# Patient Record
Sex: FEMALE | Employment: OTHER | URBAN - METROPOLITAN AREA
[De-identification: names, ages, dates, MRNs, and addresses within clinical notes are randomized per-mention and may not be internally consistent; named-entity substitution may affect disease eponyms.]

---

## 2020-01-01 ENCOUNTER — APPOINTMENT (OUTPATIENT)
Dept: GENERAL RADIOLOGY | Age: 75
DRG: 870 | End: 2020-01-01
Attending: INTERNAL MEDICINE
Payer: MEDICARE

## 2020-01-01 ENCOUNTER — HOSPITAL ENCOUNTER (INPATIENT)
Age: 75
LOS: 20 days | DRG: 870 | End: 2020-12-06
Attending: INTERNAL MEDICINE | Admitting: STUDENT IN AN ORGANIZED HEALTH CARE EDUCATION/TRAINING PROGRAM
Payer: MEDICARE

## 2020-01-01 ENCOUNTER — ANESTHESIA EVENT (OUTPATIENT)
Dept: ICU | Age: 75
DRG: 870 | End: 2020-01-01
Payer: MEDICARE

## 2020-01-01 ENCOUNTER — APPOINTMENT (OUTPATIENT)
Dept: NUCLEAR MEDICINE | Age: 75
DRG: 870 | End: 2020-01-01
Attending: INTERNAL MEDICINE
Payer: MEDICARE

## 2020-01-01 ENCOUNTER — ANESTHESIA (OUTPATIENT)
Dept: ICU | Age: 75
DRG: 870 | End: 2020-01-01
Payer: MEDICARE

## 2020-01-01 VITALS
SYSTOLIC BLOOD PRESSURE: 100 MMHG | HEIGHT: 67 IN | DIASTOLIC BLOOD PRESSURE: 47 MMHG | TEMPERATURE: 100.6 F | WEIGHT: 169.31 LBS | HEART RATE: 118 BPM | OXYGEN SATURATION: 75 % | RESPIRATION RATE: 21 BRPM | BODY MASS INDEX: 26.57 KG/M2

## 2020-01-01 LAB
-: NORMAL
-: NORMAL
ABO/RH: NORMAL
ABSOLUTE EOS #: 0 K/UL (ref 0–0.4)
ABSOLUTE EOS #: 0 K/UL (ref 0–0.44)
ABSOLUTE EOS #: 0.01 K/UL (ref 0–0.4)
ABSOLUTE EOS #: 0.11 K/UL (ref 0–0.4)
ABSOLUTE EOS #: <0.03 K/UL (ref 0–0.44)
ABSOLUTE IMMATURE GRANULOCYTE: 0 K/UL (ref 0–0.3)
ABSOLUTE IMMATURE GRANULOCYTE: 0 K/UL (ref 0–0.3)
ABSOLUTE IMMATURE GRANULOCYTE: 0.08 K/UL (ref 0–0.3)
ABSOLUTE IMMATURE GRANULOCYTE: 0.09 K/UL (ref 0–0.3)
ABSOLUTE IMMATURE GRANULOCYTE: 0.11 K/UL (ref 0–0.3)
ABSOLUTE IMMATURE GRANULOCYTE: 0.12 K/UL (ref 0–0.3)
ABSOLUTE IMMATURE GRANULOCYTE: 0.13 K/UL (ref 0–0.3)
ABSOLUTE IMMATURE GRANULOCYTE: 0.14 K/UL (ref 0–0.3)
ABSOLUTE IMMATURE GRANULOCYTE: 0.17 K/UL (ref 0–0.3)
ABSOLUTE IMMATURE GRANULOCYTE: 0.2 K/UL (ref 0–0.3)
ABSOLUTE IMMATURE GRANULOCYTE: 0.27 K/UL (ref 0–0.3)
ABSOLUTE IMMATURE GRANULOCYTE: 0.27 K/UL (ref 0–0.3)
ABSOLUTE IMMATURE GRANULOCYTE: 0.31 K/UL (ref 0–0.3)
ABSOLUTE IMMATURE GRANULOCYTE: 0.41 K/UL (ref 0–0.3)
ABSOLUTE IMMATURE GRANULOCYTE: 0.47 K/UL (ref 0–0.3)
ABSOLUTE IMMATURE GRANULOCYTE: 0.62 K/UL (ref 0–0.3)
ABSOLUTE LYMPH #: 0.48 K/UL (ref 1–4.8)
ABSOLUTE LYMPH #: 0.58 K/UL (ref 1–4.8)
ABSOLUTE LYMPH #: 0.63 K/UL (ref 1–4.8)
ABSOLUTE LYMPH #: 0.67 K/UL (ref 1–4.8)
ABSOLUTE LYMPH #: 0.71 K/UL (ref 1.1–3.7)
ABSOLUTE LYMPH #: 0.71 K/UL (ref 1.1–3.7)
ABSOLUTE LYMPH #: 0.74 K/UL (ref 1.1–3.7)
ABSOLUTE LYMPH #: 0.76 K/UL (ref 1.1–3.7)
ABSOLUTE LYMPH #: 0.79 K/UL (ref 1.1–3.7)
ABSOLUTE LYMPH #: 0.93 K/UL (ref 1.1–3.7)
ABSOLUTE LYMPH #: 0.94 K/UL (ref 1.1–3.7)
ABSOLUTE LYMPH #: 1.04 K/UL (ref 1.1–3.7)
ABSOLUTE LYMPH #: 1.19 K/UL (ref 1–4.8)
ABSOLUTE LYMPH #: 1.24 K/UL (ref 1–4.8)
ABSOLUTE LYMPH #: 1.35 K/UL (ref 1–4.8)
ABSOLUTE LYMPH #: 2.05 K/UL (ref 1–4.8)
ABSOLUTE LYMPH #: 2.08 K/UL (ref 1–4.8)
ABSOLUTE LYMPH #: 2.14 K/UL (ref 1–4.8)
ABSOLUTE MONO #: 0.16 K/UL (ref 0.1–1.2)
ABSOLUTE MONO #: 0.19 K/UL (ref 0.1–1.2)
ABSOLUTE MONO #: 0.19 K/UL (ref 0.1–1.2)
ABSOLUTE MONO #: 0.21 K/UL (ref 0.1–0.8)
ABSOLUTE MONO #: 0.22 K/UL (ref 0.1–0.8)
ABSOLUTE MONO #: 0.23 K/UL (ref 0.1–1.2)
ABSOLUTE MONO #: 0.23 K/UL (ref 0.1–1.2)
ABSOLUTE MONO #: 0.24 K/UL (ref 0.1–1.2)
ABSOLUTE MONO #: 0.26 K/UL (ref 0.1–0.8)
ABSOLUTE MONO #: 0.26 K/UL (ref 0.1–1.2)
ABSOLUTE MONO #: 0.27 K/UL (ref 0.1–0.8)
ABSOLUTE MONO #: 0.28 K/UL (ref 0.1–0.8)
ABSOLUTE MONO #: 0.34 K/UL (ref 0.1–0.8)
ABSOLUTE MONO #: 0.39 K/UL (ref 0.1–1.2)
ABSOLUTE MONO #: 0.48 K/UL (ref 0.1–0.8)
ABSOLUTE MONO #: 0.9 K/UL (ref 0.1–0.8)
ABSOLUTE MONO #: 0.9 K/UL (ref 0.1–0.8)
ABSOLUTE MONO #: 0.94 K/UL (ref 0.1–0.8)
ACTION: NORMAL
ADENOVIRUS PCR: NOT DETECTED
ALBUMIN SERPL-MCNC: 2.2 G/DL (ref 3.5–5.2)
ALBUMIN SERPL-MCNC: 2.2 G/DL (ref 3.5–5.2)
ALBUMIN SERPL-MCNC: 2.3 G/DL (ref 3.5–5.2)
ALBUMIN SERPL-MCNC: 2.5 G/DL (ref 3.5–5.2)
ALBUMIN SERPL-MCNC: 2.6 G/DL (ref 3.5–5.2)
ALBUMIN SERPL-MCNC: 2.8 G/DL (ref 3.5–5.2)
ALBUMIN SERPL-MCNC: 2.9 G/DL (ref 3.5–5.2)
ALBUMIN SERPL-MCNC: 2.9 G/DL (ref 3.5–5.2)
ALBUMIN SERPL-MCNC: 3 G/DL (ref 3.5–5.2)
ALBUMIN/GLOBULIN RATIO: 0.6 (ref 1–2.5)
ALBUMIN/GLOBULIN RATIO: 0.7 (ref 1–2.5)
ALBUMIN/GLOBULIN RATIO: 0.9 (ref 1–2.5)
ALBUMIN/GLOBULIN RATIO: 1 (ref 1–2.5)
ALBUMIN/GLOBULIN RATIO: 1 (ref 1–2.5)
ALLEN TEST: ABNORMAL
ALP BLD-CCNC: 58 U/L (ref 35–104)
ALP BLD-CCNC: 61 U/L (ref 35–104)
ALP BLD-CCNC: 62 U/L (ref 35–104)
ALP BLD-CCNC: 74 U/L (ref 35–104)
ALP BLD-CCNC: 76 U/L (ref 35–104)
ALP BLD-CCNC: 77 U/L (ref 35–104)
ALP BLD-CCNC: 88 U/L (ref 35–104)
ALT SERPL-CCNC: 14 U/L (ref 5–33)
ALT SERPL-CCNC: 18 U/L (ref 5–33)
ALT SERPL-CCNC: 18 U/L (ref 5–33)
ALT SERPL-CCNC: 23 U/L (ref 5–33)
ALT SERPL-CCNC: 35 U/L (ref 5–33)
ALT SERPL-CCNC: 45 U/L (ref 5–33)
ALT SERPL-CCNC: 49 U/L (ref 5–33)
AMORPHOUS: NORMAL
AMORPHOUS: NORMAL
ANION GAP SERPL CALCULATED.3IONS-SCNC: 11 MMOL/L (ref 9–17)
ANION GAP SERPL CALCULATED.3IONS-SCNC: 12 MMOL/L (ref 9–17)
ANION GAP SERPL CALCULATED.3IONS-SCNC: 15 MMOL/L (ref 9–17)
ANION GAP SERPL CALCULATED.3IONS-SCNC: 16 MMOL/L (ref 9–17)
ANION GAP SERPL CALCULATED.3IONS-SCNC: 5 MMOL/L (ref 9–17)
ANION GAP SERPL CALCULATED.3IONS-SCNC: 6 MMOL/L (ref 9–17)
ANION GAP SERPL CALCULATED.3IONS-SCNC: 7 MMOL/L (ref 9–17)
ANION GAP SERPL CALCULATED.3IONS-SCNC: 7 MMOL/L (ref 9–17)
ANION GAP SERPL CALCULATED.3IONS-SCNC: 8 MMOL/L (ref 9–17)
ANION GAP SERPL CALCULATED.3IONS-SCNC: 9 MMOL/L (ref 9–17)
ANTIBODY IDENTIFICATION: NORMAL
ANTIBODY SCREEN: NORMAL
ANTIBODY SCREEN: POSITIVE
ARM BAND NUMBER: NORMAL
AST SERPL-CCNC: 13 U/L
AST SERPL-CCNC: 16 U/L
AST SERPL-CCNC: 18 U/L
AST SERPL-CCNC: 22 U/L
AST SERPL-CCNC: 33 U/L
AST SERPL-CCNC: 38 U/L
AST SERPL-CCNC: 41 U/L
ATYPICAL LYMPHOCYTE ABSOLUTE COUNT: 0.01 K/UL
ATYPICAL LYMPHOCYTES: 1 %
BACTERIA: NORMAL
BACTERIA: NORMAL
BASOPHILS # BLD: 0 % (ref 0–2)
BASOPHILS # BLD: 1 % (ref 0–2)
BASOPHILS # BLD: 1 % (ref 0–2)
BASOPHILS ABSOLUTE: 0 K/UL (ref 0–0.2)
BASOPHILS ABSOLUTE: 0.08 K/UL (ref 0–0.2)
BASOPHILS ABSOLUTE: <0.03 K/UL (ref 0–0.2)
BILIRUB SERPL-MCNC: 0.29 MG/DL (ref 0.3–1.2)
BILIRUB SERPL-MCNC: 0.3 MG/DL (ref 0.3–1.2)
BILIRUB SERPL-MCNC: 0.41 MG/DL (ref 0.3–1.2)
BILIRUB SERPL-MCNC: 0.47 MG/DL (ref 0.3–1.2)
BILIRUB SERPL-MCNC: 0.51 MG/DL (ref 0.3–1.2)
BILIRUB SERPL-MCNC: 0.53 MG/DL (ref 0.3–1.2)
BILIRUB SERPL-MCNC: 0.64 MG/DL (ref 0.3–1.2)
BILIRUBIN DIRECT: 0.22 MG/DL
BILIRUBIN URINE: ABNORMAL
BILIRUBIN URINE: NEGATIVE
BILIRUBIN, INDIRECT: 0.31 MG/DL (ref 0–1)
BLD PROD TYP BPU: NORMAL
BLOOD BANK SPECIMEN: NORMAL
BNP INTERPRETATION: ABNORMAL
BNP INTERPRETATION: ABNORMAL
BORDETELLA PARAPERTUSSIS: NOT DETECTED
BORDETELLA PERTUSSIS PCR: NOT DETECTED
BUN BLDV-MCNC: 16 MG/DL (ref 8–23)
BUN BLDV-MCNC: 19 MG/DL (ref 8–23)
BUN BLDV-MCNC: 22 MG/DL (ref 8–23)
BUN BLDV-MCNC: 22 MG/DL (ref 8–23)
BUN BLDV-MCNC: 23 MG/DL (ref 8–23)
BUN BLDV-MCNC: 24 MG/DL (ref 8–23)
BUN BLDV-MCNC: 24 MG/DL (ref 8–23)
BUN BLDV-MCNC: 26 MG/DL (ref 8–23)
BUN BLDV-MCNC: 28 MG/DL (ref 8–23)
BUN BLDV-MCNC: 32 MG/DL (ref 8–23)
BUN BLDV-MCNC: 32 MG/DL (ref 8–23)
BUN BLDV-MCNC: 34 MG/DL (ref 8–23)
BUN BLDV-MCNC: 38 MG/DL (ref 8–23)
BUN BLDV-MCNC: 44 MG/DL (ref 8–23)
BUN/CREAT BLD: ABNORMAL (ref 9–20)
C-REACTIVE PROTEIN: 164 MG/L (ref 0–5)
C-REACTIVE PROTEIN: 227.4 MG/L (ref 0–5)
C-REACTIVE PROTEIN: 252.8 MG/L (ref 0–5)
C-REACTIVE PROTEIN: 67.6 MG/L (ref 0–5)
CALCIUM SERPL-MCNC: 7.8 MG/DL (ref 8.6–10.4)
CALCIUM SERPL-MCNC: 8 MG/DL (ref 8.6–10.4)
CALCIUM SERPL-MCNC: 8.1 MG/DL (ref 8.6–10.4)
CALCIUM SERPL-MCNC: 8.2 MG/DL (ref 8.6–10.4)
CALCIUM SERPL-MCNC: 8.3 MG/DL (ref 8.6–10.4)
CALCIUM SERPL-MCNC: 8.4 MG/DL (ref 8.6–10.4)
CALCIUM SERPL-MCNC: 8.4 MG/DL (ref 8.6–10.4)
CALCIUM SERPL-MCNC: 8.6 MG/DL (ref 8.6–10.4)
CALCIUM SERPL-MCNC: 9 MG/DL (ref 8.6–10.4)
CASTS UA: NORMAL /LPF (ref 0–8)
CASTS UA: NORMAL /LPF (ref 0–8)
CHLAMYDIA PNEUMONIAE BY PCR: NOT DETECTED
CHLORIDE BLD-SCNC: 100 MMOL/L (ref 98–107)
CHLORIDE BLD-SCNC: 101 MMOL/L (ref 98–107)
CHLORIDE BLD-SCNC: 102 MMOL/L (ref 98–107)
CHLORIDE BLD-SCNC: 102 MMOL/L (ref 98–107)
CHLORIDE BLD-SCNC: 103 MMOL/L (ref 98–107)
CHLORIDE BLD-SCNC: 103 MMOL/L (ref 98–107)
CHLORIDE BLD-SCNC: 104 MMOL/L (ref 98–107)
CHLORIDE BLD-SCNC: 105 MMOL/L (ref 98–107)
CHLORIDE BLD-SCNC: 105 MMOL/L (ref 98–107)
CHLORIDE BLD-SCNC: 106 MMOL/L (ref 98–107)
CHLORIDE BLD-SCNC: 106 MMOL/L (ref 98–107)
CHLORIDE BLD-SCNC: 107 MMOL/L (ref 98–107)
CHLORIDE BLD-SCNC: 108 MMOL/L (ref 98–107)
CHLORIDE BLD-SCNC: 111 MMOL/L (ref 98–107)
CO2: 21 MMOL/L (ref 20–31)
CO2: 22 MMOL/L (ref 20–31)
CO2: 24 MMOL/L (ref 20–31)
CO2: 25 MMOL/L (ref 20–31)
CO2: 26 MMOL/L (ref 20–31)
CO2: 27 MMOL/L (ref 20–31)
CO2: 27 MMOL/L (ref 20–31)
CO2: 28 MMOL/L (ref 20–31)
CO2: 30 MMOL/L (ref 20–31)
CO2: 31 MMOL/L (ref 20–31)
CO2: 31 MMOL/L (ref 20–31)
CO2: 33 MMOL/L (ref 20–31)
COLOR: ABNORMAL
COLOR: ABNORMAL
COMMENT UA: ABNORMAL
COMMENT UA: ABNORMAL
CORONAVIRUS 229E PCR: NOT DETECTED
CORONAVIRUS HKU1 PCR: NOT DETECTED
CORONAVIRUS NL63 PCR: NOT DETECTED
CORONAVIRUS OC43 PCR: NOT DETECTED
CORTISOL COLLECTION INFO: NORMAL
CORTISOL: 12.2 UG/DL (ref 2.7–18.4)
CREAT SERPL-MCNC: 0.48 MG/DL (ref 0.5–0.9)
CREAT SERPL-MCNC: 0.51 MG/DL (ref 0.5–0.9)
CREAT SERPL-MCNC: 0.51 MG/DL (ref 0.5–0.9)
CREAT SERPL-MCNC: 0.52 MG/DL (ref 0.5–0.9)
CREAT SERPL-MCNC: 0.53 MG/DL (ref 0.5–0.9)
CREAT SERPL-MCNC: 0.56 MG/DL (ref 0.5–0.9)
CREAT SERPL-MCNC: 0.57 MG/DL (ref 0.5–0.9)
CREAT SERPL-MCNC: 0.58 MG/DL (ref 0.5–0.9)
CREAT SERPL-MCNC: 0.61 MG/DL (ref 0.5–0.9)
CREAT SERPL-MCNC: 0.61 MG/DL (ref 0.5–0.9)
CREAT SERPL-MCNC: 0.64 MG/DL (ref 0.5–0.9)
CREAT SERPL-MCNC: 0.65 MG/DL (ref 0.5–0.9)
CREAT SERPL-MCNC: 0.66 MG/DL (ref 0.5–0.9)
CREAT SERPL-MCNC: 0.81 MG/DL (ref 0.5–0.9)
CREAT SERPL-MCNC: 0.81 MG/DL (ref 0.5–0.9)
CREAT SERPL-MCNC: 0.89 MG/DL (ref 0.5–0.9)
CROSSMATCH RESULT: NORMAL
CRYSTALS, UA: NORMAL /HPF
CRYSTALS, UA: NORMAL /HPF
CULTURE: ABNORMAL
CULTURE: NORMAL
D-DIMER QUANTITATIVE: 1.93 MG/L FEU
D-DIMER QUANTITATIVE: 2.44 MG/L FEU
D-DIMER QUANTITATIVE: 24.53 MG/L FEU
DAT IGG: NEGATIVE
DATE AND TIME: NORMAL
DATE, STOOL #1: ABNORMAL
DATE, STOOL #1: NORMAL
DATE, STOOL #2: ABNORMAL
DATE, STOOL #2: NORMAL
DATE, STOOL #3: ABNORMAL
DATE, STOOL #3: NORMAL
DIFFERENTIAL TYPE: ABNORMAL
DIRECT EXAM: ABNORMAL
DIRECT EXAM: NORMAL
DISPENSE STATUS BLOOD BANK: NORMAL
EKG ATRIAL RATE: 87 BPM
EKG ATRIAL RATE: 96 BPM
EKG P AXIS: 13 DEGREES
EKG P AXIS: 42 DEGREES
EKG P-R INTERVAL: 112 MS
EKG P-R INTERVAL: 134 MS
EKG Q-T INTERVAL: 336 MS
EKG Q-T INTERVAL: 340 MS
EKG QRS DURATION: 84 MS
EKG QRS DURATION: 88 MS
EKG QTC CALCULATION (BAZETT): 409 MS
EKG QTC CALCULATION (BAZETT): 424 MS
EKG R AXIS: 47 DEGREES
EKG R AXIS: 71 DEGREES
EKG T AXIS: 28 DEGREES
EKG T AXIS: 75 DEGREES
EKG VENTRICULAR RATE: 87 BPM
EKG VENTRICULAR RATE: 96 BPM
EOSINOPHILS RELATIVE PERCENT: 0 % (ref 1–4)
EOSINOPHILS RELATIVE PERCENT: 1 % (ref 1–4)
EOSINOPHILS RELATIVE PERCENT: 2 % (ref 1–4)
EPITHELIAL CELLS UA: NORMAL /HPF (ref 0–5)
EPITHELIAL CELLS UA: NORMAL /HPF (ref 0–5)
EXPIRATION DATE: NORMAL
FERRITIN: 2838 UG/L (ref 13–150)
FERRITIN: 2970 UG/L (ref 13–150)
FERRITIN: 3024 UG/L (ref 13–150)
FERRITIN: 3046 UG/L (ref 13–150)
FERRITIN: 3453 UG/L (ref 13–150)
FERRITIN: 3590 UG/L (ref 13–150)
FERRITIN: 4245 UG/L (ref 13–150)
FIBRINOGEN: 1002 MG/DL (ref 140–420)
FIBRINOGEN: 506 MG/DL (ref 140–420)
FIBRINOGEN: 673 MG/DL (ref 140–420)
FIBRINOGEN: 720 MG/DL (ref 140–420)
FIBRINOGEN: 721 MG/DL (ref 140–420)
FIO2: 100
FIO2: 100
FIO2: 40
FIO2: 45
FIO2: 45
FIO2: 55
FIO2: 65
FIO2: 70
FIO2: 80
FIO2: ABNORMAL
FOLATE: 11.5 NG/ML
GFR AFRICAN AMERICAN: >60 ML/MIN
GFR NON-AFRICAN AMERICAN: >60 ML/MIN
GFR SERPL CREATININE-BSD FRML MDRD: ABNORMAL ML/MIN/{1.73_M2}
GLOBULIN: ABNORMAL G/DL (ref 1.5–3.8)
GLUCOSE BLD-MCNC: 103 MG/DL (ref 74–100)
GLUCOSE BLD-MCNC: 107 MG/DL (ref 70–99)
GLUCOSE BLD-MCNC: 108 MG/DL (ref 65–105)
GLUCOSE BLD-MCNC: 108 MG/DL (ref 70–99)
GLUCOSE BLD-MCNC: 109 MG/DL (ref 70–99)
GLUCOSE BLD-MCNC: 110 MG/DL (ref 74–100)
GLUCOSE BLD-MCNC: 110 MG/DL (ref 74–100)
GLUCOSE BLD-MCNC: 111 MG/DL (ref 70–99)
GLUCOSE BLD-MCNC: 112 MG/DL (ref 70–99)
GLUCOSE BLD-MCNC: 116 MG/DL (ref 70–99)
GLUCOSE BLD-MCNC: 121 MG/DL (ref 70–99)
GLUCOSE BLD-MCNC: 126 MG/DL (ref 74–100)
GLUCOSE BLD-MCNC: 127 MG/DL (ref 74–100)
GLUCOSE BLD-MCNC: 128 MG/DL (ref 74–100)
GLUCOSE BLD-MCNC: 131 MG/DL (ref 74–100)
GLUCOSE BLD-MCNC: 133 MG/DL (ref 70–99)
GLUCOSE BLD-MCNC: 133 MG/DL (ref 74–100)
GLUCOSE BLD-MCNC: 139 MG/DL (ref 70–99)
GLUCOSE BLD-MCNC: 142 MG/DL (ref 74–100)
GLUCOSE BLD-MCNC: 84 MG/DL (ref 70–99)
GLUCOSE BLD-MCNC: 84 MG/DL (ref 70–99)
GLUCOSE BLD-MCNC: 91 MG/DL (ref 70–99)
GLUCOSE BLD-MCNC: 93 MG/DL (ref 70–99)
GLUCOSE BLD-MCNC: 93 MG/DL (ref 70–99)
GLUCOSE BLD-MCNC: 94 MG/DL (ref 70–99)
GLUCOSE BLD-MCNC: 96 MG/DL (ref 70–99)
GLUCOSE BLD-MCNC: 98 MG/DL (ref 74–100)
GLUCOSE URINE: NEGATIVE
GLUCOSE URINE: NEGATIVE
HCT VFR BLD CALC: 19.8 % (ref 36.3–47.1)
HCT VFR BLD CALC: 21.5 % (ref 36.3–47.1)
HCT VFR BLD CALC: 21.8 % (ref 36.3–47.1)
HCT VFR BLD CALC: 22.4 % (ref 36.3–47.1)
HCT VFR BLD CALC: 22.9 % (ref 36.3–47.1)
HCT VFR BLD CALC: 23.2 % (ref 36.3–47.1)
HCT VFR BLD CALC: 23.4 % (ref 36.3–47.1)
HCT VFR BLD CALC: 23.6 % (ref 36.3–47.1)
HCT VFR BLD CALC: 23.8 % (ref 36.3–47.1)
HCT VFR BLD CALC: 23.9 % (ref 36.3–47.1)
HCT VFR BLD CALC: 24 % (ref 36.3–47.1)
HCT VFR BLD CALC: 24.1 % (ref 36.3–47.1)
HCT VFR BLD CALC: 24.2 % (ref 36.3–47.1)
HCT VFR BLD CALC: 25.2 % (ref 36.3–47.1)
HCT VFR BLD CALC: 25.2 % (ref 36.3–47.1)
HCT VFR BLD CALC: 25.3 % (ref 36.3–47.1)
HCT VFR BLD CALC: 25.4 % (ref 36.3–47.1)
HCT VFR BLD CALC: 25.6 % (ref 36.3–47.1)
HCT VFR BLD CALC: 25.7 % (ref 36.3–47.1)
HCT VFR BLD CALC: 25.7 % (ref 36.3–47.1)
HCT VFR BLD CALC: 25.9 % (ref 36.3–47.1)
HCT VFR BLD CALC: 25.9 % (ref 36.3–47.1)
HCT VFR BLD CALC: 26 % (ref 36.3–47.1)
HCT VFR BLD CALC: 26 % (ref 36.3–47.1)
HCT VFR BLD CALC: 26.1 % (ref 36.3–47.1)
HCT VFR BLD CALC: 26.4 % (ref 36.3–47.1)
HCT VFR BLD CALC: 26.4 % (ref 36.3–47.1)
HCT VFR BLD CALC: 26.7 % (ref 36.3–47.1)
HCT VFR BLD CALC: 27.6 % (ref 36.3–47.1)
HCT VFR BLD CALC: 27.8 % (ref 36.3–47.1)
HCT VFR BLD CALC: 28.8 % (ref 36.3–47.1)
HCT VFR BLD CALC: 29.3 % (ref 36.3–47.1)
HEMOCCULT SP1 STL QL: NEGATIVE
HEMOCCULT SP1 STL QL: POSITIVE
HEMOCCULT SP2 STL QL: ABNORMAL
HEMOCCULT SP2 STL QL: NORMAL
HEMOCCULT SP3 STL QL: ABNORMAL
HEMOCCULT SP3 STL QL: NORMAL
HEMOGLOBIN: 6 G/DL (ref 11.9–15.1)
HEMOGLOBIN: 6.7 G/DL (ref 11.9–15.1)
HEMOGLOBIN: 6.8 G/DL (ref 11.9–15.1)
HEMOGLOBIN: 6.8 G/DL (ref 11.9–15.1)
HEMOGLOBIN: 7 G/DL (ref 11.9–15.1)
HEMOGLOBIN: 7.1 G/DL (ref 11.9–15.1)
HEMOGLOBIN: 7.2 G/DL (ref 11.9–15.1)
HEMOGLOBIN: 7.3 G/DL (ref 11.9–15.1)
HEMOGLOBIN: 7.4 G/DL (ref 11.9–15.1)
HEMOGLOBIN: 7.4 G/DL (ref 11.9–15.1)
HEMOGLOBIN: 7.5 G/DL (ref 11.9–15.1)
HEMOGLOBIN: 7.7 G/DL (ref 11.9–15.1)
HEMOGLOBIN: 7.8 G/DL (ref 11.9–15.1)
HEMOGLOBIN: 8 G/DL (ref 11.9–15.1)
HEMOGLOBIN: 8.1 G/DL (ref 11.9–15.1)
HEMOGLOBIN: 8.1 G/DL (ref 11.9–15.1)
HEMOGLOBIN: 8.2 G/DL (ref 11.9–15.1)
HEMOGLOBIN: 8.4 G/DL (ref 11.9–15.1)
HEMOGLOBIN: 9 G/DL (ref 11.9–15.1)
HEMOGLOBIN: 9.1 G/DL (ref 11.9–15.1)
HUMAN METAPNEUMOVIRUS PCR: NOT DETECTED
IMMATURE GRANULOCYTES: 0 %
IMMATURE GRANULOCYTES: 0 %
IMMATURE GRANULOCYTES: 1 %
IMMATURE GRANULOCYTES: 1 %
IMMATURE GRANULOCYTES: 2 %
IMMATURE GRANULOCYTES: 2 %
IMMATURE GRANULOCYTES: 3 %
IMMATURE GRANULOCYTES: 4 %
IMMATURE GRANULOCYTES: 5 %
IMMATURE GRANULOCYTES: 9 %
INFLUENZA A BY PCR: NOT DETECTED
INFLUENZA A H1 (2009) PCR: ABNORMAL
INFLUENZA A H1 PCR: ABNORMAL
INFLUENZA A H3 PCR: ABNORMAL
INFLUENZA B BY PCR: NOT DETECTED
INR BLD: 0.9
INR BLD: 0.9
IRON SATURATION: 68 % (ref 20–55)
IRON: 92 UG/DL (ref 37–145)
KETONES, URINE: ABNORMAL
KETONES, URINE: NEGATIVE
LACTATE DEHYDROGENASE: 224 U/L (ref 135–214)
LACTATE DEHYDROGENASE: 291 U/L (ref 135–214)
LACTATE DEHYDROGENASE: 296 U/L (ref 135–214)
LACTATE DEHYDROGENASE: 407 U/L (ref 135–214)
LEGIONELLA PNEUMOPHILIA AG, URINE: NEGATIVE
LEUKOCYTE ESTERASE, URINE: NEGATIVE
LEUKOCYTE ESTERASE, URINE: NEGATIVE
LYMPHOCYTES # BLD: 11 % (ref 24–44)
LYMPHOCYTES # BLD: 13 % (ref 24–43)
LYMPHOCYTES # BLD: 15 % (ref 24–44)
LYMPHOCYTES # BLD: 16 % (ref 24–44)
LYMPHOCYTES # BLD: 16 % (ref 24–44)
LYMPHOCYTES # BLD: 18 % (ref 24–44)
LYMPHOCYTES # BLD: 19 % (ref 24–43)
LYMPHOCYTES # BLD: 20 % (ref 24–44)
LYMPHOCYTES # BLD: 23 % (ref 24–44)
LYMPHOCYTES # BLD: 24 % (ref 24–43)
LYMPHOCYTES # BLD: 27 % (ref 24–43)
LYMPHOCYTES # BLD: 27 % (ref 24–43)
LYMPHOCYTES # BLD: 30 % (ref 24–43)
LYMPHOCYTES # BLD: 36 % (ref 24–44)
LYMPHOCYTES # BLD: 48 % (ref 24–44)
LYMPHOCYTES # BLD: 7 % (ref 24–44)
LYMPHOCYTES # BLD: 9 % (ref 24–43)
LYMPHOCYTES # BLD: 9 % (ref 24–43)
Lab: ABNORMAL
Lab: NORMAL
MAGNESIUM: 2 MG/DL (ref 1.6–2.6)
MAGNESIUM: 2.3 MG/DL (ref 1.6–2.6)
MAGNESIUM: 2.4 MG/DL (ref 1.6–2.6)
MAGNESIUM: 2.5 MG/DL (ref 1.6–2.6)
MCH RBC QN AUTO: 29 PG (ref 25.2–33.5)
MCH RBC QN AUTO: 29.2 PG (ref 25.2–33.5)
MCH RBC QN AUTO: 29.3 PG (ref 25.2–33.5)
MCH RBC QN AUTO: 29.4 PG (ref 25.2–33.5)
MCH RBC QN AUTO: 29.4 PG (ref 25.2–33.5)
MCH RBC QN AUTO: 29.5 PG (ref 25.2–33.5)
MCH RBC QN AUTO: 29.6 PG (ref 25.2–33.5)
MCH RBC QN AUTO: 29.8 PG (ref 25.2–33.5)
MCH RBC QN AUTO: 29.8 PG (ref 25.2–33.5)
MCH RBC QN AUTO: 29.9 PG (ref 25.2–33.5)
MCH RBC QN AUTO: 29.9 PG (ref 25.2–33.5)
MCH RBC QN AUTO: 30 PG (ref 25.2–33.5)
MCH RBC QN AUTO: 30.1 PG (ref 25.2–33.5)
MCH RBC QN AUTO: 30.3 PG (ref 25.2–33.5)
MCH RBC QN AUTO: 30.3 PG (ref 25.2–33.5)
MCH RBC QN AUTO: 30.8 PG (ref 25.2–33.5)
MCH RBC QN AUTO: 30.9 PG (ref 25.2–33.5)
MCH RBC QN AUTO: 31.5 PG (ref 25.2–33.5)
MCHC RBC AUTO-ENTMCNC: 29 G/DL (ref 28.4–34.8)
MCHC RBC AUTO-ENTMCNC: 29.3 G/DL (ref 28.4–34.8)
MCHC RBC AUTO-ENTMCNC: 29.6 G/DL (ref 28.4–34.8)
MCHC RBC AUTO-ENTMCNC: 29.8 G/DL (ref 28.4–34.8)
MCHC RBC AUTO-ENTMCNC: 30.1 G/DL (ref 28.4–34.8)
MCHC RBC AUTO-ENTMCNC: 30.2 G/DL (ref 28.4–34.8)
MCHC RBC AUTO-ENTMCNC: 30.3 G/DL (ref 28.4–34.8)
MCHC RBC AUTO-ENTMCNC: 30.3 G/DL (ref 28.4–34.8)
MCHC RBC AUTO-ENTMCNC: 30.7 G/DL (ref 28.4–34.8)
MCHC RBC AUTO-ENTMCNC: 30.8 G/DL (ref 28.4–34.8)
MCHC RBC AUTO-ENTMCNC: 30.9 G/DL (ref 28.4–34.8)
MCHC RBC AUTO-ENTMCNC: 31.1 G/DL (ref 28.4–34.8)
MCHC RBC AUTO-ENTMCNC: 31.2 G/DL (ref 28.4–34.8)
MCHC RBC AUTO-ENTMCNC: 31.3 G/DL (ref 28.4–34.8)
MCHC RBC AUTO-ENTMCNC: 31.4 G/DL (ref 28.4–34.8)
MCHC RBC AUTO-ENTMCNC: 31.6 G/DL (ref 28.4–34.8)
MCHC RBC AUTO-ENTMCNC: 31.7 G/DL (ref 28.4–34.8)
MCHC RBC AUTO-ENTMCNC: 32.8 G/DL (ref 28.4–34.8)
MCV RBC AUTO: 100 FL (ref 82.6–102.9)
MCV RBC AUTO: 102 FL (ref 82.6–102.9)
MCV RBC AUTO: 102.2 FL (ref 82.6–102.9)
MCV RBC AUTO: 91.2 FL (ref 82.6–102.9)
MCV RBC AUTO: 93.3 FL (ref 82.6–102.9)
MCV RBC AUTO: 94.3 FL (ref 82.6–102.9)
MCV RBC AUTO: 94.5 FL (ref 82.6–102.9)
MCV RBC AUTO: 95.3 FL (ref 82.6–102.9)
MCV RBC AUTO: 95.5 FL (ref 82.6–102.9)
MCV RBC AUTO: 96.6 FL (ref 82.6–102.9)
MCV RBC AUTO: 97.1 FL (ref 82.6–102.9)
MCV RBC AUTO: 97.3 FL (ref 82.6–102.9)
MCV RBC AUTO: 97.7 FL (ref 82.6–102.9)
MCV RBC AUTO: 97.9 FL (ref 82.6–102.9)
MCV RBC AUTO: 98.2 FL (ref 82.6–102.9)
MCV RBC AUTO: 98.8 FL (ref 82.6–102.9)
MCV RBC AUTO: 99.2 FL (ref 82.6–102.9)
MODE: ABNORMAL
MONOCYTES # BLD: 10 % (ref 1–7)
MONOCYTES # BLD: 11 % (ref 3–12)
MONOCYTES # BLD: 17 % (ref 1–7)
MONOCYTES # BLD: 2 % (ref 3–12)
MONOCYTES # BLD: 2 % (ref 3–12)
MONOCYTES # BLD: 3 % (ref 1–7)
MONOCYTES # BLD: 3 % (ref 3–12)
MONOCYTES # BLD: 4 % (ref 1–7)
MONOCYTES # BLD: 4 % (ref 1–7)
MONOCYTES # BLD: 6 % (ref 3–12)
MONOCYTES # BLD: 6 % (ref 3–12)
MONOCYTES # BLD: 7 % (ref 1–7)
MONOCYTES # BLD: 7 % (ref 1–7)
MONOCYTES # BLD: 7 % (ref 3–12)
MONOCYTES # BLD: 8 % (ref 1–7)
MONOCYTES # BLD: 8 % (ref 1–7)
MONOCYTES # BLD: 9 % (ref 1–7)
MONOCYTES # BLD: 9 % (ref 3–12)
MORPHOLOGY: ABNORMAL
MUCUS: NORMAL
MUCUS: NORMAL
MYCOPLASMA PNEUMONIAE IGM: 0.17
MYCOPLASMA PNEUMONIAE IGM: 0.2
MYCOPLASMA PNEUMONIAE PCR: NOT DETECTED
NEGATIVE BASE EXCESS, ART: ABNORMAL (ref 0–2)
NITRITE, URINE: NEGATIVE
NITRITE, URINE: NEGATIVE
NOTIFY: NORMAL
NRBC AUTOMATED: 0 PER 100 WBC
NRBC AUTOMATED: 0.4 PER 100 WBC
NRBC AUTOMATED: 0.6 PER 100 WBC
NRBC AUTOMATED: 0.6 PER 100 WBC
NRBC AUTOMATED: 0.7 PER 100 WBC
NRBC AUTOMATED: 0.7 PER 100 WBC
NRBC AUTOMATED: 1.2 PER 100 WBC
O2 DEVICE/FLOW/%: ABNORMAL
OTHER OBSERVATIONS UA: NORMAL
OTHER OBSERVATIONS UA: NORMAL
PARAINFLUENZA 1 PCR: NOT DETECTED
PARAINFLUENZA 2 PCR: NOT DETECTED
PARAINFLUENZA 3 PCR: NOT DETECTED
PARAINFLUENZA 4 PCR: NOT DETECTED
PATIENT TEMP: 37.5
PATIENT TEMP: 38.3
PATIENT TEMP: 39.8
PATIENT TEMP: ABNORMAL
PDW BLD-RTO: 16.1 % (ref 11.8–14.4)
PDW BLD-RTO: 16.2 % (ref 11.8–14.4)
PDW BLD-RTO: 16.6 % (ref 11.8–14.4)
PDW BLD-RTO: 16.6 % (ref 11.8–14.4)
PDW BLD-RTO: 16.7 % (ref 11.8–14.4)
PDW BLD-RTO: 16.8 % (ref 11.8–14.4)
PDW BLD-RTO: 17 % (ref 11.8–14.4)
PDW BLD-RTO: 17.1 % (ref 11.8–14.4)
PDW BLD-RTO: 17.4 % (ref 11.8–14.4)
PDW BLD-RTO: 17.5 % (ref 11.8–14.4)
PDW BLD-RTO: 18.2 % (ref 11.8–14.4)
PDW BLD-RTO: 18.4 % (ref 11.8–14.4)
PDW BLD-RTO: 18.5 % (ref 11.8–14.4)
PDW BLD-RTO: 19 % (ref 11.8–14.4)
PDW BLD-RTO: 19.2 % (ref 11.8–14.4)
PDW BLD-RTO: 19.2 % (ref 11.8–14.4)
PDW BLD-RTO: 19.6 % (ref 11.8–14.4)
PDW BLD-RTO: 19.6 % (ref 11.8–14.4)
PH UA: 5.5 (ref 5–8)
PH UA: 7 (ref 5–8)
PHOSPHORUS: 3.3 MG/DL (ref 2.6–4.5)
PHOSPHORUS: 3.5 MG/DL (ref 2.6–4.5)
PLATELET # BLD: 118 K/UL (ref 138–453)
PLATELET # BLD: 129 K/UL (ref 138–453)
PLATELET # BLD: 148 K/UL (ref 138–453)
PLATELET # BLD: 148 K/UL (ref 138–453)
PLATELET # BLD: 149 K/UL (ref 138–453)
PLATELET # BLD: 170 K/UL (ref 138–453)
PLATELET # BLD: 219 K/UL (ref 138–453)
PLATELET # BLD: 222 K/UL (ref 138–453)
PLATELET # BLD: 292 K/UL (ref 138–453)
PLATELET # BLD: 292 K/UL (ref 138–453)
PLATELET # BLD: 294 K/UL (ref 138–453)
PLATELET # BLD: 358 K/UL (ref 138–453)
PLATELET # BLD: 385 K/UL (ref 138–453)
PLATELET # BLD: 423 K/UL (ref 138–453)
PLATELET # BLD: 433 K/UL (ref 138–453)
PLATELET # BLD: 75 K/UL (ref 138–453)
PLATELET # BLD: 79 K/UL (ref 138–453)
PLATELET # BLD: 81 K/UL (ref 138–453)
PLATELET # BLD: 90 K/UL (ref 138–453)
PLATELET # BLD: ABNORMAL K/UL (ref 138–453)
PLATELET ESTIMATE: ABNORMAL
PLATELET, FLUORESCENCE: 134 K/UL (ref 138–453)
PLATELET, IMMATURE FRACTION: 9.7 % (ref 1.1–10.3)
PMV BLD AUTO: 10.6 FL (ref 8.1–13.5)
PMV BLD AUTO: 10.7 FL (ref 8.1–13.5)
PMV BLD AUTO: 11 FL (ref 8.1–13.5)
PMV BLD AUTO: 11.1 FL (ref 8.1–13.5)
PMV BLD AUTO: 11.2 FL (ref 8.1–13.5)
PMV BLD AUTO: 11.2 FL (ref 8.1–13.5)
PMV BLD AUTO: 11.3 FL (ref 8.1–13.5)
PMV BLD AUTO: 11.4 FL (ref 8.1–13.5)
PMV BLD AUTO: 11.5 FL (ref 8.1–13.5)
PMV BLD AUTO: 11.8 FL (ref 8.1–13.5)
PMV BLD AUTO: 12.2 FL (ref 8.1–13.5)
PMV BLD AUTO: 12.3 FL (ref 8.1–13.5)
PMV BLD AUTO: 12.4 FL (ref 8.1–13.5)
PMV BLD AUTO: 12.8 FL (ref 8.1–13.5)
PMV BLD AUTO: 12.8 FL (ref 8.1–13.5)
PMV BLD AUTO: 12.9 FL (ref 8.1–13.5)
PMV BLD AUTO: 13 FL (ref 8.1–13.5)
PMV BLD AUTO: ABNORMAL FL (ref 8.1–13.5)
POC HCO3: 26.6 MMOL/L (ref 21–28)
POC HCO3: 29.8 MMOL/L (ref 21–28)
POC HCO3: 30.5 MMOL/L (ref 21–28)
POC HCO3: 30.8 MMOL/L (ref 21–28)
POC HCO3: 31.4 MMOL/L (ref 21–28)
POC HCO3: 31.5 MMOL/L (ref 21–28)
POC HCO3: 31.5 MMOL/L (ref 21–28)
POC HCO3: 33.3 MMOL/L (ref 21–28)
POC HCO3: 33.9 MMOL/L (ref 21–28)
POC HCO3: 36 MMOL/L (ref 21–28)
POC HCO3: 36.2 MMOL/L (ref 21–28)
POC HCO3: 36.8 MMOL/L (ref 21–28)
POC HCO3: 38.9 MMOL/L (ref 21–28)
POC HCO3: 39.5 MMOL/L (ref 21–28)
POC HCO3: 41.4 MMOL/L (ref 21–28)
POC LACTIC ACID: 0.6 MMOL/L (ref 0.56–1.39)
POC LACTIC ACID: 0.68 MMOL/L (ref 0.56–1.39)
POC LACTIC ACID: 0.91 MMOL/L (ref 0.56–1.39)
POC LACTIC ACID: 0.91 MMOL/L (ref 0.56–1.39)
POC LACTIC ACID: 0.96 MMOL/L (ref 0.56–1.39)
POC LACTIC ACID: 1.03 MMOL/L (ref 0.56–1.39)
POC O2 SATURATION: 84 % (ref 94–98)
POC O2 SATURATION: 85 % (ref 94–98)
POC O2 SATURATION: 87 % (ref 94–98)
POC O2 SATURATION: 89 % (ref 94–98)
POC O2 SATURATION: 89 % (ref 94–98)
POC O2 SATURATION: 90 % (ref 94–98)
POC O2 SATURATION: 91 % (ref 94–98)
POC O2 SATURATION: 94 % (ref 94–98)
POC O2 SATURATION: 94 % (ref 94–98)
POC O2 SATURATION: 95 % (ref 94–98)
POC O2 SATURATION: 97 % (ref 94–98)
POC O2 SATURATION: 98 % (ref 94–98)
POC PCO2 TEMP: 39 MM HG
POC PCO2 TEMP: 42 MM HG
POC PCO2 TEMP: 51 MM HG
POC PCO2 TEMP: ABNORMAL MM HG
POC PCO2: 101.6 MM HG (ref 35–48)
POC PCO2: 36.7 MM HG (ref 35–48)
POC PCO2: 37.1 MM HG (ref 35–48)
POC PCO2: 43.7 MM HG (ref 35–48)
POC PCO2: 47 MM HG (ref 35–48)
POC PCO2: 47.6 MM HG (ref 35–48)
POC PCO2: 47.8 MM HG (ref 35–48)
POC PCO2: 49.7 MM HG (ref 35–48)
POC PCO2: 52.3 MM HG (ref 35–48)
POC PCO2: 52.4 MM HG (ref 35–48)
POC PCO2: 56.3 MM HG (ref 35–48)
POC PCO2: 57.1 MM HG (ref 35–48)
POC PCO2: 58.2 MM HG (ref 35–48)
POC PCO2: 58.4 MM HG (ref 35–48)
POC PCO2: 62.6 MM HG (ref 35–48)
POC PH TEMP: 7.4
POC PH TEMP: 7.42
POC PH TEMP: 7.51
POC PH TEMP: ABNORMAL
POC PH: 7.22 (ref 7.35–7.45)
POC PH: 7.37 (ref 7.35–7.45)
POC PH: 7.39 (ref 7.35–7.45)
POC PH: 7.41 (ref 7.35–7.45)
POC PH: 7.43 (ref 7.35–7.45)
POC PH: 7.44 (ref 7.35–7.45)
POC PH: 7.45 (ref 7.35–7.45)
POC PH: 7.45 (ref 7.35–7.45)
POC PH: 7.46 (ref 7.35–7.45)
POC PH: 7.53 (ref 7.35–7.45)
POC PO2 TEMP: 105 MM HG
POC PO2 TEMP: 51 MM HG
POC PO2 TEMP: 56 MM HG
POC PO2 TEMP: ABNORMAL MM HG
POC PO2: 103.6 MM HG (ref 83–108)
POC PO2: 46.7 MM HG (ref 83–108)
POC PO2: 48.6 MM HG (ref 83–108)
POC PO2: 51.5 MM HG (ref 83–108)
POC PO2: 53.7 MM HG (ref 83–108)
POC PO2: 54.1 MM HG (ref 83–108)
POC PO2: 56.3 MM HG (ref 83–108)
POC PO2: 56.9 MM HG (ref 83–108)
POC PO2: 60.3 MM HG (ref 83–108)
POC PO2: 61.1 MM HG (ref 83–108)
POC PO2: 64.3 MM HG (ref 83–108)
POC PO2: 67.9 MM HG (ref 83–108)
POC PO2: 70.6 MM HG (ref 83–108)
POC PO2: 81.1 MM HG (ref 83–108)
POC PO2: 87.4 MM HG (ref 83–108)
POSITIVE BASE EXCESS, ART: 11 (ref 0–3)
POSITIVE BASE EXCESS, ART: 13 (ref 0–3)
POSITIVE BASE EXCESS, ART: 14 (ref 0–3)
POSITIVE BASE EXCESS, ART: 3 (ref 0–3)
POSITIVE BASE EXCESS, ART: 5 (ref 0–3)
POSITIVE BASE EXCESS, ART: 5 (ref 0–3)
POSITIVE BASE EXCESS, ART: 6 (ref 0–3)
POSITIVE BASE EXCESS, ART: 8 (ref 0–3)
POSITIVE BASE EXCESS, ART: 9 (ref 0–3)
POTASSIUM SERPL-SCNC: 3.1 MMOL/L (ref 3.7–5.3)
POTASSIUM SERPL-SCNC: 3.5 MMOL/L (ref 3.7–5.3)
POTASSIUM SERPL-SCNC: 3.6 MMOL/L (ref 3.7–5.3)
POTASSIUM SERPL-SCNC: 3.8 MMOL/L (ref 3.7–5.3)
POTASSIUM SERPL-SCNC: 3.9 MMOL/L (ref 3.7–5.3)
POTASSIUM SERPL-SCNC: 4 MMOL/L (ref 3.7–5.3)
POTASSIUM SERPL-SCNC: 4.1 MMOL/L (ref 3.7–5.3)
POTASSIUM SERPL-SCNC: 4.2 MMOL/L (ref 3.7–5.3)
POTASSIUM SERPL-SCNC: 4.4 MMOL/L (ref 3.7–5.3)
POTASSIUM SERPL-SCNC: 4.9 MMOL/L (ref 3.7–5.3)
PRO-BNP: 712 PG/ML
PRO-BNP: 729 PG/ML
PROCALCITONIN: 1 NG/ML
PROTEIN UA: ABNORMAL
PROTEIN UA: ABNORMAL
PROTHROMBIN TIME: 9.3 SEC (ref 9–12)
PROTHROMBIN TIME: 9.5 SEC (ref 9–12)
RBC # BLD: 2.05 M/UL (ref 3.95–5.11)
RBC # BLD: 2.24 M/UL (ref 3.95–5.11)
RBC # BLD: 2.28 M/UL (ref 3.95–5.11)
RBC # BLD: 2.36 M/UL (ref 3.95–5.11)
RBC # BLD: 2.37 M/UL (ref 3.95–5.11)
RBC # BLD: 2.41 M/UL (ref 3.95–5.11)
RBC # BLD: 2.42 M/UL (ref 3.95–5.11)
RBC # BLD: 2.45 M/UL (ref 3.95–5.11)
RBC # BLD: 2.52 M/UL (ref 3.95–5.11)
RBC # BLD: 2.53 M/UL (ref 3.95–5.11)
RBC # BLD: 2.53 M/UL (ref 3.95–5.11)
RBC # BLD: 2.54 M/UL (ref 3.95–5.11)
RBC # BLD: 2.6 M/UL (ref 3.95–5.11)
RBC # BLD: 2.61 M/UL (ref 3.95–5.11)
RBC # BLD: 2.62 M/UL (ref 3.95–5.11)
RBC # BLD: 2.64 M/UL (ref 3.95–5.11)
RBC # BLD: 2.7 M/UL (ref 3.95–5.11)
RBC # BLD: 2.72 M/UL (ref 3.95–5.11)
RBC # BLD: 2.77 M/UL (ref 3.95–5.11)
RBC # BLD: 2.84 M/UL (ref 3.95–5.11)
RBC # BLD: ABNORMAL 10*6/UL
RBC UA: NORMAL /HPF (ref 0–4)
RBC UA: NORMAL /HPF (ref 0–4)
READ BACK: YES
RENAL EPITHELIAL, UA: NORMAL /HPF
RENAL EPITHELIAL, UA: NORMAL /HPF
RESP SYNCYTIAL VIRUS PCR: NOT DETECTED
RHINO/ENTEROVIRUS PCR: NOT DETECTED
SAMPLE SITE: ABNORMAL
SARS-COV-2, PCR: DETECTED
SARS-COV-2: POSITIVE
SEG NEUTROPHILS: 33 % (ref 36–66)
SEG NEUTROPHILS: 56 % (ref 36–66)
SEG NEUTROPHILS: 57 % (ref 36–65)
SEG NEUTROPHILS: 60 % (ref 36–65)
SEG NEUTROPHILS: 60 % (ref 36–65)
SEG NEUTROPHILS: 66 % (ref 36–65)
SEG NEUTROPHILS: 66 % (ref 36–66)
SEG NEUTROPHILS: 68 % (ref 36–66)
SEG NEUTROPHILS: 69 % (ref 36–66)
SEG NEUTROPHILS: 73 % (ref 36–65)
SEG NEUTROPHILS: 74 % (ref 36–66)
SEG NEUTROPHILS: 74 % (ref 36–66)
SEG NEUTROPHILS: 76 % (ref 36–66)
SEG NEUTROPHILS: 78 % (ref 36–66)
SEG NEUTROPHILS: 81 % (ref 36–65)
SEG NEUTROPHILS: 83 % (ref 36–65)
SEG NEUTROPHILS: 85 % (ref 36–65)
SEG NEUTROPHILS: 86 % (ref 36–66)
SEGMENTED NEUTROPHILS ABSOLUTE COUNT: 0.43 K/UL (ref 1.8–7.7)
SEGMENTED NEUTROPHILS ABSOLUTE COUNT: 1.74 K/UL (ref 1.5–8.1)
SEGMENTED NEUTROPHILS ABSOLUTE COUNT: 1.85 K/UL (ref 1.8–7.7)
SEGMENTED NEUTROPHILS ABSOLUTE COUNT: 1.95 K/UL (ref 1.5–8.1)
SEGMENTED NEUTROPHILS ABSOLUTE COUNT: 1.99 K/UL (ref 1.5–8.1)
SEGMENTED NEUTROPHILS ABSOLUTE COUNT: 2.1 K/UL (ref 1.5–8.1)
SEGMENTED NEUTROPHILS ABSOLUTE COUNT: 2.94 K/UL (ref 1.5–8.1)
SEGMENTED NEUTROPHILS ABSOLUTE COUNT: 3.11 K/UL (ref 1.8–7.7)
SEGMENTED NEUTROPHILS ABSOLUTE COUNT: 4.13 K/UL (ref 1.8–7.7)
SEGMENTED NEUTROPHILS ABSOLUTE COUNT: 4.56 K/UL (ref 1.8–7.7)
SEGMENTED NEUTROPHILS ABSOLUTE COUNT: 5.7 K/UL (ref 1.5–8.1)
SEGMENTED NEUTROPHILS ABSOLUTE COUNT: 5.85 K/UL (ref 1.8–7.7)
SEGMENTED NEUTROPHILS ABSOLUTE COUNT: 6.41 K/UL (ref 1.8–7.7)
SEGMENTED NEUTROPHILS ABSOLUTE COUNT: 6.66 K/UL (ref 1.8–7.7)
SEGMENTED NEUTROPHILS ABSOLUTE COUNT: 6.8 K/UL (ref 1.5–8.1)
SEGMENTED NEUTROPHILS ABSOLUTE COUNT: 6.81 K/UL (ref 1.5–8.1)
SEGMENTED NEUTROPHILS ABSOLUTE COUNT: 7.07 K/UL (ref 1.8–7.7)
SEGMENTED NEUTROPHILS ABSOLUTE COUNT: 9.72 K/UL (ref 1.8–7.7)
SODIUM BLD-SCNC: 133 MMOL/L (ref 135–144)
SODIUM BLD-SCNC: 133 MMOL/L (ref 135–144)
SODIUM BLD-SCNC: 136 MMOL/L (ref 135–144)
SODIUM BLD-SCNC: 138 MMOL/L (ref 135–144)
SODIUM BLD-SCNC: 138 MMOL/L (ref 135–144)
SODIUM BLD-SCNC: 139 MMOL/L (ref 135–144)
SODIUM BLD-SCNC: 139 MMOL/L (ref 135–144)
SODIUM BLD-SCNC: 141 MMOL/L (ref 135–144)
SODIUM BLD-SCNC: 141 MMOL/L (ref 135–144)
SODIUM BLD-SCNC: 143 MMOL/L (ref 135–144)
SODIUM BLD-SCNC: 144 MMOL/L (ref 135–144)
SODIUM BLD-SCNC: 144 MMOL/L (ref 135–144)
SODIUM BLD-SCNC: 145 MMOL/L (ref 135–144)
SODIUM BLD-SCNC: 146 MMOL/L (ref 135–144)
SPECIFIC GRAVITY UA: 1.02 (ref 1–1.03)
SPECIFIC GRAVITY UA: 1.02 (ref 1–1.03)
SPECIMEN DESCRIPTION: ABNORMAL
SPECIMEN DESCRIPTION: ABNORMAL
SPECIMEN DESCRIPTION: NORMAL
TCO2 (CALC), ART: 28 MMOL/L (ref 22–29)
TCO2 (CALC), ART: 31 MMOL/L (ref 22–29)
TCO2 (CALC), ART: 32 MMOL/L (ref 22–29)
TCO2 (CALC), ART: 32 MMOL/L (ref 22–29)
TCO2 (CALC), ART: 33 MMOL/L (ref 22–29)
TCO2 (CALC), ART: 35 MMOL/L (ref 22–29)
TCO2 (CALC), ART: 36 MMOL/L (ref 22–29)
TCO2 (CALC), ART: 38 MMOL/L (ref 22–29)
TCO2 (CALC), ART: 38 MMOL/L (ref 22–29)
TCO2 (CALC), ART: 39 MMOL/L (ref 22–29)
TCO2 (CALC), ART: 41 MMOL/L (ref 22–29)
TCO2 (CALC), ART: 41 MMOL/L (ref 22–29)
TCO2 (CALC), ART: 45 MMOL/L (ref 22–29)
TIME, STOOL #1: ABNORMAL
TIME, STOOL #1: NORMAL
TIME, STOOL #2: ABNORMAL
TIME, STOOL #2: NORMAL
TIME, STOOL #3: ABNORMAL
TIME, STOOL #3: NORMAL
TOTAL IRON BINDING CAPACITY: 136 UG/DL (ref 250–450)
TOTAL PROTEIN: 5.6 G/DL (ref 6.4–8.3)
TOTAL PROTEIN: 5.7 G/DL (ref 6.4–8.3)
TOTAL PROTEIN: 5.8 G/DL (ref 6.4–8.3)
TOTAL PROTEIN: 5.9 G/DL (ref 6.4–8.3)
TOTAL PROTEIN: 5.9 G/DL (ref 6.4–8.3)
TOTAL PROTEIN: 6.1 G/DL (ref 6.4–8.3)
TOTAL PROTEIN: 6.3 G/DL (ref 6.4–8.3)
TRANSFUSION STATUS: NORMAL
TRICHOMONAS: NORMAL
TRICHOMONAS: NORMAL
TURBIDITY: CLEAR
TURBIDITY: CLEAR
UNIT DIVISION: 0
UNIT NUMBER: NORMAL
UNSATURATED IRON BINDING CAPACITY: 44 UG/DL (ref 112–347)
URINE HGB: NEGATIVE
URINE HGB: NEGATIVE
UROBILINOGEN, URINE: NORMAL
UROBILINOGEN, URINE: NORMAL
VITAMIN B-12: >2000 PG/ML (ref 232–1245)
VITAMIN D 25-HYDROXY: 47.6 NG/ML (ref 30–100)
WBC # BLD: 1.2 K/UL (ref 3.5–11.3)
WBC # BLD: 1.3 K/UL (ref 3.5–11.3)
WBC # BLD: 10.4 K/UL (ref 3.5–11.3)
WBC # BLD: 12.8 K/UL (ref 3.5–11.3)
WBC # BLD: 2 K/UL (ref 3.5–11.3)
WBC # BLD: 2.9 K/UL (ref 3.5–11.3)
WBC # BLD: 3 K/UL (ref 3.5–11.3)
WBC # BLD: 3.3 K/UL (ref 3.5–11.3)
WBC # BLD: 3.4 K/UL (ref 3.5–11.3)
WBC # BLD: 3.5 K/UL (ref 3.5–11.3)
WBC # BLD: 4.1 K/UL (ref 3.5–11.3)
WBC # BLD: 4.2 K/UL (ref 3.5–11.3)
WBC # BLD: 5.3 K/UL (ref 3.5–11.3)
WBC # BLD: 6.8 K/UL (ref 3.5–11.3)
WBC # BLD: 6.9 K/UL (ref 3.5–11.3)
WBC # BLD: 7.1 K/UL (ref 3.5–11.3)
WBC # BLD: 8 K/UL (ref 3.5–11.3)
WBC # BLD: 8.2 K/UL (ref 3.5–11.3)
WBC # BLD: 9 K/UL (ref 3.5–11.3)
WBC # BLD: 9.3 K/UL (ref 3.5–11.3)
WBC # BLD: ABNORMAL 10*3/UL
WBC UA: NORMAL /HPF (ref 0–5)
WBC UA: NORMAL /HPF (ref 0–5)
YEAST: NORMAL
YEAST: NORMAL

## 2020-01-01 PROCEDURE — 99232 SBSQ HOSP IP/OBS MODERATE 35: CPT | Performed by: INTERNAL MEDICINE

## 2020-01-01 PROCEDURE — 6360000002 HC RX W HCPCS: Performed by: INTERNAL MEDICINE

## 2020-01-01 PROCEDURE — C9113 INJ PANTOPRAZOLE SODIUM, VIA: HCPCS | Performed by: NURSE PRACTITIONER

## 2020-01-01 PROCEDURE — 2700000000 HC OXYGEN THERAPY PER DAY

## 2020-01-01 PROCEDURE — 6370000000 HC RX 637 (ALT 250 FOR IP): Performed by: INTERNAL MEDICINE

## 2020-01-01 PROCEDURE — 36415 COLL VENOUS BLD VENIPUNCTURE: CPT

## 2020-01-01 PROCEDURE — 94761 N-INVAS EAR/PLS OXIMETRY MLT: CPT

## 2020-01-01 PROCEDURE — 93005 ELECTROCARDIOGRAM TRACING: CPT | Performed by: INTERNAL MEDICINE

## 2020-01-01 PROCEDURE — 85018 HEMOGLOBIN: CPT

## 2020-01-01 PROCEDURE — 6370000000 HC RX 637 (ALT 250 FOR IP): Performed by: CLINICAL NURSE SPECIALIST

## 2020-01-01 PROCEDURE — 2000000000 HC ICU R&B

## 2020-01-01 PROCEDURE — 2580000003 HC RX 258: Performed by: INTERNAL MEDICINE

## 2020-01-01 PROCEDURE — 99222 1ST HOSP IP/OBS MODERATE 55: CPT | Performed by: NURSE PRACTITIONER

## 2020-01-01 PROCEDURE — 2580000003 HC RX 258: Performed by: NURSE PRACTITIONER

## 2020-01-01 PROCEDURE — 6360000002 HC RX W HCPCS: Performed by: NURSE PRACTITIONER

## 2020-01-01 PROCEDURE — 94770 HC ETCO2 MONITOR DAILY: CPT

## 2020-01-01 PROCEDURE — 99232 SBSQ HOSP IP/OBS MODERATE 35: CPT | Performed by: STUDENT IN AN ORGANIZED HEALTH CARE EDUCATION/TRAINING PROGRAM

## 2020-01-01 PROCEDURE — 99291 CRITICAL CARE FIRST HOUR: CPT | Performed by: INTERNAL MEDICINE

## 2020-01-01 PROCEDURE — 3E033XZ INTRODUCTION OF VASOPRESSOR INTO PERIPHERAL VEIN, PERCUTANEOUS APPROACH: ICD-10-PCS | Performed by: INTERNAL MEDICINE

## 2020-01-01 PROCEDURE — 85014 HEMATOCRIT: CPT

## 2020-01-01 PROCEDURE — 87040 BLOOD CULTURE FOR BACTERIA: CPT

## 2020-01-01 PROCEDURE — 99232 SBSQ HOSP IP/OBS MODERATE 35: CPT | Performed by: NURSE PRACTITIONER

## 2020-01-01 PROCEDURE — 86920 COMPATIBILITY TEST SPIN: CPT

## 2020-01-01 PROCEDURE — 86870 RBC ANTIBODY IDENTIFICATION: CPT

## 2020-01-01 PROCEDURE — 37799 UNLISTED PX VASCULAR SURGERY: CPT

## 2020-01-01 PROCEDURE — 94003 VENT MGMT INPAT SUBQ DAY: CPT

## 2020-01-01 PROCEDURE — 80048 BASIC METABOLIC PNL TOTAL CA: CPT

## 2020-01-01 PROCEDURE — 99223 1ST HOSP IP/OBS HIGH 75: CPT | Performed by: INTERNAL MEDICINE

## 2020-01-01 PROCEDURE — 86902 BLOOD TYPE ANTIGEN DONOR EA: CPT

## 2020-01-01 PROCEDURE — 87205 SMEAR GRAM STAIN: CPT

## 2020-01-01 PROCEDURE — 2500000003 HC RX 250 WO HCPCS: Performed by: INTERNAL MEDICINE

## 2020-01-01 PROCEDURE — 82947 ASSAY GLUCOSE BLOOD QUANT: CPT

## 2020-01-01 PROCEDURE — 89220 SPUTUM SPECIMEN COLLECTION: CPT

## 2020-01-01 PROCEDURE — 31500 INSERT EMERGENCY AIRWAY: CPT

## 2020-01-01 PROCEDURE — 94002 VENT MGMT INPAT INIT DAY: CPT

## 2020-01-01 PROCEDURE — 83615 LACTATE (LD) (LDH) ENZYME: CPT

## 2020-01-01 PROCEDURE — 85379 FIBRIN DEGRADATION QUANT: CPT

## 2020-01-01 PROCEDURE — 83735 ASSAY OF MAGNESIUM: CPT

## 2020-01-01 PROCEDURE — 71045 X-RAY EXAM CHEST 1 VIEW: CPT

## 2020-01-01 PROCEDURE — 2060000000 HC ICU INTERMEDIATE R&B

## 2020-01-01 PROCEDURE — 99233 SBSQ HOSP IP/OBS HIGH 50: CPT | Performed by: INTERNAL MEDICINE

## 2020-01-01 PROCEDURE — 85384 FIBRINOGEN ACTIVITY: CPT

## 2020-01-01 PROCEDURE — 82803 BLOOD GASES ANY COMBINATION: CPT

## 2020-01-01 PROCEDURE — 82746 ASSAY OF FOLIC ACID SERUM: CPT

## 2020-01-01 PROCEDURE — 99231 SBSQ HOSP IP/OBS SF/LOW 25: CPT | Performed by: STUDENT IN AN ORGANIZED HEALTH CARE EDUCATION/TRAINING PROGRAM

## 2020-01-01 PROCEDURE — 83605 ASSAY OF LACTIC ACID: CPT

## 2020-01-01 PROCEDURE — 81001 URINALYSIS AUTO W/SCOPE: CPT

## 2020-01-01 PROCEDURE — 86900 BLOOD TYPING SEROLOGIC ABO: CPT

## 2020-01-01 PROCEDURE — 36430 TRANSFUSION BLD/BLD COMPNT: CPT

## 2020-01-01 PROCEDURE — 80053 COMPREHEN METABOLIC PANEL: CPT

## 2020-01-01 PROCEDURE — 83550 IRON BINDING TEST: CPT

## 2020-01-01 PROCEDURE — 80069 RENAL FUNCTION PANEL: CPT

## 2020-01-01 PROCEDURE — 51702 INSERT TEMP BLADDER CATH: CPT

## 2020-01-01 PROCEDURE — 36620 INSERTION CATHETER ARTERY: CPT | Performed by: NURSE ANESTHETIST, CERTIFIED REGISTERED

## 2020-01-01 PROCEDURE — 85025 COMPLETE CBC W/AUTO DIFF WBC: CPT

## 2020-01-01 PROCEDURE — 82728 ASSAY OF FERRITIN: CPT

## 2020-01-01 PROCEDURE — 86140 C-REACTIVE PROTEIN: CPT

## 2020-01-01 PROCEDURE — 83880 ASSAY OF NATRIURETIC PEPTIDE: CPT

## 2020-01-01 PROCEDURE — 36620 INSERTION CATHETER ARTERY: CPT

## 2020-01-01 PROCEDURE — 85027 COMPLETE CBC AUTOMATED: CPT

## 2020-01-01 PROCEDURE — 93010 ELECTROCARDIOGRAM REPORT: CPT | Performed by: INTERNAL MEDICINE

## 2020-01-01 PROCEDURE — 6360000002 HC RX W HCPCS: Performed by: FAMILY MEDICINE

## 2020-01-01 PROCEDURE — 86850 RBC ANTIBODY SCREEN: CPT

## 2020-01-01 PROCEDURE — XW13325 TRANSFUSION OF CONVALESCENT PLASMA (NONAUTOLOGOUS) INTO PERIPHERAL VEIN, PERCUTANEOUS APPROACH, NEW TECHNOLOGY GROUP 5: ICD-10-PCS | Performed by: STUDENT IN AN ORGANIZED HEALTH CARE EDUCATION/TRAINING PROGRAM

## 2020-01-01 PROCEDURE — 87070 CULTURE OTHR SPECIMN AEROBIC: CPT

## 2020-01-01 PROCEDURE — 80076 HEPATIC FUNCTION PANEL: CPT

## 2020-01-01 PROCEDURE — 6370000000 HC RX 637 (ALT 250 FOR IP): Performed by: STUDENT IN AN ORGANIZED HEALTH CARE EDUCATION/TRAINING PROGRAM

## 2020-01-01 PROCEDURE — 82306 VITAMIN D 25 HYDROXY: CPT

## 2020-01-01 PROCEDURE — P9016 RBC LEUKOCYTES REDUCED: HCPCS

## 2020-01-01 PROCEDURE — 83540 ASSAY OF IRON: CPT

## 2020-01-01 PROCEDURE — 86901 BLOOD TYPING SEROLOGIC RH(D): CPT

## 2020-01-01 PROCEDURE — 94660 CPAP INITIATION&MGMT: CPT

## 2020-01-01 PROCEDURE — 86880 COOMBS TEST DIRECT: CPT

## 2020-01-01 PROCEDURE — 2500000003 HC RX 250 WO HCPCS: Performed by: FAMILY MEDICINE

## 2020-01-01 PROCEDURE — 82533 TOTAL CORTISOL: CPT

## 2020-01-01 PROCEDURE — 5A1955Z RESPIRATORY VENTILATION, GREATER THAN 96 CONSECUTIVE HOURS: ICD-10-PCS | Performed by: INTERNAL MEDICINE

## 2020-01-01 PROCEDURE — A9540 TC99M MAA: HCPCS | Performed by: INTERNAL MEDICINE

## 2020-01-01 PROCEDURE — 31500 INSERT EMERGENCY AIRWAY: CPT | Performed by: NURSE ANESTHETIST, CERTIFIED REGISTERED

## 2020-01-01 PROCEDURE — 78580 LUNG PERFUSION IMAGING: CPT

## 2020-01-01 PROCEDURE — 84132 ASSAY OF SERUM POTASSIUM: CPT

## 2020-01-01 PROCEDURE — 99231 SBSQ HOSP IP/OBS SF/LOW 25: CPT | Performed by: INTERNAL MEDICINE

## 2020-01-01 PROCEDURE — 87449 NOS EACH ORGANISM AG IA: CPT

## 2020-01-01 PROCEDURE — 2580000003 HC RX 258: Performed by: CLINICAL NURSE SPECIALIST

## 2020-01-01 PROCEDURE — 85055 RETICULATED PLATELET ASSAY: CPT

## 2020-01-01 PROCEDURE — 85610 PROTHROMBIN TIME: CPT

## 2020-01-01 PROCEDURE — 0202U NFCT DS 22 TRGT SARS-COV-2: CPT

## 2020-01-01 PROCEDURE — G0328 FECAL BLOOD SCRN IMMUNOASSAY: HCPCS

## 2020-01-01 PROCEDURE — 36600 WITHDRAWAL OF ARTERIAL BLOOD: CPT

## 2020-01-01 PROCEDURE — 99213 OFFICE O/P EST LOW 20 MIN: CPT

## 2020-01-01 PROCEDURE — 86738 MYCOPLASMA ANTIBODY: CPT

## 2020-01-01 PROCEDURE — 99221 1ST HOSP IP/OBS SF/LOW 40: CPT | Performed by: NURSE PRACTITIONER

## 2020-01-01 PROCEDURE — 86922 COMPATIBILITY TEST ANTIGLOB: CPT

## 2020-01-01 PROCEDURE — 0BH17EZ INSERTION OF ENDOTRACHEAL AIRWAY INTO TRACHEA, VIA NATURAL OR ARTIFICIAL OPENING: ICD-10-PCS | Performed by: INTERNAL MEDICINE

## 2020-01-01 PROCEDURE — 86921 COMPATIBILITY TEST INCUBATE: CPT

## 2020-01-01 PROCEDURE — 82607 VITAMIN B-12: CPT

## 2020-01-01 PROCEDURE — XW033E5 INTRODUCTION OF REMDESIVIR ANTI-INFECTIVE INTO PERIPHERAL VEIN, PERCUTANEOUS APPROACH, NEW TECHNOLOGY GROUP 5: ICD-10-PCS | Performed by: STUDENT IN AN ORGANIZED HEALTH CARE EDUCATION/TRAINING PROGRAM

## 2020-01-01 PROCEDURE — 99233 SBSQ HOSP IP/OBS HIGH 50: CPT | Performed by: NURSE PRACTITIONER

## 2020-01-01 PROCEDURE — 2500000003 HC RX 250 WO HCPCS

## 2020-01-01 PROCEDURE — 6360000002 HC RX W HCPCS: Performed by: STUDENT IN AN ORGANIZED HEALTH CARE EDUCATION/TRAINING PROGRAM

## 2020-01-01 PROCEDURE — 3430000000 HC RX DIAGNOSTIC RADIOPHARMACEUTICAL: Performed by: INTERNAL MEDICINE

## 2020-01-01 PROCEDURE — 84145 PROCALCITONIN (PCT): CPT

## 2020-01-01 RX ORDER — FUROSEMIDE 10 MG/ML
20 INJECTION INTRAMUSCULAR; INTRAVENOUS ONCE
Status: COMPLETED | OUTPATIENT
Start: 2020-01-01 | End: 2020-01-01

## 2020-01-01 RX ORDER — GLYCOPYRROLATE 0.2 MG/ML
0.2 INJECTION INTRAMUSCULAR; INTRAVENOUS EVERY 4 HOURS PRN
Status: DISCONTINUED | OUTPATIENT
Start: 2020-01-01 | End: 2020-01-01 | Stop reason: HOSPADM

## 2020-01-01 RX ORDER — DAPSONE 100 MG/1
1 TABLET ORAL
COMMUNITY
Start: 2020-01-01

## 2020-01-01 RX ORDER — MINERAL OIL AND WHITE PETROLATUM 150; 830 MG/G; MG/G
OINTMENT OPHTHALMIC PRN
Status: DISCONTINUED | OUTPATIENT
Start: 2020-01-01 | End: 2020-01-01 | Stop reason: HOSPADM

## 2020-01-01 RX ORDER — SODIUM CHLORIDE 0.9 % (FLUSH) 0.9 %
10 SYRINGE (ML) INJECTION EVERY 12 HOURS SCHEDULED
Status: DISCONTINUED | OUTPATIENT
Start: 2020-01-01 | End: 2020-01-01

## 2020-01-01 RX ORDER — POTASSIUM CHLORIDE 7.45 MG/ML
10 INJECTION INTRAVENOUS DAILY
Status: DISCONTINUED | OUTPATIENT
Start: 2020-01-01 | End: 2020-01-01

## 2020-01-01 RX ORDER — 0.9 % SODIUM CHLORIDE 0.9 %
500 INTRAVENOUS SOLUTION INTRAVENOUS ONCE
Status: COMPLETED | OUTPATIENT
Start: 2020-01-01 | End: 2020-01-01

## 2020-01-01 RX ORDER — PROCHLORPERAZINE 25 MG
25 SUPPOSITORY, RECTAL RECTAL EVERY 12 HOURS PRN
COMMUNITY

## 2020-01-01 RX ORDER — FENTANYL CITRATE 50 UG/ML
50 INJECTION, SOLUTION INTRAMUSCULAR; INTRAVENOUS
Status: DISCONTINUED | OUTPATIENT
Start: 2020-01-01 | End: 2020-01-01

## 2020-01-01 RX ORDER — 0.9 % SODIUM CHLORIDE 0.9 %
20 INTRAVENOUS SOLUTION INTRAVENOUS ONCE
Status: DISCONTINUED | OUTPATIENT
Start: 2020-01-01 | End: 2020-01-01

## 2020-01-01 RX ORDER — NOREPINEPHRINE BIT/0.9 % NACL 16MG/250ML
2 INFUSION BOTTLE (ML) INTRAVENOUS CONTINUOUS
Status: DISCONTINUED | OUTPATIENT
Start: 2020-01-01 | End: 2020-01-01

## 2020-01-01 RX ORDER — HYDROXYCHLOROQUINE SULFATE 200 MG/1
200 TABLET, FILM COATED ORAL DAILY
Status: DISCONTINUED | OUTPATIENT
Start: 2020-01-01 | End: 2020-01-01

## 2020-01-01 RX ORDER — LATANOPROST 50 UG/ML
1 SOLUTION/ DROPS OPHTHALMIC NIGHTLY
Status: DISCONTINUED | OUTPATIENT
Start: 2020-01-01 | End: 2020-01-01 | Stop reason: HOSPADM

## 2020-01-01 RX ORDER — POLYETHYLENE GLYCOL 3350 17 G/17G
17 POWDER, FOR SOLUTION ORAL DAILY PRN
Status: DISCONTINUED | OUTPATIENT
Start: 2020-01-01 | End: 2020-01-01

## 2020-01-01 RX ORDER — MORPHINE SULFATE 4 MG/ML
4 INJECTION, SOLUTION INTRAMUSCULAR; INTRAVENOUS
Status: DISCONTINUED | OUTPATIENT
Start: 2020-01-01 | End: 2020-01-01 | Stop reason: HOSPADM

## 2020-01-01 RX ORDER — POTASSIUM CHLORIDE 7.45 MG/ML
10 INJECTION INTRAVENOUS PRN
Status: DISCONTINUED | OUTPATIENT
Start: 2020-01-01 | End: 2020-01-01

## 2020-01-01 RX ORDER — PANTOPRAZOLE SODIUM 40 MG/10ML
40 INJECTION, POWDER, LYOPHILIZED, FOR SOLUTION INTRAVENOUS 2 TIMES DAILY
Status: DISCONTINUED | OUTPATIENT
Start: 2020-01-01 | End: 2020-01-01

## 2020-01-01 RX ORDER — ROCURONIUM BROMIDE 10 MG/ML
INJECTION, SOLUTION INTRAVENOUS
Status: COMPLETED
Start: 2020-01-01 | End: 2020-01-01

## 2020-01-01 RX ORDER — SODIUM CHLORIDE 9 MG/ML
10 INJECTION INTRAVENOUS DAILY
Status: DISCONTINUED | OUTPATIENT
Start: 2020-01-01 | End: 2020-01-01

## 2020-01-01 RX ORDER — CARVEDILOL 6.25 MG/1
6.25 TABLET ORAL 2 TIMES DAILY WITH MEALS
Status: DISCONTINUED | OUTPATIENT
Start: 2020-01-01 | End: 2020-01-01

## 2020-01-01 RX ORDER — PHENAZOPYRIDINE HYDROCHLORIDE 100 MG/1
100 TABLET, FILM COATED ORAL 3 TIMES DAILY PRN
COMMUNITY

## 2020-01-01 RX ORDER — ONDANSETRON 2 MG/ML
4 INJECTION INTRAMUSCULAR; INTRAVENOUS EVERY 6 HOURS PRN
Status: DISCONTINUED | OUTPATIENT
Start: 2020-01-01 | End: 2020-01-01

## 2020-01-01 RX ORDER — SUCCINYLCHOLINE CHLORIDE 20 MG/ML
INJECTION INTRAMUSCULAR; INTRAVENOUS
Status: DISPENSED
Start: 2020-01-01 | End: 2020-01-01

## 2020-01-01 RX ORDER — DEXAMETHASONE SODIUM PHOSPHATE 10 MG/ML
6 INJECTION INTRAMUSCULAR; INTRAVENOUS DAILY
Status: COMPLETED | OUTPATIENT
Start: 2020-01-01 | End: 2020-01-01

## 2020-01-01 RX ORDER — 0.9 % SODIUM CHLORIDE 0.9 %
250 INTRAVENOUS SOLUTION INTRAVENOUS ONCE
Status: COMPLETED | OUTPATIENT
Start: 2020-01-01 | End: 2020-01-01

## 2020-01-01 RX ORDER — SODIUM CHLORIDE 0.9 % (FLUSH) 0.9 %
10 SYRINGE (ML) INJECTION PRN
Status: DISCONTINUED | OUTPATIENT
Start: 2020-01-01 | End: 2020-01-01

## 2020-01-01 RX ORDER — PROPOFOL 10 MG/ML
INJECTION, EMULSION INTRAVENOUS
Status: DISPENSED
Start: 2020-01-01 | End: 2020-01-01

## 2020-01-01 RX ORDER — PHENAZOPYRIDINE HYDROCHLORIDE 100 MG/1
100 TABLET, FILM COATED ORAL 3 TIMES DAILY PRN
Status: DISCONTINUED | OUTPATIENT
Start: 2020-01-01 | End: 2020-01-01

## 2020-01-01 RX ORDER — LORAZEPAM 2 MG/ML
1 INJECTION INTRAMUSCULAR ONCE
Status: COMPLETED | OUTPATIENT
Start: 2020-01-01 | End: 2020-01-01

## 2020-01-01 RX ORDER — NITROGLYCERIN 0.4 MG/1
0.4 TABLET SUBLINGUAL EVERY 5 MIN PRN
COMMUNITY

## 2020-01-01 RX ORDER — PROCHLORPERAZINE 25 MG
25 SUPPOSITORY, RECTAL RECTAL EVERY 12 HOURS PRN
Status: DISCONTINUED | OUTPATIENT
Start: 2020-01-01 | End: 2020-01-01

## 2020-01-01 RX ORDER — ONDANSETRON 2 MG/ML
4 INJECTION INTRAMUSCULAR; INTRAVENOUS EVERY 6 HOURS PRN
Status: DISCONTINUED | OUTPATIENT
Start: 2020-01-01 | End: 2020-01-01 | Stop reason: HOSPADM

## 2020-01-01 RX ORDER — LORAZEPAM 2 MG/ML
0.5 INJECTION INTRAMUSCULAR ONCE
Status: COMPLETED | OUTPATIENT
Start: 2020-01-01 | End: 2020-01-01

## 2020-01-01 RX ORDER — DIPHENHYDRAMINE HCL 25 MG
25 CAPSULE ORAL EVERY 6 HOURS PRN
COMMUNITY

## 2020-01-01 RX ORDER — FENTANYL CITRATE 50 UG/ML
100 INJECTION, SOLUTION INTRAMUSCULAR; INTRAVENOUS
Status: DISCONTINUED | OUTPATIENT
Start: 2020-01-01 | End: 2020-01-01

## 2020-01-01 RX ORDER — HYDROXYCHLOROQUINE SULFATE 200 MG/1
200 TABLET, FILM COATED ORAL DAILY
COMMUNITY

## 2020-01-01 RX ORDER — ACETAMINOPHEN 325 MG/1
650 TABLET ORAL EVERY 6 HOURS PRN
Status: DISCONTINUED | OUTPATIENT
Start: 2020-01-01 | End: 2020-01-01 | Stop reason: HOSPADM

## 2020-01-01 RX ORDER — PANTOPRAZOLE SODIUM 40 MG/1
40 TABLET, DELAYED RELEASE ORAL
Status: DISCONTINUED | OUTPATIENT
Start: 2020-01-01 | End: 2020-01-01

## 2020-01-01 RX ORDER — PROMETHAZINE HYDROCHLORIDE 12.5 MG/1
12.5 TABLET ORAL EVERY 6 HOURS PRN
Status: DISCONTINUED | OUTPATIENT
Start: 2020-01-01 | End: 2020-01-01 | Stop reason: HOSPADM

## 2020-01-01 RX ORDER — LORAZEPAM 2 MG/ML
0.5 INJECTION INTRAMUSCULAR EVERY 8 HOURS PRN
Status: DISCONTINUED | OUTPATIENT
Start: 2020-01-01 | End: 2020-01-01

## 2020-01-01 RX ORDER — LORAZEPAM 2 MG/ML
1 INJECTION INTRAMUSCULAR EVERY 30 MIN PRN
Status: DISCONTINUED | OUTPATIENT
Start: 2020-01-01 | End: 2020-01-01 | Stop reason: HOSPADM

## 2020-01-01 RX ORDER — FENTANYL CITRATE 50 UG/ML
INJECTION, SOLUTION INTRAMUSCULAR; INTRAVENOUS
Status: DISCONTINUED
Start: 2020-01-01 | End: 2020-01-01 | Stop reason: WASHOUT

## 2020-01-01 RX ORDER — EPINEPHRINE 0.1 MG/ML
SYRINGE (ML) INJECTION
Status: DISPENSED
Start: 2020-01-01 | End: 2020-01-01

## 2020-01-01 RX ORDER — DEXAMETHASONE SODIUM PHOSPHATE 4 MG/ML
4 INJECTION, SOLUTION INTRA-ARTICULAR; INTRALESIONAL; INTRAMUSCULAR; INTRAVENOUS; SOFT TISSUE DAILY
Status: DISCONTINUED | OUTPATIENT
Start: 2020-01-01 | End: 2020-01-01

## 2020-01-01 RX ORDER — ETOMIDATE 2 MG/ML
INJECTION INTRAVENOUS
Status: COMPLETED
Start: 2020-01-01 | End: 2020-01-01

## 2020-01-01 RX ORDER — MAGNESIUM SULFATE 1 G/100ML
1 INJECTION INTRAVENOUS PRN
Status: DISCONTINUED | OUTPATIENT
Start: 2020-01-01 | End: 2020-01-01

## 2020-01-01 RX ORDER — HALOPERIDOL 5 MG/ML
INJECTION INTRAMUSCULAR
Status: DISPENSED
Start: 2020-01-01 | End: 2020-01-01

## 2020-01-01 RX ORDER — PANTOPRAZOLE SODIUM 40 MG/10ML
40 INJECTION, POWDER, LYOPHILIZED, FOR SOLUTION INTRAVENOUS DAILY
Status: DISCONTINUED | OUTPATIENT
Start: 2020-01-01 | End: 2020-01-01

## 2020-01-01 RX ORDER — KETOROLAC TROMETHAMINE 15 MG/ML
30 INJECTION, SOLUTION INTRAMUSCULAR; INTRAVENOUS ONCE
Status: DISCONTINUED | OUTPATIENT
Start: 2020-01-01 | End: 2020-01-01

## 2020-01-01 RX ORDER — FUROSEMIDE 10 MG/ML
20 INJECTION INTRAMUSCULAR; INTRAVENOUS DAILY
Status: DISPENSED | OUTPATIENT
Start: 2020-01-01 | End: 2020-01-01

## 2020-01-01 RX ORDER — MORPHINE SULFATE 2 MG/ML
2 INJECTION, SOLUTION INTRAMUSCULAR; INTRAVENOUS
Status: DISCONTINUED | OUTPATIENT
Start: 2020-01-01 | End: 2020-01-01 | Stop reason: HOSPADM

## 2020-01-01 RX ORDER — FUROSEMIDE 10 MG/ML
20 INJECTION INTRAMUSCULAR; INTRAVENOUS DAILY
Status: COMPLETED | OUTPATIENT
Start: 2020-01-01 | End: 2020-01-01

## 2020-01-01 RX ORDER — ASPIRIN 81 MG/1
81 TABLET, CHEWABLE ORAL DAILY
Status: DISCONTINUED | OUTPATIENT
Start: 2020-01-01 | End: 2020-01-01

## 2020-01-01 RX ORDER — KETOROLAC TROMETHAMINE 15 MG/ML
15 INJECTION, SOLUTION INTRAMUSCULAR; INTRAVENOUS ONCE
Status: COMPLETED | OUTPATIENT
Start: 2020-01-01 | End: 2020-01-01

## 2020-01-01 RX ORDER — DEXMEDETOMIDINE HYDROCHLORIDE 4 UG/ML
0.2 INJECTION, SOLUTION INTRAVENOUS CONTINUOUS
Status: DISCONTINUED | OUTPATIENT
Start: 2020-01-01 | End: 2020-01-01

## 2020-01-01 RX ORDER — POLYETHYLENE GLYCOL 3350 17 G/17G
17 POWDER, FOR SOLUTION ORAL DAILY
Status: DISCONTINUED | OUTPATIENT
Start: 2020-01-01 | End: 2020-01-01

## 2020-01-01 RX ORDER — 0.9 % SODIUM CHLORIDE 0.9 %
30 INTRAVENOUS SOLUTION INTRAVENOUS PRN
Status: DISCONTINUED | OUTPATIENT
Start: 2020-01-01 | End: 2020-01-01

## 2020-01-01 RX ORDER — QUETIAPINE FUMARATE 25 MG/1
50 TABLET, FILM COATED ORAL NIGHTLY
Status: DISCONTINUED | OUTPATIENT
Start: 2020-01-01 | End: 2020-01-01

## 2020-01-01 RX ORDER — CARVEDILOL 6.25 MG/1
6.25 TABLET ORAL 2 TIMES DAILY WITH MEALS
COMMUNITY

## 2020-01-01 RX ORDER — MIDODRINE HYDROCHLORIDE 5 MG/1
10 TABLET ORAL
Status: DISCONTINUED | OUTPATIENT
Start: 2020-01-01 | End: 2020-01-01

## 2020-01-01 RX ORDER — NICOTINE 21 MG/24HR
1 PATCH, TRANSDERMAL 24 HOURS TRANSDERMAL DAILY PRN
Status: DISCONTINUED | OUTPATIENT
Start: 2020-01-01 | End: 2020-01-01

## 2020-01-01 RX ORDER — HALOPERIDOL 5 MG/ML
5 INJECTION INTRAMUSCULAR ONCE
Status: COMPLETED | OUTPATIENT
Start: 2020-01-01 | End: 2020-01-01

## 2020-01-01 RX ORDER — PREDNISONE 1 MG/1
1 TABLET ORAL DAILY
COMMUNITY

## 2020-01-01 RX ORDER — ACETAMINOPHEN 650 MG/1
650 SUPPOSITORY RECTAL EVERY 6 HOURS PRN
Status: DISCONTINUED | OUTPATIENT
Start: 2020-01-01 | End: 2020-01-01 | Stop reason: HOSPADM

## 2020-01-01 RX ORDER — OMEPRAZOLE 20 MG/1
40 CAPSULE, DELAYED RELEASE ORAL DAILY
COMMUNITY

## 2020-01-01 RX ORDER — NOREPINEPHRINE BIT/0.9 % NACL 16MG/250ML
INFUSION BOTTLE (ML) INTRAVENOUS
Status: COMPLETED
Start: 2020-01-01 | End: 2020-01-01

## 2020-01-01 RX ORDER — ISAVUCONAZONIUM SULFATE 186 MG/1
CAPSULE ORAL
COMMUNITY

## 2020-01-01 RX ORDER — ASPIRIN 81 MG/1
81 TABLET ORAL DAILY
COMMUNITY

## 2020-01-01 RX ORDER — LORAZEPAM 2 MG/ML
0.25 INJECTION INTRAMUSCULAR ONCE
Status: DISCONTINUED | OUTPATIENT
Start: 2020-01-01 | End: 2020-01-01

## 2020-01-01 RX ORDER — POTASSIUM CHLORIDE 20 MEQ/1
40 TABLET, EXTENDED RELEASE ORAL PRN
Status: DISCONTINUED | OUTPATIENT
Start: 2020-01-01 | End: 2020-01-01

## 2020-01-01 RX ADMIN — Medication 50 MCG/KG/HR: at 11:00

## 2020-01-01 RX ADMIN — FENTANYL CITRATE 50 MCG: 50 INJECTION, SOLUTION INTRAMUSCULAR; INTRAVENOUS at 18:15

## 2020-01-01 RX ADMIN — ENOXAPARIN SODIUM 40 MG: 40 INJECTION SUBCUTANEOUS at 19:40

## 2020-01-01 RX ADMIN — Medication 10 ML: at 12:38

## 2020-01-01 RX ADMIN — Medication 5 MG/HR: at 13:53

## 2020-01-01 RX ADMIN — Medication 50 MCG/HR: at 05:54

## 2020-01-01 RX ADMIN — PANTOPRAZOLE SODIUM 40 MG: 40 INJECTION, POWDER, FOR SOLUTION INTRAVENOUS at 20:44

## 2020-01-01 RX ADMIN — Medication 4 MCG/MIN: at 09:05

## 2020-01-01 RX ADMIN — MORPHINE SULFATE 4 MG: 4 INJECTION, SOLUTION INTRAMUSCULAR; INTRAVENOUS at 04:11

## 2020-01-01 RX ADMIN — Medication 6 MG/HR: at 17:32

## 2020-01-01 RX ADMIN — LATANOPROST 1 DROP: 50 SOLUTION OPHTHALMIC at 22:42

## 2020-01-01 RX ADMIN — ACETAMINOPHEN 650 MG: 325 TABLET ORAL at 19:40

## 2020-01-01 RX ADMIN — MORPHINE SULFATE 4 MG: 4 INJECTION, SOLUTION INTRAMUSCULAR; INTRAVENOUS at 16:45

## 2020-01-01 RX ADMIN — MIDODRINE HYDROCHLORIDE 10 MG: 5 TABLET ORAL at 08:06

## 2020-01-01 RX ADMIN — LORAZEPAM 1 MG: 2 INJECTION INTRAMUSCULAR at 07:45

## 2020-01-01 RX ADMIN — Medication 10 ML: at 08:24

## 2020-01-01 RX ADMIN — LORAZEPAM 1 MG: 2 INJECTION INTRAMUSCULAR at 13:06

## 2020-01-01 RX ADMIN — FUROSEMIDE 20 MG: 10 INJECTION, SOLUTION INTRAMUSCULAR; INTRAVENOUS at 05:02

## 2020-01-01 RX ADMIN — LATANOPROST 1 DROP: 50 SOLUTION OPHTHALMIC at 20:40

## 2020-01-01 RX ADMIN — MORPHINE SULFATE 4 MG: 4 INJECTION, SOLUTION INTRAMUSCULAR; INTRAVENOUS at 10:55

## 2020-01-01 RX ADMIN — Medication 10 ML: at 09:30

## 2020-01-01 RX ADMIN — Medication 6 MG/HR: at 23:00

## 2020-01-01 RX ADMIN — Medication 100 MCG/HR: at 08:36

## 2020-01-01 RX ADMIN — LATANOPROST 1 DROP: 50 SOLUTION OPHTHALMIC at 20:21

## 2020-01-01 RX ADMIN — Medication 8 MG/HR: at 04:01

## 2020-01-01 RX ADMIN — SODIUM CHLORIDE, PRESERVATIVE FREE 10 ML: 5 INJECTION INTRAVENOUS at 09:43

## 2020-01-01 RX ADMIN — LATANOPROST 1 DROP: 50 SOLUTION OPHTHALMIC at 19:11

## 2020-01-01 RX ADMIN — Medication 100 MCG/HR: at 08:24

## 2020-01-01 RX ADMIN — ACETAMINOPHEN 650 MG: 325 TABLET ORAL at 08:06

## 2020-01-01 RX ADMIN — Medication 175 MCG/HR: at 13:17

## 2020-01-01 RX ADMIN — ENOXAPARIN SODIUM 30 MG: 30 INJECTION SUBCUTANEOUS at 09:21

## 2020-01-01 RX ADMIN — Medication 2 MCG/MIN: at 11:57

## 2020-01-01 RX ADMIN — DEXAMETHASONE SODIUM PHOSPHATE 6 MG: 10 INJECTION INTRAMUSCULAR; INTRAVENOUS at 08:43

## 2020-01-01 RX ADMIN — PANTOPRAZOLE SODIUM 8 MG/HR: 40 INJECTION, POWDER, FOR SOLUTION INTRAVENOUS at 11:09

## 2020-01-01 RX ADMIN — CEFEPIME HYDROCHLORIDE 2 G: 2 INJECTION, POWDER, FOR SOLUTION INTRAVENOUS at 21:49

## 2020-01-01 RX ADMIN — CEFEPIME HYDROCHLORIDE 2 G: 2 INJECTION, POWDER, FOR SOLUTION INTRAVENOUS at 08:52

## 2020-01-01 RX ADMIN — Medication 7 MG/HR: at 21:10

## 2020-01-01 RX ADMIN — CEFTAROLINE FOSAMIL 600 MG: 600 POWDER, FOR SOLUTION INTRAVENOUS at 18:43

## 2020-01-01 RX ADMIN — FUROSEMIDE 20 MG: 10 INJECTION, SOLUTION INTRAMUSCULAR; INTRAVENOUS at 09:41

## 2020-01-01 RX ADMIN — MORPHINE SULFATE 4 MG: 4 INJECTION, SOLUTION INTRAMUSCULAR; INTRAVENOUS at 13:06

## 2020-01-01 RX ADMIN — Medication 10 ML: at 16:58

## 2020-01-01 RX ADMIN — VANCOMYCIN HYDROCHLORIDE 1250 MG: 10 INJECTION, POWDER, LYOPHILIZED, FOR SOLUTION INTRAVENOUS at 15:21

## 2020-01-01 RX ADMIN — ENOXAPARIN SODIUM 40 MG: 40 INJECTION SUBCUTANEOUS at 09:44

## 2020-01-01 RX ADMIN — REMDESIVIR 100 MG: 5 INJECTION INTRAVENOUS at 03:06

## 2020-01-01 RX ADMIN — Medication 10 ML: at 20:59

## 2020-01-01 RX ADMIN — PANTOPRAZOLE SODIUM 40 MG: 40 INJECTION, POWDER, FOR SOLUTION INTRAVENOUS at 16:57

## 2020-01-01 RX ADMIN — QUETIAPINE FUMARATE 50 MG: 25 TABLET ORAL at 21:45

## 2020-01-01 RX ADMIN — POTASSIUM CHLORIDE 10 MEQ: 7.46 INJECTION, SOLUTION INTRAVENOUS at 11:20

## 2020-01-01 RX ADMIN — PANTOPRAZOLE SODIUM 40 MG: 40 INJECTION, POWDER, FOR SOLUTION INTRAVENOUS at 09:44

## 2020-01-01 RX ADMIN — DEXAMETHASONE SODIUM PHOSPHATE 4 MG: 4 INJECTION, SOLUTION INTRAMUSCULAR; INTRAVENOUS at 08:26

## 2020-01-01 RX ADMIN — DEXMEDETOMIDINE HYDROCHLORIDE 0.2 MCG/KG/HR: 400 INJECTION INTRAVENOUS at 07:05

## 2020-01-01 RX ADMIN — DEXAMETHASONE SODIUM PHOSPHATE 6 MG: 10 INJECTION INTRAMUSCULAR; INTRAVENOUS at 08:31

## 2020-01-01 RX ADMIN — DEXAMETHASONE SODIUM PHOSPHATE 6 MG: 10 INJECTION INTRAMUSCULAR; INTRAVENOUS at 08:20

## 2020-01-01 RX ADMIN — PANTOPRAZOLE SODIUM 8 MG/HR: 40 INJECTION, POWDER, FOR SOLUTION INTRAVENOUS at 21:14

## 2020-01-01 RX ADMIN — ENOXAPARIN SODIUM 40 MG: 40 INJECTION SUBCUTANEOUS at 08:52

## 2020-01-01 RX ADMIN — ENOXAPARIN SODIUM 40 MG: 40 INJECTION SUBCUTANEOUS at 21:14

## 2020-01-01 RX ADMIN — ENOXAPARIN SODIUM 40 MG: 40 INJECTION SUBCUTANEOUS at 08:51

## 2020-01-01 RX ADMIN — ENOXAPARIN SODIUM 40 MG: 40 INJECTION SUBCUTANEOUS at 20:10

## 2020-01-01 RX ADMIN — POTASSIUM BICARBONATE 40 MEQ: 782 TABLET, EFFERVESCENT ORAL at 08:51

## 2020-01-01 RX ADMIN — LORAZEPAM 1 MG: 2 INJECTION INTRAMUSCULAR at 01:10

## 2020-01-01 RX ADMIN — CEFTAROLINE FOSAMIL 600 MG: 600 POWDER, FOR SOLUTION INTRAVENOUS at 06:39

## 2020-01-01 RX ADMIN — Medication 7 MG/HR: at 15:01

## 2020-01-01 RX ADMIN — MEROPENEM 1 G: 1 INJECTION, POWDER, FOR SOLUTION INTRAVENOUS at 14:59

## 2020-01-01 RX ADMIN — MORPHINE SULFATE 4 MG: 4 INJECTION, SOLUTION INTRAMUSCULAR; INTRAVENOUS at 02:11

## 2020-01-01 RX ADMIN — DOCUSATE SODIUM 100 MG: 50 LIQUID ORAL at 19:38

## 2020-01-01 RX ADMIN — ACETAMINOPHEN 650 MG: 325 TABLET ORAL at 21:04

## 2020-01-01 RX ADMIN — LORAZEPAM 0.5 MG: 2 INJECTION INTRAMUSCULAR at 18:11

## 2020-01-01 RX ADMIN — SODIUM CHLORIDE 250 ML: 9 INJECTION, SOLUTION INTRAVENOUS at 07:45

## 2020-01-01 RX ADMIN — Medication 10 ML: at 09:02

## 2020-01-01 RX ADMIN — LORAZEPAM 0.5 MG: 2 INJECTION INTRAMUSCULAR at 00:51

## 2020-01-01 RX ADMIN — ENOXAPARIN SODIUM 40 MG: 40 INJECTION SUBCUTANEOUS at 21:30

## 2020-01-01 RX ADMIN — Medication 10 ML: at 19:40

## 2020-01-01 RX ADMIN — PANTOPRAZOLE SODIUM 40 MG: 40 INJECTION, POWDER, FOR SOLUTION INTRAVENOUS at 21:45

## 2020-01-01 RX ADMIN — Medication 175 MCG/HR: at 12:52

## 2020-01-01 RX ADMIN — Medication 100 MCG/HR: at 20:01

## 2020-01-01 RX ADMIN — LORAZEPAM 1 MG: 2 INJECTION INTRAMUSCULAR at 00:10

## 2020-01-01 RX ADMIN — PANTOPRAZOLE SODIUM 40 MG: 40 INJECTION, POWDER, FOR SOLUTION INTRAVENOUS at 11:36

## 2020-01-01 RX ADMIN — PANTOPRAZOLE SODIUM 40 MG: 40 INJECTION, POWDER, FOR SOLUTION INTRAVENOUS at 08:24

## 2020-01-01 RX ADMIN — LORAZEPAM 1 MG: 2 INJECTION INTRAMUSCULAR at 02:40

## 2020-01-01 RX ADMIN — MORPHINE SULFATE 2 MG: 2 INJECTION, SOLUTION INTRAMUSCULAR; INTRAVENOUS at 14:04

## 2020-01-01 RX ADMIN — POTASSIUM CHLORIDE 10 MEQ: 7.46 INJECTION, SOLUTION INTRAVENOUS at 09:13

## 2020-01-01 RX ADMIN — FENTANYL CITRATE 100 MCG: 50 INJECTION, SOLUTION INTRAMUSCULAR; INTRAVENOUS at 00:05

## 2020-01-01 RX ADMIN — Medication 6 MG/HR: at 13:14

## 2020-01-01 RX ADMIN — Medication 6 MG/HR: at 12:30

## 2020-01-01 RX ADMIN — ACETAMINOPHEN 650 MG: 325 TABLET ORAL at 14:59

## 2020-01-01 RX ADMIN — VANCOMYCIN HYDROCHLORIDE 1250 MG: 10 INJECTION, POWDER, LYOPHILIZED, FOR SOLUTION INTRAVENOUS at 21:19

## 2020-01-01 RX ADMIN — FENTANYL CITRATE 100 MCG: 50 INJECTION, SOLUTION INTRAMUSCULAR; INTRAVENOUS at 19:15

## 2020-01-01 RX ADMIN — Medication 100 MCG/HR: at 00:06

## 2020-01-01 RX ADMIN — MIDODRINE HYDROCHLORIDE 10 MG: 5 TABLET ORAL at 12:39

## 2020-01-01 RX ADMIN — MORPHINE SULFATE 4 MG: 4 INJECTION, SOLUTION INTRAMUSCULAR; INTRAVENOUS at 06:11

## 2020-01-01 RX ADMIN — ROCURONIUM BROMIDE 50 MG: 10 INJECTION INTRAVENOUS at 09:16

## 2020-01-01 RX ADMIN — Medication 2 MG/HR: at 10:16

## 2020-01-01 RX ADMIN — PANTOPRAZOLE SODIUM 40 MG: 40 INJECTION, POWDER, FOR SOLUTION INTRAVENOUS at 20:24

## 2020-01-01 RX ADMIN — DOCUSATE SODIUM 100 MG: 50 LIQUID ORAL at 21:45

## 2020-01-01 RX ADMIN — Medication 150 MCG/HR: at 07:29

## 2020-01-01 RX ADMIN — Medication 10 ML: at 09:21

## 2020-01-01 RX ADMIN — LORAZEPAM 1 MG: 2 INJECTION INTRAMUSCULAR at 21:40

## 2020-01-01 RX ADMIN — MORPHINE SULFATE 4 MG: 4 INJECTION, SOLUTION INTRAMUSCULAR; INTRAVENOUS at 03:11

## 2020-01-01 RX ADMIN — Medication 10 ML: at 08:08

## 2020-01-01 RX ADMIN — PANTOPRAZOLE SODIUM 40 MG: 40 INJECTION, POWDER, FOR SOLUTION INTRAVENOUS at 08:51

## 2020-01-01 RX ADMIN — Medication 6 MG/HR: at 05:59

## 2020-01-01 RX ADMIN — LORAZEPAM 1 MG: 2 INJECTION INTRAMUSCULAR at 10:55

## 2020-01-01 RX ADMIN — Medication 10 ML: at 09:13

## 2020-01-01 RX ADMIN — Medication 175 MCG/HR: at 02:30

## 2020-01-01 RX ADMIN — DEXMEDETOMIDINE HYDROCHLORIDE 1 MCG/KG/HR: 400 INJECTION INTRAVENOUS at 19:13

## 2020-01-01 RX ADMIN — ENOXAPARIN SODIUM 40 MG: 40 INJECTION SUBCUTANEOUS at 20:14

## 2020-01-01 RX ADMIN — MORPHINE SULFATE 4 MG: 4 INJECTION, SOLUTION INTRAMUSCULAR; INTRAVENOUS at 21:11

## 2020-01-01 RX ADMIN — VANCOMYCIN HYDROCHLORIDE 1250 MG: 10 INJECTION, POWDER, LYOPHILIZED, FOR SOLUTION INTRAVENOUS at 05:06

## 2020-01-01 RX ADMIN — CEFTAROLINE FOSAMIL 600 MG: 600 POWDER, FOR SOLUTION INTRAVENOUS at 21:45

## 2020-01-01 RX ADMIN — ETOMIDATE 20 MG: 2 INJECTION INTRAVENOUS at 09:16

## 2020-01-01 RX ADMIN — DEXMEDETOMIDINE HYDROCHLORIDE 0.9 MCG/KG/HR: 400 INJECTION INTRAVENOUS at 16:08

## 2020-01-01 RX ADMIN — DEXAMETHASONE SODIUM PHOSPHATE 6 MG: 10 INJECTION INTRAMUSCULAR; INTRAVENOUS at 09:30

## 2020-01-01 RX ADMIN — LORAZEPAM 1 MG: 2 INJECTION INTRAMUSCULAR at 01:40

## 2020-01-01 RX ADMIN — PANTOPRAZOLE SODIUM 40 MG: 40 INJECTION, POWDER, FOR SOLUTION INTRAVENOUS at 19:38

## 2020-01-01 RX ADMIN — Medication 10 ML: at 08:51

## 2020-01-01 RX ADMIN — ACETAMINOPHEN 650 MG: 325 TABLET ORAL at 10:21

## 2020-01-01 RX ADMIN — CEFTAROLINE FOSAMIL 600 MG: 600 POWDER, FOR SOLUTION INTRAVENOUS at 18:16

## 2020-01-01 RX ADMIN — GLYCOPYRROLATE 0.2 MG: 0.2 INJECTION INTRAMUSCULAR; INTRAVENOUS at 10:59

## 2020-01-01 RX ADMIN — LATANOPROST 1 DROP: 50 SOLUTION OPHTHALMIC at 21:40

## 2020-01-01 RX ADMIN — SODIUM CHLORIDE 250 ML: 9 INJECTION, SOLUTION INTRAVENOUS at 04:19

## 2020-01-01 RX ADMIN — MIDODRINE HYDROCHLORIDE 10 MG: 5 TABLET ORAL at 16:29

## 2020-01-01 RX ADMIN — FUROSEMIDE 20 MG: 10 INJECTION, SOLUTION INTRAMUSCULAR; INTRAVENOUS at 09:29

## 2020-01-01 RX ADMIN — LORAZEPAM 1 MG: 2 INJECTION INTRAMUSCULAR at 06:10

## 2020-01-01 RX ADMIN — Medication 100 MCG/HR: at 19:41

## 2020-01-01 RX ADMIN — QUETIAPINE FUMARATE 50 MG: 25 TABLET ORAL at 20:40

## 2020-01-01 RX ADMIN — Medication 10 ML: at 09:44

## 2020-01-01 RX ADMIN — ENOXAPARIN SODIUM 40 MG: 40 INJECTION SUBCUTANEOUS at 21:02

## 2020-01-01 RX ADMIN — MINERAL OIL AND WHITE PETROLATUM: 150; 830 OINTMENT OPHTHALMIC at 11:58

## 2020-01-01 RX ADMIN — LATANOPROST 1 DROP: 50 SOLUTION OPHTHALMIC at 20:08

## 2020-01-01 RX ADMIN — LATANOPROST 1 DROP: 50 SOLUTION OPHTHALMIC at 22:37

## 2020-01-01 RX ADMIN — Medication 125 MCG/HR: at 00:48

## 2020-01-01 RX ADMIN — CISATRACURIUM BESYLATE 0.5 MCG/KG/MIN: 10 INJECTION, SOLUTION INTRAVENOUS at 17:29

## 2020-01-01 RX ADMIN — ENOXAPARIN SODIUM 40 MG: 40 INJECTION SUBCUTANEOUS at 08:42

## 2020-01-01 RX ADMIN — ENOXAPARIN SODIUM 30 MG: 30 INJECTION SUBCUTANEOUS at 17:45

## 2020-01-01 RX ADMIN — LORAZEPAM 0.5 MG: 2 INJECTION INTRAMUSCULAR at 18:44

## 2020-01-01 RX ADMIN — LATANOPROST 1 DROP: 50 SOLUTION OPHTHALMIC at 21:06

## 2020-01-01 RX ADMIN — CEFTAROLINE FOSAMIL 600 MG: 600 POWDER, FOR SOLUTION INTRAVENOUS at 06:36

## 2020-01-01 RX ADMIN — POLYETHYLENE GLYCOL 3350 17 G: 17 POWDER, FOR SOLUTION ORAL at 09:01

## 2020-01-01 RX ADMIN — HALOPERIDOL LACTATE 5 MG: 5 INJECTION, SOLUTION INTRAMUSCULAR at 06:51

## 2020-01-01 RX ADMIN — DEXAMETHASONE SODIUM PHOSPHATE 4 MG: 4 INJECTION, SOLUTION INTRAMUSCULAR; INTRAVENOUS at 09:21

## 2020-01-01 RX ADMIN — DEXMEDETOMIDINE HYDROCHLORIDE 0.2 MCG/KG/HR: 400 INJECTION INTRAVENOUS at 11:09

## 2020-01-01 RX ADMIN — MORPHINE SULFATE 4 MG: 4 INJECTION, SOLUTION INTRAMUSCULAR; INTRAVENOUS at 09:09

## 2020-01-01 RX ADMIN — FUROSEMIDE 20 MG: 10 INJECTION, SOLUTION INTRAMUSCULAR; INTRAVENOUS at 08:20

## 2020-01-01 RX ADMIN — Medication 75 MCG/HR: at 06:11

## 2020-01-01 RX ADMIN — POTASSIUM CHLORIDE 40 MEQ: 1500 TABLET, EXTENDED RELEASE ORAL at 15:44

## 2020-01-01 RX ADMIN — Medication 12 MCG/MIN: at 12:08

## 2020-01-01 RX ADMIN — REMDESIVIR 100 MG: 5 INJECTION INTRAVENOUS at 04:31

## 2020-01-01 RX ADMIN — LORAZEPAM 1 MG: 2 INJECTION INTRAMUSCULAR at 04:10

## 2020-01-01 RX ADMIN — Medication 150 MCG/HR: at 14:13

## 2020-01-01 RX ADMIN — MORPHINE SULFATE 4 MG: 4 INJECTION, SOLUTION INTRAMUSCULAR; INTRAVENOUS at 15:07

## 2020-01-01 RX ADMIN — PANTOPRAZOLE SODIUM 40 MG: 40 INJECTION, POWDER, FOR SOLUTION INTRAVENOUS at 22:40

## 2020-01-01 RX ADMIN — KETOROLAC TROMETHAMINE 15 MG: 15 INJECTION, SOLUTION INTRAMUSCULAR; INTRAVENOUS at 05:01

## 2020-01-01 RX ADMIN — Medication 10 ML: at 20:11

## 2020-01-01 RX ADMIN — Medication 5 MG/HR: at 08:06

## 2020-01-01 RX ADMIN — QUETIAPINE FUMARATE 50 MG: 25 TABLET ORAL at 19:39

## 2020-01-01 RX ADMIN — ENOXAPARIN SODIUM 30 MG: 30 INJECTION SUBCUTANEOUS at 20:58

## 2020-01-01 RX ADMIN — DEXAMETHASONE SODIUM PHOSPHATE 6 MG: 10 INJECTION INTRAMUSCULAR; INTRAVENOUS at 09:41

## 2020-01-01 RX ADMIN — Medication 125 MCG/HR: at 21:10

## 2020-01-01 RX ADMIN — CISATRACURIUM BESYLATE 2.5 MCG/KG/MIN: 10 INJECTION, SOLUTION INTRAVENOUS at 10:30

## 2020-01-01 RX ADMIN — POTASSIUM CHLORIDE 10 MEQ: 7.46 INJECTION, SOLUTION INTRAVENOUS at 09:42

## 2020-01-01 RX ADMIN — LORAZEPAM 0.5 MG: 2 INJECTION INTRAMUSCULAR at 06:30

## 2020-01-01 RX ADMIN — Medication 6 MG/HR: at 23:23

## 2020-01-01 RX ADMIN — Medication 125 MCG/HR: at 19:18

## 2020-01-01 RX ADMIN — SODIUM CHLORIDE 80 MG: 9 INJECTION, SOLUTION INTRAVENOUS at 11:09

## 2020-01-01 RX ADMIN — CEFTAROLINE FOSAMIL 600 MG: 600 POWDER, FOR SOLUTION INTRAVENOUS at 06:23

## 2020-01-01 RX ADMIN — DOCUSATE SODIUM 100 MG: 50 LIQUID ORAL at 19:11

## 2020-01-01 RX ADMIN — LORAZEPAM 1 MG: 2 INJECTION INTRAMUSCULAR at 22:40

## 2020-01-01 RX ADMIN — TBO-FILGRASTIM 480 MCG: 480 INJECTION, SOLUTION SUBCUTANEOUS at 12:01

## 2020-01-01 RX ADMIN — POLYETHYLENE GLYCOL 3350 17 G: 17 POWDER, FOR SOLUTION ORAL at 08:24

## 2020-01-01 RX ADMIN — Medication 2 MG/HR: at 19:50

## 2020-01-01 RX ADMIN — ENOXAPARIN SODIUM 40 MG: 40 INJECTION SUBCUTANEOUS at 09:39

## 2020-01-01 RX ADMIN — Medication 100 MCG/HR: at 06:08

## 2020-01-01 RX ADMIN — ENOXAPARIN SODIUM 40 MG: 40 INJECTION SUBCUTANEOUS at 10:13

## 2020-01-01 RX ADMIN — MEROPENEM 1 G: 1 INJECTION, POWDER, FOR SOLUTION INTRAVENOUS at 06:29

## 2020-01-01 RX ADMIN — ACETAMINOPHEN 650 MG: 325 TABLET ORAL at 21:14

## 2020-01-01 RX ADMIN — LORAZEPAM 0.5 MG: 2 INJECTION INTRAMUSCULAR at 10:01

## 2020-01-01 RX ADMIN — PANTOPRAZOLE SODIUM 40 MG: 40 INJECTION, POWDER, FOR SOLUTION INTRAVENOUS at 09:12

## 2020-01-01 RX ADMIN — Medication 175 MCG/HR: at 19:38

## 2020-01-01 RX ADMIN — ACETAMINOPHEN 650 MG: 325 TABLET ORAL at 11:17

## 2020-01-01 RX ADMIN — POLYETHYLENE GLYCOL 3350 17 G: 17 POWDER, FOR SOLUTION ORAL at 18:45

## 2020-01-01 RX ADMIN — ACETAMINOPHEN 650 MG: 325 TABLET ORAL at 00:30

## 2020-01-01 RX ADMIN — MIDODRINE HYDROCHLORIDE 10 MG: 5 TABLET ORAL at 09:43

## 2020-01-01 RX ADMIN — DOCUSATE SODIUM 100 MG: 50 LIQUID ORAL at 08:24

## 2020-01-01 RX ADMIN — ACETAMINOPHEN 650 MG: 325 TABLET ORAL at 12:00

## 2020-01-01 RX ADMIN — LORAZEPAM 0.5 MG: 2 INJECTION INTRAMUSCULAR at 04:55

## 2020-01-01 RX ADMIN — LORAZEPAM 1 MG: 2 INJECTION INTRAMUSCULAR at 00:40

## 2020-01-01 RX ADMIN — MORPHINE SULFATE 4 MG: 4 INJECTION, SOLUTION INTRAMUSCULAR; INTRAVENOUS at 00:11

## 2020-01-01 RX ADMIN — LORAZEPAM 1 MG: 2 INJECTION INTRAMUSCULAR; INTRAVENOUS at 07:09

## 2020-01-01 RX ADMIN — ENOXAPARIN SODIUM 40 MG: 40 INJECTION SUBCUTANEOUS at 08:06

## 2020-01-01 RX ADMIN — FUROSEMIDE 20 MG: 10 INJECTION, SOLUTION INTRAMUSCULAR; INTRAVENOUS at 11:09

## 2020-01-01 RX ADMIN — SODIUM CHLORIDE 250 ML: 9 INJECTION, SOLUTION INTRAVENOUS at 15:02

## 2020-01-01 RX ADMIN — ENOXAPARIN SODIUM 40 MG: 40 INJECTION SUBCUTANEOUS at 08:31

## 2020-01-01 RX ADMIN — LATANOPROST 1 DROP: 50 SOLUTION OPHTHALMIC at 21:05

## 2020-01-01 RX ADMIN — PANTOPRAZOLE SODIUM 40 MG: 40 INJECTION, POWDER, FOR SOLUTION INTRAVENOUS at 09:29

## 2020-01-01 RX ADMIN — REMDESIVIR 100 MG: 5 INJECTION INTRAVENOUS at 05:08

## 2020-01-01 RX ADMIN — MIDODRINE HYDROCHLORIDE 10 MG: 5 TABLET ORAL at 12:50

## 2020-01-01 RX ADMIN — MIDODRINE HYDROCHLORIDE 10 MG: 5 TABLET ORAL at 16:04

## 2020-01-01 RX ADMIN — ACETAMINOPHEN 650 MG: 325 TABLET ORAL at 08:51

## 2020-01-01 RX ADMIN — Medication 4 MG/HR: at 02:14

## 2020-01-01 RX ADMIN — REMDESIVIR 100 MG: 5 INJECTION INTRAVENOUS at 05:00

## 2020-01-01 RX ADMIN — LORAZEPAM 1 MG: 2 INJECTION INTRAMUSCULAR at 23:40

## 2020-01-01 RX ADMIN — MORPHINE SULFATE 4 MG: 4 INJECTION, SOLUTION INTRAMUSCULAR; INTRAVENOUS at 23:11

## 2020-01-01 RX ADMIN — ACETAMINOPHEN 650 MG: 325 TABLET ORAL at 17:07

## 2020-01-01 RX ADMIN — DOCUSATE SODIUM 100 MG: 50 LIQUID ORAL at 09:01

## 2020-01-01 RX ADMIN — LORAZEPAM 1 MG: 2 INJECTION INTRAMUSCULAR at 09:09

## 2020-01-01 RX ADMIN — DOCUSATE SODIUM 100 MG: 50 LIQUID ORAL at 09:39

## 2020-01-01 RX ADMIN — Medication 5 MILLICURIE: at 11:30

## 2020-01-01 RX ADMIN — MORPHINE SULFATE 4 MG: 4 INJECTION, SOLUTION INTRAMUSCULAR; INTRAVENOUS at 22:11

## 2020-01-01 RX ADMIN — ACETAMINOPHEN 650 MG: 325 TABLET ORAL at 18:16

## 2020-01-01 RX ADMIN — TBO-FILGRASTIM 480 MCG: 480 INJECTION, SOLUTION SUBCUTANEOUS at 12:58

## 2020-01-01 RX ADMIN — Medication 100 MCG/HR: at 23:40

## 2020-01-01 RX ADMIN — MORPHINE SULFATE 4 MG: 4 INJECTION, SOLUTION INTRAMUSCULAR; INTRAVENOUS at 01:11

## 2020-01-01 RX ADMIN — Medication 100 MCG/HR: at 09:14

## 2020-01-01 RX ADMIN — PANTOPRAZOLE SODIUM 40 MG: 40 INJECTION, POWDER, FOR SOLUTION INTRAVENOUS at 08:08

## 2020-01-01 RX ADMIN — FUROSEMIDE 20 MG: 10 INJECTION, SOLUTION INTRAMUSCULAR; INTRAVENOUS at 18:15

## 2020-01-01 RX ADMIN — SODIUM CHLORIDE, PRESERVATIVE FREE 10 ML: 5 INJECTION INTRAVENOUS at 11:30

## 2020-01-01 RX ADMIN — ENOXAPARIN SODIUM 30 MG: 30 INJECTION SUBCUTANEOUS at 08:25

## 2020-01-01 RX ADMIN — FENTANYL CITRATE 100 MCG: 50 INJECTION, SOLUTION INTRAMUSCULAR; INTRAVENOUS at 23:51

## 2020-01-01 RX ADMIN — DEXAMETHASONE SODIUM PHOSPHATE 6 MG: 10 INJECTION INTRAMUSCULAR; INTRAVENOUS at 08:51

## 2020-01-01 RX ADMIN — PANTOPRAZOLE SODIUM 40 MG: 40 INJECTION, POWDER, FOR SOLUTION INTRAVENOUS at 21:49

## 2020-01-01 RX ADMIN — MEROPENEM 1 G: 1 INJECTION, POWDER, FOR SOLUTION INTRAVENOUS at 23:40

## 2020-01-01 RX ADMIN — PANTOPRAZOLE SODIUM 8 MG/HR: 40 INJECTION, POWDER, FOR SOLUTION INTRAVENOUS at 06:33

## 2020-01-01 RX ADMIN — LORAZEPAM 1 MG: 2 INJECTION INTRAMUSCULAR at 14:03

## 2020-01-01 RX ADMIN — DOCUSATE SODIUM 100 MG: 50 LIQUID ORAL at 20:43

## 2020-01-01 RX ADMIN — LATANOPROST 1 DROP: 50 SOLUTION OPHTHALMIC at 21:14

## 2020-01-01 RX ADMIN — PANTOPRAZOLE SODIUM 40 MG: 40 INJECTION, POWDER, FOR SOLUTION INTRAVENOUS at 19:11

## 2020-01-01 RX ADMIN — LORAZEPAM 1 MG: 2 INJECTION INTRAMUSCULAR at 05:10

## 2020-01-01 RX ADMIN — TBO-FILGRASTIM 480 MCG: 480 INJECTION, SOLUTION SUBCUTANEOUS at 08:38

## 2020-01-01 RX ADMIN — LORAZEPAM 1 MG: 2 INJECTION INTRAMUSCULAR at 15:07

## 2020-01-01 RX ADMIN — MIDODRINE HYDROCHLORIDE 10 MG: 5 TABLET ORAL at 09:38

## 2020-01-01 RX ADMIN — FENTANYL CITRATE 100 MCG: 50 INJECTION, SOLUTION INTRAMUSCULAR; INTRAVENOUS at 03:28

## 2020-01-01 RX ADMIN — ENOXAPARIN SODIUM 40 MG: 40 INJECTION SUBCUTANEOUS at 09:41

## 2020-01-01 RX ADMIN — MIDODRINE HYDROCHLORIDE 10 MG: 5 TABLET ORAL at 16:09

## 2020-01-01 RX ADMIN — PANTOPRAZOLE SODIUM 40 MG: 40 INJECTION, POWDER, FOR SOLUTION INTRAVENOUS at 20:42

## 2020-01-01 RX ADMIN — ACETAMINOPHEN 650 MG: 325 TABLET ORAL at 10:44

## 2020-01-01 RX ADMIN — Medication 2 MCG/MIN: at 04:02

## 2020-01-01 RX ADMIN — PANTOPRAZOLE SODIUM 40 MG: 40 INJECTION, POWDER, FOR SOLUTION INTRAVENOUS at 09:01

## 2020-01-01 RX ADMIN — Medication 10 ML: at 21:14

## 2020-01-01 RX ADMIN — LORAZEPAM 1 MG: 2 INJECTION INTRAMUSCULAR at 23:10

## 2020-01-01 RX ADMIN — LORAZEPAM 1 MG: 2 INJECTION INTRAMUSCULAR at 22:10

## 2020-01-01 RX ADMIN — SODIUM CHLORIDE 500 ML: 0.9 INJECTION, SOLUTION INTRAVENOUS at 03:15

## 2020-01-01 RX ADMIN — Medication 100 MCG/HR: at 05:29

## 2020-01-01 RX ADMIN — ACETAMINOPHEN 650 MG: 325 TABLET ORAL at 09:44

## 2020-01-01 RX ADMIN — ENOXAPARIN SODIUM 40 MG: 40 INJECTION SUBCUTANEOUS at 09:01

## 2020-01-01 RX ADMIN — DEXMEDETOMIDINE HYDROCHLORIDE 0.8 MCG/KG/HR: 400 INJECTION INTRAVENOUS at 10:20

## 2020-01-01 RX ADMIN — Medication 175 MCG/HR: at 07:32

## 2020-01-01 RX ADMIN — LORAZEPAM 1 MG: 2 INJECTION INTRAMUSCULAR at 02:10

## 2020-01-01 RX ADMIN — POTASSIUM CHLORIDE 10 MEQ: 7.46 INJECTION, SOLUTION INTRAVENOUS at 08:52

## 2020-01-01 RX ADMIN — DOCUSATE SODIUM 100 MG: 50 LIQUID ORAL at 08:51

## 2020-01-01 RX ADMIN — MEROPENEM 1 G: 1 INJECTION, POWDER, FOR SOLUTION INTRAVENOUS at 17:20

## 2020-01-01 RX ADMIN — ACETAMINOPHEN 650 MG: 325 TABLET ORAL at 16:13

## 2020-01-01 RX ADMIN — LORAZEPAM 1 MG: 2 INJECTION INTRAMUSCULAR at 03:10

## 2020-01-01 RX ADMIN — MIDODRINE HYDROCHLORIDE 10 MG: 5 TABLET ORAL at 11:11

## 2020-01-01 RX ADMIN — CEFTAROLINE FOSAMIL 600 MG: 600 POWDER, FOR SOLUTION INTRAVENOUS at 18:21

## 2020-01-01 RX ADMIN — FUROSEMIDE 20 MG: 10 INJECTION, SOLUTION INTRAMUSCULAR; INTRAVENOUS at 08:51

## 2020-01-01 RX ADMIN — CISATRACURIUM BESYLATE 2 MCG/KG/MIN: 10 INJECTION, SOLUTION INTRAVENOUS at 12:52

## 2020-01-01 RX ADMIN — REMDESIVIR 200 MG: 100 INJECTION, POWDER, LYOPHILIZED, FOR SOLUTION INTRAVENOUS at 04:08

## 2020-01-01 RX ADMIN — Medication 175 MCG/HR: at 20:59

## 2020-01-01 RX ADMIN — MORPHINE SULFATE 4 MG: 4 INJECTION, SOLUTION INTRAMUSCULAR; INTRAVENOUS at 05:11

## 2020-01-01 RX ADMIN — MIDODRINE HYDROCHLORIDE 10 MG: 5 TABLET ORAL at 16:55

## 2020-01-01 RX ADMIN — DEXMEDETOMIDINE HYDROCHLORIDE 1.1 MCG/KG/HR: 400 INJECTION INTRAVENOUS at 00:13

## 2020-01-01 RX ADMIN — GLYCOPYRROLATE 0.2 MG: 0.2 INJECTION INTRAMUSCULAR; INTRAVENOUS at 15:08

## 2020-01-01 RX ADMIN — Medication 50 MCG/HR: at 20:49

## 2020-01-01 RX ADMIN — ACETAMINOPHEN 650 MG: 325 TABLET ORAL at 02:07

## 2020-01-01 RX ADMIN — LATANOPROST 1 DROP: 50 SOLUTION OPHTHALMIC at 21:42

## 2020-01-01 RX ADMIN — MIDODRINE HYDROCHLORIDE 10 MG: 5 TABLET ORAL at 11:36

## 2020-01-01 RX ADMIN — CEFTAROLINE FOSAMIL 600 MG: 600 POWDER, FOR SOLUTION INTRAVENOUS at 19:02

## 2020-01-01 RX ADMIN — DEXAMETHASONE SODIUM PHOSPHATE 6 MG: 10 INJECTION INTRAMUSCULAR; INTRAVENOUS at 10:45

## 2020-01-01 RX ADMIN — SODIUM CHLORIDE 500 ML: 0.9 INJECTION, SOLUTION INTRAVENOUS at 23:54

## 2020-01-01 RX ADMIN — PANTOPRAZOLE SODIUM 40 MG: 40 INJECTION, POWDER, FOR SOLUTION INTRAVENOUS at 20:40

## 2020-01-01 RX ADMIN — Medication 175 MCG/HR: at 07:56

## 2020-01-01 RX ADMIN — ACETAMINOPHEN 650 MG: 325 TABLET ORAL at 12:39

## 2020-01-01 RX ADMIN — Medication 8 MG/HR: at 07:45

## 2020-01-01 RX ADMIN — Medication 50 MCG/HR: at 11:38

## 2020-01-01 RX ADMIN — LATANOPROST 1 DROP: 50 SOLUTION OPHTHALMIC at 20:00

## 2020-01-01 RX ADMIN — Medication 5 MG/HR: at 23:49

## 2020-01-01 RX ADMIN — DOCUSATE SODIUM 100 MG: 50 LIQUID ORAL at 12:02

## 2020-01-01 RX ADMIN — CEFTAROLINE FOSAMIL 600 MG: 600 POWDER, FOR SOLUTION INTRAVENOUS at 06:08

## 2020-01-01 RX ADMIN — Medication 10 ML: at 11:37

## 2020-01-01 RX ADMIN — MIDODRINE HYDROCHLORIDE 10 MG: 5 TABLET ORAL at 16:32

## 2020-01-01 RX ADMIN — DEXAMETHASONE SODIUM PHOSPHATE 6 MG: 10 INJECTION INTRAMUSCULAR; INTRAVENOUS at 11:21

## 2020-01-01 RX ADMIN — Medication 100 MCG/HR: at 13:15

## 2020-01-01 RX ADMIN — FUROSEMIDE 20 MG: 10 INJECTION, SOLUTION INTRAMUSCULAR; INTRAVENOUS at 12:00

## 2020-01-01 RX ADMIN — ENOXAPARIN SODIUM 40 MG: 40 INJECTION SUBCUTANEOUS at 10:45

## 2020-01-01 RX ADMIN — LATANOPROST 1 DROP: 50 SOLUTION OPHTHALMIC at 20:42

## 2020-01-01 RX ADMIN — QUETIAPINE FUMARATE 50 MG: 25 TABLET ORAL at 21:04

## 2020-01-01 RX ADMIN — Medication 8 MG/HR: at 18:24

## 2020-01-01 RX ADMIN — MINERAL OIL AND WHITE PETROLATUM: 150; 830 OINTMENT OPHTHALMIC at 16:32

## 2020-01-01 RX ADMIN — MORPHINE SULFATE 4 MG: 4 INJECTION, SOLUTION INTRAMUSCULAR; INTRAVENOUS at 07:45

## 2020-01-01 RX ADMIN — MIDODRINE HYDROCHLORIDE 10 MG: 5 TABLET ORAL at 08:52

## 2020-01-01 RX ADMIN — LORAZEPAM 1 MG: 2 INJECTION INTRAMUSCULAR at 21:10

## 2020-01-01 RX ADMIN — FUROSEMIDE 20 MG: 10 INJECTION, SOLUTION INTRAMUSCULAR; INTRAVENOUS at 11:20

## 2020-01-01 RX ADMIN — MEROPENEM 1 G: 1 INJECTION, POWDER, FOR SOLUTION INTRAVENOUS at 03:34

## 2020-01-01 RX ADMIN — PANTOPRAZOLE SODIUM 40 MG: 40 INJECTION, POWDER, FOR SOLUTION INTRAVENOUS at 12:38

## 2020-01-01 RX ADMIN — LATANOPROST 1 DROP: 50 SOLUTION OPHTHALMIC at 20:58

## 2020-01-01 RX ADMIN — LATANOPROST 1 DROP: 50 SOLUTION OPHTHALMIC at 22:16

## 2020-01-01 RX ADMIN — QUETIAPINE FUMARATE 50 MG: 25 TABLET ORAL at 20:43

## 2020-01-01 RX ADMIN — LORAZEPAM 1 MG: 2 INJECTION INTRAMUSCULAR at 06:40

## 2020-01-01 RX ADMIN — LATANOPROST 1 DROP: 50 SOLUTION OPHTHALMIC at 20:44

## 2020-01-01 SDOH — HEALTH STABILITY: MENTAL HEALTH: HOW OFTEN DO YOU HAVE A DRINK CONTAINING ALCOHOL?: NEVER

## 2020-01-01 ASSESSMENT — PULMONARY FUNCTION TESTS
PIF_VALUE: 31
PIF_VALUE: 29
PIF_VALUE: 18
PIF_VALUE: 18
PIF_VALUE: 19
PIF_VALUE: 16
PIF_VALUE: 17
PIF_VALUE: 30
PIF_VALUE: 24
PIF_VALUE: 21
PIF_VALUE: 28
PIF_VALUE: 30
PIF_VALUE: 23
PIF_VALUE: 15
PIF_VALUE: 32
PIF_VALUE: 22
PIF_VALUE: 15
PIF_VALUE: 15
PIF_VALUE: 21
PIF_VALUE: 28
PIF_VALUE: 21
PIF_VALUE: 23
PIF_VALUE: 22
PIF_VALUE: 31
PIF_VALUE: 22
PIF_VALUE: 13
PIF_VALUE: 29
PIF_VALUE: 23
PIF_VALUE: 27
PIF_VALUE: 24
PIF_VALUE: 20
PIF_VALUE: 30
PIF_VALUE: 21
PIF_VALUE: 31
PIF_VALUE: 27
PIF_VALUE: 20
PIF_VALUE: 18
PIF_VALUE: 28
PIF_VALUE: 30
PIF_VALUE: 16
PIF_VALUE: 17
PIF_VALUE: 23
PIF_VALUE: 23
PIF_VALUE: 22
PIF_VALUE: 20
PIF_VALUE: 22
PIF_VALUE: 26
PIF_VALUE: 31
PIF_VALUE: 33
PIF_VALUE: 15
PIF_VALUE: 20
PIF_VALUE: 22

## 2020-01-01 ASSESSMENT — PAIN SCALES - GENERAL
PAINLEVEL_OUTOF10: 4
PAINLEVEL_OUTOF10: 3
PAINLEVEL_OUTOF10: 0
PAINLEVEL_OUTOF10: 3
PAINLEVEL_OUTOF10: 0
PAINLEVEL_OUTOF10: 1
PAINLEVEL_OUTOF10: 0
PAINLEVEL_OUTOF10: 5
PAINLEVEL_OUTOF10: 0
PAINLEVEL_OUTOF10: 7
PAINLEVEL_OUTOF10: 4
PAINLEVEL_OUTOF10: 3
PAINLEVEL_OUTOF10: 8
PAINLEVEL_OUTOF10: 0
PAINLEVEL_OUTOF10: 0
PAINLEVEL_OUTOF10: 5
PAINLEVEL_OUTOF10: 0
PAINLEVEL_OUTOF10: 3
PAINLEVEL_OUTOF10: 3
PAINLEVEL_OUTOF10: 2
PAINLEVEL_OUTOF10: 0
PAINLEVEL_OUTOF10: 3

## 2020-01-01 ASSESSMENT — ENCOUNTER SYMPTOMS
FACIAL SWELLING: 0
VOMITING: 0
DIARRHEA: 0
COUGH: 1
CHOKING: 0
EYE DISCHARGE: 0
VOMITING: 0
ABDOMINAL DISTENTION: 0
RHINORRHEA: 0
EYE DISCHARGE: 0
TACHYPNEA: 1
EYE DISCHARGE: 0
ABDOMINAL PAIN: 0
TROUBLE SWALLOWING: 0
SINUS PAIN: 0
CHEST TIGHTNESS: 0
RHINORRHEA: 0
BACK PAIN: 0
BACK PAIN: 0
VOICE CHANGE: 0
EYE DISCHARGE: 0
FACIAL SWELLING: 0
ABDOMINAL DISTENTION: 0
EYE REDNESS: 0
NAUSEA: 1
BACK PAIN: 0
NAUSEA: 0
CONSTIPATION: 0
SINUS PAIN: 0
CONSTIPATION: 0
BACK PAIN: 0
VOICE CHANGE: 0
ABDOMINAL DISTENTION: 0
SINUS PAIN: 0
COUGH: 1
COUGH: 1
WHEEZING: 0
ABDOMINAL PAIN: 0
SHORTNESS OF BREATH: 0
CHEST TIGHTNESS: 0
SHORTNESS OF BREATH: 1
EYE DISCHARGE: 0
ANAL BLEEDING: 0
CHEST TIGHTNESS: 0
CHEST TIGHTNESS: 0
WHEEZING: 0
COUGH: 1
RHINORRHEA: 0
CHOKING: 0
VOMITING: 0
DIARRHEA: 0
VOICE CHANGE: 0
TROUBLE SWALLOWING: 0
COLOR CHANGE: 0
VOMITING: 0
CHOKING: 0
CONSTIPATION: 0
ABDOMINAL PAIN: 0
VOICE CHANGE: 0
VOICE CHANGE: 0
SINUS PAIN: 0
COLOR CHANGE: 0
CONSTIPATION: 0
SHORTNESS OF BREATH: 1
ABDOMINAL DISTENTION: 0
TROUBLE SWALLOWING: 0
SHORTNESS OF BREATH: 1
CHOKING: 0
WHEEZING: 0
SHORTNESS OF BREATH: 1
SHORTNESS OF BREATH: 0
DIARRHEA: 0
SINUS PRESSURE: 0
RHINORRHEA: 0
NAUSEA: 1
FACIAL SWELLING: 0
DIARRHEA: 0
ANAL BLEEDING: 0
WHEEZING: 0
EYE DISCHARGE: 0
CHEST TIGHTNESS: 0
APNEA: 0
RHINORRHEA: 0
EYE REDNESS: 0
SINUS PAIN: 0
ABDOMINAL PAIN: 0
COUGH: 1
EYE REDNESS: 0
CHOKING: 0
SHORTNESS OF BREATH: 0
DIARRHEA: 0
CHOKING: 0
VOMITING: 0
WHEEZING: 0
CHEST TIGHTNESS: 0
WHEEZING: 1
NAUSEA: 1
BACK PAIN: 0
COLOR CHANGE: 0
FACIAL SWELLING: 0
BACK PAIN: 0
FACIAL SWELLING: 0
EYE REDNESS: 0
EYE REDNESS: 0
CHOKING: 0
TROUBLE SWALLOWING: 0
CONSTIPATION: 0
VOMITING: 0
DIARRHEA: 0
SINUS PAIN: 0
WHEEZING: 0
COLOR CHANGE: 0
ANAL BLEEDING: 0
SORE THROAT: 0
ANAL BLEEDING: 0
BACK PAIN: 0
WHEEZING: 0
NAUSEA: 1
ANAL BLEEDING: 0
COUGH: 1
PHOTOPHOBIA: 0
COLOR CHANGE: 0
COUGH: 1
COLOR CHANGE: 0
SHORTNESS OF BREATH: 1
EYE REDNESS: 0
TROUBLE SWALLOWING: 0
NAUSEA: 1
CONSTIPATION: 0
ABDOMINAL DISTENTION: 0
ANAL BLEEDING: 0
COLOR CHANGE: 0
ANAL BLEEDING: 0
SHORTNESS OF BREATH: 0
SHORTNESS OF BREATH: 1
ABDOMINAL DISTENTION: 0
VOICE CHANGE: 0

## 2020-01-01 ASSESSMENT — PAIN DESCRIPTION - PAIN TYPE: TYPE: CHRONIC PAIN

## 2020-01-01 ASSESSMENT — PAIN DESCRIPTION - LOCATION: LOCATION: COCCYX;HEAD

## 2020-11-16 PROBLEM — J96.00 ACUTE RESPIRATORY FAILURE DUE TO COVID-19 (HCC): Status: ACTIVE | Noted: 2020-01-01

## 2020-11-16 PROBLEM — U07.1 ACUTE RESPIRATORY FAILURE DUE TO COVID-19 (HCC): Status: ACTIVE | Noted: 2020-01-01

## 2020-11-16 NOTE — PLAN OF CARE
Bety Mixon is a 70 yr old female directly admitted to the [facility] ICU from [facility] ED Nov 16. She was recently diagnosed with myelodysplastic syndrome. She recently had a port placed. Pt presented with hypoxia and required Bipap with 50% O2. D-dimer is elevated but pt is allergic to IV contrast and could not have CTA. Covid is +. She was accepted to the Covid ICU per Dr Efren Golden and is admitted under Dr Radha Shoemaker. Roselia Heath.

## 2020-11-17 PROBLEM — J96.01 ACUTE RESPIRATORY FAILURE WITH HYPOXIA (HCC): Status: ACTIVE | Noted: 2020-01-01

## 2020-11-17 PROBLEM — Z86.79 HISTORY OF RAYNAUD'S SYNDROME: Status: ACTIVE | Noted: 2020-01-01

## 2020-11-17 PROBLEM — J12.82 PNEUMONIA DUE TO COVID-19 VIRUS: Status: ACTIVE | Noted: 2020-01-01

## 2020-11-17 PROBLEM — I10 ESSENTIAL HYPERTENSION: Status: ACTIVE | Noted: 2020-01-01

## 2020-11-17 PROBLEM — D75.89 BICYTOPENIA: Status: ACTIVE | Noted: 2020-01-01

## 2020-11-17 PROBLEM — U07.1 PNEUMONIA DUE TO COVID-19 VIRUS: Status: ACTIVE | Noted: 2020-01-01

## 2020-11-17 PROBLEM — A41.9 SEVERE SEPSIS (HCC): Status: ACTIVE | Noted: 2020-01-01

## 2020-11-17 PROBLEM — N17.9 AKI (ACUTE KIDNEY INJURY) (HCC): Status: ACTIVE | Noted: 2020-01-01

## 2020-11-17 PROBLEM — R74.01 TRANSAMINITIS: Status: ACTIVE | Noted: 2020-01-01

## 2020-11-17 PROBLEM — A41.9 NEUTROPENIC SEPSIS (HCC): Status: ACTIVE | Noted: 2020-01-01

## 2020-11-17 PROBLEM — D46.9 MDS (MYELODYSPLASTIC SYNDROME) (HCC): Status: ACTIVE | Noted: 2020-01-01

## 2020-11-17 PROBLEM — I25.810 CORONARY ARTERY DISEASE INVOLVING CORONARY BYPASS GRAFT OF NATIVE HEART WITHOUT ANGINA PECTORIS: Status: ACTIVE | Noted: 2020-01-01

## 2020-11-17 PROBLEM — D70.9 NEUTROPENIC SEPSIS (HCC): Status: ACTIVE | Noted: 2020-01-01

## 2020-11-17 PROBLEM — R65.20 SEVERE SEPSIS (HCC): Status: ACTIVE | Noted: 2020-01-01

## 2020-11-17 NOTE — PLAN OF CARE
Problem: Skin Integrity:  Goal: Risk for impaired skin integrity will decrease  Description: Risk for impaired skin integrity will decrease  Outcome: Ongoing  Goal: Will show no infection signs and symptoms  Description: Will show no infection signs and symptoms  Outcome: Ongoing  Goal: Absence of new skin breakdown  Description: Absence of new skin breakdown  Outcome: Ongoing     Problem: Airway Clearance - Ineffective  Goal: Achieve or maintain patent airway  Outcome: Ongoing     Problem: Gas Exchange - Impaired  Goal: Absence of hypoxia  Outcome: Ongoing  Goal: Promote optimal lung function  Outcome: Ongoing     Problem: Breathing Pattern - Ineffective  Goal: Ability to achieve and maintain a regular respiratory rate  Outcome: Ongoing   BRONCHOSPASM/BRONCHOCONSTRICTION     [x]         IMPROVE AERATION/BREATH SOUNDS  [x]   ADMINISTER THERAPY AS APPROPRIATE  [x]   ASSESS BREATH SOUNDS  [x]   PATIENT EDUCATION AS NEEDED

## 2020-11-17 NOTE — CONSULTS
PULMONARY & CRITICAL CARE MEDICINE CONSULT NOTE     Patient:  Paul Shay  MRN: [id number]  Admit date: 11/16/2020  Primary Care Physician: No primary care provider on file. Consulting Physician: Brandy Eagle MD  CODE Status: Full Code  LOS: 1     SUBJECTIVE     CHIEF COMPLAINT/REASON FOR CONSULT:    HISTORY OF PRESENT ILLNESS:  The patient is a 70 y.o. female with history of myelodysplastic syndrome on chemotherapy apparently last 3 to 4 weeks ago, coronary artery disease status post CABG, hypertension, hyperlipidemia. Presented to [facility] with increasing fatigue and tiredness which according to patient she usually feels when her blood count goes low and she gets transfusion, 4 days history of cough associated with shortness of breath, cough is dry without sputum production. She did not have fever or chills. She denies headache body ache muscle ache myalgia sore throat. Denies nausea vomiting diarrhea or abdominal pain denies urinary complaint. In the emergency room she was found to be hypoxic, initially on nasal cannula and then she was switched to BiPAP after she was placed on BiPAP 50% she was saturating well remained on BiPAP and was transferred here to [facility] ICU unit. She had a CT chest done in [facility] which shows bilateral groundglass opacities nonspecific mediastinal neuropathy. She also had a hemoglobin of 6 and she received 1 unit packed RBC there  Since she was admitted overnight she remained on BiPAP 50% FiO2 had saturated well and she was transitioned to high flow nasal cannula this morning. She had mild distress with mild tachypnea. She does get short of breath talking on the phone or activities on bed. Her initial labs show sodium 133 BUN 23 creatinine 0.81 bicarbonate 21. WBC count was 1.2 hemoglobin 7 hematocrit 22.4 and platelet 441. Procalcitonin was 1.0. . . proBNP 729. Ferritin 3024. Fibrinogen 720. D-dimer 2.44 and INR 0.9.   She was Historical Provider, MD     IMMUNIZATIONS:    There is no immunization history on file for this patient. REVIEW OF SYSTEMS:  Review of Systems   Constitutional: Positive for appetite change and fatigue. Negative for chills, diaphoresis and fever. HENT: Negative for congestion, ear pain, mouth sores, nosebleeds, sinus pressure, sinus pain, sore throat and voice change. Eyes: Negative for photophobia, discharge, redness and visual disturbance. Respiratory: Positive for cough and shortness of breath. Negative for apnea, chest tightness and wheezing. Cardiovascular: Negative for chest pain, palpitations and leg swelling. Gastrointestinal: Negative for abdominal distention, abdominal pain, constipation, diarrhea, nausea and vomiting. Endocrine: Negative for cold intolerance, heat intolerance, polydipsia and polyphagia. Genitourinary: Negative for difficulty urinating, dysuria, frequency, hematuria and urgency. Musculoskeletal: Negative for arthralgias, back pain, joint swelling and myalgias. Skin: Negative for color change, pallor and rash. Neurological: Negative for dizziness, tremors, seizures, syncope, facial asymmetry, speech difficulty, weakness and headaches. Hematological: Negative for adenopathy. Does not bruise/bleed easily. Psychiatric/Behavioral: Positive for sleep disturbance. Negative for agitation and confusion. The patient is nervous/anxious. OBJECTIVE     PaO2/FiO2 RATIO:  No results for input(s): POCPO2 in the last 72 hours.    FiO2 : 50 %     VITAL SIGNS:   LAST:  BP (!) 107/55   Pulse 76   Temp 98.1 °F (36.7 °C) (Oral)   Resp 23   Ht 5' 7\" (1.702 m)   Wt 182 lb 8.7 oz (82.8 kg)   SpO2 100%   BMI 28.59 kg/m²   8-24 HR RANGE:  TEMP Temp  Av.5 °F (36.9 °C)  Min: 98.1 °F (36.7 °C)  Max: 98.9 °F (13.2 °C)   BP Systolic (96YKS), XBC:222 , Min:80 , RDA:572      Diastolic (22HGX), GYE:84, Min:50, Max:63     PULSE Pulse  Av.2  Min: 65  Max: 76   RR Resp  Avg: CRP:   Recent Labs     11/17/20 0036   .8*     LDH:   Recent Labs     11/17/20 0036 11/17/20 0255   * 291*     BMP:   Recent Labs     11/17/20 0036 11/17/20 0255   * 133*   K 4.1 4.1    100   CO2 21 21   BUN 24* 23   CREATININE 0.89 0.81   GLUCOSE 112* 109*   PHOS  --  3.5     Liver Function Test:   Recent Labs     11/17/20 0036 11/17/20 0255   PROT 5.9* 5.8*   LABALBU 3.0* 2.9*  2.9*   ALT 49* 45*   AST 41* 38*   ALKPHOS 76 74   BILITOT 0.64 0.53     Coagulation Profile:   Recent Labs     11/17/20 0036 11/17/20 0255   INR 0.9 0.9   PROTIME 9.3 9.5     D-Dimer:  Recent Labs     11/17/20 0255   DDIMER 2.44     Ferritin:    Recent Labs     11/17/20 0036 11/17/20 0255   FERRITIN 3,046* 3,024*     Lactic Acid:  No results for input(s): LACTA in the last 72 hours. Cardiac Enzymes:  No results for input(s): CKTOTAL, CKMB, CKMBINDEX, TROPONINI in the last 72 hours. Invalid input(s): TROPONIN, HSTROP  BNP/ProBNP:   Recent Labs     11/17/20  0700   PROBNP 729*     Triglycerides:  No results for input(s): TRIG in the last 72 hours. Microbiology:  Recent Labs     11/17/20 0248   SPECDESC . BLOOD   SPECIAL RT HAND 2ML   CULTURE NO GROWTH 4 HOURS        Radiology Reports:  XR CHEST (SINGLE VIEW FRONTAL)    (Results Pending)   NM LUNG SCAN PERFUSION ONLY    (Results Pending)        Echocardiogram:   No results found for this or any previous visit. ASSESSMENT AND PLAN     Assessment:    Acute hypoxic respiratory failure. Bilateral multifocal groundglass opacities/pneumonia due to COVID-19 infection. Sepsis secondary to COVID-19 infection/secondary to immune suppression/leukopenia  Elevated D-dimer, ferritin, fibrinogen, CRP secondary to COVID-19 infection. Myelodysplastic syndrome on chemotherapy. Leukopenia possibly secondary to chemotherapy/MDS. Anemia secondary to myelodysplastic syndrome. History of coronary artery disease status post CABG.   History of hypertension. Hyperlipidemia. Plan:    I personally interviewed/examined the patient; reviewed interval history, interpreted all available radiographic and laboratory data at the time of service. She is on BiPAP 50% 12/6 this morning. Switch to high flow nasal cannula and will wean to nasal cannula to keep saturation above 92%. Will use BiPAP as needed. She had transfusion for hemoglobin of 7. Recommend to monitor hemoglobin hematocrit daily and transfusion if hemoglobin 7 or less or if symptoms or hypotension. Hematology oncology consulted and have discussed with Dr. Meagan Grullon. Remdesivir started on 11/17/2020. Decadron is started on 11/17/2020. Consider/evaluation for convalescent plasma  Infectious disease evaluation  On meropenem and vancomycin. Antibiotic per infectious disease  Follow-up blood cultures. Continue supplemental oxygen to keep oxygen saturation >90%  Perfusion scan ordered by primary service. Recommend to use Lovenox 0.5 mg/kg twice daily  Encourage prone position when sleeping, incentive spirometry  Continue pulmonary toilet, aspiration precautions and bronchodilators  Monitor I/O, electrolytes with a goal of even/negative fluid balance  Stress ulcer prophylaxis  Continue to monitor CBC, coagulation profile  Chemical DVT prophylaxis as per protocol on Lovenox  Antimicrobials reviewed; on meropenem and vancomycin  Monitor CRP, LDH, AST/ALT, D-Dimer, Ferritin  Glycemic control per primary service  Physical/occupational therapy    Discussed with nursing staff, treatment plan discussed with  Discussed with respiratory therapist will    Patient is critically ill with illness/injury that acutely impairs one or more vital organ systems, such that there is a high probability of imminent or life threatening deterioration in the patient's condition.  Critical care time of 50 minutes was spent (excluding procedures), in coordination of care during bedside rounds and discussion of patient care in detail, and recommendations of the team were adopted in the plan. Necessity of all invasive devices was also confirmed. Mariel Goldsmith M.D. Pulmonary and Critical Care Medicine           11/17/2020, 9:45 AM    This patient was evaluated in the context of the global SARS-CoV-2 (COVID-19) pandemic, which necessitated considerations that the patient either has COVID-19 infection or is at risk of infection with COVID-19. Institutional protocols and algorithms that pertain to the evaluation & management of patients with COVID-19 or those at risk for COVID-19 are in a state of rapid changes based on information released by regulatory bodies including the CDC and federal and state organizations. These policies and algorithms were followed during the patient's care. Please note that this chart was generated using voice recognition Dragon dictation software. Although every effort was made to ensure the accuracy of this automated transcription, some errors in transcription may have occurred.

## 2020-11-17 NOTE — H&P
[facility]  Office: [address], DO, Jayro Pena, DO, [place], DO, Randall End Blood, DO, Digna Angeles MD, Stephanie Marshall MD, Jackson Vargas MD, Carine Rush MD, Zeynep Gipson MD, Mary Paz MD, Belia Head MD, Quinton Nava MD, Ren Zavala MD, Baljinder Hardy DO, Milton Morton MD, Nyoka Hodgkins, MD, Gavino Thomas DO, Jadene Boast, MD,  Abhay Gutierrez DO, Shawn Woods MD, Godwin Leach MD, Roro Altamirano CNP, [address], CNP, Josiane Seal, CNP, Florentin Lopez, CNS, Joselyn Rear, CNP, Lexi Pedarryl, CNP, Dena Foot, CNP, Arti Gamma, CNP, Tiki Arteaga, CNP, Caty Huntley PA-C, Jah Moreira DNP, Crow Gallardo, CNP, Johanne Parra, CNP, Ana Woodward, CNP, Ligia Medrano, CNP, Jaime Rondon, [facility]    HISTORY AND PHYSICAL EXAMINATION            Date:   11/17/2020  Patient name:  Shamir Henry  Date of admission:  11/16/2020 10:51 PM  MRN:   [id number]  Account:  [de-identified]  YOB: 1945  PCP:    No primary care provider on file. Room:   [address] [place] [address]  Code Status:    Full Code    Chief Complaint:     Shortness of Breath, LANDIN    History Obtained From:     patient    History of Present Illness:     Shamir Henry is a 70 y.o. female with a past medical history for MDS, essential hypertension, melanoma, mitral valve prolapse, MRSA, Raynaud's disease presents emergency department for shortness of breath and dyspnea upon exertion for 3 days. Patient was initially seen at [facility] diagnosed with Covid pneumonia. Patient requiring 50% FiO2 on BiPAP, patient also had an elevated D-dimer but CTA was not possible secondary to contrast allergy. [facility] unable to accommodate due to lack of Covid beds, patient was transferred for further care. Spoke with pulmonary medicine at [facility] and was accepted for ICU transfer.   Patient was given 1 unit of PRBCs at [facility] for a hemoglobin less than 7, patient was also placed on BiPAP for transfer. Patient arrived, no complaints except for some shortness of breath. She was saturating 100% on FiO2 of 50%. Discussed treatment course, reiterated CODE STATUS. Past Medical History:     Past Medical History:   Diagnosis Date    Cancer ([facility] [address]     myelodysplastic syndrome    Deaf, left     Essential hypertension     GERD (gastroesophageal reflux disease)     Glaucoma     Hyperlipidemia     [facility]     MRSA (methicillin resistant staph aureus) culture positive     MVP (mitral valve prolapse)     Pharyngoesophageal dysphagia     Raynaud disease     Status post four vessel coronary artery bypass         Past Surgical History:     Past Surgical History:   Procedure Laterality Date    APPENDECTOMY      CARDIAC SURGERY      CABG x3    CHOLECYSTECTOMY      HYSTERECTOMY, TOTAL ABDOMINAL      IR PORT PLACEMENT < 5 YEARS      SMALL INTESTINE SURGERY      TUBAL LIGATION          Medications Prior to Admission:     Prior to Admission medications    Medication Sig Start Date End Date Taking? Authorizing Provider   dapsone 100 MG tablet Take 1 tablet by mouth daily (with breakfast) 10/16/20  Yes Historical Provider, MD   aspirin 81 MG EC tablet Take 81 mg by mouth daily   Yes Historical Provider, MD   carvedilol (COREG) 6.25 MG tablet Take 6.25 mg by mouth 2 times daily (with meals)   Yes Historical Provider, MD   Isavuconazonium Sulfate (CRESEMBA) 186 MG CAPS Take by mouth   Yes Historical Provider, MD   diphenhydrAMINE (BENADRYL) 25 MG capsule Take 25 mg by mouth every 6 hours as needed for Itching   Yes Historical Provider, MD   hydroxychloroquine (PLAQUENIL) 200 MG tablet Take 200 mg by mouth daily   Yes Historical Provider, MD   nitroGLYCERIN (NITROSTAT) 0.4 MG SL tablet Place 0.4 mg under the tongue every 5 minutes as needed for Chest pain up to max of 3 total doses.  If no relief after 1 dose, call 911. Yes Historical Provider, MD   omeprazole (PRILOSEC) 20 MG delayed release capsule Take 40 mg by mouth daily   Yes Historical Provider, MD   phenazopyridine (PYRIDIUM) 100 MG tablet Take 100 mg by mouth 3 times daily as needed for Pain   Yes Historical Provider, MD   predniSONE (DELTASONE) 1 MG tablet Take 1 mg by mouth daily   Yes Historical Provider, MD   prochlorperazine (COMPAZINE) 25 MG suppository Place 25 mg rectally every 12 hours as needed for Nausea   Yes Historical Provider, MD   Travoprost (TRAVATAN OP) Apply to eye   Yes Historical Provider, MD        Allergies:     Fish allergy; Hydrocodone-acetaminophen; Tizanidine; Atorvastatin; Metoclopramide; Moxifloxacin; Oxycodone; Pregabalin; Tramadol; Zolpidem; Cephalexin; Iodides; and Sulfa antibiotics    Social History:     Tobacco:    reports that she quit smoking about 15 years ago. Her smoking use included cigarettes. She started smoking about 63 years ago. She does not have any smokeless tobacco history on file. Alcohol:      reports no history of alcohol use. Drug Use:  reports no history of drug use. Family History:     Family History   Problem Relation Age of Onset    Heart Failure Mother     Early Death Mother     Heart Disease Mother     Stroke Father        Review of Systems:     Positive and Negative as described in HPI.     CONSTITUTIONAL:  negative for fevers, chills, sweats, fatigue, weight loss  HEENT:  negative for vision, hearing changes, runny nose, throat pain  RESPIRATORY: +SOB  CARDIOVASCULAR:  negative for chest pain, palpitations  GASTROINTESTINAL:  negative for nausea, vomiting, diarrhea, constipation, change in bowel habits, abdominal pain   GENITOURINARY:  negative for difficulty of urination, burning with urination, frequency   INTEGUMENT:  negative for rash, skin lesions, easy bruising   HEMATOLOGIC/LYMPHATIC:  negative for swelling/edema   ALLERGIC/IMMUNOLOGIC:  negative for urticaria , itching  ENDOCRINE:  negative increase in drinking, increase in urination, hot or cold intolerance  MUSCULOSKELETAL:  negative joint pains, muscle aches, swelling of joints  NEUROLOGICAL:  negative for headaches, dizziness, lightheadedness, numbness, pain, tingling extremities  BEHAVIOR/PSYCH:  negative for depression, anxiety    Physical Exam:   BP (!) 104/54   Pulse 68   Temp 98.5 °F (36.9 °C) (Oral)   Resp 29   Ht 5' 7\" (1.702 m)   Wt 182 lb 8.7 oz (82.8 kg)   SpO2 98%   BMI 28.59 kg/m²   Temp (24hrs), Av.5 °F (36.9 °C), Min:98.5 °F (36.9 °C), Max:98.5 °F (36.9 °C)    Recent Labs     20  2348   POCGLU 108*     No intake or output data in the 24 hours ending 20 0253    General Appearance: alert, well appearing, and in no acute distress  Mental status: oriented to person, place, and time  Head: normocephalic, atraumatic  Lungs: bilateral crackles at bases , right chest port in place, accessed  Cardiovascular: normal rate, regular rhythm, no murmur, gallop, rub  Abdomen: Soft, nontender, nondistended, normal bowel sounds, no hepatomegaly or splenomegaly  Neurologic: There are no new focal motor or sensory deficits, normal muscle tone and bulk, no abnormal sensation, normal speech, cranial nerves II through XII grossly intact  Skin: No gross lesions, rashes, bruising or bleeding on exposed skin area  Extremities: peripheral pulses palpable, no pedal edema or calf pain with palpation  Psych: normal affect    Investigations:      Laboratory Testing:  Recent Results (from the past 24 hour(s))   POC Glucose Fingerstick    Collection Time: 20 11:48 PM   Result Value Ref Range    POC Glucose 108 (H) 65 - 105 mg/dL   CBC    Collection Time: 20 12:36 AM   Result Value Ref Range    WBC 1.3 (LL) 3.5 - 11.3 k/uL    RBC 2.53 (L) 3.95 - 5.11 m/uL    Hemoglobin 7.4 (L) 11.9 - 15.1 g/dL    Hematocrit 23.6 (L) 36.3 - 47.1 %    MCV 93.3 82.6 - 102.9 fL    MCH 29.2 25.2 - 33.5 pg    MCHC 31.4 28.4 - 34.8 g/dL    RDW 18.5 (H) 11.8 - 14.4 %    Platelets 547 692 - 514 k/uL    MPV 10.6 8.1 - 13.5 fL    NRBC Automated 0.0 0.0 per 100 WBC   Protime-INR    Collection Time: 11/17/20 12:36 AM   Result Value Ref Range    Protime 9.3 9.0 - 12.0 sec    INR 0.9    Comprehensive Metabolic Panel w/ Reflex to MG    Collection Time: 11/17/20 12:36 AM   Result Value Ref Range    Glucose 112 (H) 70 - 99 mg/dL    BUN 24 (H) 8 - 23 mg/dL    CREATININE 0.89 0.50 - 0.90 mg/dL    Bun/Cre Ratio NOT REPORTED 9 - 20    Calcium 8.0 (L) 8.6 - 10.4 mg/dL    Sodium 133 (L) 135 - 144 mmol/L    Potassium 4.1 3.7 - 5.3 mmol/L    Chloride 100 98 - 107 mmol/L    CO2 21 20 - 31 mmol/L    Anion Gap 12 9 - 17 mmol/L    Alkaline Phosphatase 76 35 - 104 U/L    ALT 49 (H) 5 - 33 U/L    AST 41 (H) <32 U/L    Total Bilirubin 0.64 0.3 - 1.2 mg/dL    Total Protein 5.9 (L) 6.4 - 8.3 g/dL    Alb 3.0 (L) 3.5 - 5.2 g/dL    Albumin/Globulin Ratio 1.0 1.0 - 2.5    GFR Non-African American >60 >60 mL/min    GFR African American >60 >60 mL/min    GFR Comment          GFR Staging NOT REPORTED    C-Reactive Protein    Collection Time: 11/17/20 12:36 AM   Result Value Ref Range    .8 (H) 0.0 - 5.0 mg/L   Ferritin    Collection Time: 11/17/20 12:36 AM   Result Value Ref Range    Ferritin 3,046 (H) 13 - 150 ug/L   Fibrinogen    Collection Time: 11/17/20 12:36 AM   Result Value Ref Range    Fibrinogen 721 (H) 140 - 420 mg/dL   Lactate Dehydrogenase    Collection Time: 11/17/20 12:36 AM   Result Value Ref Range     (H) 135 - 214 U/L   Differential    Collection Time: 11/17/20 12:36 AM   Result Value Ref Range    Differential Type NOT REPORTED     WBC Morphology NOT REPORTED     RBC Morphology NOT REPORTED     Platelet Estimate NOT REPORTED     Immature Granulocytes 0 0 %    Seg Neutrophils 33 (L) 36 - 66 %    Lymphocytes 48 (H) 24 - 44 %    Atypical Lymphocytes 1 %    Monocytes 17 (H) 1 - 7 %    Eosinophils % 1 1 - 4 %    Basophils 0 0 - 2 %    Absolute Immature Granulocyte 0.00 0.00 - 0.30 k/uL    Segs Absolute 0.43 (LL) 1.8 - 7.7 k/uL    Absolute Lymph # 0.63 (L) 1.0 - 4.8 k/uL    Atypical Lymphocytes Absolute 0.01 k/uL    Absolute Mono # 0.22 0.1 - 0.8 k/uL    Absolute Eos # 0.01 0.0 - 0.4 k/uL    Basophils Absolute 0.00 0.0 - 0.2 k/uL    Morphology ANISOCYTOSIS PRESENT        Imaging/Diagnostics:  No results found. Assessment :      Hospital Problems           Last Modified POA    * (Principal) Pneumonia due to COVID-19 virus 11/17/2020 Yes    Acute respiratory failure due to COVID-19 ([facility] [address] 11/16/2020 Yes    MDS (myelodysplastic syndrome) ([facility] [facility] 11/17/2020 Yes    Neutropenic sepsis ([facility] [address] 11/17/2020 Yes    Bicytopenia 11/17/2020 Yes    ROSALINO (acute kidney injury) ([facility] [address] 11/17/2020 Yes    Acute respiratory failure with hypoxia ([address] 11/17/2020 Yes    Essential hypertension 11/17/2020 Yes    Coronary artery disease involving coronary bypass graft of native heart without angina pectoris 11/17/2020 Yes    History of Raynaud's syndrome 11/17/2020 Yes    Transaminitis 11/17/2020 Yes          Plan:     Patient status inpatient in the Progressive Unit/Step down    1. COVID-19 pneumonia-start remdesivir, wean oxygen as tolerated. Pulmonary consult, Decadron 4 mg, infectious disease consult  2. Neutropenic sepsis-patient newly neutropenic, start broad-spectrum antibiotic, patient with a history of MRSA, chest port in place, check blood cultures, infectious disease consult  3. Acute hypoxic respiratory failure-unclear etiology likely #1, check procalcitonin, wean oxygen as tolerated. Patient tolerating 50% FiO2 saturating 99 to 100%. 4. Elevated D-dimer-patient allergic to contrast, order VQ scan, start lovenox 1mg/kg BID until negative   5.  MDS-patient status post first round of chemotherapy, patient afterwards required 5 units of PRBC transfusions, will continue to monitor, hematology oncology consult, patient receives most of her medical care at

## 2020-11-17 NOTE — PROGRESS NOTES
Pharmacy Note  Vancomycin Consult    Haley Mejia is a 70 y.o. female started on Vancomycin; consult received from Dr. Angelina Fernandez to manage therapy. Also receiving the following antibiotics: Meropenem. Patient Active Problem List   Diagnosis    Acute respiratory failure due to COVID-19 [facility])    MDS (myelodysplastic syndrome) ([facility] [address]    Pneumonia due to COVID-19 virus       Allergies:  Fish allergy; Hydrocodone-acetaminophen; Tizanidine; Atorvastatin; Metoclopramide; Moxifloxacin; Oxycodone; Pregabalin; Tramadol; Zolpidem; Cephalexin; Iodides; and Sulfa antibiotics     Temp max:     Recent Labs     11/17/20  0036   BUN 24*       Recent Labs     11/17/20  0036   CREATININE 0.89       Recent Labs     11/17/20  0036   WBC 1.3*       No intake or output data in the 24 hours ending 11/17/20 0251    Culture Date      Source                       Results  pending    Ht Readings from Last 1 Encounters:   11/17/20 5' 7\" (1.702 m)        Wt Readings from Last 1 Encounters:   11/17/20 182 lb 8.7 oz (82.8 kg)         Body mass index is 28.59 kg/m². Estimated Creatinine Clearance: 62 mL/min (based on SCr of 0.89 mg/dL). Goal Trough Level: 10-20 mcg/mL    Assessment/Plan:  Will initiate Vancomycin  1250 mg IV every 18 hours. Timing of trough level will be determined based on culture results, renal function, and clinical response. Thank you for the consult. Will continue to follow.     Dao Upton [facility]  11/17/2020 2:52 AM

## 2020-11-17 NOTE — CONSULTS
Today's Date: 11/17/2020  Patient Name: Paul Shay  Date of admission: 11/16/2020 10:51 PM  Patient's age: 70 y.o., 1945  Admission Dx: Acute respiratory failure due to COVID-19 ([facility] [U07.1, J96.00]    Reason for Consult: management recommendations  Requesting Physician: Brandy Eagle MD    CHIEF COMPLAINT: Anemia. MDS. COVID-19 positive. History Obtained From:  patient, electronic medical record    HISTORY OF PRESENT ILLNESS:      The patient is a 70 y.o.  female who is admitted to the hospital with chief complaint of shortness of breath dyspnea for the last 3 days. Further testing revealed patient was COVID-19 positive. Patient was initially seen in outlying hospital and then transferred. Patient is requiring noninvasive positive pressure ventilation support. CTA has not been able to get done due to contrast allergy. Reportedly patient also has history of MDS. CBC from 9/2020 showed a WBC count of 1.6 hemoglobin 9.4 with MCV 95. Patient has been started on full dose anticoagulation due to concerns regarding pulmonary embolism. Patient was also recently started on Vidaza and has received 1 cycle so far. Patient is also transfusion dependent. Patient is supposed to get next cycle of Vidaza in the next week or so. Patient most recent bone marrow biopsy from 9/2020 showed normocellular marrow with erythroid hyperplasia and mild this erythropoiesis and 8% blasts. Recommendations from [address] [place] [address],[address]: Copied from care everywhere. ASSESSMENT AND PALN:  Ms. Paul Shay is a 70year old woman with multiple comorbid conditions, now with a recent diagnosis of MDS-MLD. 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  In this situation, we previously discussed that we often favor management as if the patient is higher-tier risk disease, as outcomes for patients with IPSS-R 4.5 at diagnosis are similar to high risk. But unfortunately, she is not a candidate for her only curative option, i.e., allogeneic hematopoietic cell transplantation. Therefore, when we initially consulted on her, we discussed that all therapy is palliative. Given all therapies would be palliative, we discussed this in the context that she had been transfusion-independent. Thus, while initiation of hypomethylating agent (HMA) would have certainly been justifiable at that juncture, she had cytopenias for several months and still had not required transfusions or developed significant infection. Therefore, we discussed close observation since she was transfusion independent. We discussed the risks/beneftis of such an approach. At this visit, she is now beginning to require transfusions, so we agree with the plan to begin azacitidine locally under the care of Dr. Ashlyn Albarado. We again discussed that we would recommend a repeat bone marrow exam prior to initiating therapy for palliation. If she declines treatment, one could consider EPO supplementation to minimize need for transfusion. Given her [address], antiviral prophylaxis with acyclovir (400 mg BID) could be considered. If her [address] were to persistently be lower than 500/uL, antifungal prophylaxis could be considered. She will continue to follow up with Dr. Ashlyn Albarado for ongoing management. We will see her back prn. Past Medical History:   has a past medical history of Cancer ([address] Deaf, left, Essential hypertension, GERD (gastroesophageal reflux disease), Glaucoma, Hyperlipidemia, Melanoma ([address] MRSA (methicillin resistant staph aureus) culture positive, MVP (mitral valve prolapse), Pharyngoesophageal dysphagia, Raynaud disease, and Status post four vessel coronary artery bypass. Past Surgical History:   has a past surgical history that includes Cardiac surgery; Tubal ligation; Appendectomy; Cholecystectomy;  Hysterectomy, total abdominal; Small intestine surgery; and IR PORT PLACEMENT < 5 YEARS. Medications:    Prior to Admission medications    Medication Sig Start Date End Date Taking? Authorizing Provider   dapsone 100 MG tablet Take 1 tablet by mouth daily (with breakfast) 10/16/20  Yes Historical Provider, MD   aspirin 81 MG EC tablet Take 81 mg by mouth daily   Yes Historical Provider, MD   carvedilol (COREG) 6.25 MG tablet Take 6.25 mg by mouth 2 times daily (with meals)   Yes Historical Provider, MD   Isavuconazonium Sulfate (CRESEMBA) 186 MG CAPS Take by mouth   Yes Historical Provider, MD   diphenhydrAMINE (BENADRYL) 25 MG capsule Take 25 mg by mouth every 6 hours as needed for Itching   Yes Historical Provider, MD   hydroxychloroquine (PLAQUENIL) 200 MG tablet Take 200 mg by mouth daily   Yes Historical Provider, MD   nitroGLYCERIN (NITROSTAT) 0.4 MG SL tablet Place 0.4 mg under the tongue every 5 minutes as needed for Chest pain up to max of 3 total doses. If no relief after 1 dose, call 911.    Yes Historical Provider, MD   omeprazole (PRILOSEC) 20 MG delayed release capsule Take 40 mg by mouth daily   Yes Historical Provider, MD   phenazopyridine (PYRIDIUM) 100 MG tablet Take 100 mg by mouth 3 times daily as needed for Pain   Yes Historical Provider, MD   predniSONE (DELTASONE) 1 MG tablet Take 1 mg by mouth daily   Yes Historical Provider, MD   prochlorperazine (COMPAZINE) 25 MG suppository Place 25 mg rectally every 12 hours as needed for Nausea   Yes Historical Provider, MD   Travoprost (TRAVATAN OP) Apply to eye   Yes Historical Provider, MD     Current Facility-Administered Medications   Medication Dose Route Frequency Provider Last Rate Last Dose    [START ON 11/18/2020] remdesivir 100 mg in sodium chloride 0.9 % 250 mL IVPB  100 mg Intravenous Q24H Ardath Barges, DO        0.9 % sodium chloride bolus  30 mL Intravenous PRN Krystina De Souza, DO        [START ON 11/18/2020] dexamethasone (DECADRON) injection 4 mg  4 mg Intravenous Daily Ardath Barges, DO        meropenem (MERREM) 1 g in sodium chloride 0.9 % 100 mL IVPB (mini-bag)  1 g Intravenous Q8H Lawerence Harness, DO        enoxaparin (LOVENOX) injection 80 mg  1 mg/kg Subcutaneous BID Lawerence Harness, DO        vancomycin (VANCOCIN) 1250 mg in dextrose 5 % 250 mL IVPB  1,250 mg Intravenous Q18H Lawerence Harness, DO   Stopped at 11/17/20 0084    vancomycin (VANCOCIN) intermittent dosing (placeholder)   Other RX Placeholder Lawerence Harness, DO        0.9 % sodium chloride bolus  20 mL Intravenous Once Lawerence Harness, DO        sodium chloride flush 0.9 % injection 10 mL  10 mL Intravenous PRN Lawerence Harness, DO   10 mL at 11/17/20 1130    0.9 % sodium chloride bolus  20 mL Intravenous Once Moreno Tobin MD        sodium chloride flush 0.9 % injection 10 mL  10 mL Intravenous 2 times per day Carissa Bumps, APRN - CNS   10 mL at 11/17/20 0943    sodium chloride flush 0.9 % injection 10 mL  10 mL Intravenous PRN Carissa Bumps, APRN - CNS        potassium chloride (KLOR-CON M) extended release tablet 40 mEq  40 mEq Oral PRN Carissa Bumps, APRN - CNS        Or    potassium bicarb-citric acid (EFFER-K) effervescent tablet 40 mEq  40 mEq Oral PRN Carissa Bumps, APRN - CNS        Or    potassium chloride 10 mEq/100 mL IVPB (Peripheral Line)  10 mEq Intravenous PRN Carissa Bumps, APRN - CNS        magnesium sulfate 1 g in dextrose 5% 100 mL IVPB  1 g Intravenous PRN Carissa Bumps, APRN - CNS        acetaminophen (TYLENOL) tablet 650 mg  650 mg Oral Q6H PRN Carissa Bumps, APRN - CNS        Or    acetaminophen (TYLENOL) suppository 650 mg  650 mg Rectal Q6H PRN Carissa Bumps, APRN - CNS        polyethylene glycol (GLYCOLAX) packet 17 g  17 g Oral Daily PRN Carissa Bumps, APRN - CNS        promethazine (PHENERGAN) tablet 12.5 mg  12.5 mg Oral Q6H PRN Carissa Bumps, APRN - CNS        Or    ondansetron (ZOFRAN) injection 4 mg  4 mg Intravenous Q6H PRN MICHAEL Brantley - CNS        nicotine (NICODERM CQ) 21 MG/24HR 1 patch  1 patch Transdermal Daily PRN Jackie Jose, APRN - CNS           Allergies:  Fish allergy; Hydrocodone-acetaminophen; Tizanidine; Atorvastatin; Metoclopramide; Moxifloxacin; Oxycodone; Pregabalin; Tramadol; Zolpidem; Cephalexin; Iodides; and Sulfa antibiotics    Social History:   reports that she quit smoking about 15 years ago. Her smoking use included cigarettes. She started smoking about 63 years ago. She does not have any smokeless tobacco history on file. She reports that she does not drink alcohol or use drugs. Family History: family history includes Early Death in her mother; Heart Disease in her mother; Heart Failure in her mother; Stroke in her father. REVIEW OF SYSTEMS:      Constitutional: No fever or chills. No night sweats, no weight loss. Positive fatigue. Eyes: No eye discharge, double vision, or eye pain   HEENT: negative for sore mouth, sore throat, hoarseness and voice change   Respiratory: negative for cough , sputum, dyspnea, wheezing, hemoptysis, chest pain positive shortness of breath  Cardiovascular: negative for chest pain, dyspnea, palpitations, orthopnea, PND   Gastrointestinal: negative for nausea, vomiting, diarrhea, constipation, abdominal pain, Dysphagia, hematemesis and hematochezia   Genitourinary: negative for frequency, dysuria, nocturia, urinary incontinence, and hematuria   Integument: negative for rash, skin lesions, bruises.    Hematologic/Lymphatic: negative for easy bruising, bleeding, lymphadenopathy, or petechiae   Endocrine: negative for heat or cold intolerance,weight changes, change in bowel habits and hair loss   Musculoskeletal: negative for myalgias, arthralgias, pain, joint swelling,and bone pain   Neurological: negative for headaches, dizziness, seizures, weakness, numbness    PHYSICAL EXAM:        BP (!) 143/69   Pulse 93   Temp 100.6 °F (38.1 °C) (Oral)   Resp 25   Ht 5' 7\" (1.702 m)   Wt 182 lb 8.7 oz (82.8 kg)   SpO2 98%   BMI 28.59 kg/m² Temp (24hrs), Av °F (37.2 °C), Min:98.1 °F (36.7 °C), Max:100.6 °F (38.1 °C)    In light of ongoing coronavirus pandemic and preservation of PPE I did not physically examine the patient instead to history for the phone. Patient was however inspected through the window in her room. DATA:      Labs:     Results for orders placed or performed during the hospital encounter of 20   Culture, Blood 1    Specimen: Blood   Result Value Ref Range    Specimen Description . BLOOD     Special Requests LT HAND 20ML     Culture NO GROWTH 9 HOURS    Culture, Blood 2    Specimen: Blood   Result Value Ref Range    Specimen Description . BLOOD     Special Requests RT HAND 2ML     Culture NO GROWTH 9 HOURS    CBC   Result Value Ref Range    WBC 1.3 (LL) 3.5 - 11.3 k/uL    RBC 2.53 (L) 3.95 - 5.11 m/uL    Hemoglobin 7.4 (L) 11.9 - 15.1 g/dL    Hematocrit 23.6 (L) 36.3 - 47.1 %    MCV 93.3 82.6 - 102.9 fL    MCH 29.2 25.2 - 33.5 pg    MCHC 31.4 28.4 - 34.8 g/dL    RDW 18.5 (H) 11.8 - 14.4 %    Platelets 850 705 - 640 k/uL    MPV 10.6 8.1 - 13.5 fL    NRBC Automated 0.0 0.0 per 100 WBC   Protime-INR   Result Value Ref Range    Protime 9.3 9.0 - 12.0 sec    INR 0.9    Comprehensive Metabolic Panel w/ Reflex to MG   Result Value Ref Range    Glucose 112 (H) 70 - 99 mg/dL    BUN 24 (H) 8 - 23 mg/dL    CREATININE 0.89 0.50 - 0.90 mg/dL    Bun/Cre Ratio NOT REPORTED 9 - 20    Calcium 8.0 (L) 8.6 - 10.4 mg/dL    Sodium 133 (L) 135 - 144 mmol/L    Potassium 4.1 3.7 - 5.3 mmol/L    Chloride 100 98 - 107 mmol/L    CO2 21 20 - 31 mmol/L    Anion Gap 12 9 - 17 mmol/L    Alkaline Phosphatase 76 35 - 104 U/L    ALT 49 (H) 5 - 33 U/L    AST 41 (H) <32 U/L    Total Bilirubin 0.64 0.3 - 1.2 mg/dL    Total Protein 5.9 (L) 6.4 - 8.3 g/dL    Alb 3.0 (L) 3.5 - 5.2 g/dL    Albumin/Globulin Ratio 1.0 1.0 - 2.5    GFR Non-African American >60 >60 mL/min    GFR African American >60 >60 mL/min    GFR Comment          GFR Staging NOT REPORTED C-Reactive Protein   Result Value Ref Range    .8 (H) 0.0 - 5.0 mg/L   Ferritin   Result Value Ref Range    Ferritin 3,046 (H) 13 - 150 ug/L   Fibrinogen   Result Value Ref Range    Fibrinogen 721 (H) 140 - 420 mg/dL   Lactate Dehydrogenase   Result Value Ref Range     (H) 135 - 214 U/L   CBC   Result Value Ref Range    WBC 1.2 (LL) 3.5 - 11.3 k/uL    RBC 2.37 (L) 3.95 - 5.11 m/uL    Hemoglobin 7.0 (LL) 11.9 - 15.1 g/dL    Hematocrit 22.4 (L) 36.3 - 47.1 %    MCV 94.5 82.6 - 102.9 fL    MCH 29.5 25.2 - 33.5 pg    MCHC 31.3 28.4 - 34.8 g/dL    RDW 19.0 (H) 11.8 - 14.4 %    Platelets 493 323 - 212 k/uL    MPV 10.7 8.1 - 13.5 fL    NRBC Automated 0.0 0.0 per 100 WBC   Differential   Result Value Ref Range    Differential Type NOT REPORTED     WBC Morphology NOT REPORTED     RBC Morphology NOT REPORTED     Platelet Estimate NOT REPORTED     Immature Granulocytes 0 0 %    Seg Neutrophils 33 (L) 36 - 66 %    Lymphocytes 48 (H) 24 - 44 %    Atypical Lymphocytes 1 %    Monocytes 17 (H) 1 - 7 %    Eosinophils % 1 1 - 4 %    Basophils 0 0 - 2 %    Absolute Immature Granulocyte 0.00 0.00 - 0.30 k/uL    Segs Absolute 0.43 (LL) 1.8 - 7.7 k/uL    Absolute Lymph # 0.63 (L) 1.0 - 4.8 k/uL    Atypical Lymphocytes Absolute 0.01 k/uL    Absolute Mono # 0.22 0.1 - 0.8 k/uL    Absolute Eos # 0.01 0.0 - 0.4 k/uL    Basophils Absolute 0.00 0.0 - 0.2 k/uL    Morphology ANISOCYTOSIS PRESENT    D-Dimer, Quantitative   Result Value Ref Range    D-Dimer, Quant 2.44 mg/L FEU   Ferritin   Result Value Ref Range    Ferritin 3,024 (H) 13 - 150 ug/L   Fibrinogen   Result Value Ref Range    Fibrinogen 720 (H) 140 - 420 mg/dL   Protime-INR   Result Value Ref Range    Protime 9.5 9.0 - 12.0 sec    INR 0.9    Lactate Dehydrogenase   Result Value Ref Range     (H) 135 - 214 U/L   Hepatic Function Panel   Result Value Ref Range    Alb 2.9 (L) 3.5 - 5.2 g/dL    Alkaline Phosphatase 74 35 - 104 U/L    ALT 45 (H) 5 - 33 U/L    AST 38 (H) <32 U/L    Total Bilirubin 0.53 0.3 - 1.2 mg/dL    Bilirubin, Direct 0.22 <0.31 mg/dL    Bilirubin, Indirect 0.31 0.00 - 1.00 mg/dL    Total Protein 5.8 (L) 6.4 - 8.3 g/dL    Globulin NOT REPORTED 1.5 - 3.8 g/dL    Albumin/Globulin Ratio 1.0 1.0 - 2.5   Magnesium   Result Value Ref Range    Magnesium 2.3 1.6 - 2.6 mg/dL   Renal Function Panel   Result Value Ref Range    Glucose 109 (H) 70 - 99 mg/dL    BUN 23 8 - 23 mg/dL    CREATININE 0.81 0.50 - 0.90 mg/dL    Bun/Cre Ratio NOT REPORTED 9 - 20    Calcium 8.0 (L) 8.6 - 10.4 mg/dL    Alb 2.9 (L) 3.5 - 5.2 g/dL    Phosphorus 3.5 2.6 - 4.5 mg/dL    Sodium 133 (L) 135 - 144 mmol/L    Potassium 4.1 3.7 - 5.3 mmol/L    Chloride 100 98 - 107 mmol/L    CO2 21 20 - 31 mmol/L    Anion Gap 12 9 - 17 mmol/L    GFR Non-African American >60 >60 mL/min    GFR African American >60 >60 mL/min    GFR Comment          GFR Staging NOT REPORTED    Procalcitonin   Result Value Ref Range    Procalcitonin 1.00 (H) <0.09 ng/mL   Brain Natriuretic Peptide   Result Value Ref Range    Pro- (H) <300 pg/mL    BNP Interpretation Pro-BNP Reference Range:    COVID-19    Specimen: Nasopharyngeal Swab   Result Value Ref Range    SARS-CoV-2 Positive (A)    POC Glucose Fingerstick   Result Value Ref Range    POC Glucose 108 (H) 65 - 105 mg/dL   TYPE AND SCREEN   Result Value Ref Range    Expiration Date 11/20/2020,2359     Arm Band Number [id number]     ABO/Rh A POSITIVE     Antibody Screen NOT TESTED     Antibody ID Anti-Heaven Present     SILVIA IgG NEGATIVE     Unit Number [id number]     Product Code Leukocyte Reduced Irradiated Red Cell     Unit Divison 00     Dispense Status ALLOCATED     Transfusion Status OK TO TRANSFUSE     Crossmatch Result COMPATIBLE     Unit Number [id number]     Product Code Leukocyte Reduced Red Cell     Unit Divison 00     Dispense Status ALLOCATED     Transfusion Status OK TO TRANSFUSE     Crossmatch Result COMPATIBLE    BLOOD BANK SPECIMEN   Result Value Ref Range    Blood Bank Specimen NOT REPORTED          IMAGING DATA:    Xr Chest (single View Frontal)    Result Date: 11/17/2020  EXAMINATION: NUCLEAR MEDICINE PERFUSION SCAN. PORTABLE CHEST RADIOGRAPH 11/17/2020 TECHNIQUE: 5 millicuries of Tc 08D MAA was administered intravenously prior to planar imaging of the lungs in multiple projections. HISTORY: ORDERING SYSTEM PROVIDED HISTORY: elevated D-Dimer outside hospital Reason for Exam: Covid-19 positive pt. and elevated D-Dimer 2.44 FINDINGS: PERFUSION: Distribution of radiotracer is slightly heterogeneous. No segmental defects identified. CHEST RADIOGRAPH:  Bilateral mid to lower lung field left greater than right opacities with peripheral involvement most pronounced at the bases. No effusion or pneumothorax. Normal heart size. Postsurgical changes of median sternotomy. Right-sided Port-A-Cath is in place     *Low Probability for Pulmonary Embolus. *Bilateral mid to lower lung field left greater than right opacities with peripheral involvement most pronounced at the bases compatible with  COVID-19 pneumonia. The findings were sent to the Radiology Results [id number] at 2:46 pm on 11/17/2020to be communicated to a licensed caregiver. Nm Lung Scan Perfusion Only    Result Date: 11/17/2020  EXAMINATION: NUCLEAR MEDICINE PERFUSION SCAN. PORTABLE CHEST RADIOGRAPH 11/17/2020 TECHNIQUE: 5 millicuries of Tc 11Y MAA was administered intravenously prior to planar imaging of the lungs in multiple projections. HISTORY: ORDERING SYSTEM PROVIDED HISTORY: elevated D-Dimer outside hospital Reason for Exam: Covid-19 positive pt. and elevated D-Dimer 2.44 FINDINGS: PERFUSION: Distribution of radiotracer is slightly heterogeneous. No segmental defects identified. CHEST RADIOGRAPH:  Bilateral mid to lower lung field left greater than right opacities with peripheral involvement most pronounced at the bases. No effusion or pneumothorax. Normal heart size.   Postsurgical changes of median sternotomy. Right-sided Port-A-Cath is in place     *Low Probability for Pulmonary Embolus. *Bilateral mid to lower lung field left greater than right opacities with peripheral involvement most pronounced at the bases compatible with  COVID-19 pneumonia. The findings were sent to the Radiology Results [id number] at 2:46 pm on 11/17/2020to be communicated to a licensed caregiver. IMPRESSION:   Primary Problem  Pneumonia due to COVID-19 virus    Active Hospital Problems    Diagnosis Date Noted    MDS (myelodysplastic syndrome) ([address] [D46.9] 11/17/2020    Pneumonia due to COVID-19 virus [U07.1, J12.89] 11/17/2020    Neutropenic sepsis ([address] [A41.9, D70.9] 11/17/2020    Bicytopenia [D75.89] 11/17/2020    ROSALINO (acute kidney injury) ([address] [N17.9] 11/17/2020    Acute respiratory failure with hypoxia ([address] [J96.01] 11/17/2020    Essential hypertension [I10] 11/17/2020    Coronary artery disease involving coronary bypass graft of native heart without angina pectoris [I25.810] 11/17/2020    History of Raynaud's syndrome [Z86.79] 11/17/2020    Transaminitis [R74.01] 11/17/2020    Immunosuppressed status ([address] [D84.9]     Acute respiratory failure due to COVID-19 ([address] [U07.1, J96.00] 11/16/2020       MDS  Awaiting results of VQ scan  Anemia, transfusion dependent  COVID-19 infection    RECOMMENDATIONS:  1. I personally reviewed results of lab work-up imaging studies and other relevant clinical data. 2. Reviewed records from outside facility  3. 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 She has been started on Vidaza. She has received 1 cycle so far. According the patient she was scheduled for next cycle soon. 4. Patient also has transfusion dependent anemia  5. She gets transfuse for hemoglobin below 7  6. Continue monitor cell counts. Transfuse to keep hemoglobin above 7  7.  Given patient is febrile and has severe neutropenia will also start patient on Granix for GS-CSF support  8. We will check for stool occult blood test  9. Anemia is likely secondary to myelodysplastic syndrome. Okay to give anticoagulation if there is no active bleeding and platelet count above 50,000  10. Discussed with Dr. Rose Marie Hammond. Given negative VQ scan will change Lovenox to 30 mg twice daily  11. Continue supportive care symptom management  12. Patient is seriously ill. 13. We will continue to follow. Discussed with patient and Nurse. Thank you for asking us to see this patient. Damaris Guallpa MD          This note is created with the assistance of a speech recognition program.  While intending to generate a document that actually reflects the content of the visit, the document can still have some errors including those of syntax and sound a like substitutions which may escape proof reading. It such instances, actual meaning can be extrapolated by contextual diversion.

## 2020-11-17 NOTE — PLAN OF CARE
Problem: Respiratory  Intervention: Respiratory assessment  Note:   PROVIDE ADEQUATE OXYGENATION WITH ACCEPTABLE SP02/ABG'S    [x]  IDENTIFY APPROPRIATE OXYGEN THERAPY  [x]   MONITOR SP02/ABG'S AS NEEDED   [x]   PATIENT EDUCATION AS NEEDED     NON INVASIVE VENTILATION  PROVIDE OPTIMAL VENTILATION/ACCEPTABLE SP02  IMPLEMENT NON INVASIVE VENTILATION PROTOCOL  ASSESSMENT SKIN INTEGRITY  PATIENT EDUCATION AS NEEDED  BIPAP AS NEEDED

## 2020-11-17 NOTE — CONSULTS
[facility] -   Infectious diseases evaluation  admission date 11/16/2020    reason for consultation:   covid    Impression :   Current:  · covid pneumonia - severe w resp distress  · MDS  · neutropenia  ·   Discussion / summary of stay / plan of care   ·   Recommendations   · Keep remdesevir till 11/20/20  · LFT daily  · Follow ferritin and infl markers  · consented Immune plasma - ordered 11/17 1 U  · Meropenem and vanco for now - if [facility] are neg, stop both  · ghet EKG for the QTC  · Get resp PCR and mycoplasma and legionella AG    Infection Control Recommendations   · [place] Precautions  · Contact Isolation   · Airborne isolation  · Droplet Isolation      Antimicrobial Stewardship Recommendations   · Simplification of therapy  · Targeted therapy  · Per Kg dosing    Coordination ofOutpatient Care:   · Estimated Length of IV antimicrobials:  · Patient will need Midline / picc Catheter Insertion:   · Patient will need SNF:  · Patient will need outpatient wound care:     History of Present Illness:   Initial history:  Juno Donohue is a 70y.o.-year-old female w resp distress transferred from [facility] [address] pneumonia and underlying MDS.   Neutropenia  On bipap and 50% FIO2, elevated D-Dimers and went for a VQ scan  Past cig smoking - HTN - Raynaud-    Started on decadron remdesevir  started on meropenem and vanco pend BC due to concern for ongoing sepsis      Interval changes  11/17/2020   Patient Vitals for the past 8 hrs:   BP Temp Temp src Pulse Resp SpO2   11/17/20 1000 (!) 120/56 -- -- 81 27 95 %   11/17/20 0900 (!) 107/55 -- -- 76 23 96 %   11/17/20 0857 -- -- -- -- 27 100 %   11/17/20 0850 -- -- -- -- 24 98 %   11/17/20 0800 80/63 98.1 °F (36.7 °C) Oral 69 27 100 %   11/17/20 0700 (!) 125/56 -- -- 67 27 100 %   11/17/20 0600 (!) 108/50 -- -- 66 29 98 %   11/17/20 0500 (!) 106/53 -- -- 65 23 100 %   11/17/20 0414 -- -- -- -- 26 --   11/17/20 0400 (!) 113/53 98.9 °F (37.2 °C) Axillary 66 30 100 %       Summary of relevant labs:  Labs:  WBC  1.2  Creat 0.81  procalcitonin 1    Ferritin 3046 - 3024  DDimer 2.44  Fibrinogen 720  proBNP 729      Micro:  BC 11/17/20 x 2    Imaging:  VQ scan      CT chest 11/16 [facility] periph ground glass bilat, mediastinal large LN  CT brain 11/216 neg [facility]      I have personally reviewed the past medical history, past surgical history, medications, social history, and family history, and I haveupdated the database accordingly. Allergies:   Fish allergy; Hydrocodone-acetaminophen; Tizanidine; Atorvastatin; Metoclopramide; Moxifloxacin; Oxycodone; Pregabalin; Tramadol; Zolpidem; Cephalexin; Iodides; and Sulfa antibiotics     Review of Systems:     Review of Systems   Constitutional: Positive for activity change and fatigue. Eyes: Negative for visual disturbance. Respiratory: Positive for cough and shortness of breath. Negative for choking. Cardiovascular: Negative for chest pain. Gastrointestinal: Negative for anal bleeding. Endocrine: Negative for polydipsia. Genitourinary: Negative for dysuria. Musculoskeletal: Negative for back pain. Skin: Negative for color change. Allergic/Immunologic: Positive for immunocompromised state. Neurological: Negative for numbness. HANDS SHAKE   Hematological: Negative for adenopathy. Psychiatric/Behavioral: Negative for agitation. Physical Examination :       Physical Exam  Constitutional:       General: She is not in acute distress. Appearance: Normal appearance. She is ill-appearing. HENT:      Head: Normocephalic and atraumatic. Nose: Nose normal. No congestion. Eyes:      General: No scleral icterus. Conjunctiva/sclera: Conjunctivae normal.      Pupils: Pupils are equal, round, and reactive to light. Neck:      Musculoskeletal: Neck supple. No neck rigidity.    Cardiovascular:      Rate and Rhythm: Normal rate and regular rhythm. Heart sounds: Normal heart sounds. No murmur. Pulmonary:      Effort: No respiratory distress. Breath sounds: No stridor. Abdominal:      General: There is no distension. Palpations: Abdomen is soft. Tenderness: There is no abdominal tenderness. Genitourinary:     Comments: Urine bhavin  Musculoskeletal:         General: No swelling or deformity. Skin:     General: Skin is dry. Coloration: Skin is not jaundiced. Neurological:      General: No focal deficit present. Mental Status: She is alert and oriented to person, place, and time. Mental status is at baseline. Psychiatric:         Mood and Affect: Mood normal.         Thought Content:  Thought content normal.         Past Medical History:     Past Medical History:   Diagnosis Date    Cancer ([facility] [address]     myelodysplastic syndrome    Deaf, left     Essential hypertension     GERD (gastroesophageal reflux disease)     Glaucoma     Hyperlipidemia     [facility]     MRSA (methicillin resistant staph aureus) culture positive     MVP (mitral valve prolapse)     Pharyngoesophageal dysphagia     Raynaud disease     Status post four vessel coronary artery bypass        Past Surgical  History:     Past Surgical History:   Procedure Laterality Date    APPENDECTOMY      CARDIAC SURGERY      CABG x3    CHOLECYSTECTOMY      HYSTERECTOMY, TOTAL ABDOMINAL      IR PORT PLACEMENT < 5 YEARS      SMALL INTESTINE SURGERY      TUBAL LIGATION         Medications:      [START ON 11/18/2020] remdesivir IVPB  100 mg Intravenous Q24H    [START ON 11/18/2020] dexamethasone  4 mg Intravenous Daily    meropenem  1 g Intravenous Q8H    enoxaparin  1 mg/kg Subcutaneous BID    vancomycin  1,250 mg Intravenous Q18H    vancomycin (VANCOCIN) intermittent dosing (placeholder)   Other RX Placeholder    sodium chloride  20 mL Intravenous Once    sodium chloride flush  10 mL Intravenous 2 times per day       Social History:

## 2020-11-17 NOTE — CARE COORDINATION
Case Management Initial Discharge Plan  elizabeth Cheatham  Called patient to verify information        Information verified: address, contacts, phone number, , insurance Yes    Emergency Contact/Next of Kin name & number: children #'s in emergency contact    admitted Neville Morrison    PCP: Giovani Loomis MD  Date of last visit: yesterday    Insurance Provider: medicare/    Discharge Planning    Living Arrangements:    lives with   Support Systems:   family     Home has 1 1/2 stories  ramp stairs to climb to get into front door,     Patient able to perform ADL's:Independent    Current Services (outpatient & in home) none  DME equipment: wc, walker  Pharmacy: [facility]    Receiving oral anticoagulation therapy? No          Potential Assistance Needed:   home care    Patient agreeable to home care: Yes  Freedom of choice provided:  yes will consider     Prior SNF/Rehab Placement and Facility: none  Agreeable to SNF/Rehab: No  [place] of choice provided: n/a     Evaluation: no    Expected Discharge date:       Patient expects to be discharged to:   home  Follow Up Appointment: Best Day/ Time:  afternoon     Transportation provider: family   Transportation arrangements needed for discharge: No    Readmission Risk              Risk of Unplanned Readmission:        14             Does patient have a readmission risk score greater than 14?: No  If yes, follow-up appointment must be made within 7 days of discharge.      Goals of Care: return to adl without shortness of breath      Discharge Plan: return home with  considering home care           Electronically signed by Tahmina Escobar RN on 20 at 12:22 PM EST

## 2020-11-17 NOTE — PROGRESS NOTES
Remdesivir Initiation & Daily Monitoring    Physician requesting remdesivir (use limited to ID): Jackelin Hernández, DO     Demonstrated benefit is to shorten the duration of illness, mortality benefit has not been shown. Seems to be most beneficial early in the disease course. Criteria for use (confirm all are met): yes  Suspected/Confirmed COVID-19 positive and requiring hospitalization  Patient requires supplemental oxygen (this is the population for whom remdesivir demonstrated a shortened duration of illness; benefit less clear for those on high flow oxygen or mechanical ventilation, but may be reasonable to consider if change in respiratory status was recent)  Not recommended to be used in patients with baseline ALT 5x ULN or more    There are no PK data available for severe renal impairment (eGFR < 30 mL/min) or on RRT. Consider risk/benefit for individual patient. Baseline CrCl: Estimated Creatinine Clearance: 62 mL/min (based on SCr of 0.89 mg/dL). Ensure LFTs are ordered at baseline & consider if monitoring during therapy is appropriate (this information is included in CMP or can be ordered separately). Package insert states: Perform hepatic laboratory testing in all patients before starting VEKLURY and while receiving VEKLURY as clinically appropriate. Baseline ALT: 49    Duration of therapy should be limited to 5 days. Anticipated stop date: 11/21/20    Also assess corticosteroid therapy: If patient has had symptoms for 7 days or more and is on supplemental oxygen, then a course of dexamethasone can also be considered. Dexamethasone indicated? If yes, is dexamethasone ordered?     Consider baseline CRP (steroids may be beneficial if > 20 mg/L and may be harmful if < 10 mg/L):

## 2020-11-18 NOTE — PROGRESS NOTES
Today's Date: 11/18/2020  Patient Name: Andra Azevedo  Date of admission: 11/16/2020 10:51 PM  Patient's age: 70 y.o., 1945  Admission Dx: Acute respiratory failure due to COVID-19 ([facility] [U07.1, J96.00]    Reason for Consult: management recommendations  Requesting Physician: Lindsay Morales MD    CHIEF COMPLAINT: Anemia. MDS. COVID-19 positive. History Obtained From:  patient, electronic medical record    Interval history:    Chart reviewed intervals noted  Discussed with patient's nurse  Patient had an episode of shortness of breath for which she was placed on BiPAP. Patient is now off BiPAP and back on 6 L of oxygen  Patient started on Granix. WBC count up to 2.0 now. Patient T-max 100.6 noted yesterday  H&H is stable  Awaiting stool occult results      HISTORY OF PRESENT ILLNESS:      The patient is a 70 y.o.  female who is admitted to the hospital with chief complaint of shortness of breath dyspnea for the last 3 days. Further testing revealed patient was COVID-19 positive. Patient was initially seen in outlying hospital and then transferred. Patient is requiring noninvasive positive pressure ventilation support. CTA has not been able to get done due to contrast allergy. Reportedly patient also has history of MDS. CBC from 9/2020 showed a WBC count of 1.6 hemoglobin 9.4 with MCV 95. Patient has been started on full dose anticoagulation due to concerns regarding pulmonary embolism. Patient was also recently started on Vidaza and has received 1 cycle so far. Patient is also transfusion dependent. Patient is supposed to get next cycle of Vidaza in the next week or so. Patient most recent bone marrow biopsy from 9/2020 showed normocellular marrow with erythroid hyperplasia and mild this erythropoiesis and 8% blasts. Recommendations from [address] [place] [address],[address]: Copied from care everywhere.       ASSESSMENT AND PALN:  Ms. 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 In this situation, we previously discussed that we often favor management as if the patient is higher-tier risk disease, as outcomes for patients with IPSS-R 4.5 at diagnosis are similar to high risk. But unfortunately, she is not a candidate for her only curative option, i.e., allogeneic hematopoietic cell transplantation. Therefore, when we initially consulted on her, we discussed that all therapy is palliative. Given all therapies would be palliative, we discussed this in the context that she had been transfusion-independent. Thus, while initiation of hypomethylating agent (HMA) would have certainly been justifiable at that juncture, she had cytopenias for several months and still had not required transfusions or developed significant infection. Therefore, we discussed close observation since she was transfusion independent. We discussed the risks/beneftis of such an approach. At this visit, she is now beginning to require transfusions, so we agree with the plan to begin azacitidine locally under the care of Dr. Lesly Gavin. We again discussed that we would recommend a repeat bone marrow exam prior to initiating therapy for palliation. If she declines treatment, one could consider EPO supplementation to minimize need for transfusion. Given her [address], antiviral prophylaxis with acyclovir (400 mg BID) could be considered. If her [address] were to persistently be lower than 500/uL, antifungal prophylaxis could be considered. She will continue to follow up with Dr. Lesly Gavin for ongoing management. We will see her back prn.        Past Medical History:   has a past medical history of Cancer ([address] Deaf, left, Essential hypertension, GERD (gastroesophageal reflux disease), Glaucoma, Hyperlipidemia, Melanoma ([address] MRSA (methicillin resistant staph aureus) culture positive, MVP (mitral valve prolapse), Pharyngoesophageal dysphagia, Raynaud disease, and Status post four vessel coronary artery bypass. Past Surgical History:   has a past surgical history that includes Cardiac surgery; Tubal ligation; Appendectomy; Cholecystectomy; Hysterectomy, total abdominal; Small intestine surgery; and IR PORT PLACEMENT < 5 YEARS. Medications:    Prior to Admission medications    Medication Sig Start Date End Date Taking? Authorizing Provider   dapsone 100 MG tablet Take 1 tablet by mouth daily (with breakfast) 10/16/20  Yes Historical Provider, MD   aspirin 81 MG EC tablet Take 81 mg by mouth daily   Yes Historical Provider, MD   carvedilol (COREG) 6.25 MG tablet Take 6.25 mg by mouth 2 times daily (with meals)   Yes Historical Provider, MD   Isavuconazonium Sulfate (CRESEMBA) 186 MG CAPS Take by mouth   Yes Historical Provider, MD   diphenhydrAMINE (BENADRYL) 25 MG capsule Take 25 mg by mouth every 6 hours as needed for Itching   Yes Historical Provider, MD   hydroxychloroquine (PLAQUENIL) 200 MG tablet Take 200 mg by mouth daily   Yes Historical Provider, MD   nitroGLYCERIN (NITROSTAT) 0.4 MG SL tablet Place 0.4 mg under the tongue every 5 minutes as needed for Chest pain up to max of 3 total doses. If no relief after 1 dose, call 911.    Yes Historical Provider, MD   omeprazole (PRILOSEC) 20 MG delayed release capsule Take 40 mg by mouth daily   Yes Historical Provider, MD   phenazopyridine (PYRIDIUM) 100 MG tablet Take 100 mg by mouth 3 times daily as needed for Pain   Yes Historical Provider, MD   predniSONE (DELTASONE) 1 MG tablet Take 1 mg by mouth daily   Yes Historical Provider, MD   prochlorperazine (COMPAZINE) 25 MG suppository Place 25 mg rectally every 12 hours as needed for Nausea   Yes Historical Provider, MD   Travoprost (TRAVATAN OP) Apply to eye   Yes Historical Provider, MD     Current Facility-Administered Medications Medication Dose Route Frequency Provider Last Rate Last Dose    Tbo-Filgrastim (GRANIX) injection 480 mcg  480 mcg Subcutaneous Daily Mohsen Guallpa MD   480 mcg at 11/18/20 1201    latanoprost (XALATAN) 0.005 % ophthalmic solution 1 drop  1 drop Both Eyes Nightly Fabrizio Johnson MD        remdesivir 100 mg in sodium chloride 0.9 % 250 mL IVPB  100 mg Intravenous Q24H Chacorta William, DO   Stopped at 11/18/20 0610    0.9 % sodium chloride bolus  30 mL Intravenous PRN Chacorta William, DO        dexamethasone (DECADRON) injection 4 mg  4 mg Intravenous Daily Chacorta William, DO   4 mg at 11/18/20 0826    meropenem (MERREM) 1 g in sodium chloride 0.9 % 100 mL IVPB (mini-bag)  1 g Intravenous Q8H Chacorta William, DO 0 mL/hr at 11/18/20 1050 1 g at 11/18/20 1720    vancomycin (VANCOCIN) 1250 mg in dextrose 5 % 250 mL IVPB  1,250 mg Intravenous Q18H Chacorta William, .7 mL/hr at 11/18/20 1521 1,250 mg at 11/18/20 1521    vancomycin (VANCOCIN) intermittent dosing (placeholder)   Other RX Placeholder Chacorta William, DO        0.9 % sodium chloride bolus  20 mL Intravenous Once Chacorta William, DO        0.9 % sodium chloride bolus  20 mL Intravenous Once Vilma Leonard MD        enoxaparin (LOVENOX) injection 30 mg  30 mg Subcutaneous BID Fabrizio Johnson MD   30 mg at 11/18/20 0825    sodium chloride flush 0.9 % injection 10 mL  10 mL Intravenous PRN Chacorta William, DO        potassium chloride (KLOR-CON M) extended release tablet 40 mEq  40 mEq Oral PRN MICHAEL Chase - CNS        Or    potassium bicarb-citric acid (EFFER-K) effervescent tablet 40 mEq  40 mEq Oral PRN MICHAEL Chase - CNS        Or    potassium chloride 10 mEq/100 mL IVPB (Peripheral Line)  10 mEq Intravenous PRN MICHAEL Chase - CNS        magnesium sulfate 1 g in dextrose 5% 100 mL IVPB  1 g Intravenous PRN Jullie Sheng, APRN - CNS        acetaminophen (TYLENOL) tablet 650 mg  650 mg Oral Q6H PRN Herson Patricio, APRN - CNS   650 mg at 11/17/20 1459    Or    acetaminophen (TYLENOL) suppository 650 mg  650 mg Rectal Q6H PRN Colton , APRN - CNS        polyethylene glycol (GLYCOLAX) packet 17 g  17 g Oral Daily PRN Colton , APRN - CNS        promethazine (PHENERGAN) tablet 12.5 mg  12.5 mg Oral Q6H PRN Colton , APRN - CNS        Or    ondansetron (ZOFRAN) injection 4 mg  4 mg Intravenous Q6H PRN Colton , APRN - CNS        nicotine (NICODERM CQ) 21 MG/24HR 1 patch  1 patch Transdermal Daily PRN Colton , APRN - CNS           Allergies:  Fish allergy; Hydrocodone-acetaminophen; Tizanidine; Atorvastatin; Metoclopramide; Moxifloxacin; Oxycodone; Pregabalin; Tramadol; Zolpidem; Cephalexin; Iodides; and Sulfa antibiotics    Social History:   reports that she quit smoking about 15 years ago. Her smoking use included cigarettes. She started smoking about 63 years ago. She does not have any smokeless tobacco history on file. She reports that she does not drink alcohol or use drugs. Family History: family history includes Early Death in her mother; Heart Disease in her mother; Heart Failure in her mother; Stroke in her father. REVIEW OF SYSTEMS:      Constitutional: No fever or chills. No night sweats, no weight loss. Positive fatigue. Eyes: No eye discharge, double vision, or eye pain   HEENT: negative for sore mouth, sore throat, hoarseness and voice change   Respiratory: negative for cough , sputum, dyspnea, wheezing, hemoptysis, chest pain positive shortness of breath  Cardiovascular: negative for chest pain, dyspnea, palpitations, orthopnea, PND   Gastrointestinal: negative for nausea, vomiting, diarrhea, constipation, abdominal pain, Dysphagia, hematemesis and hematochezia   Genitourinary: negative for frequency, dysuria, nocturia, urinary incontinence, and hematuria   Integument: negative for rash, skin lesions, bruises.    Hematologic/Lymphatic: negative for easy bruising, bleeding, lymphadenopathy, Not Detected Not Detected    Influenza A H1 PCR NOT REPORTED Not Detected    Influenza A H1 (2009) PCR NOT REPORTED Not Detected    Influenza A H3 PCR NOT REPORTED Not Detected    Influenza B by PCR Not Detected Not Detected    Parainfluenza 1 PCR Not Detected Not Detected    Parainfluenza 2 PCR Not Detected Not Detected    Parainfluenza 3 PCR Not Detected Not Detected    Parainfluenza 4 PCR Not Detected Not Detected    Resp Syncytial Virus PCR Not Detected Not Detected    Bordetella Parapertussis Not Detected Not Detected    B Pertussis by PCR Not Detected Not Detected    Chlamydia pneumoniae By PCR Not Detected Not Detected    Mycoplasma pneumo by PCR Not Detected Not Detected   CBC   Result Value Ref Range    WBC 1.3 (LL) 3.5 - 11.3 k/uL    RBC 2.53 (L) 3.95 - 5.11 m/uL    Hemoglobin 7.4 (L) 11.9 - 15.1 g/dL    Hematocrit 23.6 (L) 36.3 - 47.1 %    MCV 93.3 82.6 - 102.9 fL    MCH 29.2 25.2 - 33.5 pg    MCHC 31.4 28.4 - 34.8 g/dL    RDW 18.5 (H) 11.8 - 14.4 %    Platelets 119 637 - 993 k/uL    MPV 10.6 8.1 - 13.5 fL    NRBC Automated 0.0 0.0 per 100 WBC   Protime-INR   Result Value Ref Range    Protime 9.3 9.0 - 12.0 sec    INR 0.9    Comprehensive Metabolic Panel w/ Reflex to MG   Result Value Ref Range    Glucose 112 (H) 70 - 99 mg/dL    BUN 24 (H) 8 - 23 mg/dL    CREATININE 0.89 0.50 - 0.90 mg/dL    Bun/Cre Ratio NOT REPORTED 9 - 20    Calcium 8.0 (L) 8.6 - 10.4 mg/dL    Sodium 133 (L) 135 - 144 mmol/L    Potassium 4.1 3.7 - 5.3 mmol/L    Chloride 100 98 - 107 mmol/L    CO2 21 20 - 31 mmol/L    Anion Gap 12 9 - 17 mmol/L    Alkaline Phosphatase 76 35 - 104 U/L    ALT 49 (H) 5 - 33 U/L    AST 41 (H) <32 U/L    Total Bilirubin 0.64 0.3 - 1.2 mg/dL    Total Protein 5.9 (L) 6.4 - 8.3 g/dL    Alb 3.0 (L) 3.5 - 5.2 g/dL    Albumin/Globulin Ratio 1.0 1.0 - 2.5    GFR Non-African American >60 >60 mL/min    GFR African American >60 >60 mL/min    GFR Comment          GFR Staging NOT REPORTED    C-Reactive Protein   Result Value Ref Range    .8 (H) 0.0 - 5.0 mg/L   Ferritin   Result Value Ref Range    Ferritin 3,046 (H) 13 - 150 ug/L   Fibrinogen   Result Value Ref Range    Fibrinogen 721 (H) 140 - 420 mg/dL   Lactate Dehydrogenase   Result Value Ref Range     (H) 135 - 214 U/L   CBC   Result Value Ref Range    WBC 1.2 (LL) 3.5 - 11.3 k/uL    RBC 2.37 (L) 3.95 - 5.11 m/uL    Hemoglobin 7.0 (LL) 11.9 - 15.1 g/dL    Hematocrit 22.4 (L) 36.3 - 47.1 %    MCV 94.5 82.6 - 102.9 fL    MCH 29.5 25.2 - 33.5 pg    MCHC 31.3 28.4 - 34.8 g/dL    RDW 19.0 (H) 11.8 - 14.4 %    Platelets 313 038 - 094 k/uL    MPV 10.7 8.1 - 13.5 fL    NRBC Automated 0.0 0.0 per 100 WBC   Differential   Result Value Ref Range    Differential Type NOT REPORTED     WBC Morphology NOT REPORTED     RBC Morphology NOT REPORTED     Platelet Estimate NOT REPORTED     Immature Granulocytes 0 0 %    Seg Neutrophils 33 (L) 36 - 66 %    Lymphocytes 48 (H) 24 - 44 %    Atypical Lymphocytes 1 %    Monocytes 17 (H) 1 - 7 %    Eosinophils % 1 1 - 4 %    Basophils 0 0 - 2 %    Absolute Immature Granulocyte 0.00 0.00 - 0.30 k/uL    Segs Absolute 0.43 (LL) 1.8 - 7.7 k/uL    Absolute Lymph # 0.63 (L) 1.0 - 4.8 k/uL    Atypical Lymphocytes Absolute 0.01 k/uL    Absolute Mono # 0.22 0.1 - 0.8 k/uL    Absolute Eos # 0.01 0.0 - 0.4 k/uL    Basophils Absolute 0.00 0.0 - 0.2 k/uL    Morphology ANISOCYTOSIS PRESENT    D-Dimer, Quantitative   Result Value Ref Range    D-Dimer, Quant 2.44 mg/L FEU   Ferritin   Result Value Ref Range    Ferritin 3,024 (H) 13 - 150 ug/L   Fibrinogen   Result Value Ref Range    Fibrinogen 720 (H) 140 - 420 mg/dL   Protime-INR   Result Value Ref Range    Protime 9.5 9.0 - 12.0 sec    INR 0.9    Lactate Dehydrogenase   Result Value Ref Range     (H) 135 - 214 U/L   Hepatic Function Panel   Result Value Ref Range    Alb 2.9 (L) 3.5 - 5.2 g/dL    Alkaline Phosphatase 74 35 - 104 U/L    ALT 45 (H) 5 - 33 U/L    AST 38 (H) <32 U/L    Total Bilirubin 0. 53 0.3 - 1.2 mg/dL    Bilirubin, Direct 0.22 <0.31 mg/dL    Bilirubin, Indirect 0.31 0.00 - 1.00 mg/dL    Total Protein 5.8 (L) 6.4 - 8.3 g/dL    Globulin NOT REPORTED 1.5 - 3.8 g/dL    Albumin/Globulin Ratio 1.0 1.0 - 2.5   Magnesium   Result Value Ref Range    Magnesium 2.3 1.6 - 2.6 mg/dL   Renal Function Panel   Result Value Ref Range    Glucose 109 (H) 70 - 99 mg/dL    BUN 23 8 - 23 mg/dL    CREATININE 0.81 0.50 - 0.90 mg/dL    Bun/Cre Ratio NOT REPORTED 9 - 20    Calcium 8.0 (L) 8.6 - 10.4 mg/dL    Alb 2.9 (L) 3.5 - 5.2 g/dL    Phosphorus 3.5 2.6 - 4.5 mg/dL    Sodium 133 (L) 135 - 144 mmol/L    Potassium 4.1 3.7 - 5.3 mmol/L    Chloride 100 98 - 107 mmol/L    CO2 21 20 - 31 mmol/L    Anion Gap 12 9 - 17 mmol/L    GFR Non-African American >60 >60 mL/min    GFR African American >60 >60 mL/min    GFR Comment          GFR Staging NOT REPORTED    Procalcitonin   Result Value Ref Range    Procalcitonin 1.00 (H) <0.09 ng/mL   Brain Natriuretic Peptide   Result Value Ref Range    Pro- (H) <300 pg/mL    BNP Interpretation Pro-BNP Reference Range:    COVID-19    Specimen: Nasopharyngeal Swab   Result Value Ref Range    SARS-CoV-2 Positive (A)    MYCOPLASMA PNEUMONIAE ANTIBODY, IGM   Result Value Ref Range    Mycoplasma pneumo IgM 0.17 <0.91   URINALYSIS   Result Value Ref Range    Color, UA DARK YELLOW (A) YELLOW    Turbidity UA CLEAR CLEAR    Glucose, Ur NEGATIVE NEGATIVE    Bilirubin Urine NEGATIVE  Verified by ictotest. (A) NEGATIVE    Ketones, Urine SMALL (A) NEGATIVE    Specific Gravity, UA 1.023 1.005 - 1.030    Urine Hgb NEGATIVE NEGATIVE    pH, UA 5.5 5.0 - 8.0    Protein, UA 1+ (A) NEGATIVE    Urobilinogen, Urine Normal Normal    Nitrite, Urine NEGATIVE NEGATIVE    Leukocyte Esterase, Urine NEGATIVE NEGATIVE    Urinalysis Comments NOT REPORTED    Microscopic Urinalysis   Result Value Ref Range    -          WBC, UA 0 TO 2 0 - 5 /HPF    RBC, UA 0 TO 2 0 - 4 /HPF    Casts UA  0 - 8 /LPF     5 TO 10 HYALINE Reference range defined for non-centrifuged specimen. Crystals, UA NOT REPORTED None /HPF    Epithelial Cells UA 0 TO 2 0 - 5 /HPF    Renal Epithelial, UA NOT REPORTED 0 /HPF    Bacteria, UA NOT REPORTED None    Mucus, UA NOT REPORTED None    Trichomonas, UA NOT REPORTED None    Amorphous, UA NOT REPORTED None    Other Observations UA NOT REPORTED NOT REQ.     Yeast, UA NOT REPORTED None   Vitamin B12 & Folate   Result Value Ref Range    Vitamin B-12 >2000 (H) 232 - 1245 pg/mL    Folate 11.5 >4.8 ng/mL   Iron and TIBC   Result Value Ref Range    Iron 92 37 - 145 ug/dL    TIBC 136 (L) 250 - 450 ug/dL    Iron Saturation 68 (H) 20 - 55 %    UIBC 44 (L) 112 - 347 ug/dL   CBC   Result Value Ref Range    WBC 2.0 (L) 3.5 - 11.3 k/uL    RBC 2.62 (L) 3.95 - 5.11 m/uL    Hemoglobin 7.7 (L) 11.9 - 15.1 g/dL    Hematocrit 25.6 (L) 36.3 - 47.1 %    MCV 97.7 82.6 - 102.9 fL    MCH 29.4 25.2 - 33.5 pg    MCHC 30.1 28.4 - 34.8 g/dL    RDW 19.6 (H) 11.8 - 14.4 %    Platelets 560 263 - 378 k/uL    MPV 11.1 8.1 - 13.5 fL    NRBC Automated 0.0 0.0 per 100 WBC   Renal Function Panel   Result Value Ref Range    Glucose 93 70 - 99 mg/dL    BUN 23 8 - 23 mg/dL    CREATININE 0.81 0.50 - 0.90 mg/dL    Bun/Cre Ratio NOT REPORTED 9 - 20    Calcium 8.4 (L) 8.6 - 10.4 mg/dL    Alb 2.8 (L) 3.5 - 5.2 g/dL    Phosphorus 3.3 2.6 - 4.5 mg/dL    Sodium 138 135 - 144 mmol/L    Potassium 3.6 (L) 3.7 - 5.3 mmol/L    Chloride 100 98 - 107 mmol/L    CO2 22 20 - 31 mmol/L    Anion Gap 16 9 - 17 mmol/L    GFR Non-African American >60 >60 mL/min    GFR African American >60 >60 mL/min    GFR Comment          GFR Staging NOT REPORTED    Magnesium   Result Value Ref Range    Magnesium 2.5 1.6 - 2.6 mg/dL   FIBRINOGEN   Result Value Ref Range    Fibrinogen 1002 (H) 140 - 420 mg/dL   C-Reactive Protein   Result Value Ref Range    .4 (H) 0.0 - 5.0 mg/L   Ferritin   Result Value Ref Range    Ferritin 3,453 (H) 13 - 150 ug/L   Lactate Dehydrogenase Result Value Ref Range     (H) 135 - 214 U/L   POC Glucose Fingerstick   Result Value Ref Range    POC Glucose 108 (H) 65 - 105 mg/dL   EKG 12 Lead   Result Value Ref Range    Ventricular Rate 96 BPM    Atrial Rate 96 BPM    P-R Interval 134 ms    QRS Duration 88 ms    Q-T Interval 336 ms    QTc Calculation (Bazett) 424 ms    P Axis 13 degrees    R Axis 47 degrees    T Axis 28 degrees   EKG 12 Lead   Result Value Ref Range    Ventricular Rate 87 BPM    Atrial Rate 87 BPM    P-R Interval 112 ms    QRS Duration 84 ms    Q-T Interval 340 ms    QTc Calculation (Bazett) 409 ms    P Axis 42 degrees    R Axis 71 degrees    T Axis 75 degrees   TYPE AND SCREEN   Result Value Ref Range    Expiration Date 11/20/2020,2359     Arm Band Number [id number]     ABO/Rh A POSITIVE     Antibody Screen NOT TESTED     Antibody ID Anti-[place] Present     SILVIA IgG NEGATIVE     Unit Number [id number]     Product Code Leukocyte Reduced Irradiated Red Cell     Unit Divison 00     Dispense Status REL FROM [facility]     Transfusion Status OK TO TRANSFUSE     Crossmatch Result COMPATIBLE     Unit Number [id number]     Product Code Leukocyte Reduced Red Cell     Unit Divison 00     Dispense Status REL FROM [facility]     Transfusion Status OK TO TRANSFUSE     Crossmatch Result COMPATIBLE     Unit Number [id number]     Product Code Leukocyte Reduced Red Cell     Unit Divison 00     Dispense Status ALLOCATED     Transfusion Status OK TO TRANSFUSE     Crossmatch Result COMPATIBLE     Unit Number [id number]     Product Code Leukocyte Reduced Red Cell     Unit Divison 00     Dispense Status ALLOCATED     Transfusion Status OK TO TRANSFUSE     Crossmatch Result COMPATIBLE    BLOOD BANK SPECIMEN   Result Value Ref Range    Blood Bank Specimen NOT REPORTED    Prepare COVID-19 Convalescent Plasma, 1 Units   Result Value Ref Range    Unit Number [id number]     Product Code Fresh Plasma     Unit Divison 00     Dispense Status TRANSFUSED Transfusion Status OK TO TRANSFUSE          IMAGING DATA:    Xr Chest (single View Frontal)    Result Date: 11/17/2020  EXAMINATION: NUCLEAR MEDICINE PERFUSION SCAN. PORTABLE CHEST RADIOGRAPH 11/17/2020 TECHNIQUE: 5 millicuries of Tc 19O MAA was administered intravenously prior to planar imaging of the lungs in multiple projections. HISTORY: ORDERING SYSTEM PROVIDED HISTORY: elevated D-Dimer outside hospital Reason for Exam: Covid-19 positive pt. and elevated D-Dimer 2.44 FINDINGS: PERFUSION: Distribution of radiotracer is slightly heterogeneous. No segmental defects identified. CHEST RADIOGRAPH:  Bilateral mid to lower lung field left greater than right opacities with peripheral involvement most pronounced at the bases. No effusion or pneumothorax. Normal heart size. Postsurgical changes of median sternotomy. Right-sided Port-A-Cath is in place     *Low Probability for Pulmonary Embolus. *Bilateral mid to lower lung field left greater than right opacities with peripheral involvement most pronounced at the bases compatible with  COVID-19 pneumonia. The findings were sent to the Radiology Results [id number] at 2:46 pm on 11/17/2020to be communicated to a licensed caregiver. Nm Lung Scan Perfusion Only    Result Date: 11/17/2020  EXAMINATION: NUCLEAR MEDICINE PERFUSION SCAN. PORTABLE CHEST RADIOGRAPH 11/17/2020 TECHNIQUE: 5 millicuries of Tc 83E MAA was administered intravenously prior to planar imaging of the lungs in multiple projections. HISTORY: ORDERING SYSTEM PROVIDED HISTORY: elevated D-Dimer outside hospital Reason for Exam: Covid-19 positive pt. and elevated D-Dimer 2.44 FINDINGS: PERFUSION: Distribution of radiotracer is slightly heterogeneous. No segmental defects identified. CHEST RADIOGRAPH:  Bilateral mid to lower lung field left greater than right opacities with peripheral involvement most pronounced at the bases. No effusion or pneumothorax. Normal heart size.   Postsurgical changes of median sternotomy. Right-sided Port-A-Cath is in place     *Low Probability for Pulmonary Embolus. *Bilateral mid to lower lung field left greater than right opacities with peripheral involvement most pronounced at the bases compatible with  COVID-19 pneumonia. The findings were sent to the Radiology Results [id number] at 2:46 pm on 11/17/2020to be communicated to a licensed caregiver. IMPRESSION:   Primary Problem  Pneumonia due to COVID-19 virus    Active Hospital Problems    Diagnosis Date Noted    MDS (myelodysplastic syndrome) ([address] [D46.9] 11/17/2020    Pneumonia due to COVID-19 virus [U07.1, J12.89] 11/17/2020    Neutropenic sepsis ([address] [A41.9, D70.9] 11/17/2020    Bicytopenia [D75.89] 11/17/2020    ROSALINO (acute kidney injury) ([address] [N17.9] 11/17/2020    Acute respiratory failure with hypoxia ([address] [J96.01] 11/17/2020    Essential hypertension [I10] 11/17/2020    Coronary artery disease involving coronary bypass graft of native heart without angina pectoris [I25.810] 11/17/2020    History of Raynaud's syndrome [Z86.79] 11/17/2020    Transaminitis [R74.01] 11/17/2020    Severe sepsis ([address] [A41.9, R65.20] 11/17/2020    Immunosuppressed status ([address] [D84.9]     Acute respiratory failure due to COVID-19 ([address] [U07.1, J96.00] 11/16/2020       MDS  Anemia, transfusion dependent  COVID-19 infection    RECOMMENDATIONS:  1. I personally reviewed results of lab work-up imaging studies and other relevant clinical data. 2. Reviewed records from outside facility  3. 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 She has been started on Vidaza. She has received 1 cycle so far. According the patient she was scheduled for next cycle soon. 4. Transfuse to keep hemoglobin above 7  5. Follow-up on results of stool occult blood testing  6. Okay to give anticoagulation as long as patient is on bleeding and platelet count above 50,000  7. Continue Granix.   We will add differential to CBC tomorrow morning  8. Continue symptomatic and supportive care. We will continue to follow. 9. Patient also has transfusion dependent anemia  10. We will continue to follow. Discussed with patient and Nurse. Thank you for asking us to see this patient. Suyapa Guallpa MD          This note is created with the assistance of a speech recognition program.  While intending to generate a document that actually reflects the content of the visit, the document can still have some errors including those of syntax and sound a like substitutions which may escape proof reading. It such instances, actual meaning can be extrapolated by contextual diversion.

## 2020-11-18 NOTE — PROGRESS NOTES
[facility] -   Infectious diseases evaluation  admission date 11/16/2020    reason for consultation:   covid    Impression :   Current:  · covid pneumonia - severe w resp distress  · MDS  · neutropenia  ·   Discussion / summary of stay / plan of care   ·   Recommendations   · Keep remdesevir till 11/20/20  · LFT daily  · Follow ferritin and infl markers  · consented Immune plasma - ordered 11/17 1 U - tolerated 11/17  · Meropenem and vanco for [facility] are neg, will stop both  · mycoplasma and legionella AG    Infection Control Recommendations   · Ely Precautions  · Contact Isolation   · Airborne isolation  · Droplet Isolation      Antimicrobial Stewardship Recommendations   · Simplification of therapy  · Targeted therapy  · Per Kg dosing    Coordination ofOutpatient Care:   · Estimated Length of IV antimicrobials:  · Patient will need Midline / picc Catheter Insertion:   · Patient will need SNF:  · Patient will need outpatient wound care:     History of Present Illness:   Initial history:  Lizzeth Mittal is a 70y.o.-year-old female w resp distress transferred from [address] pneumonia and underlying MDS.   Neutropenia  On bipap and 50% FIO2, elevated D-Dimers and went for a VQ scan  Past cig smoking - HTN - Raynaud-    Started on decadron remdesevir  started on meropenem and vanco pend BC due to concern for ongoing sepsis      Interval changes  11/18/2020   Patient Vitals for the past 8 hrs:   BP Temp Temp src Pulse Resp SpO2   11/18/20 1720 -- -- -- -- -- 97 %   11/18/20 1550 -- -- -- -- (!) 41 --   11/18/20 1549 -- -- -- -- (!) 41 --   11/18/20 1548 -- -- -- -- (!) 38 --   11/18/20 1547 -- -- -- -- (!) 39 --   11/18/20 1546 -- -- -- -- (!) 41 --   11/18/20 1545 -- -- -- -- (!) 41 --   11/18/20 1544 -- -- -- -- (!) 39 --   11/18/20 1543 -- -- -- -- (!) 35 --   11/18/20 1542 -- -- -- -- (!) 38 --   11/18/20 1541 -- -- -- -- (!) 32 -- 11/18/20 1540 -- -- -- -- (!) 41 --   11/18/20 1539 -- -- -- -- (!) 34 --   11/18/20 1535 (!) 106/51 98.8 °F (37.1 °C) Axillary 82 (!) 41 93 %   11/18/20 1200 -- -- -- -- -- 97 %   11/18/20 1142 -- -- -- -- 28 98 %       inflamm markers worse as below  No fever  Alert and tolerated the plasma well 11/17  Cough still dry  Throat dry from mouth breathing  No pos cx  - will stop AB      Summary of relevant labs:  Labs:  WBC  1.2  Creat 0.81  procalcitonin 1   - 227  Ferritin 3046 - 3024  DDimer 2.44  Fibrinogen 720 - 1002  proBNP 729      Micro:  BC 11/17/20 x 2  resp PCR neg but for COVID+ 11/17  Imaging:  VQ scan      CT chest 11/16 [facility] periph ground glass bilat, mediastinal large LN  CT brain 11/216 neg [facility]      I have personally reviewed the past medical history, past surgical history, medications, social history, and family history, and I haveupdated the database accordingly. Allergies:   Fish allergy; Hydrocodone-acetaminophen; Tizanidine; Atorvastatin; Metoclopramide; Moxifloxacin; Oxycodone; Pregabalin; Tramadol; Zolpidem; Cephalexin; Iodides; and Sulfa antibiotics     Review of Systems:     Review of Systems   Constitutional: Positive for activity change and fatigue. Eyes: Negative for visual disturbance. Respiratory: Positive for cough and shortness of breath. Negative for choking. Cardiovascular: Negative for chest pain. Gastrointestinal: Negative for anal bleeding. Endocrine: Negative for polydipsia. Genitourinary: Negative for dysuria. Musculoskeletal: Negative for back pain. Skin: Negative for color change. Allergic/Immunologic: Positive for immunocompromised state. Neurological: Negative for numbness. HANDS SHAKE   Hematological: Negative for adenopathy. Psychiatric/Behavioral: Negative for agitation, behavioral problems and confusion. Physical Examination :       Physical Exam  Constitutional:       General: She is not in acute distress. 480 mcg Subcutaneous Daily    latanoprost  1 drop Both Eyes Nightly    remdesivir IVPB  100 mg Intravenous Q24H    dexamethasone  4 mg Intravenous Daily    meropenem  1 g Intravenous Q8H    vancomycin  1,250 mg Intravenous Q18H    vancomycin (VANCOCIN) intermittent dosing (placeholder)   Other RX Placeholder    sodium chloride  20 mL Intravenous Once    sodium chloride  20 mL Intravenous Once    enoxaparin  30 mg Subcutaneous BID       Social History:     Social History     Socioeconomic History    Marital status:      Spouse name: Not on file    Number of children: Not on file    Years of education: Not on file    Highest education level: Not on file   Occupational History    Not on file   Social Needs    Financial resource strain: Not on file    Food insecurity     Worry: Not on file     Inability: Not on file    Transportation needs     Medical: Not on file     Non-medical: Not on file   Tobacco Use    Smoking status: Former Smoker     Types: Cigarettes     Start date: 1/1/1957     Last attempt to quit: 8/22/2005     Years since quitting: 15.2   Substance and Sexual Activity    Alcohol use: Never     Frequency: Never    Drug use: Never    Sexual activity: Not on file   Lifestyle    Physical activity     Days per week: Not on file     Minutes per session: Not on file    Stress: Not on file   Relationships    Social connections     Talks on phone: Not on file     Gets together: Not on file     Attends Sabianism service: Not on file     Active member of club or organization: Not on file     Attends meetings of clubs or organizations: Not on file     Relationship status: Not on file    Intimate partner violence     Fear of current or ex partner: Not on file     Emotionally abused: Not on file     Physically abused: Not on file     Forced sexual activity: Not on file   Other Topics Concern    Not on file   Social History Narrative    Not on file       Family History:     Family History   Problem Relation Age of Onset    Heart Failure Mother     Early Death Mother     Heart Disease Mother     Stroke Father       Medical Decision Making:   I have independently reviewed/ordered the following labs:    CBC with Differential:   Recent Labs     11/17/20 0036 11/17/20 0256 11/18/20  0553   WBC 1.3* 1.2* 2.0*   HGB 7.4* 7.0* 7.7*   HCT 23.6* 22.4* 25.6*    294 292   LYMPHOPCT 48*  --   --    MONOPCT 17*  --   --      BMP:  Recent Labs     11/17/20 0255 11/18/20  0553   * 138   K 4.1 3.6*    100   CO2 21 22   BUN 23 23   CREATININE 0.81 0.81   MG 2.3 2.5     Hepatic Function Panel:   Recent Labs     11/17/20 0036 11/17/20 0255 11/18/20  0553   PROT 5.9* 5.8*  --    LABALBU 3.0* 2.9*  2.9* 2.8*   BILIDIR  --  0.22  --    IBILI  --  0.31  --    BILITOT 0.64 0.53  --    ALKPHOS 76 74  --    ALT 49* 45*  --    AST 41* 38*  --      No results for input(s): RPR in the last 72 hours. No results for input(s): HIV in the last 72 hours. No results for input(s): BC in the last 72 hours. Lab Results   Component Value Date    CREATININE 0.81 11/18/2020    GLUCOSE 93 11/18/2020       Detailed results: Thank you for allowing us to participate in the care of this patient. Please call with questions. This note is created with the assistance of a speech recognition program.  While intending to generate adocument that actually reflects the content of the visit, the document can still have some errors including those of syntax and sound a like substitutions which may escape proof reading. It such instances, actual meaningcan be extrapolated by contextual diversion.     Sixto Bradley MD  Office: [phone]  Perfect serve / office [phone]

## 2020-11-18 NOTE — PROGRESS NOTES
Ellie Barboza MD the following:    Patient c/o of sob after exertion to the bathroom. Patient was on 6L NC at this time. She kept holding her chest saying something felt off so I got an EKG. EKG is normal. Called respiratory and put patient on BiPaP. /51 MAP 64 HR 83 NSR Respirations 43 and holding steady above 40. O2 93% on BiPap. Stat CXRAY being ordered.      Electronically signed by Jordan Meeks RN on 11/18/2020 at 3:45 PM

## 2020-11-18 NOTE — PLAN OF CARE
Complications related to the disease process, condition or treatment will be avoided or minimized  Description: Complications related to the disease process, condition or treatment will be avoided or minimized  11/18/2020 1013 by Lor Obregon RN  Outcome: Ongoing  11/18/2020 0443 by Sadiq Jacob RN  Outcome: Ongoing     Problem: Skin Integrity:  Goal: Risk for impaired skin integrity will decrease  Description: Risk for impaired skin integrity will decrease  11/18/2020 1013 by Lor Obregon RN  Outcome: Ongoing  11/18/2020 0443 by Sadiq Jacob RN  Outcome: Ongoing  Goal: Will show no infection signs and symptoms  Description: Will show no infection signs and symptoms  11/18/2020 1013 by Lor Obregon RN  Outcome: Ongoing  11/18/2020 0443 by Sadiq Jacob RN  Outcome: Ongoing  Goal: Absence of new skin breakdown  Description: Absence of new skin breakdown  11/18/2020 1013 by Lor Obregon RN  Outcome: Ongoing  11/18/2020 0443 by Sadiq Jacob RN  Outcome: Ongoing     Problem: Falls - Risk of:  Goal: Will remain free from falls  Description: Will remain free from falls  11/18/2020 1013 by Lor Obregon RN  Outcome: Ongoing  11/18/2020 0443 by Sadiq Jacob RN  Outcome: Ongoing  Goal: Absence of physical injury  Description: Absence of physical injury  11/18/2020 1013 by Lor Obregon RN  Outcome: Ongoing  11/18/2020 0443 by Sadiq Jacob RN  Outcome: Ongoing     Problem: Pain:  Goal: Pain level will decrease  Description: Pain level will decrease  11/18/2020 1013 by Lor Obregon RN  Outcome: Ongoing  11/18/2020 0443 by Sadiq Jacob RN  Outcome: Ongoing  Goal: Control of acute pain  Description: Control of acute pain  11/18/2020 1013 by Lor Obregon RN  Outcome: Ongoing  11/18/2020 0443 by Sadiq Jacob RN  Outcome: Ongoing  Goal: Control of chronic pain  Description: Control of chronic pain  11/18/2020 1013 by Lor Obregon RN  Outcome: Ongoing  11/18/2020 0443 by Wyane Clifford RN  Outcome: Ongoing     Problem: Airway Clearance - Ineffective  Goal: Achieve or maintain patent airway  11/18/2020 1013 by Shantel Lucas RN  Outcome: Ongoing  11/18/2020 0443 by Wayne Clifford RN  Outcome: Ongoing     Problem: Gas Exchange - Impaired  Goal: Absence of hypoxia  11/18/2020 1013 by Shantel Lucas RN  Outcome: Ongoing  11/18/2020 0443 by Wayne Clifford RN  Outcome: Ongoing  Goal: Promote optimal lung function  11/18/2020 1013 by Shantel Lucas RN  Outcome: Ongoing  11/18/2020 0443 by Wayne Clifford RN  Outcome: Ongoing     Problem: Breathing Pattern - Ineffective  Goal: Ability to achieve and maintain a regular respiratory rate  11/18/2020 1013 by Shantel Lucas RN  Outcome: Ongoing  11/18/2020 0443 by Wayne Clifford RN  Outcome: Ongoing     Problem:  Body Temperature -  Risk of, Imbalanced  Goal: Ability to maintain a body temperature within defined limits  11/18/2020 1013 by Shantel Lucas RN  Outcome: Ongoing  11/18/2020 0443 by Wayne Clifford RN  Outcome: Ongoing  Goal: Will regain or maintain usual level of consciousness  11/18/2020 1013 by Shantel Lucas RN  Outcome: Ongoing  11/18/2020 0443 by Wayne Clifford RN  Outcome: Ongoing  Goal: Complications related to the disease process, condition or treatment will be avoided or minimized  11/18/2020 1013 by Shantel Lucas RN  Outcome: Ongoing  11/18/2020 0443 by Wayne Clifford RN  Outcome: Ongoing     Problem: Isolation Precautions - Risk of Spread of Infection  Goal: Prevent transmission of infection  11/18/2020 1013 by Shantel Lucas RN  Outcome: Ongoing  11/18/2020 0443 by Wayne Clifford RN  Outcome: Ongoing     Problem: Nutrition Deficits  Goal: Optimize nutrtional status  11/18/2020 1013 by Shantel Lucas RN  Outcome: Ongoing  11/18/2020 0443 by Wayne Clifford RN  Outcome: Ongoing     Problem: Risk for Fluid Volume Deficit  Goal: Maintain normal heart rhythm  11/18/2020 1013 by Bakari Hall RN  Outcome: Ongoing  11/18/2020 0443 by Rebeka Loyola RN  Outcome: Ongoing  Goal: Maintain absence of muscle cramping  11/18/2020 1013 by Bakari Hall RN  Outcome: Ongoing  11/18/2020 0443 by Rebeka Loyola RN  Outcome: Ongoing  Goal: Maintain normal serum potassium, sodium, calcium, phosphorus, and pH  11/18/2020 1013 by Bakari Hall RN  Outcome: Ongoing  11/18/2020 0443 by Rebeka Loyola RN  Outcome: Ongoing     Problem: Loneliness or Risk for Loneliness  Goal: Demonstrate positive use of time alone when socialization is not possible  11/18/2020 1013 by Bakari Hall RN  Outcome: Ongoing  11/18/2020 0443 by Rebeka Loyola RN  Outcome: Ongoing     Problem: Fatigue  Goal: Verbalize increase energy and improved vitality  11/18/2020 1013 by Bakari Hall RN  Outcome: Ongoing  11/18/2020 0443 by Rebeka Loyola RN  Outcome: Ongoing     Problem: Patient Education: Go to Patient Education Activity  Goal: Patient/Family Education  11/18/2020 1013 by Bakari Hall RN  Outcome: Ongoing  11/18/2020 0443 by Rebeka Loyola RN  Outcome: Ongoing

## 2020-11-18 NOTE — PROGRESS NOTES
Patient's daughter Ana Laura Bernardke updated. All questions and concerns addressed.     Electronically signed by Nicole Martinez RN on 11/18/2020 at 1:36 PM

## 2020-11-18 NOTE — PROGRESS NOTES
[facility]  Office: [address], DO, Mar Salgado, DO, Desirae Pastor, DO, Ricardo Elaine, DO, Ifrah Salcedo MD, Phuong Jaeger MD, Danielle Mcneil MD, Krystyna Bedoya MD, Jessica Nagy MD, Fatimah Clinton MD, Mariaa Bhatt MD, Jemima Moss MD, Ren Jean MD, Sabrina Davis DO, Azael Choudhary MD, Desmond De Souza MD, Hector Evangelista DO, John Paul Flores MD,  Lorraine Vargas, DO, Natalie Majano MD, Giovany Sanford MD, Adal Granado, [facility], [facility], [facility], Ebony Lowe, CNP, Zachary Pepper, CNS, Robert Varela, CNP, Steve Ko, CNP, Olivia Wiley, CNP, Dionicia Frankel, [facility], Alan Mcmillan, CNP, Kamilah Crews PA-C, Vance Becerril, [facility], Kashmir Jeffrey, CNP, Esau Bradley, CNP, González Raymundo, CNP, Brijesh Estrada, CNP, Valentin Phillips, [facility]   [address]    Progress Note    11/18/2020    4:45 PM    Name:   Juno Donohue  MRN:     [id number]     Acct:      [de-identified]   Room:   [address] [place] [address] Day:  2  Admit Date:  11/16/2020 10:51 PM    PCP:   Jayne Beckham MD  Code Status:  Full Code    Subjective:     C/C: No chief complaint on file. SOB  Interval History Status: not changed.      Seen and examined face-to-face today,  Patient was still short of breath this morning, needing 5 to 6 L of oxygen,  Had an episode of tachypnea and hypoxia with some chest discomfort this afternoon while she was going to the bathroom, EKG did not show any changes, placed on BiPAP, seems comfortable after that,  discussed with the oncology about pancytopenia, infectious disease on board  Infectious disease recommendations,  · \"Keep remdesevir till 11/20/20  · LFT daily  · Follow ferritin and infl markers  · consented Immune plasma - ordered 11/17 1 U  · Meropenem and vanco for now - if [facility] are neg, stop both  · ghet EKG for the QTC  Get resp PCR and mycoplasma and legionella AG\"      Brief History:     Juno Donohue is a 70 y.o. female with a past medical history for MDS, essential hypertension, melanoma, mitral valve prolapse, MRSA, Raynaud's disease presents emergency department for shortness of breath and dyspnea upon exertion for 3 days. Patient was initially seen at [facility] diagnosed with Covid pneumonia. Patient requiring 50% FiO2 on BiPAP, patient also had an elevated D-dimer but CTA was not possible secondary to contrast allergy. [facility] unable to accommodate due to lack of Covid beds, patient was transferred for further care. Spoke with pulmonary medicine at [facility] and was accepted for ICU transfer. Patient was given 1 unit of PRBCs at [address] [place] [address] for a hemoglobin less than 7, patient was also placed on BiPAP for transfer. Patient arrived, no complaints except for some shortness of breath. She was saturating 100% on FiO2 of 50%. Discussed treatment course, reiterated CODE STATUS. Review of Systems:     Constitutional: Anxious  Respiratory: Shortness of breath, cough  Cardiovascular:  negative for chest pain, chest pressure/discomfort, lower extremity edema, palpitations  Gastrointestinal:  negative for abdominal pain, constipation, diarrhea, nausea, vomiting  Neurological: Tremors    Medications: Allergies: Allergies   Allergen Reactions    Fish Allergy Anaphylaxis, Hives and Swelling    Hydrocodone-Acetaminophen Anaphylaxis    Tizanidine Anaphylaxis and Hives    Atorvastatin Hives, Other (See Comments) and Swelling    Metoclopramide Hives     Other reaction(s): Unknown, Unknown    Moxifloxacin Hives, Other (See Comments) and Swelling     Other reaction(s):  Other (See Comments), Unknown    Oxycodone Other (See Comments)     Other reaction(s): Hallucinations, Mental Status Change    Pregabalin Other (See Comments)     Other reaction(s): Hallucinations, Other (See Comments)  Causes sores in the mouth  Causes sores in the mouth      Tramadol      Other reaction(s): Edema, Other (See Comments)    Zolpidem Other (See Comments)     Other reaction(s): Other (See Comments)  causes cramps in hands and legs  causes cramps in hands and legs  Other reaction(s): Other (See Comments)  causes cramps in hands and legs  causes cramps in hands and legs      Cephalexin Rash     Other reaction(s): Unknown, Unknown    Iodides Rash and Swelling     ivp and heart cath dye    Sulfa Antibiotics Rash       Current Meds:   Scheduled Meds:    tbo-filgrastim  480 mcg Subcutaneous Daily    latanoprost  1 drop Both Eyes Nightly    remdesivir IVPB  100 mg Intravenous Q24H    dexamethasone  4 mg Intravenous Daily    meropenem  1 g Intravenous Q8H    vancomycin  1,250 mg Intravenous Q18H    vancomycin (VANCOCIN) intermittent dosing (placeholder)   Other RX Placeholder    sodium chloride  20 mL Intravenous Once    sodium chloride  20 mL Intravenous Once    enoxaparin  30 mg Subcutaneous BID     Continuous Infusions:   PRN Meds: sodium chloride, sodium chloride flush, potassium chloride **OR** potassium alternative oral replacement **OR** potassium chloride, magnesium sulfate, acetaminophen **OR** acetaminophen, polyethylene glycol, promethazine **OR** ondansetron, nicotine    Data:     Past Medical History:   has a past medical history of Cancer ([facility] [address] Deaf, left, Essential hypertension, GERD (gastroesophageal reflux disease), Glaucoma, Hyperlipidemia, Melanoma ([facility] [address] MRSA (methicillin resistant staph aureus) culture positive, MVP (mitral valve prolapse), Pharyngoesophageal dysphagia, Raynaud disease, and Status post four vessel coronary artery bypass. Social History:   reports that she quit smoking about 15 years ago. Her smoking use included cigarettes. She started smoking about 63 years ago. She does not have any smokeless tobacco history on file. She reports that she does not drink alcohol or use drugs.      Family History:   Family History   Problem Relation Age of Onset    Heart Failure Mother    Mary Elliott Early Death Mother     Heart Disease Mother     Stroke Father        Vitals:  BP (!) 106/51   Pulse 82   Temp 98.8 °F (37.1 °C) (Axillary)   Resp (!) 41   Ht 5' 7\" (1.702 m)   Wt 182 lb 8.7 oz (82.8 kg)   SpO2 93%   BMI 28.59 kg/m²   Temp (24hrs), Av °F (37.2 °C), Min:98.7 °F (37.1 °C), Max:99.5 °F (37.5 °C)    Recent Labs     20  2348   POCGLU 108*       I/O (24Hr):     Intake/Output Summary (Last 24 hours) at 2020 1645  Last data filed at 2020 1553  Gross per 24 hour   Intake 1483.67 ml   Output 1075 ml   Net 408.67 ml       Labs:  Hematology:  Recent Labs     20  0036 20  0255 20  0256 20  0553   WBC 1.3*  --  1.2* 2.0*   RBC 2.53*  --  2.37* 2.62*   HGB 7.4*  --  7.0* 7.7*   HCT 23.6*  --  22.4* 25.6*   MCV 93.3  --  94.5 97.7   MCH 29.2  --  29.5 29.4   MCHC 31.4  --  31.3 30.1   RDW 18.5*  --  19.0* 19.6*     --  294 292   MPV 10.6  --  10.7 11.1   .8*  --   --  227.4*   INR 0.9 0.9  --   --    DDIMER  --  2.44  --   --      Chemistry:  Recent Labs     20  0036 20  0255 20  0700 20  0553   * 133*  --  138   K 4.1 4.1  --  3.6*    100  --  100   CO2 21 21  --  22   GLUCOSE 112* 109*  --  93   BUN 24* 23  --  23   CREATININE 0.89 0.81  --  0.81   MG  --  2.3  --  2.5   ANIONGAP 12 12  --  16   LABGLOM >60 >60  --  >60   GFRAA >60 >60  --  >60   CALCIUM 8.0* 8.0*  --  8.4*   PHOS  --  3.5  --  3.3   PROBNP  --   --  729*  --      Recent Labs     20  2348 20  0036 20  0255 20  0553   PROT  --  5.9* 5.8*  --    LABALBU  --  3.0* 2.9*  2.9* 2.8*   AST  --  41* 38*  --    ALT  --  49* 45*  --    LDH  --  296* 291* 407*   ALKPHOS  --  76 74  --    BILITOT  --  0.64 0.53  --    BILIDIR  --   --  0.22  --    POCGLU 108*  --   --   --      ABG:No results found for: POCPH, PHART, PH, POCPCO2, GKN0ZUX, PCO2, POCPO2, PO2ART, PO2, POCHCO3, XDB1NXQ, HCO3, NBEA, PBEA, BEART, BE, THGBART, THB, KNQ9NRW, YNXK4IGA, O0YBRTPS, O2SAT, FIO2  Lab Results   Component Value Date/Time    SPECIAL RT HAND 2ML 11/17/2020 02:48 AM     Lab Results   Component Value Date/Time    CULTURE NO GROWTH 1 DAY 11/17/2020 02:48 AM       Radiology:  [facility] Chest (single View Frontal)    Result Date: 11/17/2020  *Low Probability for Pulmonary Embolus. *Bilateral mid to lower lung field left greater than right opacities with peripheral involvement most pronounced at the bases compatible with  COVID-19 pneumonia. The findings were sent to the Radiology Results [address] at 2:46 pm on 11/17/2020to be communicated to a licensed caregiver. Nm Lung Scan Perfusion Only    Result Date: 11/17/2020  *Low Probability for Pulmonary Embolus. *Bilateral mid to lower lung field left greater than right opacities with peripheral involvement most pronounced at the bases compatible with  COVID-19 pneumonia. The findings were sent to the Radiology Results [address] at 2:46 pm on 11/17/2020to be communicated to a licensed caregiver.        Physical Examination:        General appearance:  alert, cooperative  Mental Status:  oriented to person, place and time and normal affect  Lungs: Coarse breath sounds bilateral  Heart:  regular rate and rhythm, no murmur  Abdomen:  soft, nontender, nondistended, normal bowel sounds, no masses, hepatomegaly, splenomegaly  Extremities:  no edema, redness, tenderness in the calves  Skin:  no gross lesions, rashes, induration    Assessment:        Hospital Problems           Last Modified POA    * (Principal) Pneumonia due to COVID-19 virus 11/17/2020 Yes    Acute respiratory failure due to COVID-19 ([address] 11/16/2020 Yes    MDS (myelodysplastic syndrome) ([address] 11/17/2020 Yes    Neutropenic sepsis ([address] 11/17/2020 Yes    Bicytopenia 11/17/2020 Yes    ROSALINO (acute kidney injury) ([address] 11/17/2020 Yes    Acute respiratory failure with hypoxia ([address] 11/17/2020 Yes    Essential hypertension 11/17/2020 Yes Coronary artery disease involving coronary bypass graft of native heart without angina pectoris 11/17/2020 Yes    History of Raynaud's syndrome 11/17/2020 Yes    Transaminitis 11/17/2020 Yes    Immunosuppressed status ([facility] [address] 11/17/2020 Yes    Severe sepsis ([facility] 11/17/2020 Yes          Plan:        1. Covid pneumonia, acute respiratory failure, started on remdesivir, convalescent plasma, Decadron, infectious disease and pulmonary critical care on board, episode of dyspnea and hypoxia this afternoon, on DVT prophylaxis, EKG did not show any new changes, continue on BiPAP at this time, get labs in the morning  2. Myelodysplastic syndrome and pancytopenia, oncology consulted and discussed in detail, advised to keep the patient on DVT prophylaxis with Lovenox twice daily,  3. VQ scan low probability,  4. Neutropenic sepsis, continue antibiotics as per infectious disease, blood cultures still pending,  5. Acute kidney injury, creatinine improving,  6. Transaminitis, monitor liver functions,  7. Hypertension, monitor blood pressure,  8. DVT prophylaxis with Lovenox,  9. GI prophylaxis,  10. Full CODE STATUS,  11.  Total time spent 45 minutes    Josiah Wilson MD  11/18/2020  4:45 PM

## 2020-11-18 NOTE — PLAN OF CARE
Problem: Falls - Risk of:  Goal: Will remain free from falls  Description: Will remain free from falls  11/18/2020 1808 by Lori Esteves RN  Outcome: Met This Shift  11/18/2020 1013 by Lori Esteves RN  Outcome: Ongoing  11/18/2020 0443 by Ming Eisenberg RN  Outcome: Ongoing  Goal: Absence of physical injury  Description: Absence of physical injury  11/18/2020 1808 by Lori Esteves RN  Outcome: Met This Shift  11/18/2020 1013 by Lori Esteves RN  Outcome: Ongoing  11/18/2020 0443 by Ming Eisenberg RN  Outcome: Ongoing       Pt remained free of falls this shift. Bed remains in lowest position, with 2/4 side rails up and all wheels locked. Non skid socks on. Bedside table and call light within reach. Pt calls out appropriately. Will continue to monitor.     Electronically signed by Lori Esteves RN on 11/18/2020 at 6:08 PM

## 2020-11-18 NOTE — PROGRESS NOTES
Physician Progress Note      Obinna Taylor  CSN #:                  [id number]  :                       1945  ADMIT DATE:       2020 10:51 PM  [address] [place] DATE:  Lakenoemy Pio  PROVIDER #:        Imelda FRIEND          QUERY TEXT:    Patient admitted with COVID pneumonia with neutropenic sepsis. Noted   documentation of Acute Kidney Injury in dated problem list of H&P. Patients Cr   0.89 on admit with repeat level of Cr 0.81. Please document in progress   notes and discharge summary:    The medical record reflects the following:  Risk Factors: Covid pneumonia  Clinical Indicators:  Cr 0.89   2nd draw - Cr 0.81  Treatment: lab monitoring    Defined by Kidney Disease Improving Global Outcomes (KDIGO) clinical practice   guideline for acute kidney injury:  -Increase in SCr by greater than or equal to 0.3 mg/dl within 48 hours; or  -Increase in SCr to greater than or equal to 1.5 times baseline, which is   known or presumed to have occurred within the prior 7 days; or  -Urine volume < 0.5ml/kg/h for 6 hours    Thank you, John Marx RN, CDS. Please call with any questions [phone]    M-F 6a-2:30p  Options provided:  -- Acute kidney injury ruled out after study  -- Acute kidney injury resolved prior to admission  -- Acute kidney injury evidenced by, Please document evidence as well as   baseline creatinine, if known. -- Other - I will add my own diagnosis  -- Disagree - Not applicable / Not valid  -- Disagree - Clinically unable to determine / Unknown  -- Refer to Clinical Documentation Reviewer    PROVIDER RESPONSE TEXT:    Acute kidney injury was resolved prior to admission.     Query created by: Cristofer Alexis on 2020 10:08 AM      Electronically signed by:  Imelda Ruiz FRIEND 2020 8:20 PM

## 2020-11-18 NOTE — PLAN OF CARE
Problem:  Activity:  Goal: Fatigue will decrease  Description: Fatigue will decrease  Outcome: Ongoing     Problem: Cardiac:  Goal: Hemodynamic stability will improve  Description: Hemodynamic stability will improve  Outcome: Ongoing     Problem: Coping:  Goal: Level of anxiety will decrease  Description: Level of anxiety will decrease  Outcome: Ongoing  Goal: Ability to cope will improve  Description: Ability to cope will improve  Outcome: Ongoing  Goal: Ability to establish a method of communication will improve  Description: Ability to establish a method of communication will improve  Outcome: Ongoing     Problem: Nutritional:  Goal: Consumption of the prescribed amount of daily calories will improve  Description: Consumption of the prescribed amount of daily calories will improve  Outcome: Ongoing     Problem: Respiratory:  Goal: Ability to maintain a clear airway will improve  Description: Ability to maintain a clear airway will improve  Outcome: Ongoing  Goal: Ability to maintain adequate ventilation will improve  Description: Ability to maintain adequate ventilation will improve  Outcome: Ongoing  Goal: Complications related to the disease process, condition or treatment will be avoided or minimized  Description: Complications related to the disease process, condition or treatment will be avoided or minimized  Outcome: Ongoing     Problem: Skin Integrity:  Goal: Risk for impaired skin integrity will decrease  Description: Risk for impaired skin integrity will decrease  11/18/2020 0443 by Radu Maria RN  Outcome: Ongoing  11/17/2020 1509 by Robe Sarkar RCP  Outcome: Ongoing  Goal: Will show no infection signs and symptoms  Description: Will show no infection signs and symptoms  11/18/2020 0443 by Radu Maria RN  Outcome: Ongoing  11/17/2020 1509 by Robe Sarkar RCP  Outcome: Ongoing  Goal: Absence of new skin breakdown  Description: Absence of new skin breakdown  11/18/2020 0443 by Mohamud Joya RN  Outcome: Ongoing  11/17/2020 1509 by Sally Rodríguez RCP  Outcome: Ongoing     Problem: Falls - Risk of:  Goal: Will remain free from falls  Description: Will remain free from falls  Outcome: Ongoing  Goal: Absence of physical injury  Description: Absence of physical injury  Outcome: Ongoing     Problem: Pain:  Goal: Pain level will decrease  Description: Pain level will decrease  Outcome: Ongoing  Goal: Control of acute pain  Description: Control of acute pain  Outcome: Ongoing  Goal: Control of chronic pain  Description: Control of chronic pain  Outcome: Ongoing     Problem: Airway Clearance - Ineffective  Goal: Achieve or maintain patent airway  11/18/2020 0443 by Mohamud Joya RN  Outcome: Ongoing  11/17/2020 1509 by Sally Rodríguez RCP  Outcome: Ongoing     Problem: Gas Exchange - Impaired  Goal: Absence of hypoxia  11/18/2020 0443 by Mohamud Joya RN  Outcome: Ongoing  11/17/2020 1509 by Sally Rodríguez RCP  Outcome: Ongoing  Goal: Promote optimal lung function  11/18/2020 0443 by Mohamud Joya RN  Outcome: Ongoing  11/17/2020 1509 by Sally Rodríguez RCP  Outcome: Ongoing     Problem: Breathing Pattern - Ineffective  Goal: Ability to achieve and maintain a regular respiratory rate  11/18/2020 0443 by Mohamud Joya RN  Outcome: Ongoing  11/17/2020 1509 by Sally Rodríguez RCP  Outcome: Ongoing     Problem:  Body Temperature -  Risk of, Imbalanced  Goal: Ability to maintain a body temperature within defined limits  Outcome: Ongoing  Goal: Will regain or maintain usual level of consciousness  Outcome: Ongoing  Goal: Complications related to the disease process, condition or treatment will be avoided or minimized  Outcome: Ongoing     Problem: Isolation Precautions - Risk of Spread of Infection  Goal: Prevent transmission of infection  Outcome: Ongoing     Problem: Nutrition Deficits  Goal: Optimize nutrtional status  Outcome: Ongoing     Problem: Risk for Fluid Volume Deficit  Goal: Maintain normal heart rhythm  Outcome: Ongoing  Goal: Maintain absence of muscle cramping  Outcome: Ongoing  Goal: Maintain normal serum potassium, sodium, calcium, phosphorus, and pH  Outcome: Ongoing     Problem: Loneliness or Risk for Loneliness  Goal: Demonstrate positive use of time alone when socialization is not possible  Outcome: Ongoing     Problem: Fatigue  Goal: Verbalize increase energy and improved vitality  Outcome: Ongoing     Problem: Patient Education: Go to Patient Education Activity  Goal: Patient/Family Education  Outcome: Ongoing

## 2020-11-19 NOTE — PROGRESS NOTES
[facility]  Office: [address], DO, Martha Garcia, DO, Fredy Rivera, DO, Lennox Mars Blood, DO, Radha Macario MD, Eliazar Gutierrez MD, Gary Gunter MD, Jeromy Medina MD, Tori Elizabeth MD, Sofy Gallego MD, Yaquelin Valiente MD, Saul George MD, Ren Enciso MD, Jacqueline Ignacio, DO, Indio Walker MD, Alexis Beavers MD, Duane Dc, DO, Irina Rubi MD,  Ewa Perdomo DO, Burna Cockayne, MD, Brittny Fields MD, Sharif Vogel, [facility], [facility], CNP, Thalia Fatima, CNP, Paddy Duane, CNS, Rosita Armando, CNP, Lizeth Sellers, CNP, Kathleen Richardson, CNP, Jhoana East, CNP, Mabel Munguia, CNP, Lior Perez PA-C, Daisey Meigs, [facility], Shanel Hernandez, CNP, Jenn Neal, CNP, Luana Ruvalcaba, CNP, Tai Tucker, CNP, Katiana Jacobo, [facility]   [address]    Progress Note    11/19/2020    5:29 PM    Name:   Latha Thakur  MRN:     [id number]     Acct:      [de-identified]   Room:   3023023-01   Day:  3  Admit Date:  11/16/2020 10:51 PM    PCP:   Lise Coronado MD  Code Status:  Full Code    Subjective:     C/C: No chief complaint on file. SOB  Interval History Status: not changed.      Seen and examined face-to-face today,  Patient was still short of breath this morning, needing 5 to 6 L of oxygen,  discussed with the oncology about pancytopenia, infectious disease on board  Infectious disease recommendations,  · \"Keep remdesevir till 11/20/20  · LFT daily  · Follow ferritin and infl markers  · consented Immune plasma - ordered 11/17 1 U  · Meropenem and vanco for now [facility] are neg, stop both  · ghet EKG for the QTC  Get resp PCR and mycoplasma and legionella AG\"      Brief History:     Latha Thakur is a 70 y.o. female with a past medical history for MDS, essential hypertension, melanoma, mitral valve prolapse, MRSA, Raynaud's disease presents emergency department for shortness of breath and dyspnea upon exertion for 3 days.  Patient was initially seen at [facility] diagnosed with Covid pneumonia. Patient requiring 50% FiO2 on BiPAP, patient also had an elevated D-dimer but CTA was not possible secondary to contrast allergy. [facility] unable to accommodate due to lack of Covid beds, patient was transferred for further care. Spoke with pulmonary medicine at [place] and was accepted for ICU transfer. Patient was given 1 unit of PRBCs at [address] [place] [address] for a hemoglobin less than 7, patient was also placed on BiPAP for transfer. Patient arrived, no complaints except for some shortness of breath. She was saturating 100% on FiO2 of 50%. Discussed treatment course, reiterated CODE STATUS. Review of Systems:     Constitutional: Anxious  Respiratory: Shortness of breath, cough  Cardiovascular:  negative for chest pain, chest pressure/discomfort, lower extremity edema, palpitations  Gastrointestinal:  negative for abdominal pain, constipation, diarrhea, nausea, vomiting  Neurological: Tremors    Medications: Allergies: Allergies   Allergen Reactions    Fish Allergy Anaphylaxis, Hives and Swelling    Hydrocodone-Acetaminophen Anaphylaxis    Tizanidine Anaphylaxis and Hives    Atorvastatin Hives, Other (See Comments) and Swelling    Metoclopramide Hives     Other reaction(s): Unknown, Unknown    Moxifloxacin Hives, Other (See Comments) and Swelling     Other reaction(s): Other (See Comments), Unknown    Oxycodone Other (See Comments)     Other reaction(s): Hallucinations, Mental Status Change    Pregabalin Other (See Comments)     Other reaction(s): Hallucinations, Other (See Comments)  Causes sores in the mouth  Causes sores in the mouth      Tramadol      Other reaction(s): Edema, Other (See Comments)    Zolpidem Other (See Comments)     Other reaction(s): Other (See Comments)  causes cramps in hands and legs  causes cramps in hands and legs  Other reaction(s):  Other (See Labs     11/16/20 2348   POCGLU 108*       I/O (24Hr): Intake/Output Summary (Last 24 hours) at 11/19/2020 1729  Last data filed at 11/19/2020 1652  Gross per 24 hour   Intake 3339 ml   Output 650 ml   Net 2689 ml       Labs:  Hematology:  Recent Labs     11/17/20 0036 11/17/20  0255 11/17/20  0256 11/18/20  0553 11/19/20  0544   WBC 1.3*  --  1.2* 2.0* 3.3*   RBC 2.53*  --  2.37* 2.62* 2.61*   HGB 7.4*  --  7.0* 7.7* 7.8*   HCT 23.6*  --  22.4* 25.6* 23.8*   MCV 93.3  --  94.5 97.7 91.2   MCH 29.2  --  29.5 29.4 29.9   MCHC 31.4  --  31.3 30.1 32.8   RDW 18.5*  --  19.0* 19.6* 19.2*     --  294 292 222   MPV 10.6  --  10.7 11.1 11.4   .8*  --   --  227.4*  --    INR 0.9 0.9  --   --   --    DDIMER  --  2.44  --   --   --      Chemistry:  Recent Labs     11/17/20 0255 11/17/20  0700 11/18/20  0553 11/19/20  0544   *  --  138 138   K 4.1  --  3.6* 4.0     --  100 102   CO2 21  --  22 21   GLUCOSE 109*  --  93 94   BUN 23  --  23 19   CREATININE 0.81  --  0.81 0.53   MG 2.3  --  2.5 2.4   ANIONGAP 12  --  16 15   LABGLOM >60  --  >60 >60   GFRAA >60  --  >60 >60   CALCIUM 8.0*  --  8.4* 8.4*   PHOS 3.5  --  3.3  --    PROBNP  --  729*  --   --      Recent Labs     11/16/20 2348 11/17/20 0036 11/17/20  0255 11/18/20  0553 11/19/20  0544   PROT  --   --  5.9* 5.8*  --  5.6*   LABALBU  --    < > 3.0* 2.9*  2.9* 2.8* 2.6*   AST  --   --  41* 38*  --  33*   ALT  --   --  49* 45*  --  35*   LDH  --   --  296* 291* 407*  --    ALKPHOS  --   --  76 74  --  58   BILITOT  --   --  0.64 0.53  --  0.47   BILIDIR  --   --   --  0.22  --   --    POCGLU 108*  --   --   --   --   --     < > = values in this interval not displayed.      ABG:No results found for: POCPH, PHART, PH, POCPCO2, KVV3CCG, PCO2, POCPO2, PO2ART, PO2, POCHCO3, ZZB5LER, HCO3, NBEA, PBEA, BEART, BE, THGBART, THB, SUY4YSX, TWEF8WET, U6DWWMEF, O2SAT, FIO2  Lab Results   Component Value Date/Time    SPECIAL RT HAND 2ML 11/17/2020 02:48 AM     Lab Results   Component Value Date/Time    CULTURE NO GROWTH 2 DAYS 11/17/2020 02:48 AM       Radiology:  [facility] (single View Frontal)    Result Date: 11/17/2020  *Low Probability for Pulmonary Embolus. *Bilateral mid to lower lung field left greater than right opacities with peripheral involvement most pronounced at the bases compatible with  COVID-19 pneumonia. The findings were sent to the Radiology Results [address] at 2:46 pm on 11/17/2020to be communicated to a licensed caregiver. [facility] Lung Scan Perfusion Only    Result Date: 11/17/2020  *Low Probability for Pulmonary Embolus. *Bilateral mid to lower lung field left greater than right opacities with peripheral involvement most pronounced at the bases compatible with  COVID-19 pneumonia. The findings were sent to the Radiology Results [address] at 2:46 pm on 11/17/2020to be communicated to a licensed caregiver.        Physical Examination:        General appearance:  alert, cooperative  Mental Status:  oriented to person, place and time and normal affect  Lungs: Coarse breath sounds bilateral  Heart:  regular rate and rhythm, no murmur  Abdomen:  soft, nontender, nondistended, normal bowel sounds, no masses, hepatomegaly, splenomegaly  Extremities:  no edema, redness, tenderness in the calves  Skin:  no gross lesions, rashes, induration    Assessment:        Hospital Problems           Last Modified POA    * (Principal) Pneumonia due to COVID-19 virus 11/17/2020 Yes    Acute respiratory failure due to COVID-19 ([address] 11/16/2020 Yes    MDS (myelodysplastic syndrome) ([address] 11/17/2020 Yes    Neutropenic sepsis ([address] 11/17/2020 Yes    Bicytopenia 11/17/2020 Yes    ROSALINO (acute kidney injury) ([address] 11/17/2020 Yes    Acute respiratory failure with hypoxia ([address] 11/17/2020 Yes    Essential hypertension 11/17/2020 Yes    Coronary artery disease involving coronary bypass graft of native heart without angina pectoris 11/17/2020 Yes History of Raynaud's syndrome 11/17/2020 Yes    Transaminitis 11/17/2020 Yes    Immunosuppressed status ([facility] 11/17/2020 Yes    Severe sepsis ([facility] 11/17/2020 Yes          Plan:        1. Covid pneumonia, acute respiratory failure, started on remdesivir, convalescent plasma, Decadron, infectious disease and pulmonary critical care on board, continue with 6 L of oxygen via nasal cannula,  2. Myelodysplastic syndrome and pancytopenia, oncology consulted and discussed in detail, advised to keep the patient on DVT prophylaxis with Lovenox twice daily,  3. VQ scan low probability,  4. Neutropenic sepsis, continue antibiotics as per infectious disease, blood cultures still pending,  5. Acute kidney injury, creatinine improving,  6. Transaminitis, monitor liver functions,  7. Hypertension, monitor blood pressure,  8. DVT prophylaxis with Lovenox,  9. GI prophylaxis,  10. Full CODE STATUS,  11.  Total time spent 32 minutes    Gabbie Scherer MD  11/19/2020  5:29 PM

## 2020-11-19 NOTE — PLAN OF CARE
Problem:  Activity:  Goal: Fatigue will decrease  Description: Fatigue will decrease  11/19/2020 1654 by Bettie Galeazzi, RN  Outcome: Ongoing  11/19/2020 1518 by Bettie Galeazzi, RN  Outcome: Ongoing  11/19/2020 0952 by Bettie Galeazzi, RN  Outcome: Ongoing  11/19/2020 0439 by Valentina Jones RN  Outcome: Ongoing     Problem: Cardiac:  Goal: Hemodynamic stability will improve  Description: Hemodynamic stability will improve  11/19/2020 1654 by Bettie Galeazzi, RN  Outcome: Ongoing  11/19/2020 1518 by Bettie Galeazzi, RN  Outcome: Ongoing  11/19/2020 0952 by Bettie Galeazzi, RN  Outcome: Ongoing  11/19/2020 0439 by Valentina Jones RN  Outcome: Ongoing     Problem: Coping:  Goal: Level of anxiety will decrease  Description: Level of anxiety will decrease  11/19/2020 1654 by Bettie Galeazzi, RN  Outcome: Ongoing  11/19/2020 1518 by Bettie Galeazzi, RN  Outcome: Ongoing  11/19/2020 0952 by Bettie Galeazzi, RN  Outcome: Ongoing  11/19/2020 0439 by Valentina Jones RN  Outcome: Ongoing     Problem: Nutritional:  Goal: Consumption of the prescribed amount of daily calories will improve  Description: Consumption of the prescribed amount of daily calories will improve  11/19/2020 1654 by Bettie Galeazzi, RN  Outcome: Ongoing  11/19/2020 1518 by Bettie Galeazzi, RN  Outcome: Ongoing  11/19/2020 0952 by Bettie Galeazzi, RN  Outcome: Ongoing  11/19/2020 0439 by Valentina Jones RN  Outcome: Ongoing     Problem: Respiratory:  Goal: Ability to maintain a clear airway will improve  Description: Ability to maintain a clear airway will improve  11/19/2020 1654 by Bettie Galeazzi, RN  Outcome: Ongoing  11/19/2020 1518 by Bettie Galeazzi, RN  Outcome: Ongoing  11/19/2020 0952 by Bettie Galeazzi, RN  Outcome: Ongoing  11/19/2020 0439 by Valentina Jones RN  Outcome: Ongoing     Problem: Skin Integrity:  Goal: Risk for impaired skin integrity will decrease  Description: Risk for impaired skin integrity will decrease  11/19/2020 1654 by Education  11/19/2020 1654 by Brianna Morris RN  Outcome: Ongoing  11/19/2020 1518 by Brianna Morris RN  Outcome: Ongoing  11/19/2020 0952 by Brianna Morris RN  Outcome: Ongoing  11/19/2020 0439 by Suki Carbone RN  Outcome: Ongoing

## 2020-11-19 NOTE — PROGRESS NOTES
Today's Date: 11/19/2020  Patient Name: Federico Manzo  Date of admission: 11/16/2020 10:51 PM  Patient's age: 70 y.o., 1945  Admission Dx: Acute respiratory failure due to COVID-19 ([facility] [U07.1, J96.00]    Reason for Consult: management recommendations  Requesting Physician: Fatou Curtis MD    CHIEF COMPLAINT: Anemia. MDS. COVID-19 positive. History Obtained From:  patient, electronic medical record    Interval history:    Chart reviewed intervals noted  Patient continues to be short of breath. Requiring supplemental oxygen. Patient on remdesivir. Patient on meropenem and Vanco.  H&H is stable. ANC 1.8 now. HISTORY OF PRESENT ILLNESS:      The patient is a 70 y.o.  female who is admitted to the hospital with chief complaint of shortness of breath dyspnea for the last 3 days. Further testing revealed patient was COVID-19 positive. Patient was initially seen in outlying hospital and then transferred. Patient is requiring noninvasive positive pressure ventilation support. CTA has not been able to get done due to contrast allergy. Reportedly patient also has history of MDS. CBC from 9/2020 showed a WBC count of 1.6 hemoglobin 9.4 with MCV 95. Patient has been started on full dose anticoagulation due to concerns regarding pulmonary embolism. Patient was also recently started on Vidaza and has received 1 cycle so far. Patient is also transfusion dependent. Patient is supposed to get next cycle of Vidaza in the next week or so. Patient most recent bone marrow biopsy from 9/2020 showed normocellular marrow with erythroid hyperplasia and mild this erythropoiesis and 8% blasts. Recommendations from [address] [place] [address],[address]: Copied from care everywhere. ASSESSMENT AND PALN:  Ms. Federico Manzo is a 70year old woman with multiple comorbid conditions, now with a recent diagnosis of MDS-MLD.      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 In this situation, we previously discussed that we often favor management as if the patient is higher-tier risk disease, as outcomes for patients with IPSS-R 4.5 at diagnosis are similar to high risk. But unfortunately, she is not a candidate for her only curative option, i.e., allogeneic hematopoietic cell transplantation. Therefore, when we initially consulted on her, we discussed that all therapy is palliative. Given all therapies would be palliative, we discussed this in the context that she had been transfusion-independent. Thus, while initiation of hypomethylating agent (HMA) would have certainly been justifiable at that juncture, she had cytopenias for several months and still had not required transfusions or developed significant infection. Therefore, we discussed close observation since she was transfusion independent. We discussed the risks/beneftis of such an approach. At this visit, she is now beginning to require transfusions, so we agree with the plan to begin azacitidine locally under the care of Dr. Jany Alexander. We again discussed that we would recommend a repeat bone marrow exam prior to initiating therapy for palliation. If she declines treatment, one could consider EPO supplementation to minimize need for transfusion. Given her [address], antiviral prophylaxis with acyclovir (400 mg BID) could be considered. If her [address] were to persistently be lower than 500/uL, antifungal prophylaxis could be considered. She will continue to follow up with Dr. Jany Alexander for ongoing management. We will see her back prn.        Past Medical History:   has a past medical history of Cancer ([address] Deaf, left, Essential hypertension, GERD (gastroesophageal reflux disease), Glaucoma, Hyperlipidemia, Melanoma ([address] MRSA (methicillin resistant staph aureus) culture positive, MVP (mitral valve prolapse), Pharyngoesophageal dysphagia, Raynaud disease, and Status post four 480 mcg at 11/18/20 1201    latanoprost (XALATAN) 0.005 % ophthalmic solution 1 drop  1 drop Both Eyes Nightly Nicolas Del Rosario MD   1 drop at 11/18/20 2058    remdesivir 100 mg in sodium chloride 0.9 % 250 mL IVPB  100 mg Intravenous Q24H Edson Simple, DO   Stopped at 11/19/20 0522    0.9 % sodium chloride bolus  30 mL Intravenous PRN Edson Simple, DO        dexamethasone (DECADRON) injection 4 mg  4 mg Intravenous Daily Edson Simple, DO   4 mg at 11/18/20 0826    0.9 % sodium chloride bolus  20 mL Intravenous Once Edson Simple, DO        0.9 % sodium chloride bolus  20 mL Intravenous Once Vilma Ny MD        enoxaparin (LOVENOX) injection 30 mg  30 mg Subcutaneous BID Nicolas Del Rosario MD   30 mg at 11/18/20 2058    sodium chloride flush 0.9 % injection 10 mL  10 mL Intravenous PRN Edson Simple, DO   10 mL at 11/18/20 2059    potassium chloride (KLOR-CON M) extended release tablet 40 mEq  40 mEq Oral PRN Rebeca Thomas APRN - CNS        Or    potassium bicarb-citric acid (EFFER-K) effervescent tablet 40 mEq  40 mEq Oral PRN MICHAEL Mensah - CNS        Or    potassium chloride 10 mEq/100 mL IVPB (Peripheral Line)  10 mEq Intravenous PRN Rebeca Thomas APRN - CNS        magnesium sulfate 1 g in dextrose 5% 100 mL IVPB  1 g Intravenous PRN Rebeca Thomas APRN - CNS        acetaminophen (TYLENOL) tablet 650 mg  650 mg Oral Q6H PRN Rebeca Thomas APRN - CNS   650 mg at 11/17/20 1459    Or    acetaminophen (TYLENOL) suppository 650 mg  650 mg Rectal Q6H PRN Rebeca Thomas APRN - CNS        polyethylene glycol (GLYCOLAX) packet 17 g  17 g Oral Daily PRN MICHAEL Mensah - CNS        promethazine (PHENERGAN) tablet 12.5 mg  12.5 mg Oral Q6H PRN MICHAEL Mensah - CNS        Or    ondansetron (ZOFRAN) injection 4 mg  4 mg Intravenous Q6H PRN Rebeca Thomas APRN - CNS        nicotine (NICODERM CQ) 21 MG/24HR 1 patch  1 patch Transdermal Daily PRN Rebeca Thomas, APRN - CNS Allergies:  Fish allergy; Hydrocodone-acetaminophen; Tizanidine; Atorvastatin; Metoclopramide; Moxifloxacin; Oxycodone; Pregabalin; Tramadol; Zolpidem; Cephalexin; Iodides; and Sulfa antibiotics    Social History:   reports that she quit smoking about 15 years ago. Her smoking use included cigarettes. She started smoking about 63 years ago. She does not have any smokeless tobacco history on file. She reports that she does not drink alcohol or use drugs. Family History: family history includes Early Death in her mother; Heart Disease in her mother; Heart Failure in her mother; Stroke in her father. REVIEW OF SYSTEMS:      Constitutional: No fever or chills. No night sweats, no weight loss. Positive fatigue. Eyes: No eye discharge, double vision, or eye pain   HEENT: negative for sore mouth, sore throat, hoarseness and voice change   Respiratory: negative for cough , sputum, dyspnea, wheezing, hemoptysis, chest pain positive shortness of breath  Cardiovascular: negative for chest pain, dyspnea, palpitations, orthopnea, PND   Gastrointestinal: negative for nausea, vomiting, diarrhea, constipation, abdominal pain, Dysphagia, hematemesis and hematochezia   Genitourinary: negative for frequency, dysuria, nocturia, urinary incontinence, and hematuria   Integument: negative for rash, skin lesions, bruises.    Hematologic/Lymphatic: negative for easy bruising, bleeding, lymphadenopathy, or petechiae   Endocrine: negative for heat or cold intolerance,weight changes, change in bowel habits and hair loss   Musculoskeletal: negative for myalgias, arthralgias, pain, joint swelling,and bone pain   Neurological: negative for headaches, dizziness, seizures, weakness, numbness    PHYSICAL EXAM:        BP (!) 98/46   Pulse 82   Temp 98 °F (36.7 °C) (Oral)   Resp 28   Ht 5' 7\" (1.702 m)   Wt 194 lb 3.6 oz (88.1 kg)   SpO2 91%   BMI 30.42 kg/m²    Temp (24hrs), Av.4 °F (36.9 °C), Min:98 °F (36.7 °C), Max:98.8 °F (37.1 °C)    Due to the current efforts to prevent transmission of COVID-19 and also the need to preserve PPE for other caregivers, a face-to-face encounter with the patient was not performed. That being said, all relevant records and diagnostic tests were reviewed, including laboratory results and imaging. Please reference any relevant documentation elsewhere. Care will be coordinated with the primary service. DATA:      Labs:     Results for orders placed or performed during the hospital encounter of 11/16/20   Culture, Blood 1    Specimen: Blood   Result Value Ref Range    Specimen Description . BLOOD     Special Requests LT HAND 20ML     Culture NO GROWTH 1 DAY    Culture, Blood 2    Specimen: Blood   Result Value Ref Range    Specimen Description . BLOOD     Special Requests RT HAND 2ML     Culture NO GROWTH 1 DAY    Respiratory Panel, Molecular, with COVID-19    Specimen: Nasopharyngeal Swab   Result Value Ref Range    Specimen Description . NASOPHARYNGEAL SWAB     Adenovirus PCR Not Detected Not Detected    Coronavirus 229E PCR Not Detected Not Detected    Coronavirus HKU1 PCR Not Detected Not Detected    Coronavirus NL63 PCR Not Detected Not Detected    Coronavirus OC43 PCR Not Detected Not Detected    SARS-CoV-2, PCR DETECTED (A) Not Detected    Human Metapneumovirus PCR Not Detected Not Detected    Rhino/Enterovirus PCR Not Detected Not Detected    Influenza A by PCR Not Detected Not Detected    Influenza A H1 PCR NOT REPORTED Not Detected    Influenza A H1 (2009) PCR NOT REPORTED Not Detected    Influenza A H3 PCR NOT REPORTED Not Detected    Influenza B by PCR Not Detected Not Detected    Parainfluenza 1 PCR Not Detected Not Detected    Parainfluenza 2 PCR Not Detected Not Detected    Parainfluenza 3 PCR Not Detected Not Detected    Parainfluenza 4 PCR Not Detected Not Detected    Resp Syncytial Virus PCR Not Detected Not Detected    Bordetella Parapertussis Not Detected Not Detected    B Pertussis by PCR Not Detected Not Detected    Chlamydia pneumoniae By PCR Not Detected Not Detected    Mycoplasma pneumo by PCR Not Detected Not Detected   LEGIONELLA ANTIGEN, URINE    Specimen: Urine, clean catch   Result Value Ref Range    Legionella Pneumophilia Ag, Urine NEGATIVE    CBC   Result Value Ref Range    WBC 1.3 (LL) 3.5 - 11.3 k/uL    RBC 2.53 (L) 3.95 - 5.11 m/uL    Hemoglobin 7.4 (L) 11.9 - 15.1 g/dL    Hematocrit 23.6 (L) 36.3 - 47.1 %    MCV 93.3 82.6 - 102.9 fL    MCH 29.2 25.2 - 33.5 pg    MCHC 31.4 28.4 - 34.8 g/dL    RDW 18.5 (H) 11.8 - 14.4 %    Platelets 887 056 - 360 k/uL    MPV 10.6 8.1 - 13.5 fL    NRBC Automated 0.0 0.0 per 100 WBC   Protime-INR   Result Value Ref Range    Protime 9.3 9.0 - 12.0 sec    INR 0.9    Comprehensive Metabolic Panel w/ Reflex to MG   Result Value Ref Range    Glucose 112 (H) 70 - 99 mg/dL    BUN 24 (H) 8 - 23 mg/dL    CREATININE 0.89 0.50 - 0.90 mg/dL    Bun/Cre Ratio NOT REPORTED 9 - 20    Calcium 8.0 (L) 8.6 - 10.4 mg/dL    Sodium 133 (L) 135 - 144 mmol/L    Potassium 4.1 3.7 - 5.3 mmol/L    Chloride 100 98 - 107 mmol/L    CO2 21 20 - 31 mmol/L    Anion Gap 12 9 - 17 mmol/L    Alkaline Phosphatase 76 35 - 104 U/L    ALT 49 (H) 5 - 33 U/L    AST 41 (H) <32 U/L    Total Bilirubin 0.64 0.3 - 1.2 mg/dL    Total Protein 5.9 (L) 6.4 - 8.3 g/dL    Alb 3.0 (L) 3.5 - 5.2 g/dL    Albumin/Globulin Ratio 1.0 1.0 - 2.5    GFR Non-African American >60 >60 mL/min    GFR African American >60 >60 mL/min    GFR Comment          GFR Staging NOT REPORTED    C-Reactive Protein   Result Value Ref Range    .8 (H) 0.0 - 5.0 mg/L   Ferritin   Result Value Ref Range    Ferritin 3,046 (H) 13 - 150 ug/L   Fibrinogen   Result Value Ref Range    Fibrinogen 721 (H) 140 - 420 mg/dL   Lactate Dehydrogenase   Result Value Ref Range     (H) 135 - 214 U/L   CBC   Result Value Ref Range    WBC 1.2 (LL) 3.5 - 11.3 k/uL    RBC 2.37 (L) 3.95 - 5.11 m/uL    Hemoglobin 7.0 (LL) 11.9 - 15.1 g/dL Hematocrit 22.4 (L) 36.3 - 47.1 %    MCV 94.5 82.6 - 102.9 fL    MCH 29.5 25.2 - 33.5 pg    MCHC 31.3 28.4 - 34.8 g/dL    RDW 19.0 (H) 11.8 - 14.4 %    Platelets 971 597 - 509 k/uL    MPV 10.7 8.1 - 13.5 fL    NRBC Automated 0.0 0.0 per 100 WBC   Differential   Result Value Ref Range    Differential Type NOT REPORTED     WBC Morphology NOT REPORTED     RBC Morphology NOT REPORTED     Platelet Estimate NOT REPORTED     Immature Granulocytes 0 0 %    Seg Neutrophils 33 (L) 36 - 66 %    Lymphocytes 48 (H) 24 - 44 %    Atypical Lymphocytes 1 %    Monocytes 17 (H) 1 - 7 %    Eosinophils % 1 1 - 4 %    Basophils 0 0 - 2 %    Absolute Immature Granulocyte 0.00 0.00 - 0.30 k/uL    Segs Absolute 0.43 (LL) 1.8 - 7.7 k/uL    Absolute Lymph # 0.63 (L) 1.0 - 4.8 k/uL    Atypical Lymphocytes Absolute 0.01 k/uL    Absolute Mono # 0.22 0.1 - 0.8 k/uL    Absolute Eos # 0.01 0.0 - 0.4 k/uL    Basophils Absolute 0.00 0.0 - 0.2 k/uL    Morphology ANISOCYTOSIS PRESENT    D-Dimer, Quantitative   Result Value Ref Range    D-Dimer, Quant 2.44 mg/L FEU   Ferritin   Result Value Ref Range    Ferritin 3,024 (H) 13 - 150 ug/L   Fibrinogen   Result Value Ref Range    Fibrinogen 720 (H) 140 - 420 mg/dL   Protime-INR   Result Value Ref Range    Protime 9.5 9.0 - 12.0 sec    INR 0.9    Lactate Dehydrogenase   Result Value Ref Range     (H) 135 - 214 U/L   Hepatic Function Panel   Result Value Ref Range    Alb 2.9 (L) 3.5 - 5.2 g/dL    Alkaline Phosphatase 74 35 - 104 U/L    ALT 45 (H) 5 - 33 U/L    AST 38 (H) <32 U/L    Total Bilirubin 0.53 0.3 - 1.2 mg/dL    Bilirubin, Direct 0.22 <0.31 mg/dL    Bilirubin, Indirect 0.31 0.00 - 1.00 mg/dL    Total Protein 5.8 (L) 6.4 - 8.3 g/dL    Globulin NOT REPORTED 1.5 - 3.8 g/dL    Albumin/Globulin Ratio 1.0 1.0 - 2.5   Magnesium   Result Value Ref Range    Magnesium 2.3 1.6 - 2.6 mg/dL   Renal Function Panel   Result Value Ref Range    Glucose 109 (H) 70 - 99 mg/dL    BUN 23 8 - 23 mg/dL    CREATININE 0. 81 0.50 - 0.90 mg/dL    Bun/Cre Ratio NOT REPORTED 9 - 20    Calcium 8.0 (L) 8.6 - 10.4 mg/dL    Alb 2.9 (L) 3.5 - 5.2 g/dL    Phosphorus 3.5 2.6 - 4.5 mg/dL    Sodium 133 (L) 135 - 144 mmol/L    Potassium 4.1 3.7 - 5.3 mmol/L    Chloride 100 98 - 107 mmol/L    CO2 21 20 - 31 mmol/L    Anion Gap 12 9 - 17 mmol/L    GFR Non-African American >60 >60 mL/min    GFR African American >60 >60 mL/min    GFR Comment          GFR Staging NOT REPORTED    Procalcitonin   Result Value Ref Range    Procalcitonin 1.00 (H) <0.09 ng/mL   Brain Natriuretic Peptide   Result Value Ref Range    Pro- (H) <300 pg/mL    BNP Interpretation Pro-BNP Reference Range:    COVID-19    Specimen: Nasopharyngeal Swab   Result Value Ref Range    SARS-CoV-2 Positive (A)    MYCOPLASMA PNEUMONIAE ANTIBODY, IGM   Result Value Ref Range    Mycoplasma pneumo IgM 0.17 <0.91   URINALYSIS   Result Value Ref Range    Color, UA DARK YELLOW (A) YELLOW    Turbidity UA CLEAR CLEAR    Glucose, Ur NEGATIVE NEGATIVE    Bilirubin Urine NEGATIVE  Verified by ictotest. (A) NEGATIVE    Ketones, Urine SMALL (A) NEGATIVE    Specific Gravity, UA 1.023 1.005 - 1.030    Urine Hgb NEGATIVE NEGATIVE    pH, UA 5.5 5.0 - 8.0    Protein, UA 1+ (A) NEGATIVE    Urobilinogen, Urine Normal Normal    Nitrite, Urine NEGATIVE NEGATIVE    Leukocyte Esterase, Urine NEGATIVE NEGATIVE    Urinalysis Comments NOT REPORTED    OCCULT BLOOD SCREEN   Result Value Ref Range    Occult Blood, Stool #1 NEGATIVE NEGATIVE    Date, Stool #1 . FECES     Time, Stool #1 . FECES     Occult Blood, Stool #2 NOT REPORTED NEGATIVE    Date, Stool #2 NOT REPORTED     Time, Stool #2 NOT REPORTED     Occult Blood, Stool #3 NOT REPORTED NEGATIVE    Date, Stool #3 NOT REPORTED     Time, Stool #3 NOT REPORTED    Microscopic Urinalysis   Result Value Ref Range    -          WBC, UA 0 TO 2 0 - 5 /HPF    RBC, UA 0 TO 2 0 - 4 /HPF    Casts UA  0 - 8 /LPF     5 TO 10 HYALINE Reference range defined for non-centrifuged specimen. Crystals, UA NOT REPORTED None /HPF    Epithelial Cells UA 0 TO 2 0 - 5 /HPF    Renal Epithelial, UA NOT REPORTED 0 /HPF    Bacteria, UA NOT REPORTED None    Mucus, UA NOT REPORTED None    Trichomonas, UA NOT REPORTED None    Amorphous, UA NOT REPORTED None    Other Observations UA NOT REPORTED NOT REQ.     Yeast, UA NOT REPORTED None   Vitamin B12 & Folate   Result Value Ref Range    Vitamin B-12 >2000 (H) 232 - 1245 pg/mL    Folate 11.5 >4.8 ng/mL   Iron and TIBC   Result Value Ref Range    Iron 92 37 - 145 ug/dL    TIBC 136 (L) 250 - 450 ug/dL    Iron Saturation 68 (H) 20 - 55 %    UIBC 44 (L) 112 - 347 ug/dL   CBC   Result Value Ref Range    WBC 2.0 (L) 3.5 - 11.3 k/uL    RBC 2.62 (L) 3.95 - 5.11 m/uL    Hemoglobin 7.7 (L) 11.9 - 15.1 g/dL    Hematocrit 25.6 (L) 36.3 - 47.1 %    MCV 97.7 82.6 - 102.9 fL    MCH 29.4 25.2 - 33.5 pg    MCHC 30.1 28.4 - 34.8 g/dL    RDW 19.6 (H) 11.8 - 14.4 %    Platelets 040 240 - 695 k/uL    MPV 11.1 8.1 - 13.5 fL    NRBC Automated 0.0 0.0 per 100 WBC   Renal Function Panel   Result Value Ref Range    Glucose 93 70 - 99 mg/dL    BUN 23 8 - 23 mg/dL    CREATININE 0.81 0.50 - 0.90 mg/dL    Bun/Cre Ratio NOT REPORTED 9 - 20    Calcium 8.4 (L) 8.6 - 10.4 mg/dL    Alb 2.8 (L) 3.5 - 5.2 g/dL    Phosphorus 3.3 2.6 - 4.5 mg/dL    Sodium 138 135 - 144 mmol/L    Potassium 3.6 (L) 3.7 - 5.3 mmol/L    Chloride 100 98 - 107 mmol/L    CO2 22 20 - 31 mmol/L    Anion Gap 16 9 - 17 mmol/L    GFR Non-African American >60 >60 mL/min    GFR African American >60 >60 mL/min    GFR Comment          GFR Staging NOT REPORTED    Magnesium   Result Value Ref Range    Magnesium 2.5 1.6 - 2.6 mg/dL   FIBRINOGEN   Result Value Ref Range    Fibrinogen 1002 (H) 140 - 420 mg/dL   C-Reactive Protein   Result Value Ref Range    .4 (H) 0.0 - 5.0 mg/L   Ferritin   Result Value Ref Range    Ferritin 3,453 (H) 13 - 150 ug/L   Lactate Dehydrogenase   Result Value Ref Range     (H) 135 - 214 U/L   Magnesium   Result Value Ref Range    Magnesium 2.4 1.6 - 2.6 mg/dL   Comprehensive Metabolic Panel   Result Value Ref Range    Glucose 94 70 - 99 mg/dL    BUN 19 8 - 23 mg/dL    CREATININE 0.53 0.50 - 0.90 mg/dL    Bun/Cre Ratio NOT REPORTED 9 - 20    Calcium 8.4 (L) 8.6 - 10.4 mg/dL    Sodium 138 135 - 144 mmol/L    Potassium 4.0 3.7 - 5.3 mmol/L    Chloride 102 98 - 107 mmol/L    CO2 21 20 - 31 mmol/L    Anion Gap 15 9 - 17 mmol/L    Alkaline Phosphatase 58 35 - 104 U/L    ALT 35 (H) 5 - 33 U/L    AST 33 (H) <32 U/L    Total Bilirubin 0.47 0.3 - 1.2 mg/dL    Total Protein 5.6 (L) 6.4 - 8.3 g/dL    Alb 2.6 (L) 3.5 - 5.2 g/dL    Albumin/Globulin Ratio 0.9 (L) 1.0 - 2.5    GFR Non-African American >60 >60 mL/min    GFR African American >60 >60 mL/min    GFR Comment          GFR Staging NOT REPORTED    CBC Auto Differential   Result Value Ref Range    WBC 3.3 (L) 3.5 - 11.3 k/uL    RBC 2.61 (L) 3.95 - 5.11 m/uL    Hemoglobin 7.8 (L) 11.9 - 15.1 g/dL    Hematocrit 23.8 (L) 36.3 - 47.1 %    MCV 91.2 82.6 - 102.9 fL    MCH 29.9 25.2 - 33.5 pg    MCHC 32.8 28.4 - 34.8 g/dL    RDW 19.2 (H) 11.8 - 14.4 %    Platelets 517 574 - 068 k/uL    MPV 11.4 8.1 - 13.5 fL    NRBC Automated 0.0 0.0 per 100 WBC    Differential Type NOT REPORTED     WBC Morphology NOT REPORTED     RBC Morphology NOT REPORTED     Platelet Estimate NOT REPORTED     Immature Granulocytes 0 0 %    Seg Neutrophils 56 36 - 66 %    Lymphocytes 36 24 - 44 %    Monocytes 8 (H) 1 - 7 %    Eosinophils % 0 (L) 1 - 4 %    Basophils 0 0 - 2 %    Absolute Immature Granulocyte 0.00 0.00 - 0.30 k/uL    Segs Absolute 1.85 1.8 - 7.7 k/uL    Absolute Lymph # 1.19 1.0 - 4.8 k/uL    Absolute Mono # 0.26 0.1 - 0.8 k/uL    Absolute Eos # 0.00 0.0 - 0.4 k/uL    Basophils Absolute 0.00 0.0 - 0.2 k/uL    Morphology ANISOCYTOSIS PRESENT    POC Glucose Fingerstick   Result Value Ref Range    POC Glucose 108 (H) 65 - 105 mg/dL   EKG 12 Lead   Result Value Ref Range    Ventricular Rate 96 BPM    Atrial Rate 96 BPM    P-R Interval 134 ms    QRS Duration 88 ms    Q-T Interval 336 ms    QTc Calculation (Bazett) 424 ms    P Axis 13 degrees    R Axis 47 degrees    T Axis 28 degrees   EKG 12 Lead   Result Value Ref Range    Ventricular Rate 87 BPM    Atrial Rate 87 BPM    P-R Interval 112 ms    QRS Duration 84 ms    Q-T Interval 340 ms    QTc Calculation (Bazett) 409 ms    P Axis 42 degrees    R Axis 71 degrees    T Axis 75 degrees   TYPE AND SCREEN   Result Value Ref Range    Expiration Date 11/20/2020,2359     Arm Band Number [id number]     ABO/Rh A POSITIVE     Antibody Screen NOT TESTED     Antibody ID Anti-Fosters Present     SILVIA IgG NEGATIVE     Unit Number [id number]     Product Code Leukocyte Reduced Irradiated Red Cell     Unit Divison 00     Dispense Status REL FROM [facility]     Transfusion Status OK TO TRANSFUSE     Crossmatch Result COMPATIBLE     Unit Number [id number]     Product Code Leukocyte Reduced Red Cell     Unit Divison 00     Dispense Status REL FROM [facility]     Transfusion Status OK TO TRANSFUSE     Crossmatch Result COMPATIBLE     Unit Number [id number]     Product Code Leukocyte Reduced Red Cell     Unit Divison 00     Dispense Status ALLOCATED     Transfusion Status OK TO TRANSFUSE     Crossmatch Result COMPATIBLE     Unit Number [id number]     Product Code Leukocyte Reduced Red Cell     Unit Divison 00     Dispense Status ALLOCATED     Transfusion Status OK TO TRANSFUSE     Crossmatch Result COMPATIBLE    BLOOD BANK SPECIMEN   Result Value Ref Range    Blood Bank Specimen NOT REPORTED    Prepare COVID-19 Convalescent Plasma, 1 Units   Result Value Ref Range    Unit Number [id number]     Product Code Fresh Plasma     Unit Divison 00     Dispense Status TRANSFUSED     Transfusion Status OK TO TRANSFUSE          IMAGING DATA:    Xr Chest (single View Frontal)    Result Date: 11/17/2020  EXAMINATION: NUCLEAR MEDICINE PERFUSION SCAN.  PORTABLE CHEST RADIOGRAPH 11/17/2020 TECHNIQUE: 5 millicuries of Tc 71C MAA was administered intravenously prior to planar imaging of the lungs in multiple projections. HISTORY: ORDERING SYSTEM PROVIDED HISTORY: elevated D-Dimer outside hospital Reason for Exam: Covid-19 positive pt. and elevated D-Dimer 2.44 FINDINGS: PERFUSION: Distribution of radiotracer is slightly heterogeneous. No segmental defects identified. CHEST RADIOGRAPH:  Bilateral mid to lower lung field left greater than right opacities with peripheral involvement most pronounced at the bases. No effusion or pneumothorax. Normal heart size. Postsurgical changes of median sternotomy. Right-sided Port-A-Cath is in place     *Low Probability for Pulmonary Embolus. *Bilateral mid to lower lung field left greater than right opacities with peripheral involvement most pronounced at the bases compatible with  COVID-19 pneumonia. The findings were sent to the Radiology Results [id number] at 2:46 pm on 11/17/2020to be communicated to a licensed caregiver. Nm Lung Scan Perfusion Only    Result Date: 11/17/2020  EXAMINATION: NUCLEAR MEDICINE PERFUSION SCAN. PORTABLE CHEST RADIOGRAPH 11/17/2020 TECHNIQUE: 5 millicuries of Tc 34R MAA was administered intravenously prior to planar imaging of the lungs in multiple projections. HISTORY: ORDERING SYSTEM PROVIDED HISTORY: elevated D-Dimer outside hospital Reason for Exam: Covid-19 positive pt. and elevated D-Dimer 2.44 FINDINGS: PERFUSION: Distribution of radiotracer is slightly heterogeneous. No segmental defects identified. CHEST RADIOGRAPH:  Bilateral mid to lower lung field left greater than right opacities with peripheral involvement most pronounced at the bases. No effusion or pneumothorax. Normal heart size. Postsurgical changes of median sternotomy. Right-sided Port-A-Cath is in place     *Low Probability for Pulmonary Embolus.  *Bilateral mid to lower lung field left greater than right opacities with peripheral involvement most pronounced at the bases compatible with  COVID-19 pneumonia. The findings were sent to the Radiology Results [id number] at 2:46 pm on 11/17/2020to be communicated to a licensed caregiver. IMPRESSION:   Primary Problem  Pneumonia due to COVID-19 virus    Active Hospital Problems    Diagnosis Date Noted    MDS (myelodysplastic syndrome) ([facility] [D46.9] 11/17/2020    Pneumonia due to COVID-19 virus [U07.1, J12.89] 11/17/2020    Neutropenic sepsis ([address] [A41.9, D70.9] 11/17/2020    Bicytopenia [D75.89] 11/17/2020    ROSALINO (acute kidney injury) ([address] [address] [N17.9] 11/17/2020    Acute respiratory failure with hypoxia ([facility] [address] [J96.01] 11/17/2020    Essential hypertension [I10] 11/17/2020    Coronary artery disease involving coronary bypass graft of native heart without angina pectoris [I25.810] 11/17/2020    History of Raynaud's syndrome [Z86.79] 11/17/2020    Transaminitis [R74.01] 11/17/2020    Severe sepsis ([address] [A41.9, R65.20] 11/17/2020    Immunosuppressed status ([address] [D84.9]     Acute respiratory failure due to COVID-19 ([facility] [address] [U07.1, J96.00] 11/16/2020       MDS  Anemia, transfusion dependent  COVID-19 infection    RECOMMENDATIONS:  1. I personally reviewed results of lab work-up imaging studies and other relevant clinical data. 2. Reviewed records from outside facility  3. 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 She has been started on Vidaza. She has received 1 cycle so far. According the patient she was scheduled for next cycle soon. 4. Transfuse to keep hemoglobin above 7  5. Okay to give anticoagulation as long as platelet count above 50,000 patient not bleeding  6. Patient received dose #3 of Granix today. 7. ANC count improving with GS-CSF. We will continue to monitor  8. Continue symptomatic and supportive care. We will continue to follow. 9. Patient also has transfusion dependent anemia  10. We will continue to follow.         Discussed with patient and

## 2020-11-19 NOTE — PROGRESS NOTES
[facility] -   Infectious diseases evaluation  admission date 11/16/2020    reason for consultation:   covid    Impression :   Current:  · covid pneumonia - severe w resp distress  · MDS  · neutropenia  ·   Discussion / summary of stay / plan of care   ·   Recommendations     consented Immune plasma - ordered 11/17,  1 U - tolerated 11/17  Post Meropenem and vanco - stopped 11/18 due to cx neg  · mycoplasma IGM  · Keep remdesevir till 11/20/20  · LFT daily, so far doing well  · Follow ferritin and infl markers  · neupogen for neutropenia  · Asking for a research medicine to help out w her covid    Infection Control Recommendations   · [place] Precautions  · Contact Isolation   · Airborne isolation  · Droplet Isolation      Antimicrobial Stewardship Recommendations   · Simplification of therapy  · Targeted therapy  · Per Kg dosing    Coordination ofOutpatient Care:   · Estimated Length of IV antimicrobials:  · Patient will need Midline / picc Catheter Insertion:   · Patient will need SNF:  · Patient will need outpatient wound care:     History of Present Illness:   Initial history:  Karen Krishna is a 70y.o.-year-old female w resp distress transferred from [facility] pneumonia and underlying MDS.   Neutropenia  On bipap and 50% FIO2, elevated D-Dimers and went for a VQ scan  Past cig smoking - HTN - Raynaud-    Started on decadron remdesevir  started on meropenem and vanco pend BC due to concern for ongoing sepsis      Interval changes  11/19/2020   Patient Vitals for the past 8 hrs:   BP Temp Temp src Pulse Resp SpO2   11/19/20 1339 -- -- -- 82 (!) 39 (!) 88 %   11/19/20 1302 -- 96.3 °F (35.7 °C) Oral 83 23 91 %   11/19/20 0924 (!) 119/52 97.9 °F (36.6 °C) Oral 79 (!) 33 92 %   11/19/20 0813 -- -- -- 82 28 91 %       inflamm markers worse as below and she feels worse - now on bypap top maintain the so2, cant tolerate walking   On neupogen bill maintain the WBC  Alert and tolerated the plasma well 11/17  Cough still dry and SOB    No pos cx  -      Summary of relevant labs:  Labs:  WBC  1.2  Creat 0.81  procalcitonin 1   - 227  Ferritin 3046 - 3024 -3453  DDimer 2.44  Fibrinogen 720 - 1002  proBNP 729   - 291 - 407      Micro:  BC 11/17/20 x 2  resp PCR neg but for COVID+ 11/17  Legionella AG neg  Imaging:  VQ scan low probability    CT chest 11/16 [facility] periph ground glass bilat, mediastinal large LN  CT brain 11/216 neg [facility]      I have personally reviewed the past medical history, past surgical history, medications, social history, and family history, and I haveupdated the database accordingly. Allergies:   Fish allergy; Hydrocodone-acetaminophen; Tizanidine; Atorvastatin; Metoclopramide; Moxifloxacin; Oxycodone; Pregabalin; Tramadol; Zolpidem; Cephalexin; Iodides; and Sulfa antibiotics     Review of Systems:     Review of Systems   Constitutional: Positive for activity change and fatigue. Eyes: Negative for visual disturbance. Respiratory: Positive for cough, shortness of breath and wheezing. Negative for choking. Cardiovascular: Negative for chest pain. Gastrointestinal: Negative for anal bleeding. Endocrine: Negative for polydipsia. Genitourinary: Negative for dysuria. Musculoskeletal: Negative for back pain. Skin: Negative for color change. Allergic/Immunologic: Positive for immunocompromised state. Neurological: Negative for numbness. HANDS SHAKE   Hematological: Negative for adenopathy. Psychiatric/Behavioral: Negative for agitation, behavioral problems and confusion. Physical Examination :       Physical Exam  Constitutional:       General: She is not in acute distress. Appearance: Normal appearance. She is ill-appearing. HENT:      Head: Normocephalic and atraumatic. Nose: Nose normal. No congestion. Eyes:      General: No scleral icterus.      Conjunctiva/sclera: remdesivir IVPB  100 mg Intravenous Q24H    sodium chloride  20 mL Intravenous Once    sodium chloride  20 mL Intravenous Once       Social History:     Social History     Socioeconomic History    Marital status:      Spouse name: Not on file    Number of children: Not on file    Years of education: Not on file    Highest education level: Not on file   Occupational History    Not on file   Social Needs    Financial resource strain: Not on file    Food insecurity     Worry: Not on file     Inability: Not on file    Transportation needs     Medical: Not on file     Non-medical: Not on file   Tobacco Use    Smoking status: Former Smoker     Types: Cigarettes     Start date: 1/1/1957     Last attempt to quit: 8/22/2005     Years since quitting: 15.2   Substance and Sexual Activity    Alcohol use: Never     Frequency: Never    Drug use: Never    Sexual activity: Not on file   Lifestyle    Physical activity     Days per week: Not on file     Minutes per session: Not on file    Stress: Not on file   Relationships    Social connections     Talks on phone: Not on file     Gets together: Not on file     Attends Anglican service: Not on file     Active member of club or organization: Not on file     Attends meetings of clubs or organizations: Not on file     Relationship status: Not on file    Intimate partner violence     Fear of current or ex partner: Not on file     Emotionally abused: Not on file     Physically abused: Not on file     Forced sexual activity: Not on file   Other Topics Concern    Not on file   Social History Narrative    Not on file       Family History:     Family History   Problem Relation Age of Onset    Heart Failure Mother     Early Death Mother     Heart Disease Mother     Stroke Father       Medical Decision Making:   I have independently reviewed/ordered the following labs:    CBC with Differential:   Recent Labs     11/17/20  0036  11/18/20  0553 11/19/20  0544   WBC 1.3*   < > 2.0* 3.3*   HGB 7.4*   < > 7.7* 7.8*   HCT 23.6*   < > 25.6* 23.8*      < > 292 222   LYMPHOPCT 48*  --   --  36   MONOPCT 17*  --   --  8*    < > = values in this interval not displayed. BMP:  Recent Labs     11/18/20  0553 11/19/20  0544    138   K 3.6* 4.0    102   CO2 22 21   BUN 23 19   CREATININE 0.81 0.53   MG 2.5 2.4     Hepatic Function Panel:   Recent Labs     11/17/20  0255 11/18/20  0553 11/19/20  0544   PROT 5.8*  --  5.6*   LABALBU 2.9*  2.9* 2.8* 2.6*   BILIDIR 0.22  --   --    IBILI 0.31  --   --    BILITOT 0.53  --  0.47   ALKPHOS 74  --  58   ALT 45*  --  35*   AST 38*  --  33*     No results for input(s): RPR in the last 72 hours. No results for input(s): HIV in the last 72 hours. No results for input(s): BC in the last 72 hours. Lab Results   Component Value Date    CREATININE 0.53 11/19/2020    GLUCOSE 94 11/19/2020       Detailed results: Thank you for allowing us to participate in the care of this patient. Please call with questions. This note is created with the assistance of a speech recognition program.  While intending to generate adocument that actually reflects the content of the visit, the document can still have some errors including those of syntax and sound a like substitutions which may escape proof reading. It such instances, actual meaningcan be extrapolated by contextual diversion.     Vini Vargas MD  Office: [phone]  Perfect serve / office [phone]

## 2020-11-19 NOTE — PROGRESS NOTES
PULMONARY & CRITICAL CARE MEDICINE PROGRESS NOTE     Patient:  Haley Mejia  MRN: [id number]  [facility] date: 11/16/2020  Primary Care Physician: Waldemar Phan MD  Consulting Physician: Jass Hunt MD  CODE Status: Full Code  LOS: 2     SUBJECTIVE     CHIEF COMPLAINT/REASON FOR INITIAL CONSULT:    BRIEF HOSPITAL COURSE:   The patient is a 70 y.o. female with history of myelodysplastic syndrome on chemotherapy apparently last 3 to 4 weeks ago, coronary artery disease status post CABG, hypertension, hyperlipidemia. Presented to [facility] with increasing fatigue and tiredness which according to patient she usually feels when her blood count goes low and she gets transfusion, 4 days history of cough associated with shortness of breath, cough is dry without sputum production. She did not have fever or chills. She denies headache body ache muscle ache myalgia sore throat. Denies nausea vomiting diarrhea or abdominal pain denies urinary complaint. In the emergency room she was found to be hypoxic, initially on nasal cannula and then she was switched to BiPAP after she was placed on BiPAP 50% she was saturating well remained on BiPAP and was transferred here to [place] ICU unit. She had a CT chest done in [facility] which shows bilateral groundglass opacities nonspecific mediastinal neuropathy. She also had a hemoglobin of 6 and she received 1 unit packed RBC there  Since she was admitted overnight she remained on BiPAP 50% FiO2 had saturated well and she was transitioned to high flow nasal cannula this morning. Her initial labs show sodium 133 BUN 23 creatinine 0.81 bicarbonate 21. WBC count was 1.2 hemoglobin 7 hematocrit 22.4 and platelet 090. Procalcitonin was 1.0. . . proBNP 729. Ferritin 3024. Fibrinogen 720. D-dimer 2.44 and INR 0.9. She was also started on Lovenox for elevated D-dimer and a VQ scan was ordered by primary service.   She was started on remdesivir °C) (Oral)   Resp 29   Ht 5' 7\" (1.702 m)   Wt 182 lb 8.7 oz (82.8 kg)   SpO2 93%   BMI 28.59 kg/m²   8-24 HR RANGE:  TEMP Temp  Av.6 °F (37 °C)  Min: 98 °F (36.7 °C)  Max: 98.8 °F (69.2 °C)   BP Systolic (75QNU), XNA:535 , Min:96 , MLY:746      Diastolic (38RSA), LWH:57, Min:44, Max:77     PULSE Pulse  Av.4  Min: 73  Max: 99   RR Resp  Av.9  Min: 29  Max: 41   O2 SAT SpO2  Av.3 %  Min: 93 %  Max: 97 %   OXYGEN DELIVERY O2 Flow Rate (L/min)  Av L/min  Min: 6 L/min  Max: 6 L/min        SYSTEMIC EXAMINATION:   General appearance - Ill-appearing, mild respiratory distress  Mental status - awake & alert, follows commands  Eyes - pupils equal and reactive, sclera anicteric  Mouth - mucous membranes moist, pharynx normal without lesions  Neck - supple, no significant adenopathy, carotids upstroke normal bilaterally, no bruits  Chest - Breath sounds bilaterally were dimnished to auscultation at bases. There were no wheezes, rhonchi or rales. There is no intercostal recession or use of accessory muscles  Heart - normal rate, regular rhythm, normal S1, S2, no murmurs, rubs, clicks or gallops  Abdomen - soft, nontender, nondistended, no masses or organomegaly  Neurological - non-focal cranial nerves intact, no obvious neuromuscular deficit. Tremors present.   Extremities - peripheral pulses normal, no pedal edema, no clubbing or cyanosis  Skin - normal coloration and turgor, no rashes, no suspicious skin lesions noted     DATA REVIEW     Medications: Current Inpatient  Scheduled Meds:   tbo-filgrastim  480 mcg Subcutaneous Daily    latanoprost  1 drop Both Eyes Nightly    remdesivir IVPB  100 mg Intravenous Q24H    dexamethasone  4 mg Intravenous Daily    sodium chloride  20 mL Intravenous Once    sodium chloride  20 mL Intravenous Once    enoxaparin  30 mg Subcutaneous BID     Continuous Infusions:    INPUT/OUTPUT:  In: 550 [P.O.:450]  Out: 4460 [Urine:1075]  Date 11/18/20 0000 - 11/18/20 2359   Shift 8370-2855 [id number] 2162-2323 24 Hour Total   INTAKE   P.O.(mL/kg/hr)  450(0.7)  450   Shift Total(mL/kg)  450(5.4)  450(5.4)   OUTPUT   Urine(mL/kg/hr)  825(1.2)  825   Shift Total(mL/kg)  825(10)  825(10)   Weight (kg) 82.8 82.8 82.8 82.8        LABS:  ABGs:   No results for input(s): POCPH, POCPCO2, POCPO2, POCHCO3, [id number] in the last 72 hours. CBC:   Recent Labs     11/17/20 0036 11/17/20 0256 11/18/20  0553   WBC 1.3* 1.2* 2.0*   HGB 7.4* 7.0* 7.7*   HCT 23.6* 22.4* 25.6*   MCV 93.3 94.5 97.7    294 292   LYMPHOPCT 48*  --   --    RBC 2.53* 2.37* 2.62*   MCH 29.2 29.5 29.4   MCHC 31.4 31.3 30.1   RDW 18.5* 19.0* 19.6*     CRP:   Recent Labs     11/17/20 0036 11/18/20  0553   .8* 227.4*     LDH:   Recent Labs     11/17/20 0036 11/17/20 0255 11/18/20  0553   * 291* 407*     BMP:   Recent Labs     11/17/20 0036 11/17/20 0255 11/18/20  0553   * 133* 138   K 4.1 4.1 3.6*    100 100   CO2 21 21 22   BUN 24* 23 23   CREATININE 0.89 0.81 0.81   GLUCOSE 112* 109* 93   PHOS  --  3.5 3.3     Liver Function Test:   Recent Labs     11/17/20 0036 11/17/20 0255 11/18/20  0553   PROT 5.9* 5.8*  --    LABALBU 3.0* 2.9*  2.9* 2.8*   ALT 49* 45*  --    AST 41* 38*  --    ALKPHOS 76 74  --    BILITOT 0.64 0.53  --      Coagulation Profile:   Recent Labs     11/17/20 0036 11/17/20 0255   INR 0.9 0.9   PROTIME 9.3 9.5     D-Dimer:  Recent Labs     11/17/20 0255   DDIMER 2.44     Ferritin:    Recent Labs     11/17/20 0036 11/17/20 0255 11/18/20  0553   FERRITIN 3,046* 3,024* 3,453*     Lactic Acid:  No results for input(s): LACTA in the last 72 hours. Cardiac Enzymes:  No results for input(s): CKTOTAL, CKMB, CKMBINDEX, TROPONINI in the last 72 hours. Invalid input(s): TROPONIN, HSTROP  BNP/ProBNP:   Recent Labs     11/17/20  0700   PROBNP 729*     Triglycerides:  No results for input(s): TRIG in the last 72 hours.      Microbiology:  Recent Labs radiographic and laboratory data at the time of service. She is off BiPAP since yesterday morning  Switch to high flow nasal cannula and then on nasal cannula. Will use high flow nasal cannula if hypoxia desaturation. Would prefer to use BiPAP at night and as needed during the daytime if tolerated  She had transfusion for hemoglobin of 7. Recommend to monitor hemoglobin hematocrit daily and transfusion if hemoglobin 7 or less or if symptoms or hypotension. Follow-up with hematology oncology  Remdesivir started on 11/17/2020. Decadron  started on 11/17/2020. Received convalescent plasma 11/17/2020  Infectious disease follow-up  On meropenem and vancomycin. Antibiotic per infectious disease  Follow-up blood cultures so far negative. Continue supplemental oxygen to keep oxygen saturation >90%  Continue Lovenox 0.5 mg/kg twice daily  Encourage prone position when sleeping, incentive spirometry  Continue pulmonary toilet, aspiration precautions and bronchodilators  Monitor I/O, electrolytes with a goal of even/negative fluid balance  Stress ulcer prophylaxis  Continue to monitor CBC, coagulation profile  Chemical DVT prophylaxis as per protocol on Lovenox  Antimicrobials reviewed; on meropenem and vancomycin  Monitor CRP, LDH, AST/ALT, D-Dimer, Ferritin  Glycemic control per primary service  Physical/occupational therapy     Discussed with nursing staff, treatment plan discussed with  Discussed with respiratory therapist       The patient is/remains critically ill with illness/injury that acutely impairs one or more vital organ systems, such that there is a high probability of imminent or life threatening deterioration in the patient's condition. Critical care time of 35 minutes was spent (excluding procedures), in coordination of care during bedside rounds and discussion of patient care in detail, and recommendations of the team were adopted in the plan. Necessity of all invasive devices was also confirmed. Shala Castro M.D. Pulmonary and Critical Care Medicine           11/18/2020, 10:25 PM    This patient was evaluated in the context of the global SARS-CoV-2 (COVID-19) pandemic, which necessitated considerations that the patient either has COVID-19 infection or is at risk of infection with COVID-19. Institutional protocols and algorithms that pertain to the evaluation & management of patients with COVID-19 or those at risk for COVID-19 are in a state of rapid changes based on information released by regulatory bodies including the CDC and federal and state organizations. These policies and algorithms were followed during the patient's care. Please note that this chart was generated using voice recognition Dragon dictation software. Although every effort was made to ensure the accuracy of this automated transcription, some errors in transcription may have occurred.

## 2020-11-19 NOTE — CARE COORDINATION
TRANSITIONAL CARE PLANNING/ 2 [facility]: 3    Reason for Admission: [facility] [U07.1, J96.00]     Treatment Plan of Care: full code, 6l nc, remdesivir till 11/20, decadron         Readmission Risk              Risk of Unplanned Readmission:        16            Patient goals/Treatment Preferences/Transitional Plan: return home with /home care

## 2020-11-19 NOTE — PLAN OF CARE
RN  Outcome: Met This Shift  Goal: Absence of physical injury  Description: Absence of physical injury  11/19/2020 0439 by Lilly Mcnair RN  Outcome: Ongoing  11/18/2020 1808 by Matthew Chance RN  Outcome: Met This Shift     Problem: Pain:  Goal: Pain level will decrease  Description: Pain level will decrease  Outcome: Ongoing  Goal: Control of acute pain  Description: Control of acute pain  Outcome: Ongoing  Goal: Control of chronic pain  Description: Control of chronic pain  Outcome: Ongoing     Problem: Airway Clearance - Ineffective  Goal: Achieve or maintain patent airway  Outcome: Ongoing     Problem: Gas Exchange - Impaired  Goal: Absence of hypoxia  Outcome: Ongoing  Goal: Promote optimal lung function  Outcome: Ongoing     Problem: Breathing Pattern - Ineffective  Goal: Ability to achieve and maintain a regular respiratory rate  Outcome: Ongoing     Problem:  Body Temperature -  Risk of, Imbalanced  Goal: Ability to maintain a body temperature within defined limits  Outcome: Ongoing  Goal: Will regain or maintain usual level of consciousness  Outcome: Ongoing  Goal: Complications related to the disease process, condition or treatment will be avoided or minimized  Outcome: Ongoing     Problem: Isolation Precautions - Risk of Spread of Infection  Goal: Prevent transmission of infection  Outcome: Ongoing     Problem: Nutrition Deficits  Goal: Optimize nutrtional status  Outcome: Ongoing     Problem: Risk for Fluid Volume Deficit  Goal: Maintain normal heart rhythm  Outcome: Ongoing  Goal: Maintain absence of muscle cramping  Outcome: Ongoing  Goal: Maintain normal serum potassium, sodium, calcium, phosphorus, and pH  Outcome: Ongoing     Problem: Loneliness or Risk for Loneliness  Goal: Demonstrate positive use of time alone when socialization is not possible  Outcome: Ongoing     Problem: Fatigue  Goal: Verbalize increase energy and improved vitality  Outcome: Ongoing     Problem: Patient Education: Go to Patient Education Activity  Goal: Patient/Family Education  Outcome: Ongoing

## 2020-11-20 NOTE — PROGRESS NOTES
[facility]  Office: [address], DO, Jaylene Jaimeer, DO, Paz Manifold, DO, Morgan Alessandra Blood, DO, Mesha Love MD, Rachid Rivera MD, Vini David MD, Drsis Brothers MD, Nnamdi Wilson MD, Smith Foote MD, Clarissa Lira MD, Phoebe Souza MD, Ren Chawla MD, Bart Sandhu DO, Rachel Jackson MD, Guillermo Rey MD, Erin Sanchez DO, Rakesh Bermudez MD,  Blake Hurley DO, Norris Holter, MD, Yuni Shepard MD, Radha Sommers, CNP, [facility] Traceaudie, CNP, Barby Sneed, CNP, Trever Villafuerte, CNS, Nelson Rajput, CNP, Dar Foster, CNP, Raven Snell, CNP, Deena De Souza, CNP, Rhett Flores, CNP, Wayne Jiménez PA-C, Rocky Cedeno [facility], Siria Ratliff, CNP, Vivian Hernandez, CNP, Vanessa Rincon, CNP, Migel Soler, CNP, Ann Carver, CNP         [facility]   [address] [facility]    Progress Note    11/20/2020    4:37 PM    Name:   Andrea Willson  MRN:     [id number]     Acct:      [de-identified]   Room:   [address] [place] [address] Day:  4  Admit Date:  11/16/2020 10:51 PM    PCP:   Daphney Mena MD  Code Status:  Full Code    Subjective:     C/C: No chief complaint on file. SOB  Interval History Status: not changed. Seen and examined face-to-face today,  Patient deteriorated,  Needing high flow oxygen 100% FiO2      Infectious disease recommendations,  · \"Keep remdesevir till 11/20/20  · LFT daily  · Follow ferritin and infl markers  · consented Immune plasma - ordered 11/17 1 U  Meropenem and vanco dced due neg blood cxs,\"          Brief History:     Andrea Willson is a 70 y.o. female with a past medical history for MDS, essential hypertension, melanoma, mitral valve prolapse, MRSA, Raynaud's disease presents emergency department for shortness of breath and dyspnea upon exertion for 3 days. Patient was initially seen at [facility] diagnosed with Covid pneumonia.   Patient requiring 50% FiO2 on BiPAP, patient also had an elevated D-dimer but CTA was not possible secondary to contrast allergy. [facility] unable to accommodate due to lack of Covid beds, patient was transferred for further care. Spoke with pulmonary medicine at [facility] and was accepted for ICU transfer. Patient was given 1 unit of PRBCs at [facility] for a hemoglobin less than 7, patient was also placed on BiPAP for transfer. Patient arrived, no complaints except for some shortness of breath. Needing high flow oxygen, still critical    Review of Systems:     Constitutional: Anxious  Respiratory: Shortness of breath, cough  Cardiovascular:  negative for chest pain, chest pressure/discomfort, lower extremity edema, palpitations  Gastrointestinal:  negative for abdominal pain, constipation, diarrhea, nausea, vomiting  Neurological: Tremors    Medications: Allergies: Allergies   Allergen Reactions    Fish Allergy Anaphylaxis, Hives and Swelling    Hydrocodone-Acetaminophen Anaphylaxis    Tizanidine Anaphylaxis and Hives    Atorvastatin Hives, Other (See Comments) and Swelling    Metoclopramide Hives     Other reaction(s): Unknown, Unknown    Moxifloxacin Hives, Other (See Comments) and Swelling     Other reaction(s): Other (See Comments), Unknown    Oxycodone Other (See Comments)     Other reaction(s): Hallucinations, Mental Status Change    Pregabalin Other (See Comments)     Other reaction(s): Hallucinations, Other (See Comments)  Causes sores in the mouth  Causes sores in the mouth      Tramadol      Other reaction(s): Edema, Other (See Comments)    Zolpidem Other (See Comments)     Other reaction(s): Other (See Comments)  causes cramps in hands and legs  causes cramps in hands and legs  Other reaction(s):  Other (See Comments)  causes cramps in hands and legs  causes cramps in hands and legs      Cephalexin Rash     Other reaction(s): Unknown, Unknown    Iodides Rash and Swelling     ivp and heart cath dye    Sulfa Antibiotics Rash       Current WBC 2.0* 3.3* 9.0   RBC 2.62* 2.61* 2.60*   HGB 7.7* 7.8* 8.0*   HCT 25.6* 23.8* 26.0*   MCV 97.7 91.2 100.0   MCH 29.4 29.9 30.8   MCHC 30.1 32.8 30.8   RDW 19.6* 19.2* 19.6*    222 358   MPV 11.1 11.4 11.4   .4*  --   --      Chemistry:  Recent Labs     11/18/20  0553 11/19/20  0544    138   K 3.6* 4.0    102   CO2 22 21   GLUCOSE 93 94   BUN 23 19   CREATININE 0.81 0.53   MG 2.5 2.4   ANIONGAP 16 15   LABGLOM >60 >60   GFRAA >60 >60   CALCIUM 8.4* 8.4*   PHOS 3.3  --      Recent Labs     11/18/20  0553 11/19/20  0544   PROT  --  5.6*   LABALBU 2.8* 2.6*   AST  --  33*   ALT  --  35*   *  --    ALKPHOS  --  58   BILITOT  --  0.47     ABG:No results found for: POCPH, PHART, PH, POCPCO2, HLI7HMX, PCO2, POCPO2, PO2ART, PO2, POCHCO3, UVQ8ARL, HCO3, NBEA, PBEA, BEART, BE, THGBART, THB, LQC5RVW, TSZL0VQI, A6NXJDVB, O2SAT, FIO2  Lab Results   Component Value Date/Time    SPECIAL RT HAND 2ML 11/17/2020 02:48 AM     Lab Results   Component Value Date/Time    CULTURE NO GROWTH 3 DAYS 11/17/2020 02:48 AM       Radiology:  [facility] Chest (single View Frontal)    Result Date: 11/17/2020  *Low Probability for Pulmonary Embolus. *Bilateral mid to lower lung field left greater than right opacities with peripheral involvement most pronounced at the bases compatible with  COVID-19 pneumonia. The findings were sent to the Radiology Results [address] at 2:46 pm on 11/17/2020to be communicated to a licensed caregiver. Nm Lung Scan Perfusion Only    Result Date: 11/17/2020  *Low Probability for Pulmonary Embolus. *Bilateral mid to lower lung field left greater than right opacities with peripheral involvement most pronounced at the bases compatible with  COVID-19 pneumonia. The findings were sent to the Radiology Results [address] at 2:46 pm on 11/17/2020to be communicated to a licensed caregiver.        Physical Examination:        General appearance:  alert, cooperative  Mental Status:  oriented to person, place and time and normal affect  Lungs: Coarse breath sounds bilateral  Heart:  regular rate and rhythm, no murmur  Abdomen:  soft, nontender, nondistended, normal bowel sounds, no masses, hepatomegaly, splenomegaly  Extremities:  no edema, redness, tenderness in the calves  Skin:  no gross lesions, rashes, induration    Assessment:        Hospital Problems           Last Modified POA    * (Principal) Pneumonia due to COVID-19 virus 11/17/2020 Yes    Acute respiratory failure due to COVID-19 ([address] 11/16/2020 Yes    MDS (myelodysplastic syndrome) ([address] 11/17/2020 Yes    Neutropenic sepsis ([address] 11/17/2020 Yes    Bicytopenia 11/17/2020 Yes    ROSALINO (acute kidney injury) ([address] 11/17/2020 Yes    Acute respiratory failure with hypoxia ([address] 11/17/2020 Yes    Essential hypertension 11/17/2020 Yes    Coronary artery disease involving coronary bypass graft of native heart without angina pectoris 11/17/2020 Yes    History of Raynaud's syndrome 11/17/2020 Yes    Transaminitis 11/17/2020 Yes    Immunosuppressed status ([address] 11/17/2020 Yes    Severe sepsis ([address] 11/17/2020 Yes          Plan:        1. Covid pneumonia, acute respiratory failure, started on remdesivir, convalescent plasma, Decadron, infectious disease and pulmonary critical care on board, respiratory status deteriorated, needing high flow oxygen via nasal cannula, FiO2 100%,  2. Myelodysplastic syndrome and pancytopenia, oncology consulted and discussed in detail, advised to keep the patient on DVT prophylaxis with Lovenox twice daily,  3. VQ scan low probability,  4. Neutropenic sepsis, antibiotics as per infectious disease, blood cultures negative, antibiotics stopped by infectious disease at this time  5. Acute kidney injury, creatinine improving,  6. Transaminitis, monitor liver functions,  7. Hypertension, monitor blood pressure,  8. DVT prophylaxis with Lovenox,  9. GI prophylaxis,  10. Full CODE STATUS,  11.  Total time spent 34 minutes    Yuni Roberson MD  11/20/2020  4:37 PM

## 2020-11-20 NOTE — PROGRESS NOTES
and on Decadron. INTERVAL HISTORY:  11/20/20   Overnight events seen chart reviewed. She is hemodynamically stable no hypotension was reported her systolic blood pressure remains between 100 to 110. She is afebrile with T-max of 98.4 in last 24 hours. Her respiratory rate remains low to mid 20s usually less than 30. She had not used BiPAP BiPAP was tried yesterday and she was not able to tolerate it and she was placed on nonrebreather with 15 L. She was getting hypoxic on 6 L nasal cannula. Urine output reported 1337 last 24 hours. CBC shows WBC increased to 9 hemoglobin 8.0 hematocrit 26 platelet count 180, ferritin is 3590 and fibrinogen improved to 673. According to patient her appetite is slightly better she does complain of shortness of breath she think that her shortness of breath is slightly better she does have cough denies sputum production. She does not complain of vomiting she does have some diarrhea          REVIEW OF SYSTEMS:  Review of Systems   Constitutional: Positive for activity change, appetite change and fatigue. Negative for fever. HENT: Negative for congestion, ear discharge, facial swelling, postnasal drip, rhinorrhea, sinus pain, trouble swallowing and voice change. Eyes: Negative for discharge, redness and visual disturbance. Respiratory: Positive for cough and shortness of breath. Negative for choking, chest tightness and wheezing. Cardiovascular: Negative for chest pain, palpitations and leg swelling. Gastrointestinal: Positive for nausea. Negative for abdominal distention, abdominal pain, constipation, diarrhea and vomiting. Endocrine: Negative for cold intolerance, heat intolerance and polyuria. Genitourinary: Negative for dysuria, flank pain, frequency, hematuria and urgency. Musculoskeletal: Positive for arthralgias. Negative for back pain, gait problem and joint swelling. Neurological: Negative for tremors, speech difficulty, numbness and headaches. Hematological: Negative for adenopathy. Does not bruise/bleed easily. Psychiatric/Behavioral: Positive for sleep disturbance. Negative for confusion. The patient is nervous/anxious. OBJECTIVE     PaO2/FiO2 RATIO:  No results for input(s): POCPO2 in the last 72 hours. FiO2 : (S) 60 %     VITAL SIGNS:   LAST:  BP (!) 105/56   Pulse 74   Temp 97.8 °F (36.6 °C) (Oral)   Resp 25   Ht 5' 7\" (1.702 m)   Wt 193 lb (87.5 kg)   SpO2 100%   BMI 30.23 kg/m²   8-24 HR RANGE:  TEMP Temp  Av.7 °F (36.5 °C)  Min: 96.3 °F (35.7 °C)  Max: 98.4 °F (24.9 °C)   BP Systolic (39AFV), OQF:306 , Min:104 , ZGJ:250      Diastolic (37GCI), ZIK:29, Min:51, Max:57     PULSE Pulse  Av.8  Min: 71  Max: 83   RR Resp  Av  Min: 25  Max: 25   O2 SAT SpO2  Av %  Min: 100 %  Max: 100 %   OXYGEN DELIVERY No data recorded        SYSTEMIC EXAMINATION:   Physical examination is deferred to preserve PPE and to remind exposure in COVID-19 pandemic. Patient was evaluated and seen from outside the room through the window  General appearance - Ill-appearing, mild respiratory distress remained tachypneic  Mental status - awake & alert, follows commands  Eyes -extraocular movement intact  Chest -mild distress and tachypnea there is no intercostal recession or use of accessory muscles  Heart -regular rhythm  Neurological - non-focal cranial nerves intact, no obvious neuromuscular deficit. Tremors present. DATA REVIEW     Medications: Current Inpatient  Scheduled Meds:   enoxaparin  40 mg Subcutaneous BID    dexamethasone  6 mg Intravenous Daily    latanoprost  1 drop Both Eyes Nightly    remdesivir IVPB  100 mg Intravenous Q24H    sodium chloride  20 mL Intravenous Once    sodium chloride  20 mL Intravenous Once     Continuous Infusions:    INPUT/OUTPUT:  In: 1012 [P. O.:600;  I.V.:412]  Out: 410 [Urine:410]  Date 20 0000 - 20 0802   Shift 9332-0429 5178-0570 8649-1452 24 Hour Total   INTAKE P.O.(mL/kg/hr)  300  300   I. V.(mL/kg) 412(4.7)   412(4.7)   Shift Total(mL/kg) 412(4.7) 300(3.4)  712(8.1)   OUTPUT   Shift Total(mL/kg)       Weight (kg) 87.5 87.5 87.5 87.5        LABS:  ABGs:   No results for input(s): POCPH, POCPCO2, POCPO2, POCHCO3, ESUN2RJC in the last 72 hours. CBC:   Recent Labs     11/18/20  0553 11/19/20  0544 11/20/20  0537   WBC 2.0* 3.3* 9.0   HGB 7.7* 7.8* 8.0*   HCT 25.6* 23.8* 26.0*   MCV 97.7 91.2 100.0    222 358   LYMPHOPCT  --  36 15*   RBC 2.62* 2.61* 2.60*   MCH 29.4 29.9 30.8   MCHC 30.1 32.8 30.8   RDW 19.6* 19.2* 19.6*     CRP:   Recent Labs     11/18/20  0553   .4*     LDH:   Recent Labs     11/18/20  0553   *     BMP:   Recent Labs     11/18/20 0553 11/19/20  0544    138   K 3.6* 4.0    102   CO2 22 21   BUN 23 19   CREATININE 0.81 0.53   GLUCOSE 93 94   PHOS 3.3  --      Liver Function Test:   Recent Labs     11/18/20  0553 11/19/20  0544   PROT  --  5.6*   LABALBU 2.8* 2.6*   ALT  --  35*   AST  --  33*   ALKPHOS  --  58   BILITOT  --  0.47     Coagulation Profile:   No results for input(s): INR, PROTIME, APTT in the last 72 hours. D-Dimer:  No results for input(s): DDIMER in the last 72 hours. Ferritin:    Recent Labs     11/18/20  0553   FERRITIN 3,453*     Lactic Acid:  No results for input(s): LACTA in the last 72 hours. Cardiac Enzymes:  No results for input(s): CKTOTAL, CKMB, CKMBINDEX, TROPONINI in the last 72 hours. Invalid input(s): TROPONIN, HSTROP  BNP/ProBNP:   No results for input(s): BNP, PROBNP in the last 72 hours. Triglycerides:  No results for input(s): TRIG in the last 72 hours. Microbiology:  Recent Labs     11/17/20  1506 11/17/20  1836   SPECDESC  --  . NASOPHARYNGEAL SWAB   MPNEUM 0.17  --         Pathology:    Radiology Reports:  XR CHEST PORTABLE   Final Result   No change in bilateral pulmonary opacities left greater than right with more   peripheral involvement compatible with pneumonitis. distress  Would prefer to use BiPAP at night and as needed during the daytime if tolerated  Recommend to monitor hemoglobin hematocrit daily and transfusion if hemoglobin 7 or less or if symptoms or hypotension. Repeat chest x-ray tomorrow  Follow-up with hematology oncology  Remdesivir started on 11/17/2020. Decadron  started on 11/17/2020. Received convalescent plasma 11/17/2020  Infectious disease follow-up  Received meropenem and vancomycin for 2 days then stopped by infectious disease  Follow-up blood cultures so far negative. Continue Lovenox 0.5 mg/kg twice daily  Encourage prone position when sleeping, incentive spirometry  Continue to monitor CBC, coagulation profile  Chemical DVT prophylaxis as per protocol on Lovenox  Antimicrobials reviewed; off antibiotic  Monitor CRP, LDH, AST/ALT, D-Dimer, Ferritin. Repeat tomorrow  Continue pulmonary toilet, aspiration precautions and bronchodilators  Monitor I/O, electrolytes with a goal of even/negative fluid balance  Stress ulcer prophylaxis  Glycemic control per primary service  Physical/occupational therapy     Discussed with nursing staff, treatment plan discussed with  Discussed with respiratory therapist       The patient is/remains critically ill with illness/injury that acutely impairs one or more vital organ systems, such that there is a high probability of imminent or life threatening deterioration in the patient's condition. Critical care time of 35 minutes was spent (excluding procedures), in coordination of care during bedside rounds and discussion of patient care in detail, and recommendations of the team were adopted in the plan. Necessity of all invasive devices was also confirmed. Figueroa Mccullough M.D.    Pulmonary and Critical Care Medicine           11/20/2020, 10:54 AM    This patient was evaluated in the context of the global SARS-CoV-2 (COVID-19) pandemic, which necessitated considerations that the patient either has COVID-19 infection or is at risk of infection with COVID-19. Institutional protocols and algorithms that pertain to the evaluation & management of patients with COVID-19 or those at risk for COVID-19 are in a state of rapid changes based on information released by regulatory bodies including the CDC and federal and state organizations. These policies and algorithms were followed during the patient's care. Please note that this chart was generated using voice recognition Dragon dictation software. Although every effort was made to ensure the accuracy of this automated transcription, some errors in transcription may have occurred.

## 2020-11-20 NOTE — PLAN OF CARE
Problem:  Activity:  Goal: Fatigue will decrease  Description: Fatigue will decrease  11/20/2020 0554 by Preston hTomas RN  Outcome: Ongoing  11/19/2020 1654 by Jose Duenas RN  Outcome: Ongoing     Problem: Cardiac:  Goal: Hemodynamic stability will improve  Description: Hemodynamic stability will improve  11/20/2020 0554 by Preston Thomas RN  Outcome: Ongoing  11/19/2020 1654 by Jose Duenas RN  Outcome: Ongoing     Problem: Coping:  Goal: Level of anxiety will decrease  Description: Level of anxiety will decrease  11/20/2020 0554 by Preston Thomas RN  Outcome: Ongoing  11/19/2020 1654 by Jose Duenas RN  Outcome: Ongoing  Goal: Ability to cope will improve  Description: Ability to cope will improve  11/20/2020 0554 by Preston Thomas RN  Outcome: Ongoing  11/19/2020 1654 by Jose Duenas RN  Outcome: Ongoing  Goal: Ability to establish a method of communication will improve  Description: Ability to establish a method of communication will improve  11/20/2020 0554 by Preston Thomas RN  Outcome: Ongoing  11/19/2020 1654 by Jose Duenas RN  Outcome: Ongoing     Problem: Nutritional:  Goal: Consumption of the prescribed amount of daily calories will improve  Description: Consumption of the prescribed amount of daily calories will improve  11/20/2020 0554 by Preston Thomas RN  Outcome: Ongoing  11/19/2020 1654 by Jose Duenas RN  Outcome: Ongoing     Problem: Respiratory:  Goal: Ability to maintain a clear airway will improve  Description: Ability to maintain a clear airway will improve  11/20/2020 0554 by Preston Thomas RN  Outcome: Ongoing  11/19/2020 1654 by Jose Duenas RN  Outcome: Ongoing  Goal: Ability to maintain adequate ventilation will improve  Description: Ability to maintain adequate ventilation will improve  11/20/2020 0554 by Preston Thomas RN  Outcome: Ongoing  11/19/2020 1654 by Jose Duenas RN  Outcome: Ongoing  Goal: Complications related to the disease process, condition or treatment will be avoided or minimized  Description: Complications related to the disease process, condition or treatment will be avoided or minimized  11/20/2020 0554 by Doris Basurto RN  Outcome: Ongoing  11/19/2020 1654 by Everlean Lesch, RN  Outcome: Ongoing     Problem: Skin Integrity:  Goal: Risk for impaired skin integrity will decrease  Description: Risk for impaired skin integrity will decrease  11/20/2020 0554 by Doris Basurto RN  Outcome: Ongoing  11/19/2020 1654 by Everlean Lesch, RN  Outcome: Ongoing  Goal: Will show no infection signs and symptoms  Description: Will show no infection signs and symptoms  11/20/2020 0554 by Doris Basurto RN  Outcome: Ongoing  11/19/2020 1654 by Everlean Lesch, RN  Outcome: Ongoing  Goal: Absence of new skin breakdown  Description: Absence of new skin breakdown  11/20/2020 0554 by Doris Basurto RN  Outcome: Ongoing  11/19/2020 1654 by Everlean Lesch, RN  Outcome: Ongoing     Problem: Falls - Risk of:  Goal: Will remain free from falls  Description: Will remain free from falls  11/20/2020 0554 by Doris Basurto RN  Outcome: Ongoing  11/19/2020 1654 by Everlean Lesch, RN  Outcome: Ongoing  Goal: Absence of physical injury  Description: Absence of physical injury  11/20/2020 0554 by Doris Basurto RN  Outcome: Ongoing  11/19/2020 1654 by Everlean Lesch, RN  Outcome: Ongoing     Problem: Pain:  Goal: Pain level will decrease  Description: Pain level will decrease  11/20/2020 0554 by Doris Basurto RN  Outcome: Ongoing  11/19/2020 1654 by Everlean Lesch, RN  Outcome: Ongoing  Goal: Control of acute pain  Description: Control of acute pain  11/20/2020 0554 by Doris Basurto RN  Outcome: Ongoing  11/19/2020 1654 by Everlean Lesch, RN  Outcome: Ongoing  Goal: Control of chronic pain  Description: Control of chronic pain  11/20/2020 0554 by Doris Basurto RN  Outcome: Ongoing  11/19/2020 1654 by Everlean Lesch, RN  Outcome: Ongoing     Problem: Airway Clearance - Ineffective  Goal: Achieve or maintain patent airway  11/20/2020 0554 by Mariusz Hernandez RN  Outcome: Ongoing  11/19/2020 1654 by Dany Hutson RN  Outcome: Ongoing     Problem: Gas Exchange - Impaired  Goal: Absence of hypoxia  11/20/2020 0554 by Mariusz Hernandez RN  Outcome: Ongoing  11/19/2020 1654 by Dany Hutson RN  Outcome: Ongoing  Goal: Promote optimal lung function  11/20/2020 0554 by Mariusz Hernandez RN  Outcome: Ongoing  11/19/2020 1654 by Dany Hutson RN  Outcome: Ongoing     Problem: Breathing Pattern - Ineffective  Goal: Ability to achieve and maintain a regular respiratory rate  11/20/2020 0554 by Mariusz Hernandez RN  Outcome: Ongoing  11/19/2020 1654 by Dany Hutson RN  Outcome: Ongoing     Problem:  Body Temperature -  Risk of, Imbalanced  Goal: Ability to maintain a body temperature within defined limits  11/20/2020 0554 by Mariusz Hernandez RN  Outcome: Ongoing  11/19/2020 1654 by Dany Hutson RN  Outcome: Ongoing  Goal: Will regain or maintain usual level of consciousness  11/20/2020 0554 by Mariusz Hernandez RN  Outcome: Ongoing  11/19/2020 1654 by Dany Hutson RN  Outcome: Ongoing  Goal: Complications related to the disease process, condition or treatment will be avoided or minimized  11/20/2020 0554 by Mariusz Hernandez RN  Outcome: Ongoing  11/19/2020 1654 by Dany Hutson RN  Outcome: Ongoing     Problem: Isolation Precautions - Risk of Spread of Infection  Goal: Prevent transmission of infection  11/20/2020 0554 by Mariusz Hernandez RN  Outcome: Ongoing  11/19/2020 1654 by Dany Hutson RN  Outcome: Ongoing     Problem: Nutrition Deficits  Goal: Optimize nutrtional status  11/20/2020 0554 by Mariusz Hernandez RN  Outcome: Ongoing  11/19/2020 1654 by Dany Hutson RN  Outcome: Ongoing     Problem: Risk for Fluid Volume Deficit  Goal: Maintain normal heart rhythm  11/20/2020 0554 by Mariusz Hernandez RN  Outcome: Ongoing  11/19/2020 1654 by Dany Hutson RN  Outcome: Ongoing  Goal: Maintain absence of muscle cramping  11/20/2020 2647 by Petty Dominguez RN  Outcome: Ongoing  11/19/2020 1654 by Alfa Roe RN  Outcome: Ongoing  Goal: Maintain normal serum potassium, sodium, calcium, phosphorus, and pH  11/20/2020 0554 by Petty Dominguez RN  Outcome: Ongoing  11/19/2020 1654 by Alfa Roe RN  Outcome: Ongoing     Problem: Loneliness or Risk for Loneliness  Goal: Demonstrate positive use of time alone when socialization is not possible  11/20/2020 0554 by Petty Dominguez RN  Outcome: Ongoing  11/19/2020 1654 by Alfa Roe RN  Outcome: Ongoing     Problem: Fatigue  Goal: Verbalize increase energy and improved vitality  11/20/2020 0554 by Petty Dominguez RN  Outcome: Ongoing  11/19/2020 1654 by Alfa Roe RN  Outcome: Ongoing     Problem: Patient Education: Go to Patient Education Activity  Goal: Patient/Family Education  11/20/2020 0554 by Petty Dominguez RN  Outcome: Ongoing  11/19/2020 1654 by Alfa Roe RN  Outcome: Ongoing

## 2020-11-20 NOTE — PROGRESS NOTES
[facility] -   Infectious diseases evaluation  admission date 11/16/2020    reason for consultation:   covid    Impression :   Current:  · covid pneumonia - severe w resp distress  · MDS  · neutropenia  ·   Discussion / summary of stay / plan of care   ·   Recommendations     consented Immune plasma - ordered 11/17,  1 U - tolerated 11/17  Post Meropenem and vanco - stopped 11/18 due to cx neg  · Keep remdesevir till 11/20/20  · LFT daily, so far doing well  · Follow ferritin and infl markers today   · neupogen for neutropenia  · research medicine to help out w her covid = did not fit criteria    Infection Control Recommendations   · [place] Precautions  · Contact Isolation   · Airborne isolation  · Droplet Isolation      Antimicrobial Stewardship Recommendations   · Simplification of therapy  · Targeted therapy  · Per Kg dosing    Coordination ofOutpatient Care:   · Estimated Length of IV antimicrobials:  · Patient will need Midline / picc Catheter Insertion:   · Patient will need SNF:  · Patient will need outpatient wound care:     History of Present Illness:   Initial history:  Naomi De Souza is a 70y.o.-year-old female w resp distress transferred from [facility] [employer] pneumonia and underlying MDS.   Neutropenia  On bipap and 50% FIO2, elevated D-Dimers and went for a VQ scan  Past cig smoking - HTN - Raynaud-    Started on decadron remdesevir  started on meropenem and vanco pend BC due to concern for ongoing sepsis      Interval changes  11/20/2020   Patient Vitals for the past 8 hrs:   BP Temp Temp src Pulse Resp SpO2 Weight   11/20/20 1130 -- -- -- -- 25 (!) 89 % --   11/20/20 1116 (!) 108/53 97.5 °F (36.4 °C) Oral 75 27 (!) 89 % --   11/20/20 0600 -- -- -- -- -- -- 193 lb (87.5 kg)       Less SOB and less aches  On 100 % Hoigh flow now  so2 100  No diarrhea  Appetite better  No new inflam markers today    On neupogen bill maintain the WBC  Alert Conjunctivae normal.      Pupils: Pupils are equal, round, and reactive to light. Neck:      Musculoskeletal: Neck supple. No neck rigidity. Cardiovascular:      Rate and Rhythm: Normal rate and regular rhythm. Heart sounds: Normal heart sounds. No murmur. No friction rub. Pulmonary:      Effort: No respiratory distress. Breath sounds: No stridor. No wheezing, rhonchi or rales. Abdominal:      General: There is no distension. Palpations: Abdomen is soft. Tenderness: There is no abdominal tenderness. Genitourinary:     Comments: Urine bhavin  Musculoskeletal:         General: No swelling or deformity. Skin:     General: Skin is dry. Coloration: Skin is not jaundiced. Neurological:      General: No focal deficit present. Mental Status: She is alert and oriented to person, place, and time. Mental status is at baseline. Cranial Nerves: No cranial nerve deficit. Psychiatric:         Mood and Affect: Mood normal.         Thought Content:  Thought content normal.         Past Medical History:     Past Medical History:   Diagnosis Date    [facility]     myelodysplastic syndrome    Deaf, left     Essential hypertension     GERD (gastroesophageal reflux disease)     Glaucoma     Hyperlipidemia     [facility]     MRSA (methicillin resistant staph aureus) culture positive     MVP (mitral valve prolapse)     Pharyngoesophageal dysphagia     Raynaud disease     Status post four vessel coronary artery bypass        Past Surgical  History:     Past Surgical History:   Procedure Laterality Date    APPENDECTOMY      CARDIAC SURGERY      CABG x3    CHOLECYSTECTOMY      HYSTERECTOMY, TOTAL ABDOMINAL      IR PORT PLACEMENT < 5 YEARS      SMALL INTESTINE SURGERY      TUBAL LIGATION         Medications:      enoxaparin  40 mg Subcutaneous BID    dexamethasone  6 mg Intravenous Daily    latanoprost  1 drop Both Eyes Nightly    remdesivir IVPB  100 mg Intravenous 222 358   LYMPHOPCT 36 15*   MONOPCT 8* 10*     BMP:  Recent Labs     11/18/20  0553 11/19/20  0544    138   K 3.6* 4.0    102   CO2 22 21   BUN 23 19   CREATININE 0.81 0.53   MG 2.5 2.4     Hepatic Function Panel:   Recent Labs     11/18/20  0553 11/19/20  0544   PROT  --  5.6*   LABALBU 2.8* 2.6*   BILITOT  --  0.47   ALKPHOS  --  58   ALT  --  35*   AST  --  33*     No results for input(s): RPR in the last 72 hours. No results for input(s): HIV in the last 72 hours. No results for input(s): BC in the last 72 hours. Lab Results   Component Value Date    CREATININE 0.53 11/19/2020    GLUCOSE 94 11/19/2020       Detailed results: Thank you for allowing us to participate in the care of this patient. Please call with questions. This note is created with the assistance of a speech recognition program.  While intending to generate adocument that actually reflects the content of the visit, the document can still have some errors including those of syntax and sound a like substitutions which may escape proof reading. It such instances, actual meaningcan be extrapolated by contextual diversion.     Sarah Rodríguez MD  Office: [phone]  Perfect serve / office [phone]

## 2020-11-20 NOTE — PROGRESS NOTES
PULMONARY & CRITICAL CARE MEDICINE PROGRESS NOTE     Patient:  Andra Azevedo  MRN: [id number]  6 [facility] date: 11/16/2020  Primary Care Physician: Jaylene Herrera MD  Consulting Physician: Lindsay Morales MD  CODE Status: Full Code  LOS: 3     SUBJECTIVE     CHIEF COMPLAINT/REASON FOR INITIAL CONSULT:    BRIEF HOSPITAL COURSE:   The patient is a 70 y.o. female with history of myelodysplastic syndrome on chemotherapy apparently last 3 to 4 weeks ago, coronary artery disease status post CABG, hypertension, hyperlipidemia. Presented to [facility] with increasing fatigue and tiredness which according to patient she usually feels when her blood count goes low and she gets transfusion, 4 days history of cough associated with shortness of breath, cough is dry without sputum production. She did not have fever or chills. She denies headache body ache muscle ache myalgia sore throat. Denies nausea vomiting diarrhea or abdominal pain denies urinary complaint. In the emergency room she was found to be hypoxic, initially on nasal cannula and then she was switched to BiPAP after she was placed on BiPAP 50% she was saturating well remained on BiPAP and was transferred here to [facility] ICU unit. She had a CT chest done in [facility] which shows bilateral groundglass opacities nonspecific mediastinal neuropathy. She also had a hemoglobin of 6 and she received 1 unit packed RBC there  Since she was admitted overnight she remained on BiPAP 50% FiO2 had saturated well and she was transitioned to high flow nasal cannula this morning. Her initial labs show sodium 133 BUN 23 creatinine 0.81 bicarbonate 21. WBC count was 1.2 hemoglobin 7 hematocrit 22.4 and platelet 658. Procalcitonin was 1.0. . . proBNP 729. Ferritin 3024. Fibrinogen 720. D-dimer 2.44 and INR 0.9. She was also started on Lovenox for elevated D-dimer and a VQ scan was ordered by primary service.   She was started on remdesivir and on Decadron. INTERVAL HISTORY:  20   Currently up in chair in no distress on nasal cannula 6 L. Hemodynamics are stable. Not febrile. Cytopenia persists. Ferritin and fibrinogen remain markedly elevated. Currently on high intensity enoxaparin prophylaxis. REVIEW OF SYSTEMS:  Review of Systems   Constitutional: Positive for activity change, appetite change and fatigue. Negative for fever. HENT: Negative for congestion, ear discharge, facial swelling, postnasal drip, rhinorrhea, sinus pain, trouble swallowing and voice change. Eyes: Negative for discharge, redness and visual disturbance. Respiratory: Positive for cough and shortness of breath. Negative for choking, chest tightness and wheezing. Cardiovascular: Negative for chest pain, palpitations and leg swelling. Gastrointestinal: Positive for nausea. Negative for abdominal distention, abdominal pain, constipation, diarrhea and vomiting. Endocrine: Negative for cold intolerance, heat intolerance and polyuria. Genitourinary: Negative for dysuria, flank pain, frequency, hematuria and urgency. Musculoskeletal: Positive for arthralgias. Negative for back pain, gait problem and joint swelling. Neurological: Negative for tremors, speech difficulty, numbness and headaches. Hematological: Negative for adenopathy. Does not bruise/bleed easily. Psychiatric/Behavioral: Positive for sleep disturbance. Negative for confusion. The patient is nervous/anxious. OBJECTIVE     PaO2/FiO2 RATIO:  No results for input(s): POCPO2 in the last 72 hours.    FiO2 : (S) 60 %     VITAL SIGNS:   LAST:  BP (!) 104/51   Pulse 83   Temp 98.4 °F (36.9 °C) (Oral)   Resp 29   Ht 5' 7\" (1.702 m)   Wt 194 lb 3.6 oz (88.1 kg)   SpO2 (!) 89%   BMI 30.42 kg/m²   8-24 HR RANGE:  TEMP Temp  Av.5 °F (36.4 °C)  Min: 96.3 °F (35.7 °C)  Max: 98.4 °F (55.1 °C)   BP Systolic (44HPZ), HSX:164 , Min:90 , HZV:991      Diastolic (19QOS), MERRITT:41, Min:43, Max:57     PULSE Pulse  Av.1  Min: 70  Max: 83   RR Resp  Av  Min: 25  Max: 29   O2 SAT SpO2  Av.5 %  Min: 89 %  Max: 98 %   OXYGEN DELIVERY O2 Flow Rate (L/min)  Av L/min  Min: 6 L/min  Max: 6 L/min        SYSTEMIC EXAMINATION:   I have [facility] including pertinent history and exam findings, with the resident/APC/staff. I have seen the patient and the key elements of all parts of the encounter have been performed by me. Physical exam was deferred to limit physical exposure and PPE resources. I reviewed the interval history, interpreted all available radiographic, laboratory and physiologic data at the time of service. I agree with the assessment and plan as documented by resident/APC/ ancillary staff. DATA REVIEW     Medications: Current Inpatient  Scheduled Meds:   enoxaparin  40 mg Subcutaneous BID    [START ON 2020] dexamethasone  6 mg Intravenous Daily    tbo-filgrastim  480 mcg Subcutaneous Daily    latanoprost  1 drop Both Eyes Nightly    remdesivir IVPB  100 mg Intravenous Q24H    sodium chloride  20 mL Intravenous Once    sodium chloride  20 mL Intravenous Once     Continuous Infusions:    INPUT/OUTPUT:  In: 4983 [P.O.:925; I.V.:2414]  Out: 80 [Urine:810]  Date 20 - 20   Shift 8682-6263 3920-6954 2376-2977 24 Hour Total   INTAKE   P.O.(mL/kg/hr)  625(0.9) 300 925   I.V.(mL/kg) 3189(10.7)   4986(54.5)   Shift Total(mL/kg) 4925(39.8) 625(7.1) 300(3.4) 6701(71.4)   OUTPUT   Urine(mL/kg/hr) 400(0.6)  410 810   Shift Total(mL/kg) 400(4.5)  410(4.7) 810(9.2)   Weight (kg) 88.1 88.1 88.1 88.1        LABS:  ABGs:   No results for input(s): POCPH, POCPCO2, POCPO2, POCHCO3, AYDC6ECF in the last 72 hours.   CBC:   Recent Labs     20  0036 20  0256 20  0553 20  0544   WBC 1.3* 1.2* 2.0* 3.3*   HGB 7.4* 7.0* 7.7* 7.8*   HCT 23.6* 22.4* 25.6* 23.8*   MCV 93.3 94.5 97.7 91.2    294 292 222 LYMPHOPCT 48*  --   --  36   RBC 2.53* 2.37* 2.62* 2.61*   MCH 29.2 29.5 29.4 29.9   MCHC 31.4 31.3 30.1 32.8   RDW 18.5* 19.0* 19.6* 19.2*     CRP:   Recent Labs     11/17/20 0036 11/18/20  0553   .8* 227.4*     LDH:   Recent Labs     11/17/20 0036 11/17/20 0255 11/18/20  0553   * 291* 407*     BMP:   Recent Labs     11/17/20 0036 11/17/20 0255 11/18/20  0553 11/19/20  0544   * 133* 138 138   K 4.1 4.1 3.6* 4.0    100 100 102   CO2 21 21 22 21   BUN 24* 23 23 19   CREATININE 0.89 0.81 0.81 0.53   GLUCOSE 112* 109* 93 94   PHOS  --  3.5 3.3  --      Liver Function Test:   Recent Labs     11/17/20 0036 11/17/20 0255 11/18/20  0553 11/19/20  0544   PROT 5.9* 5.8*  --  5.6*   LABALBU 3.0* 2.9*  2.9* 2.8* 2.6*   ALT 49* 45*  --  35*   AST 41* 38*  --  33*   ALKPHOS 76 74  --  58   BILITOT 0.64 0.53  --  0.47     Coagulation Profile:   Recent Labs     11/17/20 0036 11/17/20  0255   INR 0.9 0.9   PROTIME 9.3 9.5     D-Dimer:  Recent Labs     11/17/20  0255   DDIMER 2.44     Ferritin:    Recent Labs     11/17/20 0036 11/17/20 0255 11/18/20  0553   FERRITIN 3,046* 3,024* 3,453*     Lactic Acid:  No results for input(s): LACTA in the last 72 hours. Cardiac Enzymes:  No results for input(s): CKTOTAL, CKMB, CKMBINDEX, TROPONINI in the last 72 hours. Invalid input(s): TROPONIN, HSTROP  BNP/ProBNP:   Recent Labs     11/17/20  0700   PROBNP 729*     Triglycerides:  No results for input(s): TRIG in the last 72 hours. Microbiology:  Recent Labs     11/17/20  0248 11/17/20  1506 11/17/20  1836   SPECDESC . BLOOD  --  . NASOPHARYNGEAL SWAB   SPECIAL RT HAND 2ML  --   --    CULTURE NO GROWTH 2 DAYS  --   --    MPNEUM  --  0.17  --         Pathology:    Radiology Reports:  XR CHEST PORTABLE   Final Result   No change in bilateral pulmonary opacities left greater than right with more   peripheral involvement compatible with pneumonitis.          XR CHEST (SINGLE VIEW FRONTAL)   Final Result *Low Probability for Pulmonary Embolus. *Bilateral mid to lower lung field left greater than right opacities with   peripheral involvement most pronounced at the bases compatible with  COVID-19   pneumonia. The findings were sent to the Radiology Results [address] at 2:46   pm on 11/17/2020to be communicated to a licensed caregiver. NM LUNG SCAN PERFUSION ONLY   Final Result   *Low Probability for Pulmonary Embolus. *Bilateral mid to lower lung field left greater than right opacities with   peripheral involvement most pronounced at the bases compatible with  COVID-19   pneumonia. The findings were sent to the Radiology Results [address] at 2:46   pm on 11/17/2020to be communicated to a licensed caregiver. Echocardiogram:   No results found for this or any previous visit. ASSESSMENT AND PLAN     Assessment:    Acute hypoxic respiratory failure. Bilateral multifocal groundglass opacities/pneumonia due to COVID-19 infection. Sepsis secondary to COVID-19 infection/secondary to immune suppression/leukopenia  Elevated D-dimer, ferritin, fibrinogen, CRP secondary to COVID-19 infection. Myelodysplastic syndrome on chemotherapy. Leukopenia possibly secondary to chemotherapy/MDS. Anemia secondary to myelodysplastic syndrome. History of coronary artery disease status post CABG. History of hypertension. Hyperlipidemia. Plan:  1. Continue Decadron. 2. Support oxygenation. 3. Okay to move to stepdown bed. 4. Discussed with Dr. Beto Diaz. Fernando Vance DO.   Pulmonary and Critical Care Medicine           11/19/2020, 10:42 PM    This patient was evaluated in the context of the global SARS-CoV-2 (COVID-19) pandemic, which necessitated considerations that the patient either has COVID-19 infection or is at risk of infection with COVID-19.  Institutional protocols and algorithms that pertain to the evaluation & management of patients with COVID-19 or those at risk for COVID-19 are in a state of rapid changes based on information released by regulatory bodies including the CDC and federal and state organizations. These policies and algorithms were followed during the patient's care. Please note that this chart was generated using voice recognition Dragon dictation software. Although every effort was made to ensure the accuracy of this automated transcription, some errors in transcription may have occurred.

## 2020-11-21 NOTE — PLAN OF CARE
Ineffective  Goal: Achieve or maintain patent airway  11/20/2020 1943 by Frankie Beavers RN  Outcome: Ongoing  11/20/2020 0554 by Lakeshia Cobb RN  Outcome: Ongoing     Problem: Gas Exchange - Impaired  Goal: Absence of hypoxia  11/20/2020 1943 by Frankie Beavers RN  Outcome: Ongoing  11/20/2020 0554 by Lakeshia Cobb RN  Outcome: Ongoing     Problem: Breathing Pattern - Ineffective  Goal: Ability to achieve and maintain a regular respiratory rate  11/20/2020 1943 by Frankie Beavers RN  Outcome: Ongoing  11/20/2020 0554 by Lakeshia Cobb RN  Outcome: Ongoing     Problem:  Body Temperature -  Risk of, Imbalanced  Goal: Ability to maintain a body temperature within defined limits  11/20/2020 1943 by Frankie Beavers RN  Outcome: Ongoing  11/20/2020 0554 by Lakeshia Cobb RN  Outcome: Ongoing     Problem: Isolation Precautions - Risk of Spread of Infection  Goal: Prevent transmission of infection  11/20/2020 1943 by Frankie Beavers RN  Outcome: Ongoing  11/20/2020 0554 by Lakeshia Cobb RN  Outcome: Ongoing     Problem: Nutrition Deficits  Goal: Optimize nutrtional status  11/20/2020 1943 by Frankie Beavers RN  Outcome: Ongoing  11/20/2020 0554 by Lakeshia Cobb RN  Outcome: Ongoing     Problem: Risk for Fluid Volume Deficit  Goal: Maintain normal heart rhythm  11/20/2020 1943 by Frankie Beavers RN  Outcome: Ongoing  11/20/2020 0554 by Lakeshia Cobb RN  Outcome: Ongoing     Problem: Loneliness or Risk for Loneliness  Goal: Demonstrate positive use of time alone when socialization is not possible  11/20/2020 1943 by Frankie Beavers RN  Outcome: Ongoing  11/20/2020 0554 by Lakeshia Cobb RN  Outcome: Ongoing     Problem: Fatigue  Goal: Verbalize increase energy and improved vitality  11/20/2020 1943 by Frankie Beavers RN  Outcome: Ongoing  11/20/2020 0554 by Lakeshia Cobb RN  Outcome: Ongoing     Problem: Patient Education: Go to Patient Education Activity  Goal: Patient/Family Education  11/20/2020 1943 by Frankie Beavers

## 2020-11-21 NOTE — PROGRESS NOTES
PULMONARY & CRITICAL CARE MEDICINE PROGRESS NOTE     Patient:  Karen Krishna  MRN: [id number]  6 [facility] date: 11/16/2020  Primary Care Physician: Karen Velasquez MD  Consulting Physician: Philippe Campbell MD  CODE Status: Full Code  LOS: 5     SUBJECTIVE     CHIEF COMPLAINT/REASON FOR INITIAL CONSULT:    BRIEF HOSPITAL COURSE:   The patient is a 70 y.o. female with history of myelodysplastic syndrome on chemotherapy apparently last 3 to 4 weeks ago, coronary artery disease status post CABG, hypertension, hyperlipidemia. Presented to [facility] with increasing fatigue and tiredness which according to patient she usually feels when her blood count goes low and she gets transfusion, 4 days history of cough associated with shortness of breath, cough is dry without sputum production. She did not have fever or chills. She denies headache body ache muscle ache myalgia sore throat. Denies nausea vomiting diarrhea or abdominal pain denies urinary complaint. In the emergency room she was found to be hypoxic, initially on nasal cannula and then she was switched to BiPAP after she was placed on BiPAP 50% she was saturating well remained on BiPAP and was transferred here to [facility] ICU unit. She had a CT chest done in [facility] which shows bilateral groundglass opacities nonspecific mediastinal neuropathy. She also had a hemoglobin of 6 and she received 1 unit packed RBC there  Since she was admitted overnight she remained on BiPAP 50% FiO2 had saturated well and she was transitioned to high flow nasal cannula this morning. Her initial labs show sodium 133 BUN 23 creatinine 0.81 bicarbonate 21. WBC count was 1.2 hemoglobin 7 hematocrit 22.4 and platelet 948. Procalcitonin was 1.0. . . proBNP 729. Ferritin 3024. Fibrinogen 720. D-dimer 2.44 and INR 0.9. She was also started on Lovenox for elevated D-dimer and a VQ scan was ordered by primary service.   She was started on remdesivir PO      Diastolic (58QQN), RAI:03, Min:48, Max:56     PULSE Pulse  Av.3  Min: 72  Max: 76   RR Resp  Av  Min: 22  Max: 22   O2 SAT SpO2  Av %  Min: 95 %  Max: 99 %   OXYGEN DELIVERY O2 Flow Rate (L/min)  Av.5 L/min  Min: 35 L/min  Max: 40 L/min        SYSTEMIC EXAMINATION:   I have discussed the care of [facility], including pertinent history and exam findings, with the resident/APC/staff. I have seen the patient and the key elements of all parts of the encounter have been performed by me. Physical exam was deferred to limit physical exposure and PPE resources. I reviewed the interval history, interpreted all available radiographic, laboratory and physiologic data at the time of service. I agree with the assessment and plan as documented by resident/APC/ ancillary staff. DATA REVIEW     Medications: Current Inpatient  Scheduled Meds:   LORazepam  0.25 mg Intravenous Once    enoxaparin  40 mg Subcutaneous BID    dexamethasone  6 mg Intravenous Daily    latanoprost  1 drop Both Eyes Nightly    sodium chloride  20 mL Intravenous Once    sodium chloride  20 mL Intravenous Once     Continuous Infusions:    INPUT/OUTPUT:  In: 300 [P.O.:300]  Out: -        LABS:  ABGs:   No results for input(s): POCPH, POCPCO2, POCPO2, POCHCO3, LEGF4OFK in the last 72 hours. CBC:   Recent Labs     20  0544 20  0537 20  0540   WBC 3.3* 9.0 12.8*   HGB 7.8* 8.0* 7.3*   HCT 23.8* 26.0* 23.4*   MCV 91.2 100.0 99.2    358 423   LYMPHOPCT 36 15* 16*   RBC 2.61* 2.60* 2.36*   MCH 29.9 30.8 30.9   MCHC 32.8 30.8 31.2   RDW 19.2* 19.6* 19.0*     CRP:   Recent Labs     20  0540   CRP 67.6*     LDH:   No results for input(s): LDH in the last 72 hours.   BMP:   Recent Labs     20  0544 20  0540    143   K 4.0 3.5*    106   CO2 21 25   BUN 19 22   CREATININE 0.53 0.52   GLUCOSE 94 84     Liver Function Test:   Recent Labs     20  0544   PROT 5.6* LABALBU 2.6*   ALT 35*   AST 33*   ALKPHOS 58   BILITOT 0.47     Coagulation Profile:   No results for input(s): INR, PROTIME, APTT in the last 72 hours. D-Dimer:  Recent Labs     11/21/20  0540   DDIMER 1.93     Ferritin:    Recent Labs     11/20/20  1920 11/21/20  0540   FERRITIN 3,590* 2,838*     Lactic Acid:  No results for input(s): LACTA in the last 72 hours. Cardiac Enzymes:  No results for input(s): CKTOTAL, CKMB, CKMBINDEX, TROPONINI in the last 72 hours. Invalid input(s): TROPONIN, HSTROP  BNP/ProBNP:   Recent Labs     11/21/20  0540   PROBNP 712*     Triglycerides:  No results for input(s): TRIG in the last 72 hours. Microbiology:  No results for input(s): SPECDESC, SPECDESC, SPECIAL, CULTURE, CULTURE, STATUS, ORG, CDIFFTOXPCR, CAMPYLOBPCR, SALMONELLAPC, SHIGAPCR, SHIGELLAPCR, MPNEUG, MPNEUM, LACTOQL in the last 72 hours. Pathology:    Radiology Reports:    XR CHEST PORTABLE   Final Result   No significant change in chest findings compared to the prior study from   November 18th. XR CHEST PORTABLE   Final Result   No change in bilateral pulmonary opacities left greater than right with more   peripheral involvement compatible with pneumonitis. XR CHEST (SINGLE VIEW FRONTAL)   Final Result   *Low Probability for Pulmonary Embolus. *Bilateral mid to lower lung field left greater than right opacities with   peripheral involvement most pronounced at the bases compatible with  COVID-19   pneumonia. The findings were sent to the Radiology Results [address] at 2:46   pm on 11/17/2020to be communicated to a licensed caregiver. NM LUNG SCAN PERFUSION ONLY   Final Result   *Low Probability for Pulmonary Embolus. *Bilateral mid to lower lung field left greater than right opacities with   peripheral involvement most pronounced at the bases compatible with  COVID-19   pneumonia.    The findings were sent to the Radiology Results Communication Center at 2:46   pm on 11/17/2020to be communicated to a licensed caregiver. Echocardiogram:   No results found for this or any previous visit. ASSESSMENT AND PLAN     Assessment:    Acute hypoxic respiratory failure. Bilateral multifocal groundglass opacities/pneumonia due to COVID-19 infection. Sepsis secondary to COVID-19 infection/secondary to immune suppression/leukopenia  Elevated D-dimer, ferritin, fibrinogen, CRP secondary to COVID-19 infection. Myelodysplastic syndrome on chemotherapy. Leukopenia possibly secondary to chemotherapy/MDS. Anemia secondary to myelodysplastic syndrome. History of coronary artery disease status post CABG. History of hypertension. Hyperlipidemia. Plan:  1. Continue Decadron. 2. Lasix 20mg Qam X3  3. Support oxygenation. 4. Discussed with Dr. Rebeka Flaherty. Mathieu Green DO.   Pulmonary and Critical Care Medicine           11/21/2020, 10:40 AM  CC time 30min  This patient was evaluated in the context of the global SARS-CoV-2 (COVID-19) pandemic, which necessitated considerations that the patient either has COVID-19 infection or is at risk of infection with COVID-19. Institutional protocols and algorithms that pertain to the evaluation & management of patients with COVID-19 or those at risk for COVID-19 are in a state of rapid changes based on information released by regulatory bodies including the CDC and federal and state organizations. These policies and algorithms were followed during the patient's care. Please note that this chart was generated using voice recognition Dragon dictation software. Although every effort was made to ensure the accuracy of this automated transcription, some errors in transcription may have occurred.

## 2020-11-21 NOTE — PLAN OF CARE
Problem:  Activity:  Goal: Fatigue will decrease  Description: Fatigue will decrease  11/21/2020 0732 by Isra Schmidt RN  Outcome: Ongoing  11/20/2020 1943 by Dany Hutson RN  Outcome: Ongoing     Problem: Cardiac:  Goal: Hemodynamic stability will improve  Description: Hemodynamic stability will improve  11/21/2020 0732 by Isra Schmidt RN  Outcome: Ongoing  11/20/2020 1943 by Dany Hutson RN  Outcome: Ongoing     Problem: Coping:  Goal: Level of anxiety will decrease  Description: Level of anxiety will decrease  11/21/2020 0732 by Isra Schmidt RN  Outcome: Ongoing  11/20/2020 1943 by Dany Htuson RN  Outcome: Ongoing  Goal: Ability to cope will improve  Description: Ability to cope will improve  11/21/2020 0732 by Isra Schmidt RN  Outcome: Ongoing  11/20/2020 1943 by Dany Hutson RN  Outcome: Ongoing  Goal: Ability to establish a method of communication will improve  Description: Ability to establish a method of communication will improve  11/21/2020 0732 by Isra Schmidt RN  Outcome: Ongoing  11/20/2020 1943 by Dany Hutson RN  Outcome: Ongoing     Problem: Nutritional:  Goal: Consumption of the prescribed amount of daily calories will improve  Description: Consumption of the prescribed amount of daily calories will improve  11/21/2020 0732 by Isra Schmidt RN  Outcome: Ongoing  11/20/2020 1943 by Dany Hutson RN  Outcome: Ongoing     Problem: Respiratory:  Goal: Ability to maintain a clear airway will improve  Description: Ability to maintain a clear airway will improve  11/21/2020 0732 by Isra Schmidt RN  Outcome: Ongoing  11/20/2020 1943 by Dany Hutson RN  Outcome: Ongoing  Goal: Ability to maintain adequate ventilation will improve  Description: Ability to maintain adequate ventilation will improve  11/21/2020 0732 by Isra Schmidt RN  Outcome: Ongoing  11/20/2020 1943 by Dany Hutson RN  Outcome: Ongoing  Goal: Complications related to the disease process, condition or treatment maintain patent airway  11/21/2020 0732 by Isra Schmidt RN  Outcome: Ongoing  11/20/2020 1943 by Dany Hutson RN  Outcome: Ongoing     Problem: Gas Exchange - Impaired  Goal: Absence of hypoxia  11/21/2020 0732 by Isra Schmidt RN  Outcome: Ongoing  11/20/2020 1943 by Dany Hutson RN  Outcome: Ongoing  Goal: Promote optimal lung function  11/21/2020 0732 by Isra Schmidt RN  Outcome: Ongoing  11/20/2020 1943 by Dany Hutson RN  Outcome: Ongoing     Problem: Breathing Pattern - Ineffective  Goal: Ability to achieve and maintain a regular respiratory rate  11/21/2020 0732 by Isra Schmidt RN  Outcome: Ongoing  11/20/2020 1943 by Dany Hutson RN  Outcome: Ongoing     Problem:  Body Temperature -  Risk of, Imbalanced  Goal: Ability to maintain a body temperature within defined limits  11/21/2020 0732 by Isra Schmidt RN  Outcome: Ongoing  11/20/2020 1943 by Dany Hutson RN  Outcome: Ongoing  Goal: Will regain or maintain usual level of consciousness  11/21/2020 0732 by Isra Schmidt RN  Outcome: Ongoing  11/20/2020 1943 by Dany Hutson RN  Outcome: Ongoing  Goal: Complications related to the disease process, condition or treatment will be avoided or minimized  11/21/2020 0732 by Isra Schmidt RN  Outcome: Ongoing  11/20/2020 1943 by Dany Hutson RN  Outcome: Ongoing     Problem: Isolation Precautions - Risk of Spread of Infection  Goal: Prevent transmission of infection  11/21/2020 0732 by Isra Schmidt RN  Outcome: Ongoing  11/20/2020 1943 by Dany Hutson RN  Outcome: Ongoing     Problem: Nutrition Deficits  Goal: Optimize nutrtional status  11/21/2020 0732 by Isra Schmidt RN  Outcome: Ongoing  11/20/2020 1943 by Dany Hutson RN  Outcome: Ongoing     Problem: Risk for Fluid Volume Deficit  Goal: Maintain normal heart rhythm  11/21/2020 0732 by Isra Schmidt RN  Outcome: Ongoing  11/20/2020 1943 by Dany Hutson RN  Outcome: Ongoing  Goal: Maintain absence of muscle cramping  11/21/2020

## 2020-11-21 NOTE — PROGRESS NOTES
Today's Date: 11/20/2020  Patient Name: Rudi Gifford  Date of admission: 11/16/2020 10:51 PM  Patient's age: 70 y.o., 1945  Admission Dx: Acute respiratory failure due to COVID-19 ([facility] [address] [U07.1, J96.00]    Reason for Consult: management recommendations  Requesting Physician: Lashell Velez MD    CHIEF COMPLAINT: Anemia. MDS. COVID-19 positive. History Obtained From:  patient, electronic medical record    Interval history:    Chart reviewed as noted. Patient needing high flow oxygen  Antibiotic DC'd due to negative cultures  Patient on remdesivir till 11/20   S/p 3 doses of Granix  Patient remains afebrile    HISTORY OF PRESENT ILLNESS:      The patient is a 70 y.o.  female who is admitted to the hospital with chief complaint of shortness of breath dyspnea for the last 3 days. Further testing revealed patient was COVID-19 positive. Patient was initially seen in outlying hospital and then transferred. Patient is requiring noninvasive positive pressure ventilation support. CTA has not been able to get done due to contrast allergy. Reportedly patient also has history of MDS. CBC from 9/2020 showed a WBC count of 1.6 hemoglobin 9.4 with MCV 95. Patient has been started on full dose anticoagulation due to concerns regarding pulmonary embolism. Patient was also recently started on Vidaza and has received 1 cycle so far. Patient is also transfusion dependent. Patient is supposed to get next cycle of Vidaza in the next week or so. Patient most recent bone marrow biopsy from 9/2020 showed normocellular marrow with erythroid hyperplasia and mild this erythropoiesis and 8% blasts. Recommendations from [facility]: Copied from care everywhere. ASSESSMENT AND PALN:  Ms. Rudi Gifford is a 70year old woman with multiple comorbid conditions, now with a recent diagnosis of MDS-MLD.      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 In this situation, we previously discussed that we often favor management as if the patient is higher-tier risk disease, as outcomes for patients with IPSS-R 4.5 at diagnosis are similar to high risk. But unfortunately, she is not a candidate for her only curative option, i.e., allogeneic hematopoietic cell transplantation. Therefore, when we initially consulted on her, we discussed that all therapy is palliative. Given all therapies would be palliative, we discussed this in the context that she had been transfusion-independent. Thus, while initiation of hypomethylating agent (HMA) would have certainly been justifiable at that juncture, she had cytopenias for several months and still had not required transfusions or developed significant infection. Therefore, we discussed close observation since she was transfusion independent. We discussed the risks/beneftis of such an approach. At this visit, she is now beginning to require transfusions, so we agree with the plan to begin azacitidine locally under the care of Dr. Mary Jane Jones. We again discussed that we would recommend a repeat bone marrow exam prior to initiating therapy for palliation. If she declines treatment, one could consider EPO supplementation to minimize need for transfusion. Given her [address], antiviral prophylaxis with acyclovir (400 mg BID) could be considered. If her [address] were to persistently be lower than 500/uL, antifungal prophylaxis could be considered. She will continue to follow up with Dr. Mary Jane Jones for ongoing management. We will see her back prn.        Past Medical History:   has a past medical history of Cancer ([facility] [address] Deaf, left, Essential hypertension, GERD (gastroesophageal reflux disease), Glaucoma, Hyperlipidemia, Melanoma ([facility] [address] MRSA (methicillin resistant staph aureus) culture positive, MVP (mitral valve prolapse), Pharyngoesophageal dysphagia, Raynaud disease, and Status post four at 11/20/20 0831    dexamethasone (DECADRON) injection 6 mg  6 mg Intravenous Daily Nyoka Hodgkins, MD   6 mg at 11/20/20 0831    latanoprost (XALATAN) 0.005 % ophthalmic solution 1 drop  1 drop Both Eyes Nightly Nyoka Hodgkins, MD   1 drop at 11/19/20 2008    remdesivir 100 mg in sodium chloride 0.9 % 250 mL IVPB  100 mg Intravenous Q24H Beula William, DO   Stopped at 11/20/20 0336    0.9 % sodium chloride bolus  30 mL Intravenous PRN Beula William, DO        0.9 % sodium chloride bolus  20 mL Intravenous Once Beula William, DO        0.9 % sodium chloride bolus  20 mL Intravenous Once Adilene Martin MD        sodium chloride flush 0.9 % injection 10 mL  10 mL Intravenous PRN Beula William, DO   10 mL at 11/19/20 6444    potassium chloride (KLOR-CON M) extended release tablet 40 mEq  40 mEq Oral PRN Aileen Dumont APRN - CNS        Or    potassium bicarb-citric acid (EFFER-K) effervescent tablet 40 mEq  40 mEq Oral PRN Aileen Dumont APRN - CNS        Or    potassium chloride 10 mEq/100 mL IVPB (Peripheral Line)  10 mEq Intravenous PRN MICHAEL Goodwin - CNS        magnesium sulfate 1 g in dextrose 5% 100 mL IVPB  1 g Intravenous PRN MICHAEL Goodwin - CNS        acetaminophen (TYLENOL) tablet 650 mg  650 mg Oral Q6H PRN Aileen Dumont APRN - CNS   650 mg at 11/20/20 1117    Or    acetaminophen (TYLENOL) suppository 650 mg  650 mg Rectal Q6H PRN Aileen Dumont APRN - KANE        polyethylene glycol (GLYCOLAX) packet 17 g  17 g Oral Daily PRN Aileen Dumont APRN - CNS        promethazine (PHENERGAN) tablet 12.5 mg  12.5 mg Oral Q6H PRN MICHAEL Goodwin - CNS        Or    ondansetron (ZOFRAN) injection 4 mg  4 mg Intravenous Q6H PRN MICHAEL Goodwin - CNS        nicotine (NICODERM CQ) 21 MG/24HR 1 patch  1 patch Transdermal Daily PRN MICHAEL Goodwin - KANE           Allergies:  Fish allergy; Hydrocodone-acetaminophen; Tizanidine;  Atorvastatin; Metoclopramide; Moxifloxacin; Oxycodone; Pregabalin; Tramadol; Zolpidem; Cephalexin; Iodides; and Sulfa antibiotics    Social History:   reports that she quit smoking about 15 years ago. Her smoking use included cigarettes. She started smoking about 63 years ago. She does not have any smokeless tobacco history on file. She reports that she does not drink alcohol or use drugs. Family History: family history includes Early Death in her mother; Heart Disease in her mother; Heart Failure in her mother; Stroke in her father. REVIEW OF SYSTEMS:      Constitutional: No fever or chills. No night sweats, no weight loss. Positive fatigue. Eyes: No eye discharge, double vision, or eye pain   HEENT: negative for sore mouth, sore throat, hoarseness and voice change   Respiratory: negative for cough , sputum, dyspnea, wheezing, hemoptysis, chest pain positive shortness of breath  Cardiovascular: negative for chest pain, dyspnea, palpitations, orthopnea, PND   Gastrointestinal: negative for nausea, vomiting, diarrhea, constipation, abdominal pain, Dysphagia, hematemesis and hematochezia   Genitourinary: negative for frequency, dysuria, nocturia, urinary incontinence, and hematuria   Integument: negative for rash, skin lesions, bruises.    Hematologic/Lymphatic: negative for easy bruising, bleeding, lymphadenopathy, or petechiae   Endocrine: negative for heat or cold intolerance,weight changes, change in bowel habits and hair loss   Musculoskeletal: negative for myalgias, arthralgias, pain, joint swelling,and bone pain   Neurological: negative for headaches, dizziness, seizures, weakness, numbness    PHYSICAL EXAM:        BP (!) 108/53   Pulse 75   Temp 97.5 °F (36.4 °C) (Oral)   Resp 19   Ht 5' 7\" (1.702 m)   Wt 193 lb (87.5 kg)   SpO2 94%   BMI 30.23 kg/m²    Temp (24hrs), Av.8 °F (36.6 °C), Min:97.5 °F (36.4 °C), Max:98.1 °F (36.7 °C)    Due to the current efforts to prevent transmission of COVID-19 and also the need to preserve PPE for other caregivers, a face-to-face encounter with the patient was not performed. That being said, all relevant records and diagnostic tests were reviewed, including laboratory results and imaging. Please reference any relevant documentation elsewhere. Care will be coordinated with the primary service. DATA:      Labs:     Results for orders placed or performed during the hospital encounter of 11/16/20   Culture, Blood 1    Specimen: Blood   Result Value Ref Range    Specimen Description . BLOOD     Special Requests LT HAND 20ML     Culture NO GROWTH 3 DAYS    Culture, Blood 2    Specimen: Blood   Result Value Ref Range    Specimen Description . BLOOD     Special Requests RT HAND 2ML     Culture NO GROWTH 3 DAYS    Respiratory Panel, Molecular, with COVID-19    Specimen: Nasopharyngeal Swab   Result Value Ref Range    Specimen Description . NASOPHARYNGEAL SWAB     Adenovirus PCR Not Detected Not Detected    Coronavirus 229E PCR Not Detected Not Detected    Coronavirus HKU1 PCR Not Detected Not Detected    Coronavirus NL63 PCR Not Detected Not Detected    Coronavirus OC43 PCR Not Detected Not Detected    SARS-CoV-2, PCR DETECTED (A) Not Detected    Human Metapneumovirus PCR Not Detected Not Detected    Rhino/Enterovirus PCR Not Detected Not Detected    Influenza A by PCR Not Detected Not Detected    Influenza A H1 PCR NOT REPORTED Not Detected    Influenza A H1 (2009) PCR NOT REPORTED Not Detected    Influenza A H3 PCR NOT REPORTED Not Detected    Influenza B by PCR Not Detected Not Detected    Parainfluenza 1 PCR Not Detected Not Detected    Parainfluenza 2 PCR Not Detected Not Detected    Parainfluenza 3 PCR Not Detected Not Detected    Parainfluenza 4 PCR Not Detected Not Detected    Resp Syncytial Virus PCR Not Detected Not Detected    Bordetella Parapertussis Not Detected Not Detected    B Pertussis by PCR Not Detected Not Detected    Chlamydia pneumoniae By PCR Not Detected Not Detected    Mycoplasma pneumo by PCR Not Detected Not Detected   LEGIONELLA ANTIGEN, URINE    Specimen: Urine, clean catch   Result Value Ref Range    Legionella Pneumophilia Ag, Urine NEGATIVE    CBC   Result Value Ref Range    WBC 1.3 (LL) 3.5 - 11.3 k/uL    RBC 2.53 (L) 3.95 - 5.11 m/uL    Hemoglobin 7.4 (L) 11.9 - 15.1 g/dL    Hematocrit 23.6 (L) 36.3 - 47.1 %    MCV 93.3 82.6 - 102.9 fL    MCH 29.2 25.2 - 33.5 pg    MCHC 31.4 28.4 - 34.8 g/dL    RDW 18.5 (H) 11.8 - 14.4 %    Platelets 861 229 - 264 k/uL    MPV 10.6 8.1 - 13.5 fL    NRBC Automated 0.0 0.0 per 100 WBC   Protime-INR   Result Value Ref Range    Protime 9.3 9.0 - 12.0 sec    INR 0.9    Comprehensive Metabolic Panel w/ Reflex to MG   Result Value Ref Range    Glucose 112 (H) 70 - 99 mg/dL    BUN 24 (H) 8 - 23 mg/dL    CREATININE 0.89 0.50 - 0.90 mg/dL    Bun/Cre Ratio NOT REPORTED 9 - 20    Calcium 8.0 (L) 8.6 - 10.4 mg/dL    Sodium 133 (L) 135 - 144 mmol/L    Potassium 4.1 3.7 - 5.3 mmol/L    Chloride 100 98 - 107 mmol/L    CO2 21 20 - 31 mmol/L    Anion Gap 12 9 - 17 mmol/L    Alkaline Phosphatase 76 35 - 104 U/L    ALT 49 (H) 5 - 33 U/L    AST 41 (H) <32 U/L    Total Bilirubin 0.64 0.3 - 1.2 mg/dL    Total Protein 5.9 (L) 6.4 - 8.3 g/dL    Alb 3.0 (L) 3.5 - 5.2 g/dL    Albumin/Globulin Ratio 1.0 1.0 - 2.5    GFR Non-African American >60 >60 mL/min    GFR African American >60 >60 mL/min    GFR Comment          GFR Staging NOT REPORTED    C-Reactive Protein   Result Value Ref Range    .8 (H) 0.0 - 5.0 mg/L   Ferritin   Result Value Ref Range    Ferritin 3,046 (H) 13 - 150 ug/L   Fibrinogen   Result Value Ref Range    Fibrinogen 721 (H) 140 - 420 mg/dL   Lactate Dehydrogenase   Result Value Ref Range     (H) 135 - 214 U/L   CBC   Result Value Ref Range    WBC 1.2 (LL) 3.5 - 11.3 k/uL    RBC 2.37 (L) 3.95 - 5.11 m/uL    Hemoglobin 7.0 (LL) 11.9 - 15.1 g/dL    Hematocrit 22.4 (L) 36.3 - 47.1 %    MCV 94.5 82.6 - 102.9 fL    MCH 29.5 25.2 - 33.5 pg    MCHC 31.3 28.4 - 34.8 g/dL    RDW 19.0 (H) 11.8 - 14.4 %    Platelets 223 572 - 000 k/uL    MPV 10.7 8.1 - 13.5 fL    NRBC Automated 0.0 0.0 per 100 WBC   Differential   Result Value Ref Range    Differential Type NOT REPORTED     WBC Morphology NOT REPORTED     RBC Morphology NOT REPORTED     Platelet Estimate NOT REPORTED     Immature Granulocytes 0 0 %    Seg Neutrophils 33 (L) 36 - 66 %    Lymphocytes 48 (H) 24 - 44 %    Atypical Lymphocytes 1 %    Monocytes 17 (H) 1 - 7 %    Eosinophils % 1 1 - 4 %    Basophils 0 0 - 2 %    Absolute Immature Granulocyte 0.00 0.00 - 0.30 k/uL    Segs Absolute 0.43 (LL) 1.8 - 7.7 k/uL    Absolute Lymph # 0.63 (L) 1.0 - 4.8 k/uL    Atypical Lymphocytes Absolute 0.01 k/uL    Absolute Mono # 0.22 0.1 - 0.8 k/uL    Absolute Eos # 0.01 0.0 - 0.4 k/uL    Basophils Absolute 0.00 0.0 - 0.2 k/uL    Morphology ANISOCYTOSIS PRESENT    D-Dimer, Quantitative   Result Value Ref Range    D-Dimer, Quant 2.44 mg/L FEU   Ferritin   Result Value Ref Range    Ferritin 3,024 (H) 13 - 150 ug/L   Fibrinogen   Result Value Ref Range    Fibrinogen 720 (H) 140 - 420 mg/dL   Protime-INR   Result Value Ref Range    Protime 9.5 9.0 - 12.0 sec    INR 0.9    Lactate Dehydrogenase   Result Value Ref Range     (H) 135 - 214 U/L   Hepatic Function Panel   Result Value Ref Range    Alb 2.9 (L) 3.5 - 5.2 g/dL    Alkaline Phosphatase 74 35 - 104 U/L    ALT 45 (H) 5 - 33 U/L    AST 38 (H) <32 U/L    Total Bilirubin 0.53 0.3 - 1.2 mg/dL    Bilirubin, Direct 0.22 <0.31 mg/dL    Bilirubin, Indirect 0.31 0.00 - 1.00 mg/dL    Total Protein 5.8 (L) 6.4 - 8.3 g/dL    Globulin NOT REPORTED 1.5 - 3.8 g/dL    Albumin/Globulin Ratio 1.0 1.0 - 2.5   Magnesium   Result Value Ref Range    Magnesium 2.3 1.6 - 2.6 mg/dL   Renal Function Panel   Result Value Ref Range    Glucose 109 (H) 70 - 99 mg/dL    BUN 23 8 - 23 mg/dL    CREATININE 0.81 0.50 - 0.90 mg/dL    Bun/Cre Ratio NOT REPORTED 9 - 20    Calcium 8.0 (L) 8.6 - 10.4 mg/dL    Alb 2.9 (L) 3.5 - 5.2 g/dL    Phosphorus 3.5 2.6 - 4.5 mg/dL    Sodium 133 (L) 135 - 144 mmol/L    Potassium 4.1 3.7 - 5.3 mmol/L    Chloride 100 98 - 107 mmol/L    CO2 21 20 - 31 mmol/L    Anion Gap 12 9 - 17 mmol/L    GFR Non-African American >60 >60 mL/min    GFR African American >60 >60 mL/min    GFR Comment          GFR Staging NOT REPORTED    Procalcitonin   Result Value Ref Range    Procalcitonin 1.00 (H) <0.09 ng/mL   Brain Natriuretic Peptide   Result Value Ref Range    Pro- (H) <300 pg/mL    BNP Interpretation Pro-BNP Reference Range:    COVID-19    Specimen: Nasopharyngeal Swab   Result Value Ref Range    SARS-CoV-2 Positive (A)    MYCOPLASMA PNEUMONIAE ANTIBODY, IGM   Result Value Ref Range    Mycoplasma pneumo IgM 0.17 <0.91   URINALYSIS   Result Value Ref Range    Color, UA DARK YELLOW (A) YELLOW    Turbidity UA CLEAR CLEAR    Glucose, Ur NEGATIVE NEGATIVE    Bilirubin Urine NEGATIVE  Verified by ictotest. (A) NEGATIVE    Ketones, Urine SMALL (A) NEGATIVE    Specific Gravity, UA 1.023 1.005 - 1.030    Urine Hgb NEGATIVE NEGATIVE    pH, UA 5.5 5.0 - 8.0    Protein, UA 1+ (A) NEGATIVE    Urobilinogen, Urine Normal Normal    Nitrite, Urine NEGATIVE NEGATIVE    Leukocyte Esterase, Urine NEGATIVE NEGATIVE    Urinalysis Comments NOT REPORTED    OCCULT BLOOD SCREEN   Result Value Ref Range    Occult Blood, Stool #1 NEGATIVE NEGATIVE    Date, Stool #1 . FECES     Time, Stool #1 . FECES     Occult Blood, Stool #2 NOT REPORTED NEGATIVE    Date, Stool #2 NOT REPORTED     Time, Stool #2 NOT REPORTED     Occult Blood, Stool #3 NOT REPORTED NEGATIVE    Date, Stool #3 NOT REPORTED     Time, Stool #3 NOT REPORTED    Microscopic Urinalysis   Result Value Ref Range    -          WBC, UA 0 TO 2 0 - 5 /HPF    RBC, UA 0 TO 2 0 - 4 /HPF    Casts UA  0 - 8 /LPF     5 TO 10 HYALINE Reference range defined for non-centrifuged specimen.     Crystals, UA NOT REPORTED None /HPF    Epithelial Cells UA 0 TO 2 0 - 5 /HPF    Renal Epithelial, UA NOT REPORTED 0 /HPF    Bacteria, UA NOT REPORTED None    Mucus, UA NOT REPORTED None    Trichomonas, UA NOT REPORTED None    Amorphous, UA NOT REPORTED None    Other Observations UA NOT REPORTED NOT REQ.     Yeast, UA NOT REPORTED None   Vitamin B12 & Folate   Result Value Ref Range    Vitamin B-12 >2000 (H) 232 - 1245 pg/mL    Folate 11.5 >4.8 ng/mL   Iron and TIBC   Result Value Ref Range    Iron 92 37 - 145 ug/dL    TIBC 136 (L) 250 - 450 ug/dL    Iron Saturation 68 (H) 20 - 55 %    UIBC 44 (L) 112 - 347 ug/dL   CBC   Result Value Ref Range    WBC 2.0 (L) 3.5 - 11.3 k/uL    RBC 2.62 (L) 3.95 - 5.11 m/uL    Hemoglobin 7.7 (L) 11.9 - 15.1 g/dL    Hematocrit 25.6 (L) 36.3 - 47.1 %    MCV 97.7 82.6 - 102.9 fL    MCH 29.4 25.2 - 33.5 pg    MCHC 30.1 28.4 - 34.8 g/dL    RDW 19.6 (H) 11.8 - 14.4 %    Platelets 081 698 - 696 k/uL    MPV 11.1 8.1 - 13.5 fL    NRBC Automated 0.0 0.0 per 100 WBC   Renal Function Panel   Result Value Ref Range    Glucose 93 70 - 99 mg/dL    BUN 23 8 - 23 mg/dL    CREATININE 0.81 0.50 - 0.90 mg/dL    Bun/Cre Ratio NOT REPORTED 9 - 20    Calcium 8.4 (L) 8.6 - 10.4 mg/dL    Alb 2.8 (L) 3.5 - 5.2 g/dL    Phosphorus 3.3 2.6 - 4.5 mg/dL    Sodium 138 135 - 144 mmol/L    Potassium 3.6 (L) 3.7 - 5.3 mmol/L    Chloride 100 98 - 107 mmol/L    CO2 22 20 - 31 mmol/L    Anion Gap 16 9 - 17 mmol/L    GFR Non-African American >60 >60 mL/min    GFR African American >60 >60 mL/min    GFR Comment          GFR Staging NOT REPORTED    Magnesium   Result Value Ref Range    Magnesium 2.5 1.6 - 2.6 mg/dL   FIBRINOGEN   Result Value Ref Range    Fibrinogen 1002 (H) 140 - 420 mg/dL   C-Reactive Protein   Result Value Ref Range    .4 (H) 0.0 - 5.0 mg/L   Ferritin   Result Value Ref Range    Ferritin 3,453 (H) 13 - 150 ug/L   Lactate Dehydrogenase   Result Value Ref Range     (H) 135 - 214 U/L   Magnesium   Result Value Ref Range    Magnesium 2.4 1.6 - 2.6 mg/dL   Comprehensive Metabolic Panel   Result Value Ref Range    Glucose 94 70 - 99 mg/dL    BUN 19 8 - 23 mg/dL    CREATININE 0.53 0.50 - 0.90 mg/dL    Bun/Cre Ratio NOT REPORTED 9 - 20    Calcium 8.4 (L) 8.6 - 10.4 mg/dL    Sodium 138 135 - 144 mmol/L    Potassium 4.0 3.7 - 5.3 mmol/L    Chloride 102 98 - 107 mmol/L    CO2 21 20 - 31 mmol/L    Anion Gap 15 9 - 17 mmol/L    Alkaline Phosphatase 58 35 - 104 U/L    ALT 35 (H) 5 - 33 U/L    AST 33 (H) <32 U/L    Total Bilirubin 0.47 0.3 - 1.2 mg/dL    Total Protein 5.6 (L) 6.4 - 8.3 g/dL    Alb 2.6 (L) 3.5 - 5.2 g/dL    Albumin/Globulin Ratio 0.9 (L) 1.0 - 2.5    GFR Non-African American >60 >60 mL/min    GFR African American >60 >60 mL/min    GFR Comment          GFR Staging NOT REPORTED    CBC Auto Differential   Result Value Ref Range    WBC 3.3 (L) 3.5 - 11.3 k/uL    RBC 2.61 (L) 3.95 - 5.11 m/uL    Hemoglobin 7.8 (L) 11.9 - 15.1 g/dL    Hematocrit 23.8 (L) 36.3 - 47.1 %    MCV 91.2 82.6 - 102.9 fL    MCH 29.9 25.2 - 33.5 pg    MCHC 32.8 28.4 - 34.8 g/dL    RDW 19.2 (H) 11.8 - 14.4 %    Platelets 054 380 - 940 k/uL    MPV 11.4 8.1 - 13.5 fL    NRBC Automated 0.0 0.0 per 100 WBC    Differential Type NOT REPORTED     WBC Morphology NOT REPORTED     RBC Morphology NOT REPORTED     Platelet Estimate NOT REPORTED     Immature Granulocytes 0 0 %    Seg Neutrophils 56 36 - 66 %    Lymphocytes 36 24 - 44 %    Monocytes 8 (H) 1 - 7 %    Eosinophils % 0 (L) 1 - 4 %    Basophils 0 0 - 2 %    Absolute Immature Granulocyte 0.00 0.00 - 0.30 k/uL    Segs Absolute 1.85 1.8 - 7.7 k/uL    Absolute Lymph # 1.19 1.0 - 4.8 k/uL    Absolute Mono # 0.26 0.1 - 0.8 k/uL    Absolute Eos # 0.00 0.0 - 0.4 k/uL    Basophils Absolute 0.00 0.0 - 0.2 k/uL    Morphology ANISOCYTOSIS PRESENT    Mycoplasma pneumoniae antibody, IgM   Result Value Ref Range    Mycoplasma pneumo IgM 0.20 <0.91   CBC Auto Differential   Result Value Ref Range    WBC 9.0 3.5 - 11.3 k/uL    RBC 2.60 (L) 3.95 - 5.11 m/uL    Hemoglobin 8.0 (L) 11.9 - 15.1 g/dL Hematocrit 26.0 (L) 36.3 - 47.1 %    .0 82.6 - 102.9 fL    MCH 30.8 25.2 - 33.5 pg    MCHC 30.8 28.4 - 34.8 g/dL    RDW 19.6 (H) 11.8 - 14.4 %    Platelets 389 987 - 379 k/uL    MPV 11.4 8.1 - 13.5 fL    NRBC Automated 0.0 0.0 per 100 WBC    Differential Type NOT REPORTED     WBC Morphology NOT REPORTED     RBC Morphology NOT REPORTED     Platelet Estimate NOT REPORTED     Immature Granulocytes 1 (H) 0 %    Seg Neutrophils 74 (H) 36 - 66 %    Lymphocytes 15 (L) 24 - 44 %    Monocytes 10 (H) 1 - 7 %    Eosinophils % 0 (L) 1 - 4 %    Basophils 0 0 - 2 %    Absolute Immature Granulocyte 0.09 0.00 - 0.30 k/uL    Segs Absolute 6.66 1.8 - 7.7 k/uL    Absolute Lymph # 1.35 1.0 - 4.8 k/uL    Absolute Mono # 0.90 (H) 0.1 - 0.8 k/uL    Absolute Eos # 0.00 0.0 - 0.4 k/uL    Basophils Absolute 0.00 0.0 - 0.2 k/uL    Morphology ANISOCYTOSIS PRESENT    FIBRINOGEN   Result Value Ref Range    Fibrinogen 673 (H) 140 - 420 mg/dL   POC Glucose Fingerstick   Result Value Ref Range    POC Glucose 108 (H) 65 - 105 mg/dL   EKG 12 Lead   Result Value Ref Range    Ventricular Rate 96 BPM    Atrial Rate 96 BPM    P-R Interval 134 ms    QRS Duration 88 ms    Q-T Interval 336 ms    QTc Calculation (Bazett) 424 ms    P Axis 13 degrees    R Axis 47 degrees    T Axis 28 degrees   EKG 12 Lead   Result Value Ref Range    Ventricular Rate 87 BPM    Atrial Rate 87 BPM    P-R Interval 112 ms    QRS Duration 84 ms    Q-T Interval 340 ms    QTc Calculation (Bazett) 409 ms    P Axis 42 degrees    R Axis 71 degrees    T Axis 75 degrees   TYPE AND SCREEN   Result Value Ref Range    Expiration Date 11/20/2020,2359     Arm Band Number BE [id number]     ABO/Rh A POSITIVE     Antibody Screen NOT TESTED     Antibody ID Anti-Heaven Present     SILVIA IgG NEGATIVE     Unit Number [id number]     Product Code Leukocyte Reduced Irradiated Red Cell     Unit Divison 00     Dispense Status REL FROM [facility]     Transfusion Status OK TO TRANSFUSE     Crossmatch Result findings were sent to the Radiology Results [address] at 2:46 pm on 11/17/2020to be communicated to a licensed caregiver. Nm Lung Scan Perfusion Only    Result Date: 11/17/2020  EXAMINATION: NUCLEAR MEDICINE PERFUSION SCAN. PORTABLE CHEST RADIOGRAPH 11/17/2020 TECHNIQUE: 5 millicuries of Tc 57F MAA was administered intravenously prior to planar imaging of the lungs in multiple projections. HISTORY: ORDERING SYSTEM PROVIDED HISTORY: elevated D-Dimer outside hospital Reason for Exam: Covid-19 positive pt. and elevated D-Dimer 2.44 FINDINGS: PERFUSION: Distribution of radiotracer is slightly heterogeneous. No segmental defects identified. CHEST RADIOGRAPH:  Bilateral mid to lower lung field left greater than right opacities with peripheral involvement most pronounced at the bases. No effusion or pneumothorax. Normal heart size. Postsurgical changes of median sternotomy. Right-sided Port-A-Cath is in place     *Low Probability for Pulmonary Embolus. *Bilateral mid to lower lung field left greater than right opacities with peripheral involvement most pronounced at the bases compatible with  COVID-19 pneumonia. The findings were sent to the Radiology Results [address] at 2:46 pm on 11/17/2020to be communicated to a licensed caregiver.          IMPRESSION:   Primary Problem  Pneumonia due to COVID-19 virus    Active Hospital Problems    Diagnosis Date Noted    MDS (myelodysplastic syndrome) ([facility] [address] [D46.9] 11/17/2020    Pneumonia due to COVID-19 virus [U07.1, J12.89] 11/17/2020    Neutropenic sepsis ([address] [A41.9, D70.9] 11/17/2020    Bicytopenia [D75.89] 11/17/2020    ROSALINO (acute kidney injury) ([facility] [address] [N17.9] 11/17/2020    Acute respiratory failure with hypoxia ([facility] [address] [J96.01] 11/17/2020    Essential hypertension [I10] 11/17/2020    Coronary artery disease involving coronary bypass graft of native heart without angina pectoris [I25.810] 11/17/2020    History of Raynaud's syndrome [Z86.79] 11/17/2020    Transaminitis [R74.01] 11/17/2020    Severe sepsis ([facility] [address] [A41.9, R65.20] 11/17/2020    Immunosuppressed status ([facility] [address] [D84.9]     Acute respiratory failure due to COVID-19 ([facility] [U07.1, J96.00] 11/16/2020       MDS  Anemia, transfusion dependent  COVID-19 infection    RECOMMENDATIONS:  1. I personally reviewed results of lab work-up imaging studies and other relevant clinical data. 2. Reviewed records from outside facility  3. 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 She has been started on Vidaza. She has received 1 cycle so far. According the patient she was scheduled for next cycle soon. 4. Transfuse to keep hemoglobin above 7  5. Okay to give anticoagulation as long as platelet count above 50,000 patient not bleeding  6. Patient status post 3 doses of Granix. Adequate ANC. 7. Continue symptomatic and supportive care. We will continue to follow. 8. Patient also has transfusion dependent anemia  9. We will continue to follow. Discussed with patient and Nurse. Thank you for asking us to see this patient. Jackson Guallpa MD          This note is created with the assistance of a speech recognition program.  While intending to generate a document that actually reflects the content of the visit, the document can still have some errors including those of syntax and sound a like substitutions which may escape proof reading. It such instances, actual meaning can be extrapolated by contextual diversion.

## 2020-11-21 NOTE — PROGRESS NOTES
[facility]  Office: [address], DO, Julio C Ebbs, DO, Matias List, DO, Alejandra Dixon Blood, DO, Timbo Kemp MD, Brandee Espana MD, Ranjit Oshea MD, Kelly Krueger MD, Raghav Wong MD, Cris Rangel MD, Chaparrita Carrera MD, Live Pryor MD, Ren Cantrell MD, Darrell Heart, DO, Eriberto Naylor MD, Charbel Shine MD, Chano Medina, DO, Abby Gee MD,  Veorna Sim, DO, Kady Colon MD, Tiago Albarran MD, Naina Reed, CNP, [facility] Hearn, CNP, Eli Christie, CNP, Filiberto Wilson, CNS, Mono Thompson, CNP, Duane Corona, CNP, Yoselin Austin, CNP, Adriano Pang, CNP, Yasmeen Ozuna, CNP, Sixto Russo PA-C, Junior Husain, DNP, Ricky Pierre, CNP, Iwona Smith, CNP, Judi Li, CNP, Aliyah Hanson, CNP, Tapan Mcneal, CNP         [facility]   [address] [facility]    Progress Note    11/21/2020    5:22 PM    Name:   Primo Hinds  MRN:     [id number]     Acct:      [de-identified]   Room:   3023023-01   Day:  5  Admit Date:  11/16/2020 10:51 PM    PCP:   Bo Jean MD  Code Status:  Full Code    Subjective:     C/C: No chief complaint on file. SOB  Interval History Status: not changed. Seen and examined face-to-face today,  Still very short of breath, needing 100% FiO2 via high flow nasal cannula,  Discussed with pulmonary critical care and infectious disease,  Would like to keep a negative fluid balance, started on Lasix 20 mg every morning for 3 days,  On dexamethasone,  Completed remdesivir,  Given convalescent plasma,  Antibiotics discontinued after negative cultures,  Neupogen for neutropenia,  Myelodysplastic syndrome, oncology on board        Brief History:     Primo Hinds is a 70 y.o. female with a past medical history for MDS, essential hypertension, melanoma, mitral valve prolapse, MRSA, Raynaud's disease presents emergency department for shortness of breath and dyspnea upon exertion for 3 days.   Patient was initially seen at [facility] diagnosed with Covid pneumonia. Patient requiring 50% FiO2 on BiPAP, patient also had an elevated D-dimer but CTA was not possible secondary to contrast allergy. [facility] unable to accommodate due to lack of Covid beds, patient was transferred for further care. Spoke with pulmonary medicine at [facility] and was accepted for ICU transfer. Patient was given 1 unit of PRBCs at [facility] for a hemoglobin less than 7, patient was also placed on BiPAP for transfer. Patient arrived, no complaints except for some shortness of breath. Needing high flow oxygen, still critical    Review of Systems:     Constitutional: Anxious  Respiratory: Shortness of breath, cough  Cardiovascular:  negative for chest pain, chest pressure/discomfort, lower extremity edema, palpitations  Gastrointestinal:  negative for abdominal pain, constipation, diarrhea, nausea, vomiting  Neurological: Tremors    Medications: Allergies: Allergies   Allergen Reactions    Fish Allergy Anaphylaxis, Hives and Swelling    Hydrocodone-Acetaminophen Anaphylaxis    Tizanidine Anaphylaxis and Hives    Atorvastatin Hives, Other (See Comments) and Swelling    Metoclopramide Hives     Other reaction(s): Unknown, Unknown    Moxifloxacin Hives, Other (See Comments) and Swelling     Other reaction(s): Other (See Comments), Unknown    Oxycodone Other (See Comments)     Other reaction(s): Hallucinations, Mental Status Change    Pregabalin Other (See Comments)     Other reaction(s): Hallucinations, Other (See Comments)  Causes sores in the mouth  Causes sores in the mouth      Tramadol      Other reaction(s): Edema, Other (See Comments)    Zolpidem Other (See Comments)     Other reaction(s): Other (See Comments)  causes cramps in hands and legs  causes cramps in hands and legs  Other reaction(s):  Other (See Comments)  causes cramps in hands and legs  causes cramps in hands and legs      Cephalexin Rash     Other reaction(s): Unknown, Unknown    Iodides Rash and Swelling     ivp and heart cath dye    Sulfa Antibiotics Rash       Current Meds:   Scheduled Meds:    furosemide  20 mg Intravenous Daily    LORazepam  0.25 mg Intravenous Once    enoxaparin  40 mg Subcutaneous BID    dexamethasone  6 mg Intravenous Daily    latanoprost  1 drop Both Eyes Nightly    sodium chloride  20 mL Intravenous Once    sodium chloride  20 mL Intravenous Once     Continuous Infusions:   PRN Meds: sodium chloride, sodium chloride flush, potassium chloride **OR** potassium alternative oral replacement **OR** potassium chloride, magnesium sulfate, acetaminophen **OR** acetaminophen, polyethylene glycol, promethazine **OR** ondansetron, nicotine    Data:     Past Medical History:   has a past medical history of Cancer ([facility] [address] Deaf, left, Essential hypertension, GERD (gastroesophageal reflux disease), Glaucoma, Hyperlipidemia, [facility] MRSA (methicillin resistant staph aureus) culture positive, MVP (mitral valve prolapse), Pharyngoesophageal dysphagia, Raynaud disease, and Status post four vessel coronary artery bypass. Social History:   reports that she quit smoking about 15 years ago. Her smoking use included cigarettes. She started smoking about 63 years ago. She does not have any smokeless tobacco history on file. She reports that she does not drink alcohol or use drugs. Family History:   Family History   Problem Relation Age of Onset    Heart Failure Mother     Early Death Mother     Heart Disease Mother     Stroke Father        Vitals:  /71   Pulse 84   Temp 99.2 °F (37.3 °C) (Oral)   Resp 19   Ht 5' 7\" (1.702 m)   Wt 192 lb 14.4 oz (87.5 kg)   SpO2 92%   BMI 30.21 kg/m²   Temp (24hrs), Av.9 °F (36.6 °C), Min:97.3 °F (36.3 °C), Max:99.2 °F (37.3 °C)    No results for input(s): POCGLU in the last 72 hours. I/O (24Hr):     Intake/Output Summary (Last 24 hours) at 2020 [facility] filed at 11/21/2020 1536  Gross per 24 hour   Intake 480 ml   Output 1450 ml   Net -970 ml       Labs:  Hematology:  Recent Labs     11/19/20  0544 11/20/20  0537 11/21/20  0540   WBC 3.3* 9.0 12.8*   RBC 2.61* 2.60* 2.36*   HGB 7.8* 8.0* 7.3*   HCT 23.8* 26.0* 23.4*   MCV 91.2 100.0 99.2   MCH 29.9 30.8 30.9   MCHC 32.8 30.8 31.2   RDW 19.2* 19.6* 19.0*    358 423   MPV 11.4 11.4 11.5   CRP  --   --  67.6*   DDIMER  --   --  1.93     Chemistry:  Recent Labs     11/19/20  0544 11/21/20  0540    143   K 4.0 3.5*    106   CO2 21 25   GLUCOSE 94 84   BUN 19 22   CREATININE 0.53 0.52   MG 2.4 2.0   ANIONGAP 15 12   LABGLOM >60 >60   GFRAA >60 >60   CALCIUM 8.4* 8.6   PROBNP  --  712*     Recent Labs     11/19/20  0544   PROT 5.6*   LABALBU 2.6*   AST 33*   ALT 35*   ALKPHOS 58   BILITOT 0.47     ABG:No results found for: POCPH, PHART, PH, POCPCO2, GAT2QBE, PCO2, POCPO2, PO2ART, PO2, POCHCO3, JJS0UZW, HCO3, NBEA, PBEA, BEART, BE, THGBART, THB, NKI6PUD, JQLM3VNB, W8IFFRRF, O2SAT, FIO2  Lab Results   Component Value Date/Time    SPECIAL RT HAND 2ML 11/17/2020 02:48 AM     Lab Results   Component Value Date/Time    CULTURE NO GROWTH 4 DAYS 11/17/2020 02:48 AM       Radiology:  [facility] Chest (single View Frontal)    Result Date: 11/17/2020  *Low Probability for Pulmonary Embolus. *Bilateral mid to lower lung field left greater than right opacities with peripheral involvement most pronounced at the bases compatible with  COVID-19 pneumonia. The findings were sent to the Radiology Results [id number] at 2:46 pm on 11/17/2020to be communicated to a licensed caregiver. Nm Lung Scan Perfusion Only    Result Date: 11/17/2020  *Low Probability for Pulmonary Embolus. *Bilateral mid to lower lung field left greater than right opacities with peripheral involvement most pronounced at the bases compatible with  COVID-19 pneumonia.  The findings were sent to the Radiology Results Communication

## 2020-11-21 NOTE — PLAN OF CARE
This Shift  11/21/2020 0732 by Carrington Block RN  Outcome: Ongoing  Goal: Control of acute pain  Description: Control of acute pain  11/21/2020 1417 by Katelin Smith RN  Outcome: Met This Shift  11/21/2020 0732 by Carrington Block RN  Outcome: Ongoing  Goal: Control of chronic pain  Description: Control of chronic pain  11/21/2020 1417 by Katelin Smith RN  Outcome: Met This Shift  11/21/2020 0732 by Carrington Block RN  Outcome: Ongoing     Problem: Airway Clearance - Ineffective  Goal: Achieve or maintain patent airway  11/21/2020 1417 by Katelin Smith RN  Outcome: Met This Shift  11/21/2020 0732 by Carrington Block RN  Outcome: Ongoing     Problem: Risk for Fluid Volume Deficit  Goal: Maintain normal heart rhythm  11/21/2020 1417 by Katelin Smith RN  Outcome: Met This Shift  11/21/2020 0732 by Carrington Block RN  Outcome: Ongoing  Goal: Maintain absence of muscle cramping  11/21/2020 1417 by Katelin Smith RN  Outcome: Met This Shift  11/21/2020 0732 by Carrington Block RN  Outcome: Ongoing  Goal: Maintain normal serum potassium, sodium, calcium, phosphorus, and pH  11/21/2020 1417 by Katelin Smith RN  Outcome: Met This Shift  11/21/2020 0732 by Carrington Block RN  Outcome: Ongoing     Problem: Loneliness or Risk for Loneliness  Goal: Demonstrate positive use of time alone when socialization is not possible  11/21/2020 1417 by Katelin Smith RN  Outcome: Met This Shift  11/21/2020 0732 by Carrington Block RN  Outcome: Ongoing     Problem: OXYGENATION/RESPIRATORY FUNCTION  Goal: Patient will maintain patent airway  11/21/2020 1417 by Katelin Smith RN  Outcome: Met This Shift  11/21/2020 0732 by Carrington Block RN  Outcome: Ongoing  Goal: Patient will achieve/maintain normal respiratory rate/effort  Description: Respiratory rate and effort will be within normal limits for the patient  11/21/2020 1417 by Katelin Smith RN  Outcome: Met This Shift  11/21/2020 0732 by Carrington Block RN  Outcome: Ongoing

## 2020-11-22 PROBLEM — E87.6 HYPOKALEMIA: Status: ACTIVE | Noted: 2020-01-01

## 2020-11-22 NOTE — PROGRESS NOTES
[facility]  Office: [address], DO, Mallory Stern, DO, Sho Leonard, DO, Elin Mcintyre Blood, DO, Mya Fowler MD, Patricia Chavarria MD, Zelda Aviles MD, Hallie Aldana MD, Jiles Alpers, MD, Jania Kaminski MD, Romero Hill MD, Ozzie Del Real MD, Ren Escalante MD, Bria Sprague, DO, Enrique Murray MD, Shannan Davis MD, Malachi Mcpherson, DO, Orestes Caro MD,  Abigail Limon DO, Hiram Deras MD, Belkis Rose MD, Evelyne Ramírez, CNP, [facility] Traceaudie, CNP, Imer Larson, CNP, Kellie Wells, CNS, Alicia Underwood, CNP, Felicia Aguirre, CNP, Storm Meraz, CNP, Dung Cruz, CNP, Olivia Cristina, CNP, MARCIE CullenC, Johnny Song, LUIS, Chio Espana, CNP, Renetta Weir, CNP, Rufino Queen, CNP, Charity Holguin, CNP, Earle Gaona, CNP         [facility]   [address] [facility]    Progress Note    11/22/2020    4:52 PM    Name:   Lila Mays  MRN:     [id number]     Acct:      [de-identified]   Room:   [id number]   Day:  6  Admit Date:  11/16/2020 10:51 PM    PCP:   Kristie Dubin, MD  Code Status:  Full Code    Subjective:     C/C: No chief complaint on file. SOB  Interval History Status: not changed. Seen and examined face-to-face today,  Still very short of breath, needing 100% FiO2 via high flow nasal cannula,  Discussed with pulmonary critical care and infectious disease,  Would like to keep a negative fluid balance, started on Lasix 20 mg every morning for 3 days,  On dexamethasone,  Completed remdesivir,  Given convalescent plasma,  Antibiotics discontinued after negative cultures,  Neupogen for neutropenia,  Myelodysplastic syndrome, oncology on board        Brief History:     Lila Mays is a 70 y.o. female with a past medical history for MDS, essential hypertension, melanoma, mitral valve prolapse, MRSA, Raynaud's disease presents emergency department for shortness of breath and dyspnea upon exertion for 3 days.   Patient was initially seen at [facility] diagnosed with Covid pneumonia. Patient requiring 50% FiO2 on BiPAP, patient also had an elevated D-dimer but CTA was not possible secondary to contrast allergy. [facility] unable to accommodate due to lack of Covid beds, patient was transferred for further care. Spoke with pulmonary medicine at [facility] and was accepted for ICU transfer. Patient was given 1 unit of PRBCs at [facility] for a hemoglobin less than 7, patient was also placed on BiPAP for transfer. Patient arrived, no complaints except for some shortness of breath. Needing high flow oxygen, still critical    Review of Systems:     Constitutional: Anxious  Respiratory: Shortness of breath, cough  Cardiovascular:  negative for chest pain, chest pressure/discomfort, lower extremity edema, palpitations  Gastrointestinal:  negative for abdominal pain, constipation, diarrhea, nausea, vomiting  Neurological: Tremors    Medications: Allergies: Allergies   Allergen Reactions    Fish Allergy Anaphylaxis, Hives and Swelling    Hydrocodone-Acetaminophen Anaphylaxis    Tizanidine Anaphylaxis and Hives    Atorvastatin Hives, Other (See Comments) and Swelling    Metoclopramide Hives     Other reaction(s): Unknown, Unknown    Moxifloxacin Hives, Other (See Comments) and Swelling     Other reaction(s): Other (See Comments), Unknown    Oxycodone Other (See Comments)     Other reaction(s): Hallucinations, Mental Status Change    Pregabalin Other (See Comments)     Other reaction(s): Hallucinations, Other (See Comments)  Causes sores in the mouth  Causes sores in the mouth      Tramadol      Other reaction(s): Edema, Other (See Comments)    Zolpidem Other (See Comments)     Other reaction(s): Other (See Comments)  causes cramps in hands and legs  causes cramps in hands and legs  Other reaction(s):  Other (See Comments)  causes cramps in hands and legs  causes cramps in hands and legs      11/22/2020 1652  Last data filed at 11/22/2020 1553  Gross per 24 hour   Intake 490 ml   Output 250 ml   Net 240 ml       Labs:  Hematology:  Recent Labs     11/20/20  0537 11/21/20  0540 11/22/20  0513   WBC 9.0 12.8* 10.4   RBC 2.60* 2.36* 2.70*   HGB 8.0* 7.3* 8.1*   HCT 26.0* 23.4* 27.6*   .0 99.2 102.2   MCH 30.8 30.9 30.0   MCHC 30.8 31.2 29.3   RDW 19.6* 19.0* 19.2*    423 385   MPV 11.4 11.5 11.2   CRP  --  67.6*  --    DDIMER  --  1.93  --      Chemistry:  Recent Labs     11/21/20  0540 11/22/20  1046     --    K 3.5* 3.1*     --    CO2 25  --    GLUCOSE 84  --    BUN 22  --    CREATININE 0.52  --    MG 2.0  --    ANIONGAP 12  --    LABGLOM >60  --    GFRAA >60  --    CALCIUM 8.6  --    PROBNP 712*  --      No results for input(s): PROT, LABALBU, LABA1C, R7QYGWT, D1BRFPG, FT4, TSH, AST, ALT, LDH, GGT, ALKPHOS, LABGGT, BILITOT, BILIDIR, AMMONIA, AMYLASE, LIPASE, LACTATE, CHOL, HDL, LDLCHOLESTEROL, CHOLHDLRATIO, TRIG, VLDL, SNG62QI, PHENYTOIN, PHENYF, URICACID, POCGLU in the last 72 hours. ABG:No results found for: POCPH, PHART, PH, POCPCO2, LOU3FZQ, PCO2, POCPO2, PO2ART, PO2, POCHCO3, VIZ7TAK, HCO3, NBEA, PBEA, BEART, BE, THGBART, THB, OYI9UXH, YJSE2KSB, R4AATKYK, O2SAT, FIO2  Lab Results   Component Value Date/Time    SPECIAL RT HAND 2ML 11/17/2020 02:48 AM     Lab Results   Component Value Date/Time    CULTURE NO GROWTH 5 DAYS 11/17/2020 02:48 AM       Radiology:  [facility] Chest (single View Frontal)    Result Date: 11/17/2020  *Low Probability for Pulmonary Embolus. *Bilateral mid to lower lung field left greater than right opacities with peripheral involvement most pronounced at the bases compatible with  COVID-19 pneumonia. The findings were sent to the Radiology Results [id number] at 2:46 pm on 11/17/2020to be communicated to a licensed caregiver. Nm Lung Scan Perfusion Only    Result Date: 11/17/2020  *Low Probability for Pulmonary Embolus.  *Bilateral mid to lower lung field left greater than right opacities with peripheral involvement most pronounced at the bases compatible with  COVID-19 pneumonia. The findings were sent to the Radiology Results [id number] at 2:46 pm on 11/17/2020to be communicated to a licensed caregiver. Physical Examination:        General appearance:  alert, cooperative  Mental Status:  oriented to person, place and time and normal affect  Lungs: Coarse breath sounds bilateral  Heart:  regular rate and rhythm, no murmur  Abdomen:  soft, nontender, nondistended, normal bowel sounds, no masses, hepatomegaly, splenomegaly  Extremities:  no edema, redness, tenderness in the calves  Skin:  no gross lesions, rashes, induration    Assessment:        Hospital Problems           Last Modified POA    * (Principal) Pneumonia due to COVID-19 virus 11/17/2020 Yes    Acute respiratory failure due to COVID-19 ([address] 11/16/2020 Yes    MDS (myelodysplastic syndrome) ([address] 11/17/2020 Yes    Neutropenic sepsis ([address] 11/17/2020 Yes    Bicytopenia 11/17/2020 Yes    ROSALINO (acute kidney injury) ([address] 11/17/2020 Yes    Acute respiratory failure with hypoxia ([address] 11/17/2020 Yes    Essential hypertension 11/17/2020 Yes    Coronary artery disease involving coronary bypass graft of native heart without angina pectoris 11/17/2020 Yes    History of Raynaud's syndrome 11/17/2020 Yes    Transaminitis 11/17/2020 Yes    Immunosuppressed status ([address] 11/17/2020 Yes    Severe sepsis ([address] 11/17/2020 Yes    Hypokalemia 11/22/2020 Yes          Plan:        1. Covid pneumonia, acute respiratory failure, started on remdesivir, convalescent plasma, Decadron, infectious disease and pulmonary critical care on board, respiratory status deteriorated, needing high flow oxygen via nasal cannula, FiO2 100%,  2. Myelodysplastic syndrome and pancytopenia, oncology consulted and discussed in detail, advised to keep the patient on DVT prophylaxis with Lovenox twice daily,  3.  Hypokalemia, replaced , check next am   4. VQ scan low probability,  5. Keep negative fluid balance with Lasix 20 mg every morning for 3 days  6. Neutropenic sepsis, antibiotics as per infectious disease, blood cultures negative, antibiotics stopped by infectious disease at this time,  7. Neutropenia, Neupogen as per oncology  8. Acute kidney injury, creatinine improving,  9. Transaminitis, monitor liver functions,  10. Hypertension, monitor blood pressure,  11. DVT prophylaxis with Lovenox,  12. GI prophylaxis,  13. Full CODE STATUS,  14.  Total time spent 34 minutes    Nyoka Hodgkins, MD  11/22/2020  4:52 PM

## 2020-11-22 NOTE — PLAN OF CARE
Problem:  Activity:  Goal: Fatigue will decrease  Description: Fatigue will decrease  Outcome: Ongoing     Problem: Cardiac:  Goal: Hemodynamic stability will improve  Description: Hemodynamic stability will improve  Outcome: Ongoing     Problem: Coping:  Goal: Level of anxiety will decrease  Description: Level of anxiety will decrease  Outcome: Ongoing  Goal: Ability to cope will improve  Description: Ability to cope will improve  Outcome: Ongoing  Goal: Ability to establish a method of communication will improve  Description: Ability to establish a method of communication will improve  Outcome: Ongoing     Problem: Nutritional:  Goal: Consumption of the prescribed amount of daily calories will improve  Description: Consumption of the prescribed amount of daily calories will improve  Outcome: Ongoing     Problem: Respiratory:  Goal: Ability to maintain a clear airway will improve  Description: Ability to maintain a clear airway will improve  Outcome: Ongoing  Goal: Ability to maintain adequate ventilation will improve  Description: Ability to maintain adequate ventilation will improve  Outcome: Ongoing  Goal: Complications related to the disease process, condition or treatment will be avoided or minimized  Description: Complications related to the disease process, condition or treatment will be avoided or minimized  Outcome: Ongoing     Problem: Skin Integrity:  Goal: Risk for impaired skin integrity will decrease  Description: Risk for impaired skin integrity will decrease  Outcome: Ongoing  Goal: Will show no infection signs and symptoms  Description: Will show no infection signs and symptoms  Outcome: Ongoing  Goal: Absence of new skin breakdown  Description: Absence of new skin breakdown  Outcome: Ongoing     Problem: Falls - Risk of:  Goal: Will remain free from falls  Description: Will remain free from falls  Outcome: Ongoing  Goal: Absence of physical injury  Description: Absence of physical injury  Outcome: Ongoing     Problem: Pain:  Goal: Pain level will decrease  Description: Pain level will decrease  Outcome: Ongoing  Goal: Control of acute pain  Description: Control of acute pain  Outcome: Ongoing  Goal: Control of chronic pain  Description: Control of chronic pain  Outcome: Ongoing     Problem: Airway Clearance - Ineffective  Goal: Achieve or maintain patent airway  Outcome: Ongoing     Problem: Gas Exchange - Impaired  Goal: Absence of hypoxia  Outcome: Ongoing  Goal: Promote optimal lung function  Outcome: Ongoing     Problem: Breathing Pattern - Ineffective  Goal: Ability to achieve and maintain a regular respiratory rate  Outcome: Ongoing     Problem:  Body Temperature -  Risk of, Imbalanced  Goal: Ability to maintain a body temperature within defined limits  Outcome: Ongoing  Goal: Will regain or maintain usual level of consciousness  Outcome: Ongoing  Goal: Complications related to the disease process, condition or treatment will be avoided or minimized  Outcome: Ongoing     Problem: Isolation Precautions - Risk of Spread of Infection  Goal: Prevent transmission of infection  Outcome: Ongoing     Problem: Nutrition Deficits  Goal: Optimize nutrtional status  Outcome: Ongoing     Problem: Risk for Fluid Volume Deficit  Goal: Maintain normal heart rhythm  Outcome: Ongoing  Goal: Maintain absence of muscle cramping  Outcome: Ongoing  Goal: Maintain normal serum potassium, sodium, calcium, phosphorus, and pH  Outcome: Ongoing     Problem: Loneliness or Risk for Loneliness  Goal: Demonstrate positive use of time alone when socialization is not possible  Outcome: Ongoing     Problem: Fatigue  Goal: Verbalize increase energy and improved vitality  Outcome: Ongoing     Problem: Patient Education: Go to Patient Education Activity  Goal: Patient/Family Education  Outcome: Ongoing     Problem: OXYGENATION/RESPIRATORY FUNCTION  Goal: Patient will maintain patent airway  11/22/2020 0421 by Pamela Parks Usman Mujica RN  Outcome: Ongoing  11/21/2020 2041 by Ernestina Lira RCP  Outcome: Ongoing  Goal: Patient will achieve/maintain normal respiratory rate/effort  Description: Respiratory rate and effort will be within normal limits for the patient  11/22/2020 0421 by Jessi Jacobson RN  Outcome: Ongoing  11/21/2020 2041 by Ernestina Lira RCP  Outcome: Ongoing

## 2020-11-22 NOTE — FLOWSHEET NOTE
VIA Phone   Assessment: Patient is experiencing emotional distress. The patient expressed her feelings with the . Patient shared about her coping ability. Intervention:  engaged in active listening.  explored the patient's thoughts and feelings.  inquired about the patients support system; family, friends, and community groups.  asked if the patient would like prayer and informed the patient that chaplains are available 24/7.  will be dropping off a bible and a devotional book for the patient. Outcome: The patient engaged in the conversation and vented her emothions. The patient was comforted by the prayer. 11/22/20 1206   Encounter Summary   Services provided to: Patient   Referral/Consult From: [facility]   [facility] ([facility])   Contact Religion No   Continue Visiting   (11/22/20)   Complexity of Encounter High   Length of Encounter 15 minutes   Spiritual Assessment Completed Yes   Crisis   Type Emotional distress   Assessment Anxious; Fearful   Intervention Active listening;Nurtured hope;Explored feelings, thoughts, concerns;Prayer;Provided reading materials/devotional materials   Outcome Engaged in conversation;Comfort;Expressed feelings/needs/concerns;Venting emotion

## 2020-11-22 NOTE — PLAN OF CARE
Problem:  Activity:  Goal: Fatigue will decrease  Description: Fatigue will decrease  11/22/2020 1501 by Teddy Bashir RN  Outcome: Ongoing  11/22/2020 0421 by Ruby Jc RN  Outcome: Ongoing     Problem: Cardiac:  Goal: Hemodynamic stability will improve  Description: Hemodynamic stability will improve  11/22/2020 1501 by Teddy Bashir RN  Outcome: Ongoing  11/22/2020 0421 by Ruby Jc RN  Outcome: Ongoing     Problem: Coping:  Goal: Level of anxiety will decrease  Description: Level of anxiety will decrease  11/22/2020 1501 by Teddy Bashir RN  Outcome: Ongoing  11/22/2020 0421 by Ruby Jc RN  Outcome: Ongoing  Goal: Ability to cope will improve  Description: Ability to cope will improve  11/22/2020 1501 by Teddy Bashir RN  Outcome: Ongoing  11/22/2020 0421 by Ruby Jc RN  Outcome: Ongoing  Goal: Ability to establish a method of communication will improve  Description: Ability to establish a method of communication will improve  11/22/2020 1501 by Teddy Bashir RN  Outcome: Ongoing  11/22/2020 0421 by Ruby Jc RN  Outcome: Ongoing     Problem: Nutritional:  Goal: Consumption of the prescribed amount of daily calories will improve  Description: Consumption of the prescribed amount of daily calories will improve  11/22/2020 1501 by Teddy Bashir RN  Outcome: Ongoing  11/22/2020 0421 by Ruby Jc RN  Outcome: Ongoing     Problem: Respiratory:  Goal: Ability to maintain a clear airway will improve  Description: Ability to maintain a clear airway will improve  11/22/2020 1501 by Teddy Bashir RN  Outcome: Ongoing  11/22/2020 0421 by Ruby Jc RN  Outcome: Ongoing  Goal: Ability to maintain adequate ventilation will improve  Description: Ability to maintain adequate ventilation will improve  11/22/2020 1501 by Teddy Bashir RN  Outcome: Ongoing  11/22/2020 0421 by Ruby Jc RN  Outcome: Ongoing  Goal: Complications related to the disease process, condition or treatment will be avoided or minimized  Description: Complications related to the disease process, condition or treatment will be avoided or minimized  11/22/2020 1501 by Jamila Bolton RN  Outcome: Ongoing  11/22/2020 0421 by Keyana Avendaño RN  Outcome: Ongoing     Problem: Skin Integrity:  Goal: Risk for impaired skin integrity will decrease  Description: Risk for impaired skin integrity will decrease  11/22/2020 1501 by Jamila Bolton RN  Outcome: Ongoing  11/22/2020 0421 by Keyana Avendaño RN  Outcome: Ongoing  Goal: Will show no infection signs and symptoms  Description: Will show no infection signs and symptoms  11/22/2020 1501 by Jamila Bolton RN  Outcome: Ongoing  11/22/2020 0421 by Keyana Avendaño RN  Outcome: Ongoing  Goal: Absence of new skin breakdown  Description: Absence of new skin breakdown  11/22/2020 1501 by Jamila Bolton RN  Outcome: Ongoing  11/22/2020 0421 by Keyana Avendaño RN  Outcome: Ongoing     Problem: Falls - Risk of:  Goal: Will remain free from falls  Description: Will remain free from falls  11/22/2020 1501 by Jamila Bolton RN  Outcome: Ongoing  11/22/2020 0421 by Keyana Avendaño RN  Outcome: Ongoing  Goal: Absence of physical injury  Description: Absence of physical injury  11/22/2020 1501 by Jamila Bolton RN  Outcome: Ongoing  11/22/2020 0421 by Keyana Avendaño RN  Outcome: Ongoing     Problem: Pain:  Goal: Pain level will decrease  Description: Pain level will decrease  11/22/2020 1501 by Jamila Bolton RN  Outcome: Ongoing  11/22/2020 0421 by Keyana Avendaño RN  Outcome: Ongoing  Goal: Control of acute pain  Description: Control of acute pain  11/22/2020 1501 by Jamila Bolton RN  Outcome: Ongoing  11/22/2020 0421 by Keyana Avendaño RN  Outcome: Ongoing  Goal: Control of chronic pain  Description: Control of chronic pain  11/22/2020 1501 by Jamila Bolton RN  Outcome: Ongoing  11/22/2020 0421 by Keyana Avendaño RN  Outcome: Ongoing Problem: Airway Clearance - Ineffective  Goal: Achieve or maintain patent airway  11/22/2020 1501 by Nic Tinoco RN  Outcome: Ongoing  11/22/2020 0421 by Juan Manuel Farah RN  Outcome: Ongoing     Problem: Gas Exchange - Impaired  Goal: Absence of hypoxia  11/22/2020 1501 by Nic Tinoco RN  Outcome: Ongoing  11/22/2020 0421 by Juan Manuel Farah RN  Outcome: Ongoing  Goal: Promote optimal lung function  11/22/2020 1501 by Nic Tinoco RN  Outcome: Ongoing  11/22/2020 0421 by Juan Manuel Farah RN  Outcome: Ongoing     Problem: Breathing Pattern - Ineffective  Goal: Ability to achieve and maintain a regular respiratory rate  11/22/2020 1501 by Nic Tinoco RN  Outcome: Ongoing  11/22/2020 0421 by Juan Manuel Farah RN  Outcome: Ongoing     Problem:  Body Temperature -  Risk of, Imbalanced  Goal: Ability to maintain a body temperature within defined limits  11/22/2020 1501 by Nic Tinoco RN  Outcome: Ongoing  11/22/2020 0421 by Juan Manuel Farah RN  Outcome: Ongoing  Goal: Will regain or maintain usual level of consciousness  11/22/2020 1501 by Nic Tinoco RN  Outcome: Ongoing  11/22/2020 0421 by Juan Manuel Farah RN  Outcome: Ongoing  Goal: Complications related to the disease process, condition or treatment will be avoided or minimized  11/22/2020 1501 by Nic Tinoco RN  Outcome: Ongoing  11/22/2020 0421 by Juan Manuel Farah RN  Outcome: Ongoing     Problem: Isolation Precautions - Risk of Spread of Infection  Goal: Prevent transmission of infection  11/22/2020 1501 by Nic Tinoco RN  Outcome: Ongoing  11/22/2020 0421 by Juan Manuel Farah RN  Outcome: Ongoing     Problem: Nutrition Deficits  Goal: Optimize nutrtional status  11/22/2020 1501 by Nic Tinoco RN  Outcome: Ongoing  11/22/2020 0421 by Juan Manuel Farah RN  Outcome: Ongoing     Problem: Risk for Fluid Volume Deficit  Goal: Maintain normal heart rhythm  11/22/2020 1501 by Nic Tinoco RN  Outcome: Ongoing  11/22/2020 0421 by Jerry Marie RN  Outcome: Ongoing  Goal: Maintain absence of muscle cramping  11/22/2020 1501 by Beatriz Masterson RN  Outcome: Ongoing  11/22/2020 0421 by Jerry Marie RN  Outcome: Ongoing  Goal: Maintain normal serum potassium, sodium, calcium, phosphorus, and pH  11/22/2020 1501 by Beatriz Masterson RN  Outcome: Ongoing  11/22/2020 0421 by Jerry Marie RN  Outcome: Ongoing     Problem: Loneliness or Risk for Loneliness  Goal: Demonstrate positive use of time alone when socialization is not possible  11/22/2020 1501 by Beatriz Masterson RN  Outcome: Ongoing  11/22/2020 0421 by Jerry Marie RN  Outcome: Ongoing     Problem: Fatigue  Goal: Verbalize increase energy and improved vitality  11/22/2020 1501 by Beatriz Masterson RN  Outcome: Ongoing  11/22/2020 0421 by Jerry Marie RN  Outcome: Ongoing     Problem: Patient Education: Go to Patient Education Activity  Goal: Patient/Family Education  11/22/2020 1501 by Beatriz Masterson RN  Outcome: Ongoing  11/22/2020 0421 by Jerry Marie RN  Outcome: Ongoing

## 2020-11-23 NOTE — PROGRESS NOTES
PULMONARY & CRITICAL CARE MEDICINE PROGRESS NOTE     Patient:  Andra Azevedo  MRN: [id number]  [facility] date: 11/16/2020  Primary Care Physician: Jaylene Herrera MD  Consulting Physician: Lindsay Morales MD  CODE Status: Full Code  LOS: 7     SUBJECTIVE     CHIEF COMPLAINT/REASON FOR INITIAL CONSULT:    BRIEF HOSPITAL COURSE:   The patient is a 70 y.o. female with history of myelodysplastic syndrome on chemotherapy apparently last 3 to 4 weeks ago, coronary artery disease status post CABG, hypertension, hyperlipidemia. Presented to [facility] with increasing fatigue and tiredness which according to patient she usually feels when her blood count goes low and she gets transfusion, 4 days history of cough associated with shortness of breath, cough is dry without sputum production. She did not have fever or chills. She denies headache body ache muscle ache myalgia sore throat. Denies nausea vomiting diarrhea or abdominal pain denies urinary complaint. In the emergency room she was found to be hypoxic, initially on nasal cannula and then she was switched to BiPAP after she was placed on BiPAP 50% she was saturating well remained on BiPAP and was transferred here to [facility] ICU unit. She had a CT chest done in [facility] which shows bilateral groundglass opacities nonspecific mediastinal neuropathy. She also had a hemoglobin of 6 and she received 1 unit packed RBC there  Since she was admitted overnight she remained on BiPAP 50% FiO2 had saturated well and she was transitioned to high flow nasal cannula this morning. Her initial labs show sodium 133 BUN 23 creatinine 0.81 bicarbonate 21. WBC count was 1.2 hemoglobin 7 hematocrit 22.4 and platelet 090. Procalcitonin was 1.0. . . proBNP 729. Ferritin 3024. Fibrinogen 720. D-dimer 2.44 and INR 0.9. She was also started on Lovenox for elevated D-dimer and a VQ scan was ordered by primary service.   She was started on remdesivir and on Decadron. INTERVAL HISTORY:  20   Patient decompensated and required intubation  Much more comfortable post intubation  Having small tracheal secretions  Review of systems:  Could not be done secondary to patient being intubated  OBJECTIVE     PaO2/FiO2 RATIO:  Recent Labs     20  1110   POCPO2 87.4      FiO2 : 100 %     VITAL SIGNS:   LAST:  BP (!) 157/85   Pulse 129   Temp 97.5 °F (36.4 °C) (Oral)   Resp 16   Ht 5' 7\" (1.702 m)   Wt 173 lb 15.1 oz (78.9 kg)   SpO2 97%   BMI 27.24 kg/m²   8-24 HR RANGE:  TEMP Temp  Av.8 °F (36.6 °C)  Min: 97.5 °F (36.4 °C)  Max: 98 °F (49.8 °C)   BP Systolic (29GKU), WKT:351 , Min:66 , RSM:837      Diastolic (16YDZ), RTT:02, Min:16, Max:119     PULSE Pulse  Av.4  Min: 84  Max: 150   RR Resp  Av.8  Min: 16  Max: 62   O2 SAT SpO2  Av.4 %  Min: 80 %  Max: 97 %   OXYGEN DELIVERY No data recorded        SYSTEMIC EXAMINATION:   I have discussed [facility], including pertinent history and exam findings, with the resident/APC/staff. I have seen the patient and the key elements of all parts of the encounter have been performed by me. Physical exam was deferred to limit physical exposure and PPE resources. I reviewed the interval history, interpreted all available radiographic, laboratory and physiologic data at the time of service. I agree with the assessment and plan as documented by resident/APC/ ancillary staff.        DATA REVIEW     Medications: Current Inpatient  Scheduled Meds:   haloperidol lactate        succinylcholine        etomidate        propofol        norepinephrine-sodium chloride        EPINEPHrine        potassium chloride  10 mEq Intravenous Daily    fentaNYL        furosemide  20 mg Intravenous Daily    LORazepam  0.25 mg Intravenous Once    enoxaparin  40 mg Subcutaneous BID    dexamethasone  6 mg Intravenous Daily    latanoprost  1 drop Both Eyes Nightly    sodium chloride  20 mL Triglycerides:  No results for input(s): TRIG in the last 72 hours. Microbiology:  No results for input(s): SPECDESC, SPECDESC, SPECIAL, CULTURE, CULTURE, STATUS, ORG, CDIFFTOXPCR, CAMPYLOBPCR, SALMONELLAPC, SHIGAPCR, SHIGELLAPCR, MPNEUG, MPNEUM, LACTOQL in the last 72 hours. Pathology:    Radiology Reports:    XR CHEST PORTABLE   Final Result   Endotracheal tube tip is approximately 4 cm above the nghia. Slight increase in bilateral parenchymal opacities. XR CHEST PORTABLE   Final Result   No significant change in chest findings compared to the prior study from   November 18th. XR CHEST PORTABLE   Final Result   No change in bilateral pulmonary opacities left greater than right with more   peripheral involvement compatible with pneumonitis. XR CHEST (SINGLE VIEW FRONTAL)   Final Result   *Low Probability for Pulmonary Embolus. *Bilateral mid to lower lung field left greater than right opacities with   peripheral involvement most pronounced at the bases compatible with  COVID-19   pneumonia. The findings were sent to the Radiology Results [address] at 2:46   pm on 11/17/2020to be communicated to a licensed caregiver. NM LUNG SCAN PERFUSION ONLY   Final Result   *Low Probability for Pulmonary Embolus. *Bilateral mid to lower lung field left greater than right opacities with   peripheral involvement most pronounced at the bases compatible with  COVID-19   pneumonia. The findings were sent to the Radiology Results [address] at 2:46   pm on 11/17/2020to be communicated to a licensed caregiver. Echocardiogram:   No results found for this or any previous visit. ASSESSMENT AND PLAN     Assessment:    Acute hypoxic respiratory failure. Bilateral multifocal groundglass opacities/pneumonia due to COVID-19 infection.   Sepsis secondary to COVID-19 infection/secondary to immune suppression/leukopenia  Elevated D-dimer, ferritin, fibrinogen, CRP secondary to COVID-19 infection. Myelodysplastic syndrome on chemotherapy. Leukopenia possibly secondary to chemotherapy/MDS. Anemia secondary to myelodysplastic syndrome. History of coronary artery disease status post CABG. History of hypertension. Hyperlipidemia. Shock secondary to sepsis  Anemia, stable    Plan:  1. Continue Decadron. 2. ARDS ventilator protocol to be followed  3. Chest x-ray and ABG as needed  4. Lasix 20mg Qam X3  5. Patient is on Lovenox and Decadron  6. Patient is on pressor support  7. Discussed with primary attending physician    Total critical care time caring for this patient with life threatening, unstable organ failure, including direct patient contact, management of life support systems, review of data including imaging and labs, discussions with other team members and physicians at least 27  Min so far today, excluding procedures. Fransisca Go MD  Pulmonary and Critical Care Medicine           11/23/2020, 11:36 AM    This patient was evaluated in the context of the global SARS-CoV-2 (COVID-19) pandemic, which necessitated considerations that the patient either has COVID-19 infection or is at risk of infection with COVID-19. Institutional protocols and algorithms that pertain to the evaluation & management of patients with COVID-19 or those at risk for COVID-19 are in a state of rapid changes based on information released by regulatory bodies including the CDC and federal and state organizations. These policies and algorithms were followed during the patient's care. Please note that this chart was generated using voice recognition Dragon dictation software. Although every effort was made to ensure the accuracy of this automated transcription, some errors in transcription may have occurred.

## 2020-11-23 NOTE — PLAN OF CARE
Nutrition Problem #1: Inadequate oral intake  Intervention: Food and/or Nutrient Delivery: Continue NPO(Start diet vs nutrition support as able)  Nutritional Goals: Start diet vs nutrition support within 24-72 hrs

## 2020-11-23 NOTE — PROGRESS NOTES
[facility] -   Infectious diseases evaluation  admission date 11/16/2020    reason for consultation:   covid    Impression :   Current:  · covid pneumonia - severe w resp distress  · MDS  · neutropenia  ·   Discussion / summary of stay / plan of care   ·   Recommendations     consented Immune plasma - ordered 11/17,  1 U - tolerated 11/17  Post Meropenem and vanco - stopped 11/18 due to cx neg  remdesevir till 11/20/20, completed  · Follow ferritin and infl markers in am   · neupogen for neutropenia  · research medicine to help out w her covid = did not fit criteria  · Steroids for Covid lung injury    Infection Control Recommendations   · [place] Precautions  · Contact Isolation   · Airborne isolation  · Droplet Isolation      Antimicrobial Stewardship Recommendations   · Simplification of therapy  · Targeted therapy  · Per Kg dosing    Coordination ofOutpatient Care:   · Estimated Length of IV antimicrobials:  · Patient will need Midline / picc Catheter Insertion:   · Patient will need SNF:  · Patient will need outpatient wound care:     History of Present Illness:   Initial history:  Naomi De Souza is a 70y.o.-year-old female w resp distress transferred from [address] pneumonia and underlying MDS. Neutropenia  On bipap and 50% FIO2, elevated D-Dimers and went for a VQ scan  Past cig smoking - HTN - Raynaud-    Started on decadron remdesevir  started on meropenem and vanco pend BC due to concern for ongoing sepsis      Interval changes  11/22/2020   Patient Vitals for the past 8 hrs:   BP Temp Temp src Pulse Resp SpO2   11/22/20 2007 -- -- -- -- 30 (!) 88 %   11/22/20 1949 (!) 123/57 97.5 °F (36.4 °C) Oral 94 25 --         Still 100% nonrebreather 33 L, sleeping comfortably, was very anxious earlier.   No skin rash,     ferritin has been improving    White count is up to 12, results of Neupogen  tolerated the plasma well 11/17    No positive cultures-  Mycoplasma IGM neg      Summary of relevant labs:  Labs:  WBC  1.2-12.8  Creat 0.81  procalcitonin 1   - 227-67-67.6  Ferritin 7236 - 3024 -[id number]  DDimer 2.44  Fibrinogen 720 - 1002  proBNP 729   - 291 - 407      Micro:  BC 11/17/20 x 2  resp PCR neg but for COVID+ 11/17  Legionella AG neg  Imaging:  VQ scan low probability    CT chest 11/16 [facility] periph ground glass bilat, mediastinal large LN  CT brain 11/216 neg [facility]      I have personally reviewed the past medical history, past surgical history, medications, social history, and family history, and I haveupdated the database accordingly. Allergies:   Fish allergy; Hydrocodone-acetaminophen; Tizanidine; Atorvastatin; Metoclopramide; Moxifloxacin; Oxycodone; Pregabalin; Tramadol; Zolpidem; Cephalexin; Iodides; and Sulfa antibiotics     Review of Systems:     Review of Systems   Constitutional: Positive for activity change. Negative for chills and fatigue. Eyes: Negative for visual disturbance. Respiratory: Positive for cough. Negative for choking, shortness of breath and wheezing. Cardiovascular: Negative for chest pain and leg swelling. Gastrointestinal: Negative for anal bleeding. Endocrine: Negative for polydipsia and polyphagia. Genitourinary: Negative for dysuria and frequency. Musculoskeletal: Negative for back pain. Skin: Negative for color change. Allergic/Immunologic: Positive for immunocompromised state. Neurological: Negative for seizures, speech difficulty, numbness and headaches. HANDS SHAKE   Hematological: Negative for adenopathy. Psychiatric/Behavioral: Negative for agitation, behavioral problems and confusion. Physical Examination :       Physical Exam  Constitutional:       General: She is not in acute distress. Appearance: Normal appearance. She is ill-appearing. She is not diaphoretic. HENT:      Head: Normocephalic and atraumatic.       Nose: Nose normal. No congestion. Eyes:      General: No scleral icterus. Conjunctiva/sclera: Conjunctivae normal.      Pupils: Pupils are equal, round, and reactive to light. Neck:      Musculoskeletal: Neck supple. No neck rigidity. Cardiovascular:      Rate and Rhythm: Normal rate and regular rhythm. Heart sounds: Normal heart sounds. No murmur. No friction rub. Pulmonary:      Effort: No respiratory distress. Breath sounds: No stridor. No wheezing, rhonchi or rales. Chest:      Chest wall: No tenderness. Abdominal:      General: There is no distension. Palpations: Abdomen is soft. Tenderness: There is no abdominal tenderness. Genitourinary:     Comments: Urine bhavin  Musculoskeletal:         General: No swelling or deformity. Skin:     General: Skin is dry. Coloration: Skin is not jaundiced. Findings: Bruising present. No erythema, lesion or rash. Neurological:      General: No focal deficit present. Mental Status: She is alert and oriented to person, place, and time. Mental status is at baseline. Cranial Nerves: No cranial nerve deficit. Psychiatric:         Mood and Affect: Mood normal.         Thought Content:  Thought content normal.         Past Medical History:     Past Medical History:   Diagnosis Date    Cancer ([facility]     myelodysplastic syndrome    Deaf, left     Essential hypertension     GERD (gastroesophageal reflux disease)     Glaucoma     Hyperlipidemia     Melanoma ([facility]     MRSA (methicillin resistant staph aureus) culture positive     MVP (mitral valve prolapse)     Pharyngoesophageal dysphagia     Raynaud disease     Status post four vessel coronary artery bypass        Past Surgical  History:     Past Surgical History:   Procedure Laterality Date    APPENDECTOMY      CARDIAC SURGERY      CABG x3    CHOLECYSTECTOMY      HYSTERECTOMY, TOTAL ABDOMINAL      IR PORT PLACEMENT < 5 YEARS      SMALL INTESTINE SURGERY      TUBAL LIGATION Medications:      furosemide  20 mg Intravenous Daily    LORazepam  0.25 mg Intravenous Once    enoxaparin  40 mg Subcutaneous BID    dexamethasone  6 mg Intravenous Daily    latanoprost  1 drop Both Eyes Nightly    sodium chloride  20 mL Intravenous Once    sodium chloride  20 mL Intravenous Once       Social History:     Social History     Socioeconomic History    Marital status:      Spouse name: Not on file    Number of children: Not on file    Years of education: Not on file    Highest education level: Not on file   Occupational History    Not on file   Social Needs    Financial resource strain: Not on file    Food insecurity     Worry: Not on file     Inability: Not on file    Transportation needs     Medical: Not on file     Non-medical: Not on file   Tobacco Use    Smoking status: Former Smoker     Types: Cigarettes     Start date: 1/1/1957     Last attempt to quit: 8/22/2005     Years since quitting: 15.2   Substance and Sexual Activity    Alcohol use: Never     Frequency: Never    Drug use: Never    Sexual activity: Not on file   Lifestyle    Physical activity     Days per week: Not on file     Minutes per session: Not on file    Stress: Not on file   Relationships    Social connections     Talks on phone: Not on file     Gets together: Not on file     Attends Muslim service: Not on file     Active member of club or organization: Not on file     Attends meetings of clubs or organizations: Not on file     Relationship status: Not on file    Intimate partner violence     Fear of current or ex partner: Not on file     Emotionally abused: Not on file     Physically abused: Not on file     Forced sexual activity: Not on file   Other Topics Concern    Not on file   Social History Narrative    Not on file       Family History:     Family History   Problem Relation Age of Onset    Heart Failure Mother     Early Death Mother     Heart Disease Mother     Stroke Father Medical Decision Making:   I have independently reviewed/ordered the following labs:    CBC with Differential:   Recent Labs     11/21/20  0540 11/22/20  0513   WBC 12.8* 10.4   HGB 7.3* 8.1*   HCT 23.4* 27.6*    385   LYMPHOPCT 16* 20*   MONOPCT 7 9*     BMP:  Recent Labs     11/21/20  0540 11/22/20  1046     --    K 3.5* 3.1*     --    CO2 25  --    BUN 22  --    CREATININE 0.52  --    MG 2.0  --      Hepatic Function Panel:   No results for input(s): PROT, LABALBU, BILIDIR, IBILI, BILITOT, ALKPHOS, ALT, AST in the last 72 hours. No results for input(s): RPR in the last 72 hours. No results for input(s): HIV in the last 72 hours. No results for input(s): BC in the last 72 hours. Lab Results   Component Value Date    CREATININE 0.52 11/21/2020    GLUCOSE 84 11/21/2020       Detailed results: Thank you for allowing us to participate in the care of this patient. Please call with questions. This note is created with the assistance of a speech recognition program.  While intending to generate adocument that actually reflects the content of the visit, the document can still have some errors including those of syntax and sound a like substitutions which may escape proof reading. It such instances, actual meaningcan be extrapolated by contextual diversion.     Digna Beasley MD  Office: [phone]  Perfect serve / office [phone]

## 2020-11-23 NOTE — PROGRESS NOTES
[facility]  Office: [address], DO, Martha Garcia, DO, Fredy Rivera, DO, Lennoxponcho Cheema Blood, DO, Radha Macario MD, Eliazar Gutierrez MD, Gary Gunter MD, Jeromy Medina MD, Tori Elizabeth MD, Sofy Gallego MD, Yaquelin Valiente MD, Saul George MD, Ren Enciso MD, Jacqueline Ignacio, DO, Indio Walker MD, Alexis Beavers MD, Duane Dc DO, Irina Rubi MD,  Ewa Perdomo DO, Burna Cockayne, MD, Brittny Fields MD, Sharif Vogel [facility], [facility] CenterCLARISA Hearn, CNP, Thalia Fatima, CNP, Paddy Duane, CNS, Rosita Armando, CNP, Lizeth Sellers, CNP, Kathleen Richardson, CNP, Jhoana East, CNP, Mabel Munguia, CNP, Lior Perez PA-C, Daisey Meigs, [facility], Shanel Hernandez, CNP, Jenn Neal, CNP, Luana Ruvalcaba, CNP, Tai Tucker, CNP, Katiana Jacobo, [facility]   [address] [facility]    Progress Note    11/23/2020    5:06 PM    Name:   Latha Thakur  MRN:     [id number]     Acct:      [de-identified]   Room:   3023/3023-01   Day:  7  Admit Date:  11/16/2020 10:51 PM    PCP:   Lise Coronado MD  Code Status:  Full Code    Subjective:     C/C: No chief complaint on file. SOB  Interval History Status: not changed.      Seen and examined face-to-face today,  Still very short of breath, needing 100% FiO2 via high flow nasal cannula, respiratory rate was above 45 and patient was not able to hold oxygenation, decision was made to intubate her this morning, now intubated and sedated,  Discussed with pulmonary critical care and infectious disease,  On dexamethasone,  Completed remdesivir,  Given convalescent plasma,  Antibiotics discontinued after negative cultures,  Neupogen for neutropenia,  Myelodysplastic syndrome, oncology on board  Overall prognosis poor        Brief History:     Latha Thakur is a 70 y.o. female with a past medical history for MDS, essential hypertension, melanoma, mitral valve prolapse, MRSA, Raynaud's disease presents emergency department for shortness of breath and dyspnea upon exertion for 3 days. Patient was initially seen at [facility] diagnosed with Covid pneumonia. Patient requiring 50% FiO2 on BiPAP, patient also had an elevated D-dimer but CTA was not possible secondary to contrast allergy. [facility] unable to accommodate due to lack of Covid beds, patient was transferred for further care. Spoke with pulmonary medicine at [place] and was accepted for ICU transfer. Patient was given 1 unit of PRBCs at [address] [place] [address] for a hemoglobin less than 7, patient was also placed on BiPAP for transfer. Patient arrived, no complaints except for some shortness of breath. Needing high flow oxygen, still critical    Intubated and sedated, endotracheal intubation done on November 23, 2020 due to acute respiratory failure not holding oxygenation even on FiO2 100% BiPAP    Review of Systems:     Sedated and intubated  Medications: Allergies: Allergies   Allergen Reactions    Fish Allergy Anaphylaxis, Hives and Swelling    Hydrocodone-Acetaminophen Anaphylaxis    Tizanidine Anaphylaxis and Hives    Atorvastatin Hives, Other (See Comments) and Swelling    Metoclopramide Hives     Other reaction(s): Unknown, Unknown    Moxifloxacin Hives, Other (See Comments) and Swelling     Other reaction(s): Other (See Comments), Unknown    Oxycodone Other (See Comments)     Other reaction(s): Hallucinations, Mental Status Change    Pregabalin Other (See Comments)     Other reaction(s): Hallucinations, Other (See Comments)  Causes sores in the mouth  Causes sores in the mouth      Tramadol      Other reaction(s): Edema, Other (See Comments)    Zolpidem Other (See Comments)     Other reaction(s): Other (See Comments)  causes cramps in hands and legs  causes cramps in hands and legs  Other reaction(s):  Other (See Comments)  causes cramps in hands and legs  causes cramps in hands and legs      Cephalexin Rash Other reaction(s): Unknown, Unknown    Iodides Rash and Swelling     ivp and heart cath dye    Sulfa Antibiotics Rash       Current Meds:   Scheduled Meds:    haloperidol lactate        succinylcholine        propofol        EPINEPHrine        potassium chloride  10 mEq Intravenous Daily    fentaNYL        furosemide  20 mg Intravenous Daily    LORazepam  0.25 mg Intravenous Once    enoxaparin  40 mg Subcutaneous BID    dexamethasone  6 mg Intravenous Daily    latanoprost  1 drop Both Eyes Nightly    sodium chloride  20 mL Intravenous Once    sodium chloride  20 mL Intravenous Once     Continuous Infusions:    dexmedetomidine Stopped (11/23/20 0903)    midazolam 4 mg/hr (11/23/20 1310)    fentaNYL 25 mcg/hr (11/23/20 1118)    norepinephrine 8 mcg/min (11/23/20 1603)     PRN Meds: LORazepam, sodium chloride, sodium chloride flush, potassium chloride **OR** potassium alternative oral replacement **OR** potassium chloride, magnesium sulfate, acetaminophen **OR** acetaminophen, polyethylene glycol, promethazine **OR** ondansetron, nicotine    Data:     Past Medical History:   has a past medical history of Cancer ([facility] [address] Deaf, left, Essential hypertension, GERD (gastroesophageal reflux disease), Glaucoma, Hyperlipidemia, Melanoma ([facility] [address] MRSA (methicillin resistant staph aureus) culture positive, MVP (mitral valve prolapse), Pharyngoesophageal dysphagia, Raynaud disease, and Status post four vessel coronary artery bypass. Social History:   reports that she quit smoking about 15 years ago. Her smoking use included cigarettes. She started smoking about 63 years ago. She does not have any smokeless tobacco history on file. She reports that she does not drink alcohol or use drugs.      Family History:   Family History   Problem Relation Age of Onset    Heart Failure Mother     Early Death Mother     Heart Disease Mother     Stroke Father        Vitals:  BP (!) 157/85   Pulse 63   Temp 97.5 °F (36.4 °C) (Oral)   Resp 27   Ht 5' 7\" (1.702 m)   Wt 173 lb 15.1 oz (78.9 kg)   SpO2 100%   BMI 27.24 kg/m²   Temp (24hrs), Av.5 °F (36.4 °C), Min:97.5 °F (36.4 °C), Max:97.5 °F (36.4 °C)    Recent Labs     20  1110   POCGLU 110*       I/O (24Hr): Intake/Output Summary (Last 24 hours) at 2020 1706  Last data filed at 2020 1600  Gross per 24 hour   Intake 1430.47 ml   Output 165 ml   Net 1265.47 ml       Labs:  Hematology:  Recent Labs     20  0540 20  0513 20  0525   WBC 12.8* 10.4 9.3   RBC 2.36* 2.70* 2.54*   HGB 7.3* 8.1* 8.0*   HCT 23.4* 27.6* 25.9*   MCV 99.2 102.2 102.0   MCH 30.9 30.0 31.5   MCHC 31.2 29.3 30.9   RDW 19.0* 19.2* 19.0*    385 433   MPV 11.5 11.2 11.4   CRP 67.6*  --   --    DDIMER 1.93  --   --      Chemistry:  Recent Labs     20  0540 20  1046 20  0525     --  139   K 3.5* 3.1* 3.8     --  103   CO2 25  --  24   GLUCOSE 84  --  84   BUN 22  --  16   CREATININE 0.52  --  0.64   MG 2.0  --   --    ANIONGAP 12  --  12   LABGLOM >60  --  >60   GFRAA >60  --  >60   CALCIUM 8.6  --  9.0   PROBNP 712*  --   --      Recent Labs     20  0525 20  1110   PROT 6.3*  --    LABALBU 2.5*  --    AST 22  --    ALT 23  --    ALKPHOS 88  --    BILITOT 0.51  --    POCGLU  --  110*     ABG:  Lab Results   Component Value Date    POCPH 7.464 2020    POCPCO2 37.1 2020    POCPO2 87.4 2020    POCHCO3 26.6 2020    NBEA NOT REPORTED 2020    PBEA 3 2020    PYB8QQV 28 2020    PWCG1DSZ 97 2020    FIO2 100.0 2020     Lab Results   Component Value Date/Time    SPECIAL RT HAND 2ML 2020 02:48 AM     Lab Results   Component Value Date/Time    CULTURE NO GROWTH 6 DAYS 2020 02:48 AM       Radiology:  [facility] Chest (single View Frontal)    Result Date: 2020  *Low Probability for Pulmonary Embolus.  *Bilateral mid to lower lung field left greater than right opacities with peripheral involvement most pronounced at the bases compatible with  COVID-19 pneumonia. The findings were sent to the Radiology Results [address] at 2:46 pm on 11/17/2020to be communicated to a licensed caregiver. Nm Lung Scan Perfusion Only    Result Date: 11/17/2020  *Low Probability for Pulmonary Embolus. *Bilateral mid to lower lung field left greater than right opacities with peripheral involvement most pronounced at the bases compatible with  COVID-19 pneumonia. The findings were sent to the Radiology Results [address] at 2:46 pm on 11/17/2020to be communicated to a licensed caregiver. Physical Examination:        General appearance: Sedated and intubated  Lungs: Coarse breath sounds bilateral  Heart:  regular rate and rhythm, no murmur  Abdomen:  soft, nontender, nondistended, normal bowel sounds, no masses, hepatomegaly, splenomegaly  Extremities:  no edema, redness, tenderness in the calves  Skin:  no gross lesions, rashes, induration    Assessment:        Hospital Problems           Last Modified POA    * (Principal) Pneumonia due to COVID-19 virus 11/17/2020 Yes    Acute respiratory failure due to COVID-19 ([address] 11/16/2020 Yes    MDS (myelodysplastic syndrome) ([address] 11/17/2020 Yes    Neutropenic sepsis ([address] 11/17/2020 Yes    Bicytopenia 11/17/2020 Yes    ROSALINO (acute kidney injury) ([address] 11/17/2020 Yes    Acute respiratory failure with hypoxia ([address] 11/17/2020 Yes    Essential hypertension 11/17/2020 Yes    Coronary artery disease involving coronary bypass graft of native heart without angina pectoris 11/17/2020 Yes    History of Raynaud's syndrome 11/17/2020 Yes    Transaminitis 11/17/2020 Yes    Immunosuppressed status ([address] 11/17/2020 Yes    Severe sepsis ([address] 11/17/2020 Yes    Hypokalemia 11/22/2020 Yes          Plan:        1. Covid pneumonia, acute respiratory failure, sedated, placed on mechanical ventilation due to deterioration of respiratory failure. on remdesivir, convalescent plasma, Decadron, infectious disease and pulmonary critical care on board,   2. Myelodysplastic syndrome and pancytopenia, oncology consulted and discussed in detail, advised to keep the patient on DVT prophylaxis with Lovenox twice daily,  3. Hypokalemia, replaced , check next am   4. VQ scan low probability,  5. Keep negative fluid balance, but patient was hypotensive, Lasix on hold,  6. Neutropenic sepsis, antibiotics as per infectious disease, blood cultures negative, antibiotics stopped by infectious disease at this time,  7. Neutropenia, Neupogen as per oncology  8. Acute kidney injury, creatinine improving,  9. Transaminitis, monitor liver functions,  10. Hypertension, monitor blood pressure,  11. DVT prophylaxis with Lovenox,  12. GI prophylaxis,  13.  Full CODE STATUS,  14. Regular care time spent 37 minutes    Josiah Wilson MD  11/23/2020  5:06 PM

## 2020-11-23 NOTE — PLAN OF CARE
Problem:  Activity:  Goal: Fatigue will decrease  Description: Fatigue will decrease  Outcome: Ongoing     Problem: Cardiac:  Goal: Hemodynamic stability will improve  Description: Hemodynamic stability will improve  Outcome: Ongoing     Problem: Coping:  Goal: Level of anxiety will decrease  Description: Level of anxiety will decrease  Outcome: Ongoing  Goal: Ability to cope will improve  Description: Ability to cope will improve  Outcome: Ongoing  Goal: Ability to establish a method of communication will improve  Description: Ability to establish a method of communication will improve  Outcome: Ongoing     Problem: Nutritional:  Goal: Consumption of the prescribed amount of daily calories will improve  Description: Consumption of the prescribed amount of daily calories will improve  Outcome: Ongoing     Problem: Respiratory:  Goal: Ability to maintain a clear airway will improve  Description: Ability to maintain a clear airway will improve  Outcome: Ongoing  Goal: Ability to maintain adequate ventilation will improve  Description: Ability to maintain adequate ventilation will improve  Outcome: Ongoing  Goal: Complications related to the disease process, condition or treatment will be avoided or minimized  Description: Complications related to the disease process, condition or treatment will be avoided or minimized  Outcome: Ongoing     Problem: Skin Integrity:  Goal: Risk for impaired skin integrity will decrease  Description: Risk for impaired skin integrity will decrease  Outcome: Ongoing  Goal: Will show no infection signs and symptoms  Description: Will show no infection signs and symptoms  Outcome: Ongoing  Goal: Absence of new skin breakdown  Description: Absence of new skin breakdown  Outcome: Ongoing     Problem: Falls - Risk of:  Goal: Will remain free from falls  Description: Will remain free from falls  Outcome: Ongoing  Goal: Absence of physical injury  Description: Absence of physical injury  Outcome: Ongoing     Problem: Pain:  Goal: Pain level will decrease  Description: Pain level will decrease  Outcome: Ongoing  Goal: Control of acute pain  Description: Control of acute pain  Outcome: Ongoing  Goal: Control of chronic pain  Description: Control of chronic pain  Outcome: Ongoing     Problem: Airway Clearance - Ineffective  Goal: Achieve or maintain patent airway  Outcome: Ongoing     Problem: Gas Exchange - Impaired  Goal: Absence of hypoxia  Outcome: Ongoing  Goal: Promote optimal lung function  Outcome: Ongoing     Problem: Breathing Pattern - Ineffective  Goal: Ability to achieve and maintain a regular respiratory rate  Outcome: Ongoing     Problem:  Body Temperature -  Risk of, Imbalanced  Goal: Ability to maintain a body temperature within defined limits  Outcome: Ongoing  Goal: Will regain or maintain usual level of consciousness  Outcome: Ongoing  Goal: Complications related to the disease process, condition or treatment will be avoided or minimized  Outcome: Ongoing     Problem: Isolation Precautions - Risk of Spread of Infection  Goal: Prevent transmission of infection  Outcome: Ongoing     Problem: Nutrition Deficits  Goal: Optimize nutrtional status  Outcome: Ongoing     Problem: Risk for Fluid Volume Deficit  Goal: Maintain normal heart rhythm  Outcome: Ongoing  Goal: Maintain absence of muscle cramping  Outcome: Ongoing  Goal: Maintain normal serum potassium, sodium, calcium, phosphorus, and pH  Outcome: Ongoing     Problem: Loneliness or Risk for Loneliness  Goal: Demonstrate positive use of time alone when socialization is not possible  Outcome: Ongoing     Problem: Fatigue  Goal: Verbalize increase energy and improved vitality  Outcome: Ongoing     Problem: Patient Education: Go to Patient Education Activity  Goal: Patient/Family Education  Outcome: Ongoing     Problem: OXYGENATION/RESPIRATORY FUNCTION  Goal: Patient will maintain patent airway  Outcome: Ongoing  Goal: Patient will achieve/maintain normal respiratory rate/effort  Description: Respiratory rate and effort will be within normal limits for the patient  Outcome: Ongoing

## 2020-11-23 NOTE — CARE COORDINATION
TRANSITIONAL CARE PLANNING/ 2 [facility] Day: 7    Reason for Admission: Acute respiratory failure due to COVID-19 ([facility] [U07.1, J96.00]     Treatment Plan of Care: intubated today fio2 100%         Readmission Risk              Risk of Unplanned Readmission:        17            Patient goals/Treatment Preferences/Transitional Plan: await pt/ot evals when appropriate to determine level of functioning

## 2020-11-23 NOTE — PROGRESS NOTES
PULMONARY & CRITICAL CARE MEDICINE PROGRESS NOTE     Patient:  Cassidy Cartwright  MRN: [id number]  6 [facility] date: 11/16/2020  Primary Care Physician: Yael José MD  Consulting Physician: Diomedes Davis MD  CODE Status: Full Code  LOS: 6     SUBJECTIVE     CHIEF COMPLAINT/REASON FOR INITIAL CONSULT:    BRIEF HOSPITAL COURSE:   The patient is a 70 y.o. female with history of myelodysplastic syndrome on chemotherapy apparently last 3 to 4 weeks ago, coronary artery disease status post CABG, hypertension, hyperlipidemia. Presented to [facility] with increasing fatigue and tiredness which according to patient she usually feels when her blood count goes low and she gets transfusion, 4 days history of cough associated with shortness of breath, cough is dry without sputum production. She did not have fever or chills. She denies headache body ache muscle ache myalgia sore throat. Denies nausea vomiting diarrhea or abdominal pain denies urinary complaint. In the emergency room she was found to be hypoxic, initially on nasal cannula and then she was switched to BiPAP after she was placed on BiPAP 50% she was saturating well remained on BiPAP and was transferred here to [facility] ICU unit. She had a CT chest done in [facility] which shows bilateral groundglass opacities nonspecific mediastinal neuropathy. She also had a hemoglobin of 6 and she received 1 unit packed RBC there  Since she was admitted overnight she remained on BiPAP 50% FiO2 had saturated well and she was transitioned to high flow nasal cannula this morning. Her initial labs show sodium 133 BUN 23 creatinine 0.81 bicarbonate 21. WBC count was 1.2 hemoglobin 7 hematocrit 22.4 and platelet 620. Procalcitonin was 1.0. . . proBNP 729. Ferritin 3024. Fibrinogen 720. D-dimer 2.44 and INR 0.9. She was also started on Lovenox for elevated D-dimer and a VQ scan was ordered by primary service.   She was started on remdesivir and on Decadron. INTERVAL HISTORY:  20   Back on HFNC 100%. Desats with minimal activity. Does not mathew BIPAP. Needed NRBM. Afeb. I/O +1.8L since adm. On lasix 20mg IV daily. Wt up 4 kg. . K 3.1 covered. CRP way down  ProBNP remains elevated. Fibrinogen and ferritn both down. REVIEW OF SYSTEMS:  Review of Systems   Constitutional: Positive for activity change, appetite change and fatigue. Negative for fever. HENT: Negative for congestion, ear discharge, facial swelling, postnasal drip, rhinorrhea, sinus pain, trouble swallowing and voice change. Eyes: Negative for discharge, redness and visual disturbance. Respiratory: Positive for cough and shortness of breath. Negative for choking, chest tightness and wheezing. Cardiovascular: Negative for chest pain, palpitations and leg swelling. Gastrointestinal: Positive for nausea. Negative for abdominal distention, abdominal pain, constipation, diarrhea and vomiting. Endocrine: Negative for cold intolerance, heat intolerance and polyuria. Genitourinary: Negative for dysuria, flank pain, frequency, hematuria and urgency. Musculoskeletal: Positive for arthralgias. Negative for back pain, gait problem and joint swelling. Neurological: Negative for tremors, speech difficulty, numbness and headaches. Hematological: Negative for adenopathy. Does not bruise/bleed easily. Psychiatric/Behavioral: Positive for sleep disturbance. Negative for confusion. The patient is nervous/anxious. OBJECTIVE     PaO2/FiO2 RATIO:  No results for input(s): POCPO2 in the last 72 hours.    FiO2 : 100 %     VITAL SIGNS:   LAST:  BP (!) 123/57   Pulse 94   Temp 97.5 °F (36.4 °C) (Oral)   Resp 30   Ht 5' 7\" (1.702 m)   Wt 187 lb 2.7 oz (84.9 kg)   SpO2 (!) 88%   BMI 29.32 kg/m²   8-24 HR RANGE:  TEMP Temp  Av.8 °F (36.6 °C)  Min: 97.5 °F (36.4 °C)  Max: 98 °F (54.6 °C)   BP Systolic (25OSB), CPY:456 , Min:117 , IUH:011      Diastolic (51YTQ), Av, Min:55, Max:74     PULSE Pulse  Av.6  Min: 78  Max: 103   RR Resp  Av.5  Min: 22  Max: 30   O2 SAT SpO2  Av %  Min: 88 %  Max: 97 %   OXYGEN DELIVERY O2 Flow Rate (L/min)  Av L/min  Min: 40 L/min  Max: 40 L/min        SYSTEMIC EXAMINATION:   [facility] including pertinent history and exam findings, with the resident/APC/staff. I have seen the patient and the key elements of all parts of the encounter have been performed by me. Physical exam was deferred to limit physical exposure and PPE resources. I reviewed the interval history, interpreted all available radiographic, laboratory and physiologic data at the time of service. I agree with the assessment and plan as documented by resident/APC/ ancillary staff. DATA REVIEW     Medications: Current Inpatient  Scheduled Meds:   furosemide  20 mg Intravenous Daily    LORazepam  0.25 mg Intravenous Once    enoxaparin  40 mg Subcutaneous BID    dexamethasone  6 mg Intravenous Daily    latanoprost  1 drop Both Eyes Nightly    sodium chloride  20 mL Intravenous Once    sodium chloride  20 mL Intravenous Once     Continuous Infusions:    INPUT/OUTPUT:  In: 490 [P.O.:480; I.V.:10]  Out: 250 [Urine:250]  Date 20 - 20   Shift 9529-7488 8062-0629 0660-2257 24 Hour Total   INTAKE   P.O.(mL/kg/hr)  480(0.7)  480   I. V.(mL/kg)  10(0.1)  10(0.1)   Shift Total(mL/kg)  490(5.8)  490(5.8)   OUTPUT   Urine(mL/kg/hr) 250(0.4)   250   Shift Total(mL/kg) 250(2.9)   250(2.9)   Weight (kg) 84.9 84.9 84.9 84.9        LABS:  ABGs:   No results for input(s): POCPH, POCPCO2, POCPO2, POCHCO3, OZER7PWB in the last 72 hours.   CBC:   Recent Labs     20  0537 20  0540 20  0513   WBC 9.0 12.8* 10.4   HGB 8.0* 7.3* 8.1*   HCT 26.0* 23.4* 27.6*   .0 99.2 102.2    423 385   LYMPHOPCT 15* 16* 20*   RBC 2.60* 2.36* 2.70*   MCH 30.8 30.9 30.0   MCHC 30.8 31.2 29.3   RDW 19.6* 19.0* 19.2*     CRP:   Recent Labs     11/21/20  0540   CRP 67.6*     LDH:   No results for input(s): LDH in the last 72 hours. BMP:   Recent Labs     11/21/20  0540 11/22/20  1046     --    K 3.5* 3.1*     --    CO2 25  --    BUN 22  --    CREATININE 0.52  --    GLUCOSE 84  --      Liver Function Test:   No results for input(s): PROT, LABALBU, ALT, AST, GGT, ALKPHOS, BILITOT in the last 72 hours. Coagulation Profile:   No results for input(s): INR, PROTIME, APTT in the last 72 hours. D-Dimer:  Recent Labs     11/21/20  0540   DDIMER 1.93     Ferritin:    Recent Labs     11/20/20  1920 11/21/20  0540   FERRITIN 3,590* 2,838*     Lactic Acid:  No results for input(s): LACTA in the last 72 hours. Cardiac Enzymes:  No results for input(s): CKTOTAL, CKMB, CKMBINDEX, TROPONINI in the last 72 hours. Invalid input(s): TROPONIN, HSTROP  BNP/ProBNP:   Recent Labs     11/21/20  0540   PROBNP 712*     Triglycerides:  No results for input(s): TRIG in the last 72 hours. Microbiology:  No results for input(s): SPECDESC, SPECDESC, SPECIAL, CULTURE, CULTURE, STATUS, ORG, CDIFFTOXPCR, CAMPYLOBPCR, SALMONELLAPC, SHIGAPCR, SHIGELLAPCR, MPNEUG, MPNEUM, LACTOQL in the last 72 hours. Pathology:    Radiology Reports:    XR CHEST PORTABLE   Final Result   No significant change in chest findings compared to the prior study from   November 18th. XR CHEST PORTABLE   Final Result   No change in bilateral pulmonary opacities left greater than right with more   peripheral involvement compatible with pneumonitis. XR CHEST (SINGLE VIEW FRONTAL)   Final Result   *Low Probability for Pulmonary Embolus. *Bilateral mid to lower lung field left greater than right opacities with   peripheral involvement most pronounced at the bases compatible with  COVID-19   pneumonia. The findings were sent to the Radiology Results [address] at 2:46   pm on 11/17/2020to be communicated to a licensed caregiver. NM LUNG SCAN PERFUSION ONLY   Final Result   *Low Probability for Pulmonary Embolus. *Bilateral mid to lower lung field left greater than right opacities with   peripheral involvement most pronounced at the bases compatible with  COVID-19   pneumonia. The findings were sent to the Radiology Results [address] at 2:46   pm on 11/17/2020to be communicated to a licensed caregiver. Echocardiogram:   No results found for this or any previous visit. ASSESSMENT AND PLAN     Assessment:    Acute hypoxic respiratory failure. Bilateral multifocal groundglass opacities/pneumonia due to COVID-19 infection. Sepsis secondary to COVID-19 infection/secondary to immune suppression/leukopenia  Elevated D-dimer, ferritin, fibrinogen, CRP secondary to COVID-19 infection. Myelodysplastic syndrome on chemotherapy. Leukopenia possibly secondary to chemotherapy/MDS. Anemia secondary to myelodysplastic syndrome. History of coronary artery disease status post CABG. History of hypertension. Hyperlipidemia. Plan:  1. Continue Decadron. 2. Lasix 20mg Qam X3  3. Support oxygenation. 4. Check for signs of respiratory muscle fatigue. 5. Discussed with Dr. Eusebio Real. Quentin Mcallister DO.   Pulmonary and Critical Care Medicine           11/22/2020, 9:39 PM  CC time 30min  This patient was evaluated in the context of the global SARS-CoV-2 (COVID-19) pandemic, which necessitated considerations that the patient either has COVID-19 infection or is at risk of infection with COVID-19. Institutional protocols and algorithms that pertain to the evaluation & management of patients with COVID-19 or those at risk for COVID-19 are in a state of rapid changes based on information released by regulatory bodies including the CDC and federal and state organizations. These policies and algorithms were followed during the patient's care.     Please note that this chart was generated using voice recognition Dragon dictation software. Although every effort was made to ensure the accuracy of this automated transcription, some errors in transcription may have occurred.

## 2020-11-23 NOTE — ANESTHESIA PROCEDURE NOTES
Airway  Date/Time: 11/23/2020 9:15 AM  Urgency: urgent    Airway not difficult    General Information and Staff    Patient location during procedure: ICU  Resident/CRNA: MICHAEL Neves CRNA  Performed: resident/CRNA     Consent for Airway (if performed for an anesthetic, see related documentation for consents)  Patient identity confirmed: per hospital policy  Consent: The procedure was performed in an emergent situation. Verbal consent not obtained. Written consent not obtained.   Risks and benefits: risks, benefits and alternatives were not discussed      Code status verified:yes  Indications and Patient Condition  Indications for airway management: respiratory failure  Spontaneous ventilation: present  Sedation level: no sedation  Preoxygenated: yes  Patient position: sniffing  MILS not maintained throughout  Mask difficulty assessment: vent by bag mask    Final Airway Details  Final airway type: endotracheal airway      Successful airway: ETT  Cuffed: yes   Successful intubation technique: direct laryngoscopy  Endotracheal tube insertion site: oral  Blade: Esha  Blade size: #3  ETT size (mm): 7.5  Cormack-Lehane Classification: grade I - full view of glottis  Placement verified by: chest auscultation and capnometry   Inital cuff pressure (cm H2O): 10  Measured from: lips  ETT to lips (cm): 22  Number of attempts at approach: 1  Ventilation between attempts: bag mask  Number of other approaches attempted: 0    no

## 2020-11-23 NOTE — PROGRESS NOTES
0430 pt called out feeling anxious, on high flow 100% 40L, needed to urinate, taken to [facility], oxygen saturation dropped, pt not recovering well, placed on bipap, oxygen improved, pt increasingly anxious. Vitals , RR 33, O2 85  0455 writer gave 0.5mg IV ativan, oxygen up to 96%  0630 pt still anxious. Dr. Esther De La Cruz at bedside. Vitals , RR 62, O2 91, bp 157/119. Gave 0.5mg IV ativan  0640 1mg IV ativan  0651 5mg IM haldol  0705 Precedex gtt started 0.2mcg/kg/hr  0709 1mg IV ativan  0719 vitals , RR 37, bp 101/59 O2 96  0802 vitals , RR 42, bp 100/70  0815 bp 86/54, precedex gtt 0.8mcg/kg/hr  Writer gave report to [facility]unique BOUCHER, Alisia Genao.

## 2020-11-23 NOTE — ANESTHESIA PROCEDURE NOTES
Arterial Line:    An arterial line was placed using ultrasound guidance, in the ICU for the following indication(s): continuous blood pressure monitoring and blood sampling needed. A 20 gauge (size), 5 cm (length), Arrow (type) catheter was placed, Seldinger technique used, into the right brachial artery, secured by Tegaderm and suture. Anesthesia type: General    Events:  hematoma during insertion, patient tolerated procedure well with no complications and EBL < 5mL.   11/23/2020 9:45 AM11/23/2020 9:55 AM  Resident/CRNA: MICHAEL Cuellar CRNA  Performed: Resident/CRNA   Preanesthetic Checklist  Completed: patient identified, site marked, surgical consent, pre-op evaluation, timeout performed, IV checked, risks and benefits discussed, monitors and equipment checked, anesthesia consent given, oxygen available and patient being monitored

## 2020-11-23 NOTE — PROGRESS NOTES
Comprehensive Nutrition Assessment    Type and Reason for Visit:  Initial(LOS Day 7)    Nutrition Recommendations/Plan:   -Recommend NPO status   -Start diet vs nutrition support as able   -If tube feeding is desired, recommend Vital AF 1.2 (Semi-elemental and carb-controlled) @ 55 mL/hr x 24 hrs ~> 1584 kcals, 99 gms protein (w/out propofol running)  -If pt starts pronating/supine, recommend    -Prone @ 20 mL/hr    -Supine @ 125 mL/hr   -Will monitor nutrition progression     Nutrition Assessment:   Pt admitted d/t SOB and LANDIN 2/2 +COVID-19. Pt was currently getting intubated during time of visit per RN. Propofol not running @ this time. Prior to intubation, pt was consuming 1-75% of her meals this week. Pt w/ 4.9% wt reduction x 1 wk - actual wt loss vs fluid loss? UBW is unknown. No wt hx in EMR. Last BM noted yesterday. Tube feed rec's above if desired, will monitor.      Malnutrition Assessment:  Malnutrition Status:  Insufficient data    Context:  Acute Illness     Findings of the 6 clinical characteristics of malnutrition:  Energy Intake:  (variable po intake x 1 wk)  Weight Loss:  Unable to assess(fluid loss vs actual wt loss?)     Body Fat Loss:  Unable to assess     Muscle Mass Loss:  Unable to assess    Fluid Accumulation:  1 - Mild Extremities, Generalized   Strength:  Not Performed    Estimated Daily Nutrient Needs:  Energy (kcal):  18-20 ~> 1107-4569 kcals/d; Weight Used for Energy Requirements:  Admission     Protein (g):  1.2-2.0 gm/kg ~>  gms/d; Weight Used for Protein Requirements:  Ideal          Nutrition Related Findings:  BM 11/22; labs/meds reviewed      Wounds:  None       Current Nutrition Therapies:    DIET GENERAL;  Additional Calorie Sources:   none    Anthropometric Measures:  · Height: 5' 7\" (170.2 cm)  · Current Body Weight: 173 lb (78.5 kg)   · Admission Body Weight: 182 lb (82.6 kg)    · Ideal Body Weight: 135 lbs; % Ideal Body Weight 128.1 %   · BMI: 27.1  · BMI Categories: Overweight (BMI 25.0-29. 9)       Nutrition Diagnosis:   · Inadequate oral intake related to impaired respiratory function as evidenced by intubation, NPO or clear liquid status due to medical condition      Nutrition Interventions:   Food and/or Nutrient Delivery:  Continue NPO(Start diet vs nutrition support as able)  Nutrition Education/Counseling:  Education not indicated   Coordination of Nutrition Care:  Continue to monitor while inpatient    Goals: Set   Start diet vs nutrition support within 24-72 hrs       Nutrition Monitoring and Evaluation:   Food/Nutrient Intake Outcomes:  Diet Advancement/Tolerance  Physical Signs/Symptoms Outcomes:  Biochemical Data, Nutrition Focused Physical Findings, Skin, Weight, GI Status, Fluid Status or Edema     Discharge Planning:     Too soon to determine     Electronically signed by Jenny Portillo RD, LD on 11/23/20 at 12:16 PM EST    Contact: [phone]

## 2020-11-23 NOTE — PLAN OF CARE
minimized  Description: Complications related to the disease process, condition or treatment will be avoided or minimized  11/23/2020 1756 by Ector Yusuf RN  Outcome: Ongoing  11/23/2020 0555 by Connie Eason RN  Outcome: Ongoing     Problem: Skin Integrity:  Goal: Risk for impaired skin integrity will decrease  Description: Risk for impaired skin integrity will decrease  11/23/2020 1756 by Ector Yusuf RN  Outcome: Ongoing  11/23/2020 0555 by Connie Eason RN  Outcome: Ongoing  Goal: Will show no infection signs and symptoms  Description: Will show no infection signs and symptoms  11/23/2020 1756 by Ector Yusuf RN  Outcome: Ongoing  11/23/2020 0555 by Connie Eason RN  Outcome: Ongoing  Goal: Absence of new skin breakdown  Description: Absence of new skin breakdown  11/23/2020 1756 by Ector Yusuf RN  Outcome: Ongoing  11/23/2020 0555 by Connie Eason RN  Outcome: Ongoing     Problem: Falls - Risk of:  Goal: Will remain free from falls  Description: Will remain free from falls  11/23/2020 1756 by Ector Yusuf RN  Outcome: Ongoing  11/23/2020 0555 by Connie Eason RN  Outcome: Ongoing  Goal: Absence of physical injury  Description: Absence of physical injury  11/23/2020 1756 by Ector Yusuf RN  Outcome: Ongoing  11/23/2020 0555 by Connie Eason RN  Outcome: Ongoing     Problem: Pain:  Goal: Pain level will decrease  Description: Pain level will decrease  11/23/2020 1756 by Ector Yusuf RN  Outcome: Ongoing  11/23/2020 0555 by Connie Eason RN  Outcome: Ongoing  Goal: Control of acute pain  Description: Control of acute pain  11/23/2020 1756 by Ector Yusuf RN  Outcome: Ongoing  11/23/2020 0555 by Connie Eason RN  Outcome: Ongoing  Goal: Control of chronic pain  Description: Control of chronic pain  11/23/2020 1756 by Ector Yusuf RN  Outcome: Ongoing  11/23/2020 0555 by Connie Eason RN  Outcome: Ongoing     Problem: Airway Clearance - Ineffective  Goal: Achieve or maintain patent airway  11/23/2020 1756 by Bobby Colindres Estrada Roche RN  Outcome: Ongoing  11/23/2020 0555 by Jax Knox RN  Outcome: Ongoing     Problem: Gas Exchange - Impaired  Goal: Absence of hypoxia  11/23/2020 1756 by Chelsey Leavitt RN  Outcome: Ongoing  11/23/2020 0555 by Jax Knox RN  Outcome: Ongoing  Goal: Promote optimal lung function  11/23/2020 1756 by Chelsey Leavitt RN  Outcome: Ongoing  11/23/2020 0555 by Jax Knox RN  Outcome: Ongoing     Problem: Breathing Pattern - Ineffective  Goal: Ability to achieve and maintain a regular respiratory rate  11/23/2020 1756 by Chelsey Leavitt RN  Outcome: Ongoing  11/23/2020 0555 by Jax Knox RN  Outcome: Ongoing     Problem:  Body Temperature -  Risk of, Imbalanced  Goal: Ability to maintain a body temperature within defined limits  11/23/2020 1756 by Chelsey Leavitt RN  Outcome: Ongoing  11/23/2020 0555 by Jax Knox RN  Outcome: Ongoing  Goal: Will regain or maintain usual level of consciousness  11/23/2020 1756 by Chelsey Leavitt RN  Outcome: Ongoing  11/23/2020 0555 by Jax Knox RN  Outcome: Ongoing  Goal: Complications related to the disease process, condition or treatment will be avoided or minimized  11/23/2020 1756 by Chelsey Leavitt RN  Outcome: Ongoing  11/23/2020 0555 by Jax Knox RN  Outcome: Ongoing     Problem: Isolation Precautions - Risk of Spread of Infection  Goal: Prevent transmission of infection  11/23/2020 1756 by Chelsey Leavitt RN  Outcome: Ongoing  11/23/2020 0555 by Jax Knox RN  Outcome: Ongoing     Problem: Nutrition Deficits  Goal: Optimize nutrtional status  11/23/2020 1756 by Chelsey Leavitt RN  Outcome: Ongoing  11/23/2020 0555 by Jax Knox RN  Outcome: Ongoing     Problem: Risk for Fluid Volume Deficit  Goal: Maintain normal heart rhythm  11/23/2020 1756 by Chelsey Leavitt RN  Outcome: Ongoing  11/23/2020 0555 by Jax Knox RN  Outcome: Ongoing  Goal: Maintain absence of muscle cramping  11/23/2020 1756 by Chelsey Leavitt RN  Outcome: Ongoing  11/23/2020 0555 by Jax Knox

## 2020-11-24 PROBLEM — D61.818 OTHER PANCYTOPENIA (HCC): Status: ACTIVE | Noted: 2020-01-01

## 2020-11-24 NOTE — PROGRESS NOTES
PULMONARY & CRITICAL CARE MEDICINE PROGRESS NOTE     Patient:  Naomi De Souza  MRN: [id number]  [facility] date: 11/16/2020  Primary Care Physician: Jackie Lemos MD  Consulting Physician: Franciso Schaumann, MD  CODE Status: Full Code  LOS: 8     SUBJECTIVE     CHIEF COMPLAINT/REASON FOR INITIAL CONSULT:    BRIEF HOSPITAL COURSE:   The patient is a 70 y.o. female with history of myelodysplastic syndrome on chemotherapy apparently last 3 to 4 weeks ago, coronary artery disease status post CABG, hypertension, hyperlipidemia. [facility] and tiredness which according to patient she usually feels when her blood count goes low and she gets transfusion, 4 days history of cough associated with shortness of breath, cough is dry without sputum production. She did not have fever or chills. She denies headache body ache muscle ache myalgia sore throat. Denies nausea vomiting diarrhea or abdominal pain denies urinary complaint. In the emergency room she was found to be hypoxic, initially on nasal cannula and then she was switched to BiPAP after she was placed on BiPAP 50% she was saturating well remained on BiPAP and was transferred here to [facility] ICU unit. She had a CT chest done in [facility] which shows bilateral groundglass opacities nonspecific mediastinal neuropathy. She also had a hemoglobin of 6 and she received 1 unit packed RBC there  Since she was admitted overnight she remained on BiPAP 50% FiO2 had saturated well and she was transitioned to high flow nasal cannula this morning. Her initial labs show sodium 133 BUN 23 creatinine 0.81 bicarbonate 21. WBC count was 1.2 hemoglobin 7 hematocrit 22.4 and platelet 226. Procalcitonin was 1.0. . . proBNP 729. Ferritin 3024. Fibrinogen 720. D-dimer 2.44 and INR 0.9. She was also started on Lovenox for elevated D-dimer and a VQ scan was ordered by primary service.   She was started on remdesivir and on Decadron. INTERVAL HISTORY:  20   Patient remains on the ventilator  Patient was on 90% oxygen with a PEEP of 10 at the time of my rounding  PF ratio was improving but still less than 150  Having small tracheal secretions  On 4 mcg of norepinephrine during my rounds  Maintain the mean arterial pressure 91  The blood pressure 20 fluctuates and hence the need for norepinephrine  We'll continue pressor support    Review of systems:  Could not be done secondary to patient being intubated  OBJECTIVE     PaO2/FiO2 RATIO:  Recent Labs     20  0339   POCPO2 103.6      FiO2 : 90 %     VITAL SIGNS:   LAST:  BP (!) 124/55   Pulse 96   Temp 98.1 °F (36.7 °C) (Oral)   Resp 22   Ht 5' 7\" (1.702 m)   Wt 162 lb 0.6 oz (73.5 kg)   SpO2 100%   BMI 25.38 kg/m²   8-24 HR RANGE:  TEMP Temp  Av.6 °F (36.4 °C)  Min: 97 °F (36.1 °C)  Max: 98.1 °F (41.9 °C)   BP Systolic (70GES), CIL:648 , Min:124 , OCQ:134      Diastolic (59ATJ), QWI:40, Min:55, Max:91     PULSE Pulse  Av.3  Min: 57  Max: 96   RR Resp  Av  Min: 22  Max: 22   O2 SAT SpO2  Av %  Min: 100 %  Max: 100 %   OXYGEN DELIVERY No data recorded        SYSTEMIC EXAMINATION:   I have [facility] including pertinent history and exam findings, with the resident/APC/staff. I have seen the patient and the key elements of all parts of the encounter have been performed by me. Physical exam was deferred to limit physical exposure and PPE resources. I reviewed the interval history, interpreted all available radiographic, laboratory and physiologic data at the time of service. I agree with the assessment and plan as documented by resident/APC/ ancillary staff.        DATA REVIEW     Medications: Current Inpatient  Scheduled Meds:   furosemide  20 mg Intravenous Daily    potassium chloride  10 mEq Intravenous Daily    LORazepam  0.25 mg Intravenous Once    enoxaparin  40 mg Subcutaneous BID    dexamethasone  6 mg Intravenous Daily    latanoprost  1 drop Both Eyes Nightly    sodium chloride  20 mL Intravenous Once    sodium chloride  20 mL Intravenous Once     Continuous Infusions:   dexmedetomidine Stopped (11/23/20 0903)    midazolam 4 mg/hr (11/24/20 1756)    fentaNYL Stopped (11/24/20 1137)    norepinephrine Stopped (11/24/20 1750)       INPUT/OUTPUT:  In: 497.4 [I.V.:497.4]  Out: 730 [Urine:355]  Date 11/24/20 0000 - 11/24/20 2359   Shift 0848-6833 0089-4711 5722-0509 24 Hour Total   INTAKE   I.V.(mL/kg) 180.7(2.5) 316.7(4.3)  497.4(6.8)   Shift Total(mL/kg) 180.7(2.5) 316.7(4.3)  497.4(6.8)   OUTPUT   Urine(mL/kg/hr) 230(0.4) 125(0.2)  355   Emesis/NG output(mL/kg) 250(3.4) 125(1.7)  375(5.1)   Shift Total(mL/kg) 480(6.5) 250(3.4)  730(9.9)   Weight (kg) 73.5 73.5 73.5 73.5        LABS:  ABGs:   Recent Labs     11/23/20  1110 11/24/20  0339   POCPH 7.464* 7.433   POCPCO2 37.1 47.0   POCPO2 87.4 103.6   POCHCO3 26.6 31.4*   [id number] 97 98     CBC:   Recent Labs     11/22/20  0513 11/23/20  0525 11/24/20  0613   WBC 10.4 9.3 6.9   HGB 8.1* 8.0* 6.8*   HCT 27.6* 25.9* 21.5*   .2 102.0 94.3    433 219   LYMPHOPCT 20* 23* 18*   RBC 2.70* 2.54* 2.28*   MCH 30.0 31.5 29.8   MCHC 29.3 30.9 31.6   RDW 19.2* 19.0* 18.2*     CRP:   No results for input(s): CRP in the last 72 hours. LDH:   No results for input(s): LDH in the last 72 hours. BMP:   Recent Labs     11/22/20  1046 11/23/20  0525 11/24/20  0613   NA  --  139 143   K 3.1* 3.8 4.2   CL  --  103 105   CO2  --  24 26   BUN  --  16 24*   CREATININE  --  0.64 0.61   GLUCOSE  --  84 111*     Liver Function Test:   Recent Labs     11/23/20  0525 11/24/20  0613   PROT 6.3* 5.9*   LABALBU 2.5* 2.2*   ALT 23 18   AST 22 18   ALKPHOS 88 77   BILITOT 0.51 0.41     Coagulation Profile:   No results for input(s): INR, PROTIME, APTT in the last 72 hours. D-Dimer:  No results for input(s): DDIMER in the last 72 hours.   Ferritin:    No results for input(s): FERRITIN in the last 72 hours. Lactic Acid:  No results for input(s): LACTA in the last 72 hours. Cardiac Enzymes:  No results for input(s): CKTOTAL, CKMB, CKMBINDEX, TROPONINI in the last 72 hours. Invalid input(s): TROPONIN, HSTROP  BNP/ProBNP:   No results for input(s): BNP, PROBNP in the last 72 hours. Triglycerides:  No results for input(s): TRIG in the last 72 hours. Microbiology:  No results for input(s): SPECDESC, SPECDESC, SPECIAL, CULTURE, CULTURE, STATUS, ORG, CDIFFTOXPCR, CAMPYLOBPCR, SALMONELLAPC, SHIGAPCR, SHIGELLAPCR, MPNEUG, MPNEUM, LACTOQL in the last 72 hours. Pathology:    Radiology Reports:    XR CHEST PORTABLE   Final Result   Endotracheal tube tip is approximately 4 cm above the nghia. Slight increase in bilateral parenchymal opacities. XR CHEST PORTABLE   Final Result   No significant change in chest findings compared to the prior study from   November 18th. XR CHEST PORTABLE   Final Result   No change in bilateral pulmonary opacities left greater than right with more   peripheral involvement compatible with pneumonitis. XR CHEST (SINGLE VIEW FRONTAL)   Final Result   *Low Probability for Pulmonary Embolus. *Bilateral mid to lower lung field left greater than right opacities with   peripheral involvement most pronounced at the bases compatible with  COVID-19   pneumonia. The findings were sent to the Radiology Results [address] at 2:46   pm on 11/17/2020to be communicated to a licensed caregiver. NM LUNG SCAN PERFUSION ONLY   Final Result   *Low Probability for Pulmonary Embolus. *Bilateral mid to lower lung field left greater than right opacities with   peripheral involvement most pronounced at the bases compatible with  COVID-19   pneumonia. The findings were sent to the Radiology Results [address] at 2:46   pm on 11/17/2020to be communicated to a licensed caregiver.               Echocardiogram:   No results found for this or any previous visit. ASSESSMENT AND PLAN     Assessment:    Acute hypoxic respiratory failure. Bilateral multifocal groundglass opacities/pneumonia due to COVID-19 infection. Sepsis secondary to COVID-19 infection/secondary to immune suppression/leukopenia  Elevated D-dimer, ferritin, fibrinogen, CRP secondary to COVID-19 infection. Myelodysplastic syndrome on chemotherapy. Leukopenia possibly secondary to chemotherapy/MDS. Anemia secondary to myelodysplastic syndrome. History of coronary artery disease status post CABG. History of hypertension. Hyperlipidemia. Shock secondary to sepsis  Anemia, stable    Plan:  1. Continue Decadron. 2. ARDS ventilator protocol to be followed  3. Chest x-ray and ABG as needed  4. Lasix 20mg Qam X3  5. Patient is on Lovenox and Decadron  6. Continue to monitor inflammatory markers especially D-dimers  7. Patient is on pressor support  8. Glycemic control is adequate  9. ABG without significant acid-base disorder    Total critical care time caring for this patient with life threatening, unstable organ failure, including direct patient contact, management of life support systems, review of data including imaging and labs, discussions with other team members and physicians at least 27  Min so far today, excluding procedures. Kiana Plata MD  Pulmonary and Critical Care Medicine           11/24/2020, 6:31 PM    This patient was evaluated in the context of the global SARS-CoV-2 (COVID-19) pandemic, which necessitated considerations that the patient either has COVID-19 infection or is at risk of infection with COVID-19. Institutional protocols and algorithms that pertain to the evaluation & management of patients with COVID-19 or those at risk for COVID-19 are in a state of rapid changes based on information released by regulatory bodies including the CDC and federal and state organizations.  These policies and algorithms were followed during the patient's care. Please note that this chart was generated using voice recognition Dragon dictation software. Although every effort was made to ensure the accuracy of this automated transcription, some errors in transcription may have occurred.

## 2020-11-24 NOTE — PROGRESS NOTES
[facility]  Office: Susy 58, DO, Sebastian Kras, DO, Nicole Ng, DO, Nora Delay Blood, DO, Zeynep Walton MD, Kirk Mitchell MD, Sonal Michaud MD, [place]ico Estrada MD, Irvin Trent MD, Manjeet Malone MD, Herbert Raymond MD, Stephanie Montoya MD, Ren Rea MD, Bree House, DO, Nuria Degroot MD, Jass Hunt MD, Angelina Fernandez, DO, Heidi Eason MD,  Abhijit Genao, DO, Richard Butler MD, Ventura Smith MD, Sal Tejeda, [facility], [facility], CNP, Francis Casey, CNP, Michelle Rapp, CNS, Pauline Cox, CNP, Be Section, CNP, Ken Fung, CNP, Kane Ramirez, CNP, Liza Solders, CNP, Elvia Chan PA-C, Beni Bruce, [facility], Mariela More, CNP, Ordoug Soda, CNP, Wyvalerieia Husbands, CNP, Colletta Moros, CNP, Panda Mendez, [facility]   [id number] [facility]    Progress Note    11/24/2020    3:57 PM    Name:   Haley Mejia  MRN:     [id number]     Acct:      [de-identified]   Room:   [address] [place] [address] Day:  8  Admit Date:  11/16/2020 10:51 PM    PCP:   Waldemar Phan MD  Code Status:  Full Code    Subjective:     C/C: No chief complaint on file. SOB  Interval History Status: not changed. Seen and examined face-to-face today,  After deteriorating, intubated on November 23 and sedated,  Discussed with pulmonary critical care and infectious disease,  On dexamethasone,  Completed remdesivir,  Given convalescent plasma,  Antibiotics discontinued after negative cultures,  Neupogen for neutropenia,  Myelodysplastic syndrome, oncology on board,  Hemoglobin dropped to 6.8, status post 1 unit PRBC  Overall prognosis poor        Brief History:     Haley Mejia is a 70 y.o. female with a past medical history for MDS, essential hypertension, melanoma, mitral valve prolapse, MRSA, Raynaud's disease presents emergency department for shortness of breath and dyspnea upon exertion for 3 days.   Patient was initially seen at [facility] diagnosed with Covid pneumonia. Patient requiring 50% FiO2 on BiPAP, patient also had an elevated D-dimer but CTA was not possible secondary to contrast allergy. [facility] unable to accommodate due to lack of Covid beds, patient was transferred for further care. Spoke with pulmonary medicine at [facility] and was accepted for ICU transfer. Patient was given 1 unit of PRBCs at [facility] for a hemoglobin less than 7, patient was also placed on BiPAP for transfer. Patient arrived, no complaints except for some shortness of breath. Needing high flow oxygen, still critical    Intubated and sedated, endotracheal intubation done on November 23, 2020 due to acute respiratory failure not holding oxygenation even on FiO2 100% BiPAP    Review of Systems:     Sedated and intubated  Medications: Allergies: Allergies   Allergen Reactions    Fish Allergy Anaphylaxis, Hives and Swelling    Hydrocodone-Acetaminophen Anaphylaxis    Tizanidine Anaphylaxis and Hives    Atorvastatin Hives, Other (See Comments) and Swelling    Metoclopramide Hives     Other reaction(s): Unknown, Unknown    Moxifloxacin Hives, Other (See Comments) and Swelling     Other reaction(s): Other (See Comments), Unknown    Oxycodone Other (See Comments)     Other reaction(s): Hallucinations, Mental Status Change    Pregabalin Other (See Comments)     Other reaction(s): Hallucinations, Other (See Comments)  Causes sores in the mouth  Causes sores in the mouth      Tramadol      Other reaction(s): Edema, Other (See Comments)    Zolpidem Other (See Comments)     Other reaction(s): Other (See Comments)  causes cramps in hands and legs  causes cramps in hands and legs  Other reaction(s):  Other (See Comments)  causes cramps in hands and legs  causes cramps in hands and legs      Cephalexin Rash     Other reaction(s): Unknown, Unknown    Iodides Rash and Swelling     ivp and heart cath dye    Sulfa Antibiotics Rash       Current Meds:   Scheduled Meds:    furosemide  20 mg Intravenous Daily    potassium chloride  10 mEq Intravenous Daily    LORazepam  0.25 mg Intravenous Once    enoxaparin  40 mg Subcutaneous BID    dexamethasone  6 mg Intravenous Daily    latanoprost  1 drop Both Eyes Nightly    sodium chloride  20 mL Intravenous Once    sodium chloride  20 mL Intravenous Once     Continuous Infusions:    dexmedetomidine Stopped (20 0903)    midazolam 3 mg/hr (20 1233)    fentaNYL Stopped (20 1137)    norepinephrine 3 mcg/min (20 1136)     PRN Meds: LORazepam, sodium chloride, sodium chloride flush, potassium chloride **OR** potassium alternative oral replacement **OR** potassium chloride, magnesium sulfate, acetaminophen **OR** acetaminophen, polyethylene glycol, promethazine **OR** ondansetron, nicotine    Data:     Past Medical History:   has a past medical history of Cancer ([facility] [address] Deaf, left, Essential hypertension, GERD (gastroesophageal reflux disease), Glaucoma, Hyperlipidemia, [facility] MRSA (methicillin resistant staph aureus) culture positive, MVP (mitral valve prolapse), Pharyngoesophageal dysphagia, Raynaud disease, and Status post four vessel coronary artery bypass. Social History:   reports that she quit smoking about 15 years ago. Her smoking use included cigarettes. She started smoking about 63 years ago. She does not have any smokeless tobacco history on file. She reports that she does not drink alcohol or use drugs.      Family History:   Family History   Problem Relation Age of Onset    Heart Failure Mother     Early Death Mother     Heart Disease Mother     Stroke Father        Vitals:  BP (!) 124/55   Pulse 96   Temp 98.1 °F (36.7 °C) (Oral)   Resp 22   Ht 5' 7\" (1.702 m)   Wt 162 lb 0.6 oz (73.5 kg)   SpO2 100%   BMI 25.38 kg/m²   Temp (24hrs), Av.8 °F (36.6 °C), Min:97 °F (36.1 °C), Max:98.2 °F (36.8 °C)    Recent Labs     20  1110 11/24/20  0339   POCGLU 110* 126*       I/O (24Hr): Intake/Output Summary (Last 24 hours) at 11/24/2020 1557  Last data filed at 11/24/2020 1119  Gross per 24 hour   Intake 1642.24 ml   Output 1055 ml   Net 587.24 ml       Labs:  Hematology:  Recent Labs     11/22/20  0513 11/23/20  0525 11/24/20  0613   WBC 10.4 9.3 6.9   RBC 2.70* 2.54* 2.28*   HGB 8.1* 8.0* 6.8*   HCT 27.6* 25.9* 21.5*   .2 102.0 94.3   MCH 30.0 31.5 29.8   MCHC 29.3 30.9 31.6   RDW 19.2* 19.0* 18.2*    433 219   MPV 11.2 11.4 11.0     Chemistry:  Recent Labs     11/22/20  1046 11/23/20  0525 11/24/20  0613   NA  --  139 143   K 3.1* 3.8 4.2   CL  --  103 105   CO2  --  24 26   GLUCOSE  --  84 111*   BUN  --  16 24*   CREATININE  --  0.64 0.61   ANIONGAP  --  12 12   LABGLOM  --  >60 >60   GFRAA  --  >60 >60   CALCIUM  --  9.0 8.2*     Recent Labs     11/23/20  0525 11/23/20  1110 11/24/20  0339 11/24/20  0613   PROT 6.3*  --   --  5.9*   LABALBU 2.5*  --   --  2.2*   AST 22  --   --  18   ALT 23  --   --  18   ALKPHOS 88  --   --  77   BILITOT 0.51  --   --  0.41   POCGLU  --  110* 126*  --      ABG:  Lab Results   Component Value Date    POCPH 7.433 11/24/2020    POCPCO2 47.0 11/24/2020    POCPO2 103.6 11/24/2020    POCHCO3 31.4 11/24/2020    NBEA NOT REPORTED 11/24/2020    PBEA 6 11/24/2020    KCM4XPJ 33 11/24/2020    QNMR6LVH 98 11/24/2020    FIO2 100.0 11/24/2020     Lab Results   Component Value Date/Time    SPECIAL RT HAND 2ML 11/17/2020 02:48 AM     Lab Results   Component Value Date/Time    CULTURE NO GROWTH 6 DAYS 11/17/2020 02:48 AM       Radiology:  [facility] Chest (single View Frontal)    Result Date: 11/17/2020  *Low Probability for Pulmonary Embolus. *Bilateral mid to lower lung field left greater than right opacities with peripheral involvement most pronounced at the bases compatible with  COVID-19 pneumonia.  The findings were sent to the Radiology Results [address] at 2:46 pm on 11/17/2020to be communicated to a licensed caregiver. Nm Lung Scan Perfusion Only    Result Date: 11/17/2020  *Low Probability for Pulmonary Embolus. *Bilateral mid to lower lung field left greater than right opacities with peripheral involvement most pronounced at the bases compatible with  COVID-19 pneumonia. The findings were sent to the Radiology Results [address] at 2:46 pm on 11/17/2020to be communicated to a licensed caregiver. Physical Examination:        General appearance: Sedated and intubated  Lungs: Coarse breath sounds bilateral  Heart:  regular rate and rhythm, no murmur  Abdomen:  soft, nontender, nondistended, normal bowel sounds, no masses, hepatomegaly, splenomegaly  Extremities:  no edema, redness, tenderness in the calves  Skin:  no gross lesions, rashes, induration    Assessment:        Hospital Problems           Last Modified POA    * (Principal) Pneumonia due to COVID-19 virus 11/17/2020 Yes    Acute respiratory failure due to COVID-19 ([address] 11/16/2020 Yes    MDS (myelodysplastic syndrome) ([address] 11/17/2020 Yes    Neutropenic sepsis ([address] 11/17/2020 Yes    Bicytopenia 11/17/2020 Yes    ROSALINO (acute kidney injury) ([address] 11/17/2020 Yes    Acute respiratory failure with hypoxia ([address] 11/17/2020 Yes    Essential hypertension 11/17/2020 Yes    Coronary artery disease involving coronary bypass graft of native heart without angina pectoris 11/17/2020 Yes    History of Raynaud's syndrome 11/17/2020 Yes    Transaminitis 11/17/2020 Yes    Immunosuppressed status ([address] 11/17/2020 Yes    Severe sepsis ([address] 11/17/2020 Yes    Hypokalemia 11/22/2020 Yes    Other pancytopenia ([address] 11/24/2020 Yes          Plan:        1. Covid pneumonia, acute respiratory failure, sedated, placed on mechanical ventilation due to deterioration of respiratory failure. on remdesivir, convalescent plasma, Decadron, infectious disease and pulmonary critical care on board,   2.  Myelodysplastic syndrome and pancytopenia, oncology consulted and discussed in detail, advised to keep the patient on DVT prophylaxis with Lovenox twice daily,  3. Anemia, hemoglobin 6.8, transfused 1 unit of PRBC  4. Hypokalemia, replaced , check next am   5. VQ scan low probability,  6. Keep negative fluid balance, but patient was hypotensive, Lasix on hold,  7. Neutropenic sepsis, antibiotics as per infectious disease, blood cultures negative, antibiotics stopped by infectious disease at this time,  8. Neutropenia, Neupogen as per oncology  9. Acute kidney injury, creatinine improving,  10. Transaminitis, monitor liver functions,  11. Hypertension, monitor blood pressure,  12. DVT prophylaxis with Lovenox,  13. GI prophylaxis,  14.  Full CODE STATUS,  15. Critical care time spent 39 minutes    Charbel Shine MD  11/24/2020  3:57 PM

## 2020-11-24 NOTE — PLAN OF CARE
Problem: Airway Clearance - Ineffective  Goal: Achieve or maintain patent airway  11/23/2020 2057 by Luís Champion RCP  Outcome: Ongoing     Problem: Gas Exchange - Impaired  Goal: Absence of hypoxia  11/23/2020 2057 by Luís Champion RCP  Outcome: Ongoing     Problem: Gas Exchange - Impaired  Goal: Promote optimal lung function  11/23/2020 2057 by Luís Champion RCP  Outcome: Ongoing     Problem: Breathing Pattern - Ineffective  Goal: Ability to achieve and maintain a regular respiratory rate  11/23/2020 2057 by Luís Champion RCP  Outcome: Ongoing     Problem: OXYGENATION/RESPIRATORY FUNCTION  Goal: Patient will maintain patent airway  11/23/2020 2057 by Luís Champion RCP  Outcome: Ongoing     Problem: OXYGENATION/RESPIRATORY FUNCTION  Goal: Patient will achieve/maintain normal respiratory rate/effort  Description: Respiratory rate and effort will be within normal limits for the patient  11/23/2020 2057 by Luís Champion RCP  Outcome: Ongoing

## 2020-11-24 NOTE — PROGRESS NOTES
[facility] -   Infectious diseases evaluation  admission date 11/16/2020    reason for consultation:   covid    Impression :   Current:  · covid pneumonia - severe w resp distress  · MDS  · neutropenia  ·   Discussion / summary of stay / plan of care   ·   Recommendations     consented Immune plasma - ordered 11/17,  1 U - tolerated 11/17  Post Meropenem and vanco - stopped 11/18 due to cx neg  remdesevir till 11/20/20, completed  · Follow ferritin and infl markers in am   · neupogen for neutropenia  · research medicine to help out w her covid = did not fit criteria  · Steroids for Covid lung injury    Infection Control Recommendations   · [place] Precautions  · Contact Isolation   · Airborne isolation  · Droplet Isolation      Antimicrobial Stewardship Recommendations   · Simplification of therapy  · Targeted therapy  · Per Kg dosing    Coordination ofOutpatient Care:   · Estimated Length of IV antimicrobials:  · Patient will need Midline / picc Catheter Insertion:   · Patient will need SNF:  · Patient will need outpatient wound care:     History of Present Illness:   Initial history:  Viviano Litten is a 70y.o.-year-old female w resp distress transferred from [id number] pneumonia and underlying MDS.   Neutropenia  On bipap and 50% FIO2, elevated D-Dimers and went for a VQ scan  Past cig smoking - HTN - Raynaud-    Started on decadron remdesevir  started on meropenem and vanco pend BC due to concern for ongoing sepsis      Interval changes     Patient Vitals for the past 8 hrs:   BP Temp Temp src Pulse Resp SpO2   11/23/20 2300 -- -- -- 59 19 100 %   11/23/20 2200 -- -- -- 63 21 100 %   11/23/20 2100 -- -- -- 69 20 100 %   11/23/20 2018 -- -- -- 78 25 100 %   11/23/20 2000 (!) 146/62 97 °F (36.1 °C) CORE 67 28 100 %   11/23/20 1900 -- -- -- 72 28 100 %   11/23/20 1845 -- -- -- 69 24 100 %   11/23/20 1830 -- -- -- 70 28 100 %   11/23/20 1815 -- -- -- 66 25 100 %   11/23/20 1800 -- -- -- 66 23 100 %   11/23/20 1745 -- -- -- 67 23 100 %   11/23/20 1730 -- -- -- 72 28 100 %   11/23/20 1715 -- -- -- 73 28 100 %   11/23/20 1700 -- -- -- 74 28 100 %   11/23/20 1645 -- -- -- 72 26 100 %   11/23/20 1630 -- -- -- 70 24 100 %         Intubated 11/23 due to respiratory failure and hypotension  100% FiO2 and 10 of PEEP  Sedated on the vent at this point    ferritin has been improving    White count is up to 12, results of Neupogen  tolerated the plasma well 11/17    No positive cultures-  Mycoplasma IGM neg      Summary of relevant labs:  Labs:  WBC  1.2-12.8 - 9.3  Creat 0.81  procalcitonin 1   - 227-67-67.6  Ferritin 3046 - 3024 -[id number]  DDimer 2.44  Fibrinogen 720 - 1002  proBNP 729   - 291 - 407      Micro:  BC 11/17/20 x 2  resp PCR neg but for COVID+ 11/17  Legionella AG neg  Imaging:  VQ scan low probability    CT chest 11/16 [facility] periph ground glass bilat, mediastinal large LN  CT brain 11/216 neg [facility]      I have personally reviewed the past medical history, past surgical history, medications, social history, and family history, and I haveupdated the database accordingly. Allergies:   Fish allergy; Hydrocodone-acetaminophen; Tizanidine; Atorvastatin; Metoclopramide; Moxifloxacin; Oxycodone; Pregabalin; Tramadol; Zolpidem; Cephalexin; Iodides; and Sulfa antibiotics     Review of Systems:     Review of Systems   Constitutional: Positive for activity change. Negative for chills and fatigue. Eyes: Negative for visual disturbance. Respiratory: Positive for cough. Negative for choking, shortness of breath and wheezing. Cardiovascular: Negative for chest pain and leg swelling. Gastrointestinal: Negative for anal bleeding. Endocrine: Negative for polydipsia, polyphagia and polyuria. Genitourinary: Negative for dysuria and frequency. Musculoskeletal: Negative for back pain. Skin: Negative for color change. Allergic/Immunologic: Positive for immunocompromised state. Neurological: Negative for dizziness, seizures, speech difficulty, numbness and headaches. HANDS SHAKE   Hematological: Negative for adenopathy. Psychiatric/Behavioral: Negative for agitation, behavioral problems and confusion. Physical Examination :       Physical Exam  Constitutional:       General: She is not in acute distress. Appearance: Normal appearance. She is ill-appearing. She is not diaphoretic. HENT:      Head: Normocephalic and atraumatic. Nose: Nose normal. No congestion. Eyes:      General: No scleral icterus. Conjunctiva/sclera: Conjunctivae normal.      Pupils: Pupils are equal, round, and reactive to light. Neck:      Musculoskeletal: Neck supple. No neck rigidity or muscular tenderness. Cardiovascular:      Rate and Rhythm: Normal rate and regular rhythm. Heart sounds: Normal heart sounds. No murmur. No friction rub. Pulmonary:      Effort: No respiratory distress. Breath sounds: No stridor. No wheezing, rhonchi or rales. Chest:      Chest wall: No tenderness. Abdominal:      General: There is no distension. Palpations: Abdomen is soft. Tenderness: There is no abdominal tenderness. Genitourinary:     Comments: Urine bhavin  Musculoskeletal:         General: No swelling or deformity. Skin:     General: Skin is dry. Coloration: Skin is not jaundiced or pale. Findings: Bruising present. No erythema, lesion or rash. Neurological:      General: No focal deficit present. Mental Status: She is alert and oriented to person, place, and time. Mental status is at baseline. Cranial Nerves: No cranial nerve deficit. Psychiatric:         Mood and Affect: Mood normal.         Thought Content:  Thought content normal.         Past Medical History:     Past Medical History:   Diagnosis Date    Cancer ([facility] [address]     myelodysplastic syndrome    Deaf, left     Essential hypertension     GERD (gastroesophageal reflux disease)     Glaucoma     Hyperlipidemia     Melanoma (HCC)     MRSA (methicillin resistant staph aureus) culture positive     MVP (mitral valve prolapse)     Pharyngoesophageal dysphagia     Raynaud disease     Status post four vessel coronary artery bypass        Past Surgical  History:     Past Surgical History:   Procedure Laterality Date    APPENDECTOMY      CARDIAC SURGERY      CABG x3    CHOLECYSTECTOMY      HYSTERECTOMY, TOTAL ABDOMINAL      IR PORT PLACEMENT < 5 YEARS      SMALL INTESTINE SURGERY      TUBAL LIGATION         Medications:      potassium chloride  10 mEq Intravenous Daily    furosemide  20 mg Intravenous Daily    LORazepam  0.25 mg Intravenous Once    enoxaparin  40 mg Subcutaneous BID    dexamethasone  6 mg Intravenous Daily    latanoprost  1 drop Both Eyes Nightly    sodium chloride  20 mL Intravenous Once    sodium chloride  20 mL Intravenous Once       Social History:     Social History     Socioeconomic History    Marital status:      Spouse name: Not on file    Number of children: Not on file    Years of education: Not on file    Highest education level: Not on file   Occupational History    Not on file   Social Needs    Financial resource strain: Not on file    Food insecurity     Worry: Not on file     Inability: Not on file    Transportation needs     Medical: Not on file     Non-medical: Not on file   Tobacco Use    Smoking status: Former Smoker     Types: Cigarettes     Start date: 1/1/1957     Last attempt to quit: 8/22/2005     Years since quitting: 15.2   Substance and Sexual Activity    Alcohol use: Never     Frequency: Never    Drug use: Never    Sexual activity: Not on file   Lifestyle    Physical activity     Days per week: Not on file     Minutes per session: Not on file    Stress: Not on file   Relationships    Social connections     Talks on phone: Not on file     Gets together: Not on file     Attends Anabaptism service: Not on file     Active member of club or organization: Not on file     Attends meetings of clubs or organizations: Not on file     Relationship status: Not on file    Intimate partner violence     Fear of current or ex partner: Not on file     Emotionally abused: Not on file     Physically abused: Not on file     Forced sexual activity: Not on file   Other Topics Concern    Not on file   Social History Narrative    Not on file       Family History:     Family History   Problem Relation Age of Onset    Heart Failure Mother     Early Death Mother     Heart Disease Mother     Stroke Father       Medical Decision Making:   I have independently reviewed/ordered the following labs:    CBC with Differential:   Recent Labs     11/22/20  0513 11/23/20  0525   WBC 10.4 9.3   HGB 8.1* 8.0*   HCT 27.6* 25.9*    433   LYMPHOPCT 20* 23*   MONOPCT 9* 3     BMP:  Recent Labs     11/21/20  0540 11/22/20  1046 11/23/20  0525     --  139   K 3.5* 3.1* 3.8     --  103   CO2 25  --  24   BUN 22  --  16   CREATININE 0.52  --  0.64   MG 2.0  --   --      Hepatic Function Panel:   Recent Labs     11/23/20  0525   PROT 6.3*   LABALBU 2.5*   BILITOT 0.51   ALKPHOS 88   ALT 23   AST 22     No results for input(s): RPR in the last 72 hours. No results for input(s): HIV in the last 72 hours. No results for input(s): BC in the last 72 hours. Lab Results   Component Value Date    CREATININE 0.64 11/23/2020    GLUCOSE 84 11/23/2020       Detailed results: Thank you for allowing us to participate in the care of this patient. Please call with questions. This note is created with the assistance of a speech recognition program.  While intending to generate adocument that actually reflects the content of the visit, the document can still have some errors including those of syntax and sound a like substitutions which may escape proof reading.   It such instances, actual meaningcan be extrapolated by contextual diversion.     Tyrel Osei MD  Office: [phone]  Perfect serve / office [phone]

## 2020-11-24 NOTE — PROGRESS NOTES
Called to bedside for anxiety and respiratory distress. Upon examination patient is awake and very anxious, breathing 60s to 70 respirations per minute, heart rate was 140s 150s sinus tachycardia. Oxygen saturation is fluctuating between 88 and 97% on BiPAP. Patient was given an additional dose of 0.25 Ativan earlier with no resolution, 15 minutes later 0.5 mg of Ativan was given an additional 0.5 mg was given 15 minutes later. Patient still very anxious with no changes in vital signs. Patient was started on a Precedex drip and she was taken off BiPAP and changed to high flow nasal cannula with nonrebreather. Oxygen saturation improved slightly, agitation finally improved after increasing Precedex to 0.4 mg and Ativan 1 mg was given. Patient now resting comfortably although somewhat somnolent on BiPAP. Oxygen saturations 97%, blood pressure decreasing, informed pulmonary medicine and day team regarding patient's mentation and events of the morning.     45 minutes of critical care time spent with patient at bedside     [id number] [address]

## 2020-11-25 NOTE — PLAN OF CARE
Problem:  Activity:  Goal: Fatigue will decrease  Description: Fatigue will decrease  11/25/2020 0506 by Marita Landa RN  Outcome: Ongoing  11/24/2020 1928 by Kavya Kemp RN  Outcome: Ongoing     Problem: Cardiac:  Goal: Hemodynamic stability will improve  Description: Hemodynamic stability will improve  11/25/2020 0506 by Marita Landa RN  Outcome: Ongoing  11/24/2020 1928 by Kavya Kemp RN  Outcome: Ongoing     Problem: Coping:  Goal: Level of anxiety will decrease  Description: Level of anxiety will decrease  11/25/2020 0506 by Marita Landa RN  Outcome: Ongoing  11/24/2020 1928 by Kavya Kemp RN  Outcome: Ongoing  Goal: Ability to cope will improve  Description: Ability to cope will improve  11/25/2020 0506 by Marita Landa RN  Outcome: Ongoing  11/24/2020 1928 by Kavya Kemp RN  Outcome: Ongoing  Goal: Ability to establish a method of communication will improve  Description: Ability to establish a method of communication will improve  11/25/2020 0506 by Marita Landa RN  Outcome: Ongoing  11/24/2020 1928 by Kavya Kemp RN  Outcome: Ongoing     Problem: Nutritional:  Goal: Consumption of the prescribed amount of daily calories will improve  Description: Consumption of the prescribed amount of daily calories will improve  11/25/2020 0506 by Marita Landa RN  Outcome: Ongoing  11/24/2020 1928 by Kavya Kemp RN  Outcome: Ongoing     Problem: Respiratory:  Goal: Ability to maintain a clear airway will improve  Description: Ability to maintain a clear airway will improve  11/25/2020 0506 by Marita Landa RN  Outcome: Ongoing  11/24/2020 2041 by Myrna Tran RCP  Outcome: Ongoing  11/24/2020 1928 by Kavya Kemp RN  Outcome: Ongoing  Goal: Ability to maintain adequate ventilation will improve  Description: Ability to maintain adequate ventilation will improve  11/25/2020 0506 by Marita Landa RN  Outcome: Ongoing  11/24/2020 2041 by Myrna Tran RCP  Outcome: Ongoing  11/24/2020 1928 by Roro Ledezma function  11/25/2020 0506 by Martha Vance RN  Outcome: Ongoing  11/24/2020 2041 by Crow Reyes RCP  Outcome: Ongoing  11/24/2020 1928 by Aby Ponce RN  Outcome: Ongoing     Problem: Breathing Pattern - Ineffective  Goal: Ability to achieve and maintain a regular respiratory rate  11/25/2020 0506 by Martha Vance RN  Outcome: Ongoing  11/24/2020 2041 by Crow Reyes RCP  Outcome: Ongoing  11/24/2020 1928 by Aby Ponce RN  Outcome: Ongoing     Problem:  Body Temperature -  Risk of, Imbalanced  Goal: Ability to maintain a body temperature within defined limits  11/25/2020 0506 by Martha Vance RN  Outcome: Ongoing  11/24/2020 1928 by Aby Ponce RN  Outcome: Ongoing  Goal: Will regain or maintain usual level of consciousness  11/25/2020 0506 by Martha Vance RN  Outcome: Ongoing  11/24/2020 1928 by Aby Ponce RN  Outcome: Ongoing  Goal: Complications related to the disease process, condition or treatment will be avoided or minimized  11/25/2020 0506 by Martha Vance RN  Outcome: Ongoing  11/24/2020 1928 by Aby Ponce RN  Outcome: Ongoing     Problem: Isolation Precautions - Risk of Spread of Infection  Goal: Prevent transmission of infection  11/25/2020 0506 by Martha Vance RN  Outcome: Ongoing  11/24/2020 1928 by Aby Ponce RN  Outcome: Ongoing     Problem: Nutrition Deficits  Goal: Optimize nutrtional status  11/25/2020 0506 by Martha Vance RN  Outcome: Ongoing  11/24/2020 1928 by Aby Ponce RN  Outcome: Ongoing     Problem: Risk for Fluid Volume Deficit  Goal: Maintain normal heart rhythm  11/25/2020 0506 by Martha Vance RN  Outcome: Ongoing  11/24/2020 1928 by Aby Ponce RN  Outcome: Ongoing  Goal: Maintain absence of muscle cramping  11/25/2020 0506 by Martha Vnace RN  Outcome: Ongoing  11/24/2020 1928 by Aby Ponce RN  Outcome: Ongoing  Goal: Maintain normal serum potassium, sodium, calcium, phosphorus, and pH  11/25/2020 0506 by Martha Vance RN  Outcome: Ongoing  11/24/2020 1928 by Samantha Moctezuma RN  Outcome: Ongoing     Problem: Loneliness or Risk for Loneliness  Goal: Demonstrate positive use of time alone when socialization is not possible  11/25/2020 0506 by Yandel Rowley RN  Outcome: Ongoing  11/24/2020 1928 by Samantha Moctezuma RN  Outcome: Ongoing     Problem: Fatigue  Goal: Verbalize increase energy and improved vitality  11/25/2020 0506 by Yandel Rowley RN  Outcome: Ongoing  11/24/2020 1928 by Samantha Moctezuma RN  Outcome: Ongoing     Problem: Patient Education: Go to Patient Education Activity  Goal: Patient/Family Education  11/25/2020 0506 by Yandel Rowley RN  Outcome: Ongoing  11/24/2020 1928 by Samantha Moctezuma RN  Outcome: Ongoing     Problem: OXYGENATION/RESPIRATORY FUNCTION  Goal: Patient will maintain patent airway  11/25/2020 0506 by Yandel Rowley RN  Outcome: Ongoing  11/24/2020 2041 by Esvin Perez RCP  Outcome: Ongoing  11/24/2020 1928 by Samantha Moctezuma RN  Outcome: Ongoing  Goal: Patient will achieve/maintain normal respiratory rate/effort  Description: Respiratory rate and effort will be within normal limits for the patient  11/25/2020 0506 by Yandel Rowley RN  Outcome: Ongoing  11/24/2020 2041 by Esvin Perez RCP  Outcome: Ongoing  11/24/2020 1928 by Samantha Moctezuma RN  Outcome: Ongoing     Problem: Nutrition  Goal: Optimal nutrition therapy  Description: Nutrition Problem #1: Inadequate oral intake  Intervention: Food and/or Nutrient Delivery: Continue NPO(Start diet vs nutrition support as able)  Nutritional Goals: Start diet vs nutrition support within 24-72 hrs     11/25/2020 0506 by Yandel Rowley RN  Outcome: Ongoing  11/24/2020 1928 by Samantha Moctezuma RN  Outcome: Ongoing     Problem: Skin Integrity:  Goal: Absence of new skin breakdown  Description: Absence of new skin breakdown  11/25/2020 0506 by Yandel Rowley RN  Outcome: Met This Shift  11/24/2020 1928 by Samantha Moctezuma RN  Outcome: Ongoing     Problem: Falls - Risk of:  Goal: Will remain free from falls  Description: Will remain free from falls  11/25/2020 0506 by Yves Gould RN  Outcome: Met This Shift  11/24/2020 1928 by Hilaria Huston RN  Outcome: Ongoing  Goal: Absence of physical injury  Description: Absence of physical injury  11/25/2020 0506 by Yves Gould RN  Outcome: Met This Shift  11/24/2020 1928 by Hilaria Huston RN  Outcome: Ongoing     Problem: Restraint Use - Nonviolent/Non-Self-Destructive Behavior:  Goal: Absence of restraint-related injury  Description: Absence of restraint-related injury  Outcome: Met This Shift     Problem: Restraint Use - Nonviolent/Non-Self-Destructive Behavior:  Goal: Absence of restraint indications  Description: Absence of restraint indications  Outcome: Not Met This Shift

## 2020-11-25 NOTE — PROGRESS NOTES
Hospital diagnosed with Covid pneumonia. Patient requiring 50% FiO2 on BiPAP, patient also had an elevated D-dimer but CTA was not possible secondary to contrast allergy. [facility] unable to accommodate due to lack of Covid beds, patient was transferred for further care. Spoke with pulmonary medicine at [place] and was accepted for ICU transfer. Patient was given 1 unit of PRBCs at [address] [place] [address] for a hemoglobin less than 7, patient was also placed on BiPAP for transfer. Patient arrived, no complaints except for some shortness of breath. Needing high flow oxygen, still critical    Intubated and sedated, endotracheal intubation done on November 23, 2020 due to acute respiratory failure not holding oxygenation even on FiO2 100% BiPAP    Review of Systems:     Sedated and intubated  Medications: Allergies: Allergies   Allergen Reactions    Fish Allergy Anaphylaxis, Hives and Swelling    Hydrocodone-Acetaminophen Anaphylaxis    Tizanidine Anaphylaxis and Hives    Atorvastatin Hives, Other (See Comments) and Swelling    Metoclopramide Hives     Other reaction(s): Unknown, Unknown    Moxifloxacin Hives, Other (See Comments) and Swelling     Other reaction(s): Other (See Comments), Unknown    Oxycodone Other (See Comments)     Other reaction(s): Hallucinations, Mental Status Change    Pregabalin Other (See Comments)     Other reaction(s): Hallucinations, Other (See Comments)  Causes sores in the mouth  Causes sores in the mouth      Tramadol      Other reaction(s): Edema, Other (See Comments)    Zolpidem Other (See Comments)     Other reaction(s): Other (See Comments)  causes cramps in hands and legs  causes cramps in hands and legs  Other reaction(s):  Other (See Comments)  causes cramps in hands and legs  causes cramps in hands and legs      Cephalexin Rash     Other reaction(s): Unknown, Unknown    Iodides Rash and Swelling     ivp and heart cath dye    Sulfa Antibiotics Rash       Current Meds:   Scheduled Meds:    [START ON 11/28/2020] pantoprazole  40 mg Intravenous Daily    And    [START ON 11/28/2020] sodium chloride (PF)  10 mL Intravenous Daily    furosemide  20 mg Intravenous Daily    potassium chloride  10 mEq Intravenous Daily    LORazepam  0.25 mg Intravenous Once    [Held by provider] enoxaparin  40 mg Subcutaneous BID    dexamethasone  6 mg Intravenous Daily    latanoprost  1 drop Both Eyes Nightly    sodium chloride  20 mL Intravenous Once    sodium chloride  20 mL Intravenous Once     Continuous Infusions:    pantoprozole (PROTONIX) infusion 8 mg/hr (11/25/20 1109)    dexmedetomidine Stopped (11/23/20 6981)    midazolam 6 mg/hr (11/25/20 8672)    fentaNYL 75 mcg/hr (11/25/20 2213)    norepinephrine Stopped (11/24/20 3350)     PRN Meds: LORazepam, sodium chloride, sodium chloride flush, potassium chloride **OR** potassium alternative oral replacement **OR** potassium chloride, magnesium sulfate, acetaminophen **OR** acetaminophen, polyethylene glycol, promethazine **OR** ondansetron, nicotine    Data:     Past Medical History:   has a past medical history of Cancer ([facility] Deaf, left, Essential hypertension, GERD (gastroesophageal reflux disease), Glaucoma, Hyperlipidemia, Melanoma ([facility] [facility] MRSA (methicillin resistant staph aureus) culture positive, MVP (mitral valve prolapse), Pharyngoesophageal dysphagia, Raynaud disease, and Status post four vessel coronary artery bypass. Social History:   reports that she quit smoking about 15 years ago. Her smoking use included cigarettes. She started smoking about 63 years ago. She does not have any smokeless tobacco history on file. She reports that she does not drink alcohol or use drugs.      Family History:   Family History   Problem Relation Age of Onset    Heart Failure Mother     Early Death Mother     Heart Disease Mother     Stroke Father        Vitals:  BP (!) 101/51   Pulse 90   Temp 97.9 °F (36.6 °C)   Resp 19   Ht 5' 7\" (1.702 m)   Wt 162 lb 7.7 oz (73.7 kg)   SpO2 95%   BMI 25.45 kg/m²   Temp (24hrs), Av °F (36.7 °C), Min:97.5 °F (36.4 °C), Max:98.8 °F (37.1 °C)    Recent Labs     20  11120   POCGLU 110* 126* 98       I/O (24Hr):     Intake/Output Summary (Last 24 hours) at 2020 1834  Last data filed at 2020 1736  Gross per 24 hour   Intake 1266 ml   Output 2500 ml   Net -1234 ml       Labs:  Hematology:  Recent Labs     20  0520  0620  1756   WBC 9.3 6.9 4.2  --    RBC 2.54* 2.28* 2.05*  --    HGB 8.0* 6.8* 6.0* 7.7*   HCT 25.9* 21.5* 19.8* 24.0*   .0 94.3 96.6  --    MCH 31.5 29.8 29.3  --    MCHC 30.9 31.6 30.3  --    RDW 19.0* 18.2* 18.4*  --     219 148  --    MPV 11.4 11.0 11.2  --      Chemistry:  Recent Labs     20  0520    143 141   K 3.8 4.2 4.4    105 104   CO2 24 26 31   GLUCOSE 84 111* 96   BUN 16 24* 26*   CREATININE 0.64 0.61 0.61   ANIONGAP 12 12 6*   LABGLOM >60 >60 >60   GFRAA >60 >60 >60   CALCIUM 9.0 8.2* 8.3*     Recent Labs     20  0520  11120  03320   PROT 6.3*  --   --  5.9*  --  5.7*   LABALBU 2.5*  --   --  2.2*  --  2.2*   AST 22  --   --  18  --  16   ALT 23  --   --  18  --  18   ALKPHOS 88  --   --  77  --  62   BILITOT 0.51  --   --  0.41  --  0.30   POCGLU  --  110* 126*  --  98  --      ABG:  Lab Results   Component Value Date    POCPH 7.448 2020    POCPCO2 52.3 2020    POCPO2 67.9 2020    POCHCO3 36.2 2020    NBEA NOT REPORTED 2020    PBEA 11 2020    JFQ3PHZ 38 2020    MONI5ZPG 94 2020    FIO2 80.0 2020     Lab Results   Component Value Date/Time    SPECIAL RT HAND 2ML 2020 02:48 AM     Lab Results   Component Value Date/Time    CULTURE NO GROWTH 6 DAYS 2020 02:48 AM       Radiology:  [facility] Chest (single View Frontal)    Result Date: 11/17/2020  *Low Probability for Pulmonary Embolus. *Bilateral mid to lower lung field left greater than right opacities with peripheral involvement most pronounced at the bases compatible with  COVID-19 pneumonia. The findings were sent to the Radiology Results [address] at 2:46 pm on 11/17/2020to be communicated to a licensed caregiver. Nm Lung Scan Perfusion Only    Result Date: 11/17/2020  *Low Probability for Pulmonary Embolus. *Bilateral mid to lower lung field left greater than right opacities with peripheral involvement most pronounced at the bases compatible with  COVID-19 pneumonia. The findings were sent to the Radiology Results [address] at 2:46 pm on 11/17/2020to be communicated to a licensed caregiver.        Physical Examination:        General appearance: Sedated and intubated  Lungs: Coarse breath sounds bilateral  Heart:  regular rate and rhythm, no murmur  Abdomen:  soft, nontender, nondistended, normal bowel sounds, no masses, hepatomegaly, splenomegaly  Extremities:  no edema, redness, tenderness in the calves  Skin:  no gross lesions, rashes, induration    Assessment:        Hospital Problems           Last Modified POA    * (Principal) Pneumonia due to COVID-19 virus 11/17/2020 Yes    Acute respiratory failure due to COVID-19 ([address] 11/16/2020 Yes    MDS (myelodysplastic syndrome) ([address] 11/17/2020 Yes    Neutropenic sepsis ([address] 11/17/2020 Yes    Bicytopenia 11/17/2020 Yes    ROSALINO (acute kidney injury) ([address] 11/17/2020 Yes    Acute respiratory failure with hypoxia ([address] 11/17/2020 Yes    Essential hypertension 11/17/2020 Yes    Coronary artery disease involving coronary bypass graft of native heart without angina pectoris 11/17/2020 Yes    History of Raynaud's syndrome 11/17/2020 Yes    Transaminitis 11/17/2020 Yes    Immunosuppressed status ([address] 11/17/2020 Yes    Severe sepsis ([address] 11/17/2020 Yes    Hypokalemia 11/22/2020 Yes    Other pancytopenia ([facility] 11/24/2020 Yes          Plan:        1. Covid pneumonia, acute respiratory failure, sedated, placed on mechanical ventilation due to deterioration of respiratory failure. on remdesivir, convalescent plasma, Decadron, infectious disease and pulmonary critical care on board,   2. Myelodysplastic syndrome and pancytopenia, oncology on board and discussed in detail, advised to keep the patient on DVT prophylaxis with Lovenox twice daily,  3. Anemia, hemoglobin 6.0, transfused 1 unit of PRBC, hold lovenox,   4. Hypokalemia, replaced ,    5. VQ scan low probability,  6. Keep negative fluid balance, but patient was hypotensive, Lasix on hold,  7. Neutropenic sepsis, antibiotics as per infectious disease, blood cultures negative, antibiotics stopped by infectious disease at this time,  8. Neutropenia, Neupogen as per oncology  9. Acute kidney injury, creatinine improving,  10. Transaminitis, monitor liver functions,  11. Hypertension, monitor blood pressure,  12. DVT scds only, Lovenox on hold   13. GI prophylaxis,  14.  Full CODE STATUS,  15. Critical care time spent 39 minutes    Fernando Rose MD  11/25/2020  6:34 PM

## 2020-11-25 NOTE — PLAN OF CARE
Problem: Airway Clearance - Ineffective  Goal: Achieve or maintain patent airway  11/25/2020 0744 by Lisa Napier, RCP  Outcome: Ongoing  11/25/2020 0742 by Lisa Napier, RCP  Outcome: Ongoing  11/25/2020 0506 by Sandra Jimenez RN  Outcome: Ongoing  11/24/2020 2041 by Magi Cap, RCP  Outcome: Ongoing  11/24/2020 1928 by Sp Tavarez RN  Outcome: Ongoing     Problem: Gas Exchange - Impaired  Goal: Absence of hypoxia  11/25/2020 0744 by Lisa Napier, RCP  Outcome: Ongoing  11/25/2020 0742 by Lisa Napier, RCP  Outcome: Ongoing  11/25/2020 0506 by Sandra Jimenez RN  Outcome: Ongoing  11/24/2020 2041 by [place] Cap, RCP  Outcome: Ongoing  11/24/2020 1928 by Sp Tavarez RN  Outcome: Ongoing  Goal: Promote optimal lung function  11/25/2020 0744 by Lisa Napier, RCP  Outcome: Ongoing  11/25/2020 0742 by Lisa Napier, RCP  Outcome: Ongoing  11/25/2020 0506 by Sandra Jimenez RN  Outcome: Ongoing  11/24/2020 2041 by Magi Cap, RCP  Outcome: Ongoing  11/24/2020 1928 by Sp Tavarez RN  Outcome: Ongoing     Problem: Breathing Pattern - Ineffective  Goal: Ability to achieve and maintain a regular respiratory rate  11/25/2020 0744 by Lisa Napier, RCP  Outcome: Ongoing  11/25/2020 0506 by Sandra Jimenez RN  Outcome: Ongoing  11/24/2020 2041 by [place] Cap, RCP  Outcome: Ongoing  11/24/2020 1928 by Sp Tavarez RN  Outcome: Ongoing     Problem: OXYGENATION/RESPIRATORY FUNCTION  Goal: Patient will maintain patent airway  11/25/2020 0744 by Lisa Napier, RCP  Outcome: Ongoing  11/25/2020 0506 by Sandra Jimenez RN  Outcome: Ongoing  11/24/2020 2041 by [place] Cap, RCP  Outcome: Ongoing  11/24/2020 1928 by Sp Tavarez RN  Outcome: Ongoing  Goal: Patient will achieve/maintain normal respiratory rate/effort  Description: Respiratory rate and effort will be within normal limits for the patient  11/25/2020 0744 by Lisa Napier, RCP  Outcome: Ongoing  11/25/2020 0506 by Karmen Oliveira CITLALY Diop  Outcome: Ongoing  11/24/2020 2041 by Crow Reyes RCP  Outcome: Ongoing  11/24/2020 1928 by Aby Ponce RN  Outcome: Ongoing     Problem: MECHANICAL VENTILATION  Goal: Patient will maintain patent airway  11/25/2020 0744 by Mary Franco RCP  Outcome: Ongoing  11/25/2020 0506 by Martha Vance RN  Outcome: Ongoing  11/24/2020 2041 by Crow Reyes RCP  Outcome: Ongoing  11/24/2020 1928 by Aby Ponce RN  Outcome: Ongoing  Goal: Oral health is maintained or improved  Outcome: Ongoing  Goal: ET tube will be managed safely  Outcome: Ongoing  Goal: Ability to express needs and understand communication  Outcome: Ongoing  Goal: Mobility/activity is maintained at optimum level for patient  Outcome: Ongoing

## 2020-11-25 NOTE — CARE COORDINATION
TRANSITIONAL CARE PLANNING/ 2 [facility] Day: 9    Reason for Admission: Acute respiratory failure due to COVID-19 ([facility] [U07.1, J96.00]   ARDS    Treatment Plan of Care: intubated fio2 75%, decadron, fentanyl, versed      Readmission Risk              Risk of Unplanned Readmission:        24            Patient goals/Treatment Preferences/Transitional Plan: Goal return home with  /home care

## 2020-11-25 NOTE — PLAN OF CARE
Problem:  Activity:  Goal: Fatigue will decrease  Description: Fatigue will decrease  11/25/2020 1113 by Darius Michaud RN  Outcome: Ongoing  11/25/2020 0744 by Daphne Rose RCP  Outcome: Ongoing  11/25/2020 0506 by Colonel Sylvia RN  Outcome: Ongoing     Problem: Cardiac:  Goal: Hemodynamic stability will improve  Description: Hemodynamic stability will improve  11/25/2020 1113 by Darius Michaud RN  Outcome: Ongoing  11/25/2020 0744 by Daphne Rose RCP  Outcome: Ongoing  11/25/2020 0506 by Colonel Sylvia RN  Outcome: Ongoing     Problem: Coping:  Goal: Level of anxiety will decrease  Description: Level of anxiety will decrease  11/25/2020 1113 by Darius Michaud RN  Outcome: Ongoing  11/25/2020 0744 by Daphne Rose RCP  Outcome: Ongoing  11/25/2020 0506 by Colonel Sylvia RN  Outcome: Ongoing  Goal: Ability to cope will improve  Description: Ability to cope will improve  11/25/2020 1113 by Darius Michaud RN  Outcome: Ongoing  11/25/2020 0744 by Daphne Rose RCP  Outcome: Ongoing  11/25/2020 0506 by Colonel Sylvia RN  Outcome: Ongoing  Goal: Ability to establish a method of communication will improve  Description: Ability to establish a method of communication will improve  11/25/2020 1113 by Darius Michaud RN  Outcome: Ongoing  11/25/2020 0744 by Daphne Rose RCP  Outcome: Ongoing  11/25/2020 0506 by Colonel Sylvia RN  Outcome: Ongoing     Problem: Nutritional:  Goal: Consumption of the prescribed amount of daily calories will improve  Description: Consumption of the prescribed amount of daily calories will improve  11/25/2020 1113 by Darius Michaud RN  Outcome: Ongoing  11/25/2020 0744 by Daphne Rose RCP  Outcome: Ongoing  11/25/2020 0506 by Colonel Sylvia RN  Outcome: Ongoing     Problem: Respiratory:  Goal: Ability to maintain a clear airway will improve  Description: Ability to maintain a clear airway will improve  11/25/2020 1113 by Darius Michaud RN  Outcome: Ongoing  11/25/2020 0744 by Kang Rey, RCP  Outcome: Ongoing  11/25/2020 5500 by Kang Rey, RCP  Outcome: Ongoing  11/25/2020 0506 by Valencia Chen RN  Outcome: Ongoing  Goal: Ability to maintain adequate ventilation will improve  Description: Ability to maintain adequate ventilation will improve  11/25/2020 1113 by Lindsey Doyle RN  Outcome: Ongoing  11/25/2020 0744 by Kang Rey, RCP  Outcome: Ongoing  11/25/2020 0742 by Kang Rey, RCP  Outcome: Ongoing  11/25/2020 0506 by Valencia Chen RN  Outcome: Ongoing  Goal: Complications related to the disease process, condition or treatment will be avoided or minimized  Description: Complications related to the disease process, condition or treatment will be avoided or minimized  11/25/2020 1113 by Lindsey Doyle RN  Outcome: Ongoing  11/25/2020 0744 by Kang Rey, RCP  Outcome: Ongoing  11/25/2020 0742 by Kang Rey, RCP  Outcome: Ongoing  11/25/2020 0506 by Valencia Chen RN  Outcome: Ongoing     Problem: Skin Integrity:  Goal: Risk for impaired skin integrity will decrease  Description: Risk for impaired skin integrity will decrease  11/25/2020 1113 by Lindsey Doyle RN  Outcome: Ongoing  11/25/2020 0744 by Kang Rey, RCP  Outcome: Ongoing  11/25/2020 0742 by Kang Rey, RCP  Outcome: Ongoing  11/25/2020 0506 by Valencia Chen RN  Outcome: Ongoing  Goal: Will show no infection signs and symptoms  Description: Will show no infection signs and symptoms  11/25/2020 1113 by Lindsey Doyle RN  Outcome: Ongoing  11/25/2020 0744 by Kang Rey, RCP  Outcome: Ongoing  11/25/2020 0742 by Kang Rey, RCP  Outcome: Ongoing  11/25/2020 0506 by Valencia Chen RN  Outcome: Ongoing  Goal: Absence of new skin breakdown  Description: Absence of new skin breakdown  11/25/2020 1113 by Lindsey Doyle RN  Outcome: Ongoing  11/25/2020 0744 by Kang Rey, RCP  Outcome: Ongoing  11/25/2020 0742 by Kang Rey, RCP  Outcome: Ongoing  11/25/2020 0506 by Miriam Owens RN  Outcome: Met This Shift     Problem: Falls - Risk of:  Goal: Will remain free from falls  Description: Will remain free from falls  11/25/2020 1113 by Power Joyner RN  Outcome: Ongoing  11/25/2020 0744 by Francois Bates RCP  Outcome: Ongoing  11/25/2020 0506 by Miriam Owens RN  Outcome: Met This Shift  Goal: Absence of physical injury  Description: Absence of physical injury  11/25/2020 1113 by Power Joyner RN  Outcome: Ongoing  11/25/2020 0744 by Francois Bates, RCP  Outcome: Ongoing  11/25/2020 0506 by Miriam Owens RN  Outcome: Met This Shift     Problem: Pain:  Goal: Pain level will decrease  Description: Pain level will decrease  11/25/2020 1113 by Power Joyner RN  Outcome: Ongoing  11/25/2020 0744 by Francois Bates, RCP  Outcome: Ongoing  11/25/2020 0506 by Miriam Owens RN  Outcome: Ongoing  Goal: Control of acute pain  Description: Control of acute pain  11/25/2020 1113 by Power Joyner RN  Outcome: Ongoing  11/25/2020 0744 by Francois Bates, RCP  Outcome: Ongoing  11/25/2020 0506 by Miriam Owens RN  Outcome: Ongoing  Goal: Control of chronic pain  Description: Control of chronic pain  11/25/2020 1113 by Power Joyner RN  Outcome: Ongoing  11/25/2020 0744 by Francois Bates, RCP  Outcome: Ongoing  11/25/2020 0506 by Miriam Owens RN  Outcome: Ongoing     Problem: Airway Clearance - Ineffective  Goal: Achieve or maintain patent airway  11/25/2020 1113 by Power Joyner RN  Outcome: Ongoing  11/25/2020 0744 by Francois Bates RCP  Outcome: Ongoing  11/25/2020 0742 by AMBER CaceresP  Outcome: Ongoing  11/25/2020 0506 by Miriam Owens RN  Outcome: Ongoing     Problem: Gas Exchange - Impaired  Goal: Absence of hypoxia  11/25/2020 1113 by Power Joyner RN  Outcome: Ongoing  11/25/2020 0744 by AMBER CaceresP  Outcome: Ongoing  11/25/2020 0742 by Francois Bates RCP  Outcome: Ongoing  11/25/2020 0506 by Miriam Owens RN  Outcome: Ongoing  Goal: Promote optimal lung function  11/25/2020 1113 by Jonna Dueñas RN  Outcome: Ongoing  11/25/2020 0744 by Meena Rush RCP  Outcome: Ongoing  11/25/2020 0742 by Meena Rush RCP  Outcome: Ongoing  11/25/2020 0506 by Anamika Brothers RN  Outcome: Ongoing     Problem: Breathing Pattern - Ineffective  Goal: Ability to achieve and maintain a regular respiratory rate  11/25/2020 1113 by Jonna Dueñas RN  Outcome: Ongoing  11/25/2020 0744 by Meena Rush RCP  Outcome: Ongoing  11/25/2020 0506 by Anamika Brothers RN  Outcome: Ongoing     Problem:  Body Temperature -  Risk of, Imbalanced  Goal: Ability to maintain a body temperature within defined limits  11/25/2020 1113 by Jonna Dueñas RN  Outcome: Ongoing  11/25/2020 0744 by Meena Rush RCP  Outcome: Ongoing  11/25/2020 0506 by Anamika Brothers RN  Outcome: Ongoing  Goal: Will regain or maintain usual level of consciousness  11/25/2020 1113 by Jonna Dueñas RN  Outcome: Ongoing  11/25/2020 0744 by Meena Rush RCP  Outcome: Ongoing  11/25/2020 0506 by Anamika Brothers RN  Outcome: Ongoing  Goal: Complications related to the disease process, condition or treatment will be avoided or minimized  11/25/2020 1113 by Jonna Dueñas RN  Outcome: Ongoing  11/25/2020 0744 by Meena Rush RCP  Outcome: Ongoing  11/25/2020 0506 by Anamika Brothers RN  Outcome: Ongoing     Problem: Isolation Precautions - Risk of Spread of Infection  Goal: Prevent transmission of infection  11/25/2020 1113 by Jonna Dueñas RN  Outcome: Ongoing  11/25/2020 0744 by Meena Rush RCP  Outcome: Ongoing  11/25/2020 0506 by Anamika Brothers RN  Outcome: Ongoing     Problem: Nutrition Deficits  Goal: Optimize nutrtional status  11/25/2020 1113 by Jonna Dueñas RN  Outcome: Ongoing  11/25/2020 0744 by Meena Rush RCP  Outcome: Ongoing  11/25/2020 0506 by Anamika Brothers RN  Outcome: Ongoing     Problem: Risk for Fluid Volume Deficit  Goal: Maintain normal heart rhythm  11/25/2020 1113 by Chiqui Walker Kane Skaggs RN  Outcome: Ongoing  11/25/2020 0744 by Kenyon Cervantes RCP  Outcome: Ongoing  11/25/2020 0506 by Mirlande Jiang RN  Outcome: Ongoing  Goal: Maintain absence of muscle cramping  11/25/2020 1113 by Matthieu Loera RN  Outcome: Ongoing  11/25/2020 0744 by Kenyon Cervantes RCP  Outcome: Ongoing  11/25/2020 0506 by Mirlande Jiang RN  Outcome: Ongoing  Goal: Maintain normal serum potassium, sodium, calcium, phosphorus, and pH  11/25/2020 1113 by Matthieu Loera RN  Outcome: Ongoing  11/25/2020 0744 by Kenyon Cervantes RCP  Outcome: Ongoing  11/25/2020 0506 by Mirlande Jiang RN  Outcome: Ongoing     Problem: Loneliness or Risk for Loneliness  Goal: Demonstrate positive use of time alone when socialization is not possible  11/25/2020 1113 by Matthieu Loera RN  Outcome: Ongoing  11/25/2020 0744 by Kenyon Cervantes RCP  Outcome: Ongoing  11/25/2020 0506 by Mirlande Jiang RN  Outcome: Ongoing     Problem: Fatigue  Goal: Verbalize increase energy and improved vitality  11/25/2020 1113 by Matthieu Loera RN  Outcome: Ongoing  11/25/2020 0744 by Kenyon Cervantes RCP  Outcome: Ongoing  11/25/2020 0506 by Mirlande Jiang RN  Outcome: Ongoing     Problem: Patient Education: Go to Patient Education Activity  Goal: Patient/Family Education  11/25/2020 1113 by Matthieu Loera RN  Outcome: Ongoing  11/25/2020 0744 by Kenyon Cervantes RCP  Outcome: Ongoing  11/25/2020 0506 by Mirlande Jiang RN  Outcome: Ongoing     Problem: OXYGENATION/RESPIRATORY FUNCTION  Goal: Patient will maintain patent airway  11/25/2020 1113 by Matthieu Loera RN  Outcome: Ongoing  11/25/2020 0744 by Kenyon Cervantes RCP  Outcome: Ongoing  11/25/2020 0506 by Mirlande Jiang RN  Outcome: Ongoing  Goal: Patient will achieve/maintain normal respiratory rate/effort  Description: Respiratory rate and effort will be within normal limits for the patient  11/25/2020 1113 by Matthieu Loera RN  Outcome: Ongoing  11/25/2020 0744 by Kenyon Cervantes RCP  Outcome: Ongoing  11/25/2020 0506 by Lalito Hollins RN  Outcome: Ongoing     Problem: Nutrition  Goal: Optimal nutrition therapy  Description: Nutrition Problem #1: Inadequate oral intake  Intervention: Food and/or Nutrient Delivery: Continue NPO(Start diet vs nutrition support as able)  Nutritional Goals: Start diet vs nutrition support within 24-72 hrs     11/25/2020 1113 by Ryan Marquez RN  Outcome: Ongoing  11/25/2020 0744 by Trina Cason RCP  Outcome: Ongoing  11/25/2020 0506 by Lalito Hollins RN  Outcome: Ongoing     Problem: Restraint Use - Nonviolent/Non-Self-Destructive Behavior:  Goal: Absence of restraint-related injury  Description: Absence of restraint-related injury  11/25/2020 1113 by Ryan Marquez RN  Outcome: Ongoing  11/25/2020 0744 by Trina Cason RCP  Outcome: Ongoing  11/25/2020 0506 by Lalito Hollins RN  Outcome: Met This Shift     Problem: MECHANICAL VENTILATION  Goal: Patient will maintain patent airway  11/25/2020 1113 by Ryan Marquez RN  Outcome: Ongoing  11/25/2020 0744 by Trina Cason RCP  Outcome: Ongoing  11/25/2020 0506 by Lalito Hollins RN  Outcome: Ongoing  Goal: Oral health is maintained or improved  11/25/2020 1113 by Ryan Marquez RN  Outcome: Ongoing  11/25/2020 0744 by Trina Cason RCP  Outcome: Ongoing  Goal: ET tube will be managed safely  11/25/2020 1113 by Ryan Marquez RN  Outcome: Ongoing  11/25/2020 0744 by Trina Cason RCP  Outcome: Ongoing  Goal: Ability to express needs and understand communication  11/25/2020 1113 by Ryan Marquez RN  Outcome: Ongoing  11/25/2020 0744 by Trina Cason RCP  Outcome: Ongoing  Goal: Mobility/activity is maintained at optimum level for patient  11/25/2020 1113 by Ryan Marquez RN  Outcome: Ongoing  11/25/2020 0744 by Trina Cason RCP  Outcome: Ongoing     Problem: SKIN INTEGRITY  Goal: Skin integrity is maintained or improved  11/25/2020 1113 by Ryan Marquez RN  Outcome: Ongoing  11/25/2020 0744 by Yoav Wong Barrington Bedolla RCP  Outcome: Ongoing     Problem: Restraint Use - Nonviolent/Non-Self-Destructive Behavior:  Goal: Absence of restraint indications  Description: Absence of restraint indications  11/25/2020 1113 by Justin Miranda RN  Outcome: Not Met This Shift  11/25/2020 0744 by Wayne Reyes RCP  Outcome: Ongoing  11/25/2020 0506 by Arlene Diego RN  Outcome: Not Met This Shift

## 2020-11-25 NOTE — CONSULTS
[facility]  Consultation Note     . REASON FOR CONSULTATION:    Likely UGI bleed    HISTORY OF PRESENT ILLNESS:       HPI taken from staff and chart as pt is in COVID isolation, intubated    This is a 70 y.o. female with PMH including MDS, essential hypertension, CABG-on  mg, melanoma, mitral valve prolapse, MRSA, Raynaud's disease presents emergency department for shortness of breath and dyspnea upon exertion for 3 days. Pt was admitted and found to be COVID positive. Eventually pt required intubation. This am, pt was noted to have aprox 200 ml of coffee ground material in her OG. Staff denies any rectal bleeding-no melena, hematochezia.    Review of home meds show pt normally takes Prednisone daily,  mg daily and Plaqunil and Prilosec 20 mg   Pt is currently on pressor support    Summary of imaging completed at this time:  No abdominal imaging completed at this time     Summary of labs completed at this time:  CBC showed initial Hgb of 7.4-6.8 yesterday am and 6.0 this am-pt was transfused 1 unit PRBC's    Previous GI history:   2012 EGD at [facility] records available  No colonoscopy report on file    Past Medical/Social/Family History:  Past Medical History:   Diagnosis Date    Cancer ([facility] [address]     myelodysplastic syndrome    Deaf, left     Essential hypertension     GERD (gastroesophageal reflux disease)     Glaucoma     Hyperlipidemia     Melanoma ([facility] [address]     MRSA (methicillin resistant staph aureus) culture positive     MVP (mitral valve prolapse)     Pharyngoesophageal dysphagia     Raynaud disease     Status post four vessel coronary artery bypass      Past Surgical History:   Procedure Laterality Date    APPENDECTOMY      CARDIAC SURGERY      CABG x3    CHOLECYSTECTOMY      HYSTERECTOMY, TOTAL ABDOMINAL      IR PORT PLACEMENT < 5 YEARS      SMALL INTESTINE SURGERY      TUBAL LIGATION       Family History   Problem Relation Age of Onset    Heart Provider, MD   nitroGLYCERIN (NITROSTAT) 0.4 MG SL tablet Place 0.4 mg under the tongue every 5 minutes as needed for Chest pain up to max of 3 total doses. If no relief after 1 dose, call 911. Yes Historical Provider, MD   omeprazole (PRILOSEC) 20 MG delayed release capsule Take 40 mg by mouth daily   Yes Historical Provider, MD   phenazopyridine (PYRIDIUM) 100 MG tablet Take 100 mg by mouth 3 times daily as needed for Pain   Yes Historical Provider, MD   predniSONE (DELTASONE) 1 MG tablet Take 1 mg by mouth daily   Yes Historical Provider, MD   prochlorperazine (COMPAZINE) 25 MG suppository Place 25 mg rectally every 12 hours as needed for Nausea   Yes Historical Provider, MD   Travoprost (TRAVATAN OP) Apply to eye   Yes Historical Provider, MD     .  Current Medications:  Scheduled Meds:   pantoprazole (PROTONIX) bolus  80 mg Intravenous Once    [START ON 11/28/2020] pantoprazole  40 mg Intravenous Daily    And    [START ON 11/28/2020] sodium chloride (PF)  10 mL Intravenous Daily    furosemide  20 mg Intravenous Daily    potassium chloride  10 mEq Intravenous Daily    LORazepam  0.25 mg Intravenous Once    [Held by provider] enoxaparin  40 mg Subcutaneous BID    dexamethasone  6 mg Intravenous Daily    latanoprost  1 drop Both Eyes Nightly    sodium chloride  20 mL Intravenous Once    sodium chloride  20 mL Intravenous Once     . Continuous Infusions:   pantoprozole (PROTONIX) infusion 8 mg/hr (11/25/20 1109)    dexmedetomidine Stopped (11/23/20 6873)    midazolam 5 mg/hr (11/25/20 0930)    fentaNYL 75 mcg/hr (11/25/20 0913)    norepinephrine Stopped (11/24/20 1750)     .   PRN Meds:LORazepam, sodium chloride, sodium chloride flush, potassium chloride **OR** potassium alternative oral replacement **OR** potassium chloride, magnesium sulfate, acetaminophen **OR** acetaminophen, polyethylene glycol, promethazine **OR** ondansetron, nicotine    REVIEW OF SYSTEMS:     SOCRATES-pt is in COVID isolation Other pancytopenia ([facility] [address]  Resolved Problems:    * No resolved hospital problems. *       GI Impression:    70 yr old female admitted with COVID, intubated with suspected coffee ground OG output. PMH includes MDS, Raynaunds (on steroids) and CABG-on . Plan and Recommendations:    1. No indications for endoscopy at this time given COVID isolation, small amount of coffee ground emesis and lack of melena or hematochezia  2. Cont PPI drip  3. Rec daily CBC, BMP. Hgb q 12 hrs. Transfuse to maintain Hgb greater than 7  4. Monitor for active bleeding  5.  Will follow    This plan was formulated in collaboration with Dr. Josephine Fitzgerald MD    Electronically signed by:  Isiah Dodd [address] [place] [address] G[facility]  11/25/2020    11:17 AM

## 2020-11-25 NOTE — PROGRESS NOTES
[facility]  Progress Note    [facility]  MRN: [id number]  Date: 11/25/2020  LOS: 9     Reason for F/U :   COVID-19 virus pneumonia    Impression :   1. Acute respiratory failure with hypoxia secondary to COVID-19 virus pneumonia  2. Myelodysplastic syndrome-neutropenic  3. Immunocompromised host  4. Anemia secondary to MDS status post PRBC transfusion  5. Shock secondary to sepsis-resolved    Recommendations:   · The patient completed a course of remdesivir on 11/20/2020  · The patient received 1 unit of plasma 11/17/2020  · The patient continues on dexamethasone. · The patient did receive a course of meropenem and vancomycin and is currently off systemic antimicrobial therapy. · Continue supportive therapy on ARDS protocol     Infection Control Recommendations:   Droplet plus precautions    Discharge Planning:   Patient will need Midline Catheter Insertion/ PICC line Insertion: No  Patient will need: Home IV , [place],  SNF,  LTAC: Undetermined  Patient willneed outpatient wound care: No    Medical Decision making / Summary of Stay:   [facility] is a 70y.o.-year-old female w resp distress transferred from [facility] [address] pneumonia and underlying MDS.   Neutropenia  On bipap and 50% FIO2, elevated D-Dimers and went for a VQ scan  Past cig smoking - HTN - Raynaud-     Started on decadron remdesevir  started on meropenem and vanco pend BC due to concern for ongoing sepsis  Intubated 11/23 due to respiratory failure and hypotension  Remains sedated on the ventilator, FiO2 90% on 10 of PEEP, slightly better than yesterday     ferritin has been improving     Has had Neupogen to improve her white count  tolerated 1 unit of plasma well 11/17     No positive cultures-  Mycoplasma IGM neg    Current evaluation:11/25/2020    BP (!) 101/51   Pulse 90   Temp 97.9 °F (36.6 °C)   Resp 19   Ht 5' 7\" (1.702 m)   Wt 162 lb 7.7 oz (73.7 kg)   SpO2 95%   BMI 25.45 kg/m² Temperature Range: Temp: 97.9 °F (36.6 °C) Temp  Av °F (36.7 °C)  Min: 97.5 °F (36.4 °C)  Max: 98.8 °F (37.1 °C)  The patient is seen via the window and remains intubated on the ventilator and the care was discussed with the nurse. The hemoglobin did drop down to 6 and the patient did get PRBC transfusion. The patient was seen by the GI service today. She is on 75% FiO2 10 of PEEP and was able to wean off of the pressors    Review of Systems   Unable to perform ROS: Intubated       Physical Examination :     Due to the current efforts to prevent transmission of COVID-19 and also the need to preserve PPE for other caregivers, a face-to-face encounter with the patient was not performed. That being said, all relevant records and diagnostic tests were reviewed, including laboratory results and imaging. Please reference any relevant documentation elsewhere. Care will be coordinated with the primary service.       Laboratory data:   I have independently reviewed the followinglabs:  CBC with Differential:   Recent Labs     20   WBC 6.9 4.2   HGB 6.8* 6.0*   HCT 21.5* 19.8*    148   LYMPHOPCT 18* 16*   MONOPCT 7 8*     BMP:   Recent Labs     20  0613 20    141   K 4.2 4.4    104   CO2 26 31   BUN 24* 26*   CREATININE 0.61 0.61     Hepatic Function Panel:   Recent Labs     20  0620   PROT 5.9* 5.7*   LABALBU 2.2* 2.2*   BILITOT 0.41 0.30   ALKPHOS 77 62   ALT 18 18   AST 18 16         Lab Results   Component Value Date    PROCAL 1.00 2020     Lab Results   Component Value Date    CRP 67.6 2020    .4 2020    .8 2020     No results found for: SEDRATE      Lab Results   Component Value Date    DDIMER 1.93 2020    DDIMER 2.44 2020     Lab Results   Component Value Date    FERRITIN 4,245 2020    FERRITIN 2,838 2020    FERRITIN 3,590 2020    FERRITIN 3,453 2020 FERRITIN 3,024 11/17/2020     Lab Results   Component Value Date     11/18/2020     11/17/2020     11/17/2020     Lab Results   Component Value Date    FIBRINOGEN 506 11/21/2020    FIBRINOGEN 673 11/20/2020    FIBRINOGEN 1002 11/18/2020    FIBRINOGEN 720 11/17/2020    FIBRINOGEN 721 11/17/2020       Results in Past 30 Days  Result Component Current Result Ref Range Previous Result Ref Range   SARS-CoV-2, PCR DETECTED (A) (11/17/2020) Not Detected Positive (A) (11/16/2020)      Lab Results   Component Value Date    COVID19 DETECTED 11/17/2020    COVID19 Positive 11/16/2020       No results for input(s): [facility] in the last 72 hours. Imaging Studies:   No new imaging    Cultures:     Culture, Blood 1 [[id number]]   Collected: 11/17/20 0241    Order Status: Completed  Specimen: Blood  Updated: 11/23/20 0013     Specimen Description  . BLOOD     Special Requests  LT HAND 20ML     Culture  NO GROWTH 6 DAYS    Culture, Blood 2 [[id number]]   Collected: 11/17/20 0248    Order Status: Completed  Specimen: Blood  Updated: 11/23/20 0013     Specimen Description  . BLOOD     Special Requests  RT HAND 2ML     Culture  NO GROWTH 6 DAYS    LEGIONELLA ANTIGEN, URINE [[id number]]   Collected: 11/18/20 2114    Order Status: Completed  Specimen: Urine, clean catch  Updated: 11/19/20 0375     Legionella Pneumophilia Ag, Urine  NEGATIVE     Comment:  L. pneumophila serogroup 1 antigen not detected. A negative result does not exclude infection with Leginella pnemophila serogroup 1 nor does   it rule out other microbial-caused respiratory infections of disease caused by other   serogroups of Legionella pneumophila. Respiratory Panel, Molecular, with COVID-19 [[id number]]  (Abnormal)  Collected: 11/17/20 1836    Order Status: Completed  Specimen: Nasopharyngeal Swab  Updated: 11/17/20 2117     Specimen Description  . NASOPHARYNGEAL SWAB     Adenovirus PCR  Not Detected     Coronavirus 229E PCR  Not Detected     Coronavirus HKU1 PCR  Not Detected     Coronavirus NL63 PCR  Not Detected     Coronavirus OC43 PCR  Not Detected     SARS-CoV-2, PCR  DETECTEDAbnormal       Comment: This test has been authorized by the FDA under an Emergency Use Authorization (EUA) for use   by authorized laboratories. This test is only authorized for the duration of the time of declaration that circumstances   exist justifying the authorization of the emergency use of in vitro diagnostic tests for   detection of the SARS-CoV-2 virusand/or diagnosis of COVID-19 infection under section 564   (b)(1) of the Act,21 U.S.C.bbb-1(b)(1), unless the authorization is terminated or revoked   sooner.         Patient Fact Sheet:   Wolfgang         Provider Fact Sheet:   Wolfgang         METHODOLGY: Multiplex PCR   Results reported to the appropriate Health Department        Human Metapneumovirus PCR  Not Detected     Rhino/Enterovirus PCR  Not Detected     Influenza A by PCR  Not Detected     Influenza A H1 PCR  NOT REPORTED     Influenza A H1 (2009) PCR  NOT REPORTED     Influenza A H3 PCR  NOT REPORTED     Influenza B by PCR  Not Detected     Parainfluenza 1 PCR  Not Detected     Parainfluenza 2 PCR  Not Detected     Parainfluenza 3 PCR  Not Detected     Parainfluenza 4 PCR  Not Detected     Resp Syncytial Virus PCR  Not Detected     Bordetella Parapertussis  Not Detected     B Pertussis by PCR  Not Detected     Chlamydia pneumoniae By PCR  Not Detected     Mycoplasma pneumo by PCR  Not Detected     Comment:  Performed by multiplexed nucleic acid assay.              Medications:      [START ON 11/28/2020] pantoprazole  40 mg Intravenous Daily    And    [START ON 11/28/2020] sodium chloride (PF)  10 mL Intravenous Daily    furosemide  20 mg Intravenous Daily    potassium chloride  10 mEq Intravenous Daily    LORazepam  0.25 mg Intravenous Once    [Held by provider] enoxaparin  40 mg Subcutaneous BID    dexamethasone  6 mg Intravenous Daily    latanoprost  1 drop Both Eyes Nightly    sodium chloride  20 mL Intravenous Once    sodium chloride  20 mL Intravenous Once           [facility]  [address] [place] [facility] messaging  OFFICE: [phone]      Electronically signed by Tesfaye Gloria MD on 11/25/2020 at 2:03 PM  Thank you for allowing us to participate in the care of this patient. Please call with questions. This note iscreated with the assistance of a speech recognition program.  While intending to generate a document that actually reflects the content of the visit, the document can still have some errors including those of syntax andsound a like substitutions which may escape proof reading. In such instances, actual meaning can be extrapolated by contextual diversion.

## 2020-11-25 NOTE — PROGRESS NOTES
Comprehensive Nutrition Assessment    Type and Reason for Visit:  Reassess    Nutrition Recommendations/Plan:   -Recommend NPO status   -Start diet vs nutrition support as able   -If tube feeding is desired, recommend Vital AF 1.2 (Semi-elemental and carb-controlled) @ 55 mL/hr x 24 hrs ~> 1584 kcals, 99 gms protein (w/out propofol running)  -If pt starts pronating/supine, recommend               -Prone @ 20 mL/hr               -Supine @ 125 mL/hr   -If TPN is desired, recommend 200 gms dextrose + 50 gms AA + 20% of 250 mL IV lipids ~> 1380 kcals, 50 gms protein   -Will monitor nutrition progression     Nutrition Assessment:  Pt remains NPO and intubated. Propofol remains off. Last BM noted on 11/22. RN reports no plans for nutrition at this time d/t coffee ground/bloody OG. Pt w/ 10.9 % wt reduction x 9 days- actual wt loss vs fluid loss? UBW is unknown. Tube feed and TPN rec's above is desired, will continue to monitor.      Malnutrition Assessment:  Malnutrition Status:  Insufficient data    Context:  Acute Illness     Findings of the 6 clinical characteristics of malnutrition:  Energy Intake:  (<75% of est'd needs x 2 days)  Weight Loss:  Unable to assess(fluid loss vs actual wt loss?)     Body Fat Loss:  Unable to assess     Muscle Mass Loss:  Unable to assess    Fluid Accumulation:  1 - Mild Extremities, Generalized   Strength:  Not Performed    Estimated Daily Nutrient Needs:  Energy (kcal):  18-20 ~> 3079-5394 kcals/d; Weight Used for Energy Requirements:  Admission     Protein (g):  1.2-2.0 gm/kg ~>  gms/d; Weight Used for Protein Requirements:  Ideal          Nutrition Related Findings:  BM 11/22; labs/meds reviewed      Wounds:  None       Current Nutrition Therapies:    Diet NPO Effective Now  Additional Calorie Sources:   none    Anthropometric Measures:  · Height: 5' 7\" (170.2 cm)  · Current Body Weight: 162 lb (73.5 kg)   · Admission Body Weight: 182 lb (82.6 kg)   · Ideal Body Weight: 135 lbs; % Ideal Body Weight 120 %   · BMI: 25.4  · BMI Categories: Overweight (BMI 25.0-29. 9)       Nutrition Diagnosis:   · Inadequate oral intake related to impaired respiratory function as evidenced by intubation, NPO or clear liquid status due to medical condition      Nutrition Interventions:   Food and/or Nutrient Delivery:  Continue NPO(Start diet vs nutrition support as able)  Nutrition Education/Counseling:  Education not indicated   Coordination of Nutrition Care:  Continue to monitor while inpatient    Goals: No progress  Start diet vs nutrition support within 24-72 hrs       Nutrition Monitoring and Evaluation:   Food/Nutrient Intake Outcomes:  Diet Advancement/Tolerance  Physical Signs/Symptoms Outcomes:  Biochemical Data, Nutrition Focused Physical Findings, Skin, Weight, GI Status, Fluid Status or Edema     Discharge Planning:     Too soon to determine     Electronically signed by Sanam Lovett RD, LD on 11/25/20 at 12:33 PM EST    Contact: [phone]

## 2020-11-25 NOTE — PLAN OF CARE
Problem: Respiratory:  Goal: Ability to maintain a clear airway will improve  Description: Ability to maintain a clear airway will improve  11/24/2020 2041 by Benito Carpenter RCP  Outcome: Ongoing     Problem: Respiratory:  Goal: Ability to maintain adequate ventilation will improve  Description: Ability to maintain adequate ventilation will improve  11/24/2020 2041 by Benito Carpenter RCP  Outcome: Ongoing     Problem: Respiratory:  Goal: Complications related to the disease process, condition or treatment will be avoided or minimized  Description: Complications related to the disease process, condition or treatment will be avoided or minimized  11/24/2020 2041 by Benito Carpenter RCP  Outcome: Ongoing     Problem: Gas Exchange - Impaired  Goal: Absence of hypoxia  11/24/2020 2041 by Benito Carpenter RCP  Outcome: Ongoing     Problem: Gas Exchange - Impaired  Goal: Promote optimal lung function  11/24/2020 2041 by Benito Carpenter RCP  Outcome: Ongoing     Problem: Breathing Pattern - Ineffective  Goal: Ability to achieve and maintain a regular respiratory rate  11/24/2020 2041 by Benito Carpenetr RCP  Outcome: Ongoing     Problem: OXYGENATION/RESPIRATORY FUNCTION  Goal: Patient will maintain patent airway  11/24/2020 2041 by eBnito Carpenter RCP  Outcome: Ongoing     Problem: OXYGENATION/RESPIRATORY FUNCTION  Goal: Patient will achieve/maintain normal respiratory rate/effort  Description: Respiratory rate and effort will be within normal limits for the patient  11/24/2020 2041 by Benito Carpenter RCP  Outcome: Ongoing

## 2020-11-25 NOTE — PROGRESS NOTES
PULMONARY & CRITICAL CARE MEDICINE PROGRESS NOTE     Patient:  Haris Silver  MRN: [id number]  [facility] date: 11/16/2020  Primary Care Physician: Aixa Alejandra MD  Consulting Physician: Olaf Morales MD  CODE Status: Full Code  LOS: 9     SUBJECTIVE     CHIEF COMPLAINT/REASON FOR INITIAL CONSULT:    BRIEF HOSPITAL COURSE:   The patient is a 70 y.o. female with history of myelodysplastic syndrome on chemotherapy apparently last 3 to 4 weeks ago, coronary artery disease status post CABG, hypertension, hyperlipidemia. Presented to [facility] with increasing fatigue and tiredness which according to patient she usually feels when her blood count goes low and she gets transfusion, 4 days history of cough associated with shortness of breath, cough is dry without sputum production. She did not have fever or chills. She denies headache body ache muscle ache myalgia sore throat. Denies nausea vomiting diarrhea or abdominal pain denies urinary complaint. In the emergency room she was found to be hypoxic, initially on nasal cannula and then she was switched to BiPAP after she was placed on BiPAP 50% she was saturating well remained on BiPAP and was transferred here to [facility] ICU unit. She had a CT chest done in [facility] which shows bilateral groundglass opacities nonspecific mediastinal neuropathy. She also had a hemoglobin of 6 and she received 1 unit packed RBC there  Since she was admitted overnight she remained on BiPAP 50% FiO2 had saturated well and she was transitioned to high flow nasal cannula this morning. Her initial labs show sodium 133 BUN 23 creatinine 0.81 bicarbonate 21. WBC count was 1.2 hemoglobin 7 hematocrit 22.4 and platelet 043. Procalcitonin was 1.0. . . proBNP 729. Ferritin 3024. Fibrinogen 720. D-dimer 2.44 and INR 0.9. She was also started on Lovenox for elevated D-dimer and a VQ scan was ordered by primary service.   She was started on remdesivir and on Decadron. INTERVAL HISTORY:  20   Patient remains on the ventilator  Patient was on 75 % oxygen with a PEEP of 10 at the time of my rounding  PF ratio was improving but still less than 150  Having small tracheal secretions  Patient came off the norepinephrine  Maintain the mean arterial pressure 64    Review of systems:  Could not be done secondary to patient being intubated  OBJECTIVE     PaO2/FiO2 RATIO:  Recent Labs     20  0315   POCPO2 67.9*      FiO2 : 70 %     VITAL SIGNS:   LAST:  BP (!) 101/51   Pulse 90   Temp 97.9 °F (36.6 °C)   Resp 19   Ht 5' 7\" (1.702 m)   Wt 162 lb 7.7 oz (73.7 kg)   SpO2 95%   BMI 25.45 kg/m²   8-24 HR RANGE:  TEMP Temp  Av °F (36.7 °C)  Min: 97.5 °F (36.4 °C)  Max: 98.8 °F (81.3 °C)   BP Systolic (95ZVU), ABV:334 , Min:89 , LWZ:520      Diastolic (38FWL), NIM:76, Min:44, Max:73     PULSE Pulse  Av.2  Min: 78  Max: 99   RR Resp  Av.4  Min: 13  Max: 22   O2 SAT SpO2  Av.4 %  Min: 94 %  Max: 99 %   OXYGEN DELIVERY No data recorded        SYSTEMIC EXAMINATION:   I have discussed the care of [facility], including pertinent history and exam findings, with the resident/APC/staff. I have seen the patient and the key elements of all parts of the encounter have been performed by me. Physical exam was deferred to limit physical exposure and PPE resources. I reviewed the interval history, interpreted all available radiographic, laboratory and physiologic data at the time of service. I agree with the assessment and plan as documented by resident/APC/ ancillary staff.    Patient appears comfortable on the ventilator and not using accessory musculature breathing    DATA REVIEW     Medications: Current Inpatient  Scheduled Meds:   [START ON 2020] pantoprazole  40 mg Intravenous Daily    And    [START ON 2020] sodium chloride (PF)  10 mL Intravenous Daily    furosemide  20 mg Intravenous Daily    potassium chloride  10 mEq Intravenous Daily    LORazepam  0.25 mg Intravenous Once    [Held by provider] enoxaparin  40 mg Subcutaneous BID    dexamethasone  6 mg Intravenous Daily    latanoprost  1 drop Both Eyes Nightly    sodium chloride  20 mL Intravenous Once    sodium chloride  20 mL Intravenous Once     Continuous Infusions:   pantoprozole (PROTONIX) infusion 8 mg/hr (11/25/20 1109)    dexmedetomidine Stopped (11/23/20 0903)    midazolam 5 mg/hr (11/25/20 0930)    fentaNYL 75 mcg/hr (11/25/20 0913)    norepinephrine Stopped (11/24/20 1750)       INPUT/OUTPUT:  In: 1058 [I.V.:394; Blood:350; NG/GT:50]  Out: 8750 [Urine:1900]  Date 11/25/20 0000 - 11/25/20 2359   Shift 2910-3108 4751-1208 7148-8292 24 Hour Total   INTAKE   I.V.(mL/kg) 58(0.8) 18(0.2)  76(1)   Blood(mL/kg)  350(4.7)  350(4.7)   IV Piggyback(mL/kg) 153(2.1)   153(2.1)   Shift Total(mL/kg) 211(2.9) 368(5)  579(7.9)   OUTPUT   Urine(mL/kg/hr) 245(0.4) 580  825   Emesis/NG output(mL/kg) 200(2.7)   200(2.7)   Shift Total(mL/kg) 445(6) 580(7.9)  1025(13.9)   Weight (kg) 73.7 73.7 73.7 73.7        LABS:  ABGs:   Recent Labs     11/23/20  1110 11/24/20  0339 11/25/20  0315   POCPH 7.464* 7.433 7.448   POCPCO2 37.1 47.0 52.3*   POCPO2 87.4 103.6 67.9*   POCHCO3 26.6 31.4* 36.2*   QGGB6ZKD 97 98 94     CBC:   Recent Labs     11/23/20  0525 11/24/20  0613 11/25/20  0319   WBC 9.3 6.9 4.2   HGB 8.0* 6.8* 6.0*   HCT 25.9* 21.5* 19.8*   .0 94.3 96.6    219 148   LYMPHOPCT 23* 18* 16*   RBC 2.54* 2.28* 2.05*   MCH 31.5 29.8 29.3   MCHC 30.9 31.6 30.3   RDW 19.0* 18.2* 18.4*     CRP:   No results for input(s): CRP in the last 72 hours. LDH:   No results for input(s): LDH in the last 72 hours.   BMP:   Recent Labs     11/23/20  0525 11/24/20  0613 11/25/20  0319    143 141   K 3.8 4.2 4.4    105 104   CO2 24 26 31   BUN 16 24* 26*   CREATININE 0.64 0.61 0.61   GLUCOSE 84 111* 96     Liver Function Test:   Recent Labs     11/23/20  0525 11/24/20  5361 11/25/20 0319   PROT 6.3* 5.9* 5.7*   LABALBU 2.5* 2.2* 2.2*   ALT 23 18 18   AST 22 18 16   ALKPHOS 88 77 62   BILITOT 0.51 0.41 0.30     Coagulation Profile:   No results for input(s): INR, PROTIME, APTT in the last 72 hours. D-Dimer:  No results for input(s): DDIMER in the last 72 hours. Ferritin:    Recent Labs     11/25/20 0319   FERRITIN 4,245*     Lactic Acid:  No results for input(s): LACTA in the last 72 hours. Cardiac Enzymes:  No results for input(s): CKTOTAL, CKMB, CKMBINDEX, TROPONINI in the last 72 hours. Invalid input(s): TROPONIN, HSTROP  BNP/ProBNP:   No results for input(s): BNP, PROBNP in the last 72 hours. Triglycerides:  No results for input(s): TRIG in the last 72 hours. Microbiology:  No results for input(s): SPECDESC, SPECDESC, SPECIAL, CULTURE, CULTURE, STATUS, ORG, CDIFFTOXPCR, CAMPYLOBPCR, SALMONELLAPC, SHIGAPCR, SHIGELLAPCR, MPNEUG, MPNEUM, LACTOQL in the last 72 hours. Pathology:    Radiology Reports:    XR CHEST PORTABLE   Final Result   Endotracheal tube tip is approximately 4 cm above the nghia. Slight increase in bilateral parenchymal opacities. XR CHEST PORTABLE   Final Result   No significant change in chest findings compared to the prior study from   November 18th. XR CHEST PORTABLE   Final Result   No change in bilateral pulmonary opacities left greater than right with more   peripheral involvement compatible with pneumonitis. XR CHEST (SINGLE VIEW FRONTAL)   Final Result   *Low Probability for Pulmonary Embolus. *Bilateral mid to lower lung field left greater than right opacities with   peripheral involvement most pronounced at the bases compatible with  COVID-19   pneumonia. The findings were sent to the Radiology Results [address] at 2:46   pm on 11/17/2020to be communicated to a licensed caregiver. NM LUNG SCAN PERFUSION ONLY   Final Result   *Low Probability for Pulmonary Embolus.    *Bilateral mid to lower lung field left greater than right opacities with   peripheral involvement most pronounced at the bases compatible with  COVID-19   pneumonia. The findings were sent to the Radiology Results [address] at 2:46   pm on 11/17/2020to be communicated to a licensed caregiver. Echocardiogram:   No results found for this or any previous visit. ASSESSMENT AND PLAN     Assessment:    Acute hypoxic respiratory failure. Bilateral multifocal groundglass opacities/pneumonia due to COVID-19 infection. Sepsis secondary to COVID-19 infection/secondary to immune suppression/leukopenia  Elevated D-dimer, ferritin, fibrinogen, CRP secondary to COVID-19 infection. Myelodysplastic syndrome on chemotherapy. Leukopenia possibly secondary to chemotherapy/MDS. Anemia secondary to myelodysplastic syndrome. History of coronary artery disease status post CABG. History of hypertension. Hyperlipidemia. Shock secondary to sepsis  Anemia, worse and concern for upper GI bleeding    Plan:  1. Continue Decadron. 2. ARDS ventilator protocol to be followed  3. Chest x-ray and ABG as needed  4. Lasix 20mg IV  5. Patient is on Lovenox. This has been held secondary to possible of upper GI bleeding  6. Continue to monitor inflammatory markers especially D-dimers  7. Patient came off the pressor support  8. Glycemic control is adequate  9. GI consultation has been obtained  10. ABG with metabolic alkalosis with compensation    Total critical care time caring for this patient with life threatening, unstable organ failure, including direct patient contact, management of life support systems, review of data including imaging and labs, discussions with other team members and physicians at least 27  Min so far today, excluding procedures.     Armida Boeck, MD  Pulmonary and Critical Care Medicine           11/25/2020, 2:56 PM    This patient was evaluated in the context of the global SARS-CoV-2 (COVID-19) pandemic, which necessitated considerations that the patient either has COVID-19 infection or is at risk of infection with COVID-19. Institutional protocols and algorithms that pertain to the evaluation & management of patients with COVID-19 or those at risk for COVID-19 are in a state of rapid changes based on information released by regulatory bodies including the CDC and federal and state organizations. These policies and algorithms were followed during the patient's care. Please note that this chart was generated using voice recognition Dragon dictation software. Although every effort was made to ensure the accuracy of this automated transcription, some errors in transcription may have occurred.

## 2020-11-25 NOTE — PLAN OF CARE
Problem:  Activity:  Goal: Fatigue will decrease  Description: Fatigue will decrease  Outcome: Ongoing     Problem: Cardiac:  Goal: Hemodynamic stability will improve  Description: Hemodynamic stability will improve  Outcome: Ongoing     Problem: Coping:  Goal: Level of anxiety will decrease  Description: Level of anxiety will decrease  Outcome: Ongoing  Goal: Ability to cope will improve  Description: Ability to cope will improve  Outcome: Ongoing  Goal: Ability to establish a method of communication will improve  Description: Ability to establish a method of communication will improve  Outcome: Ongoing     Problem: Nutritional:  Goal: Consumption of the prescribed amount of daily calories will improve  Description: Consumption of the prescribed amount of daily calories will improve  Outcome: Ongoing     Problem: Respiratory:  Goal: Ability to maintain a clear airway will improve  Description: Ability to maintain a clear airway will improve  Outcome: Ongoing  Goal: Ability to maintain adequate ventilation will improve  Description: Ability to maintain adequate ventilation will improve  Outcome: Ongoing  Goal: Complications related to the disease process, condition or treatment will be avoided or minimized  Description: Complications related to the disease process, condition or treatment will be avoided or minimized  Outcome: Ongoing     Problem: Skin Integrity:  Goal: Risk for impaired skin integrity will decrease  Description: Risk for impaired skin integrity will decrease  Outcome: Ongoing  Goal: Will show no infection signs and symptoms  Description: Will show no infection signs and symptoms  Outcome: Ongoing  Goal: Absence of new skin breakdown  Description: Absence of new skin breakdown  Outcome: Ongoing     Problem: Falls - Risk of:  Goal: Will remain free from falls  Description: Will remain free from falls  Outcome: Ongoing  Goal: Absence of physical injury  Description: Absence of physical injury  Outcome: Ongoing     Problem: Pain:  Goal: Pain level will decrease  Description: Pain level will decrease  Outcome: Ongoing  Goal: Control of acute pain  Description: Control of acute pain  Outcome: Ongoing  Goal: Control of chronic pain  Description: Control of chronic pain  Outcome: Ongoing     Problem: Airway Clearance - Ineffective  Goal: Achieve or maintain patent airway  Outcome: Ongoing     Problem: Gas Exchange - Impaired  Goal: Absence of hypoxia  Outcome: Ongoing  Goal: Promote optimal lung function  Outcome: Ongoing     Problem: Breathing Pattern - Ineffective  Goal: Ability to achieve and maintain a regular respiratory rate  Outcome: Ongoing     Problem:  Body Temperature -  Risk of, Imbalanced  Goal: Ability to maintain a body temperature within defined limits  Outcome: Ongoing  Goal: Will regain or maintain usual level of consciousness  Outcome: Ongoing  Goal: Complications related to the disease process, condition or treatment will be avoided or minimized  Outcome: Ongoing     Problem: Isolation Precautions - Risk of Spread of Infection  Goal: Prevent transmission of infection  Outcome: Ongoing     Problem: Nutrition Deficits  Goal: Optimize nutrtional status  Outcome: Ongoing     Problem: Risk for Fluid Volume Deficit  Goal: Maintain normal heart rhythm  Outcome: Ongoing  Goal: Maintain absence of muscle cramping  Outcome: Ongoing  Goal: Maintain normal serum potassium, sodium, calcium, phosphorus, and pH  Outcome: Ongoing     Problem: Loneliness or Risk for Loneliness  Goal: Demonstrate positive use of time alone when socialization is not possible  Outcome: Ongoing     Problem: Fatigue  Goal: Verbalize increase energy and improved vitality  Outcome: Ongoing     Problem: Patient Education: Go to Patient Education Activity  Goal: Patient/Family Education  Outcome: Ongoing     Problem: OXYGENATION/RESPIRATORY FUNCTION  Goal: Patient will maintain patent airway  Outcome: Ongoing  Goal: Patient will achieve/maintain normal respiratory rate/effort  Description: Respiratory rate and effort will be within normal limits for the patient  Outcome: Ongoing     Problem: Nutrition  Goal: Optimal nutrition therapy  Description: Nutrition Problem #1: Inadequate oral intake  Intervention: Food and/or Nutrient Delivery: Continue NPO(Start diet vs nutrition support as able)  Nutritional Goals: Start diet vs nutrition support within 24-72 hrs     Outcome: Ongoing

## 2020-11-25 NOTE — PROGRESS NOTES
[facility] -   Infectious diseases evaluation  admission date 11/16/2020    reason for consultation:   covid    Impression :   Current:  · covid pneumonia - severe w resp distress  · MDS  · neutropenia  ·   Discussion / summary of stay / plan of care   ·   Recommendations     consented Immune plasma - ordered 11/17,  1 U - tolerated 11/17  Post Meropenem and vanco - stopped 11/18 due to cx neg  remdesevir till 11/20/20, completed  · Follow ferritin in am   · neupogen for neutropenia PRN  · research medicine to help out w her covid = did not fit criteria  · Steroids for Covid lung injury  · poor outcome    Infection Control Recommendations   · [place] Precautions  · Contact Isolation   · Airborne isolation  · Droplet Isolation      Antimicrobial Stewardship Recommendations   · Simplification of therapy  · Targeted therapy  · Per Kg dosing    Coordination ofOutpatient Care:   · Estimated Length of IV antimicrobials:  · Patient will need Midline / picc Catheter Insertion:   · Patient will need SNF:  · Patient will need outpatient wound care:     History of Present Illness:   Initial history:  Bhupendra Kang is a 70y.o.-year-old female w resp distress transferred from [facility] [address] pneumonia and underlying MDS.   Neutropenia  On bipap and 50% FIO2, elevated D-Dimers and went for a VQ scan  Past cig smoking - HTN - Raynaud-    Started on decadron remdesevir  started on meropenem and vanco pend BC due to concern for ongoing sepsis      Interval changes     Patient Vitals for the past 8 hrs:   Temp Temp src Pulse Resp SpO2   11/24/20 2013 -- -- 92 22 100 %   11/24/20 2000 98.8 °F (37.1 °C) Bladder -- -- --   11/24/20 1915 -- -- 89 25 100 %   11/24/20 1900 -- -- 97 19 100 %   11/24/20 1845 -- -- 95 21 100 %   11/24/20 1830 -- -- 96 20 100 %   11/24/20 1815 -- -- 95 21 100 %   11/24/20 1800 -- -- 99 22 100 %   11/24/20 1745 -- -- 92 19 100 %   11/24/20 1730 -- -- 91 18 100 %   11/24/20 1715 -- -- 96 19 100 %   11/24/20 1700 -- -- 94 17 100 %   11/24/20 1645 -- -- 95 19 100 %   11/24/20 1630 -- -- 94 19 100 %   11/24/20 1615 -- -- 95 19 100 %   11/24/20 1600 -- -- 94 18 100 %   11/24/20 1545 -- -- 94 20 100 %   11/24/20 1530 -- -- 95 19 100 %   11/24/20 1515 -- -- 97 20 100 %   11/24/20 1500 -- -- 96 20 100 %   11/24/20 1445 -- -- 94 20 100 %         Intubated 11/23 due to respiratory failure and hypotension  Remains sedated on the ventilator, FiO2 90% on 10 of PEEP, slightly better than yesterday    ferritin has been improving    Has had Neupogen to improve her white count  tolerated 1 unit of plasma well 11/17    No positive cultures-  Mycoplasma IGM neg      Summary of relevant labs:  Labs:  WBC  1.2-12.8 - 9.3-6.9  Creat 0.81  procalcitonin 1   - 227-67-67.6  Ferritin 3046 - 3024 -7710-6052  DDimer 2.44  Fibrinogen 720 - 1002  proBNP 729   - 291 - 407      Micro:  BC 11/17/20 x 2  resp PCR neg but for COVID+ 11/17  Legionella AG neg  Imaging:  VQ scan low probability    CT chest 11/16 [facility] periph ground glass bilat, mediastinal large LN  CT brain 11/216 neg [facility]      I have personally reviewed the past medical history, past surgical history, medications, social history, and family history, and I haveupdated the database accordingly. Allergies:   Fish allergy; Hydrocodone-acetaminophen; Tizanidine; Atorvastatin; Metoclopramide; Moxifloxacin; Oxycodone; Pregabalin; Tramadol; Zolpidem; Cephalexin; Iodides; and Sulfa antibiotics     Review of Systems:     Review of Systems   Unable to perform ROS: Intubated       Physical Examination :       Physical Exam  Constitutional:       General: She is not in acute distress. Appearance: Normal appearance. She is ill-appearing. She is not diaphoretic. HENT:      Head: Normocephalic and atraumatic. Nose: Nose normal. No congestion. Eyes:      General: No scleral icterus. Conjunctiva/sclera: Conjunctivae normal.      Pupils: Pupils are equal, round, and reactive to light. Neck:      Musculoskeletal: Neck supple. No neck rigidity or muscular tenderness. Cardiovascular:      Rate and Rhythm: Normal rate and regular rhythm. Heart sounds: Normal heart sounds. No murmur. No friction rub. Pulmonary:      Effort: No respiratory distress. Breath sounds: No stridor. No wheezing, rhonchi or rales. Chest:      Chest wall: No tenderness. Abdominal:      General: There is no distension. Palpations: Abdomen is soft. Tenderness: There is no abdominal tenderness. Genitourinary:     Comments: Urine bhavin  Musculoskeletal:         General: No swelling or deformity. Skin:     General: Skin is dry. Coloration: Skin is not jaundiced or pale. Findings: Bruising present. No erythema, lesion or rash. Neurological:      Mental Status: She is alert.       Comments: Intubated   Psychiatric:      Comments: Intubated         Past Medical History:     Past Medical History:   Diagnosis Date    [facility]     myelodysplastic syndrome    Deaf, left     Essential hypertension     GERD (gastroesophageal reflux disease)     Glaucoma     Hyperlipidemia     [facility]     MRSA (methicillin resistant staph aureus) culture positive     MVP (mitral valve prolapse)     Pharyngoesophageal dysphagia     Raynaud disease     Status post four vessel coronary artery bypass        Past Surgical  History:     Past Surgical History:   Procedure Laterality Date    APPENDECTOMY      CARDIAC SURGERY      CABG x3    CHOLECYSTECTOMY      HYSTERECTOMY, TOTAL ABDOMINAL      IR PORT PLACEMENT < 5 YEARS      SMALL INTESTINE SURGERY      TUBAL LIGATION         Medications:      furosemide  20 mg Intravenous Daily    potassium chloride  10 mEq Intravenous Daily    LORazepam  0.25 mg Intravenous Once    enoxaparin  40 mg Subcutaneous BID    dexamethasone  6 mg Intravenous Daily    latanoprost  1 drop Both Eyes Nightly    sodium chloride  20 mL Intravenous Once    sodium chloride  20 mL Intravenous Once       Social History:     Social History     Socioeconomic History    Marital status:      Spouse name: Not on file    Number of children: Not on file    Years of education: Not on file    Highest education level: Not on file   Occupational History    Not on file   Social Needs    Financial resource strain: Not on file    Food insecurity     Worry: Not on file     Inability: Not on file    Transportation needs     Medical: Not on file     Non-medical: Not on file   Tobacco Use    Smoking status: Former Smoker     Types: Cigarettes     Start date: 1/1/1957     Last attempt to quit: 8/22/2005     Years since quitting: 15.2   Substance and Sexual Activity    Alcohol use: Never     Frequency: Never    Drug use: Never    Sexual activity: Not on file   Lifestyle    Physical activity     Days per week: Not on file     Minutes per session: Not on file    Stress: Not on file   Relationships    Social connections     Talks on phone: Not on file     Gets together: Not on file     Attends Gnosticism service: Not on file     Active member of club or organization: Not on file     Attends meetings of clubs or organizations: Not on file     Relationship status: Not on file    Intimate partner violence     Fear of current or ex partner: Not on file     Emotionally abused: Not on file     Physically abused: Not on file     Forced sexual activity: Not on file   Other Topics Concern    Not on file   Social History Narrative    Not on file       Family History:     Family History   Problem Relation Age of Onset    Heart Failure Mother     Early Death Mother     Heart Disease Mother     Stroke Father       Medical Decision Making:   I have independently reviewed/ordered the following labs:    CBC with Differential:   Recent Labs     11/23/20  0525 11/24/20  0613   WBC 9.3 6.9 HGB 8.0* 6.8*   HCT 25.9* 21.5*    219   LYMPHOPCT 23* 18*   MONOPCT 3 7     BMP:  Recent Labs     11/23/20  0525 11/24/20  0613    143   K 3.8 4.2    105   CO2 24 26   BUN 16 24*   CREATININE 0.64 0.61     Hepatic Function Panel:   Recent Labs     11/23/20  0525 11/24/20  0613   PROT 6.3* 5.9*   LABALBU 2.5* 2.2*   BILITOT 0.51 0.41   ALKPHOS 88 77   ALT 23 18   AST 22 18     No results for input(s): RPR in the last 72 hours. No results for input(s): HIV in the last 72 hours. No results for input(s): BC in the last 72 hours. Lab Results   Component Value Date    CREATININE 0.61 11/24/2020    GLUCOSE 111 11/24/2020       Detailed results: Thank you for allowing us to participate in the care of this patient. Please call with questions. This note is created with the assistance of a speech recognition program.  While intending to generate adocument that actually reflects the content of the visit, the document can still have some errors including those of syntax and sound a like substitutions which may escape proof reading. It such instances, actual meaningcan be extrapolated by contextual diversion.     Guzman Garza MD  Office: [phone]  Perfect serve / office [phone]

## 2020-11-26 NOTE — PROGRESS NOTES
Comprehensive Nutrition Assessment    Type and Reason for Visit:  Reassess, Consult(TF ordering/management)    Nutrition Recommendations/Plan:   -Start Tube Feeding-Suggest Vital AF with goal rate of 55 mL/hr to provide 1584 kcal and 99 g pro/day. -If pt starts pronating/supine, recommend:               -Prone @ 20 mL/hr               -Supine @ 125 mL/hr   -Will monitor TF tolerance/adequacy. Modify TF as needed. Nutrition Assessment:  Pt remains on vent. Plan to start tube feeding today- consulted for TF management. Meds/labs reviewed. Malnutrition Assessment:  Malnutrition Status:  Insufficient data       Estimated Daily Nutrient Needs:  Energy (kcal):  18-20 ~> 3500-0631 kcals/d; Weight Used for Energy Requirements:  Admission     Protein (g):  1.2-2.0 gm/kg ~>  gms/d; Weight Used for Protein Requirements:  Ideal          Current Nutrition Therapies:    Diet NPO Effective Now    Anthropometric Measures:  · Height: 5' 7\" (170.2 cm)  · Current Body Weight: 159 lb 6.3 oz (72.3 kg)   · Admission Body Weight: 182 lb (82.6 kg)    · Ideal Body Weight: 135 lbs; % Ideal Body Weight 120 %   · BMI: 25  · BMI Categories: Overweight (BMI 25.0-29. 9)       Nutrition Diagnosis:   · Inadequate oral intake related to impaired respiratory function as evidenced by intubation, NPO or clear liquid status due to medical condition    Nutrition Interventions:   Food and/or Nutrient Delivery:  Start Tube Feeding-Suggest Vital AF with goal rate of 55 mL/hr to provide 1584 kcal and 99 g pro/day.   Nutrition Education/Counseling:  Education not indicated, No recommendation at this time   Coordination of Nutrition Care:  Continue to monitor while inpatient    Goals:  meet % of estimated nutrient needs       Nutrition Monitoring and Evaluation:   Food/Nutrient Intake Outcomes:  Enteral Nutrition Intake/Tolerance  Physical Signs/Symptoms Outcomes:  Biochemical Data, Fluid Status or Edema, Skin, Weight     Discharge Planning:     Too soon to determine     Electronically signed by Rohith Phillips RD, LD on 11/26/20 at 12:53 PM EST    Contact: [phone]

## 2020-11-26 NOTE — PROGRESS NOTES
input(s): PROTIME, INR in the last 72 hours. Cancer Markers:  CEA:  No results for input(s): CEA in the last 72 hours. Ca 125:  No results for input(s):  in the last 72 hours. Ca 19-9:   Invalid input(s):   AFP: No results for input(s): AFP in the last 72 hours. Lactic acid:Invalid input(s): LACTIC ACID    Radiology Review:    No results found. Principal Problem:    Pneumonia due to COVID-19 virus  Active Problems:    Acute respiratory failure due to COVID-19 [facility])    MDS (myelodysplastic syndrome) (HCC)    Neutropenic sepsis ([facility]    Bicytopenia    ROSALINO (acute kidney injury) ([facility]    Acute respiratory failure with hypoxia (HCC)    Essential hypertension    Coronary artery disease involving coronary bypass graft of native heart without angina pectoris    History of Raynaud's syndrome    Transaminitis    Immunosuppressed status (HCC)    Severe sepsis (HCC)    Hypokalemia    Other pancytopenia ([facility]  Resolved Problems:    * No resolved hospital problems. *       GI Impression:  Coffee ground emesis-resolved. Hgb stable    Plan and Recommendations:    1. Rec Protonix 40 mg IV BID  2. No indications for endoscopy at this time  3. GI will sign off. Please recall if we can be of service. This plan was formulated in collaboration with Dr. Max Kwok MD    Thank you for allowing me to participate in the care of your patient. Please feel free to contact me with any questions or concerns. MICHAEL Amado [facility]      Attending Physican Attestation  Patient seen and examined with Ms. Kris Carcamo CNP.  I have reviewed the above note and confirmed the key elements of the medical history and physical exam. I have discussed the findings, established the care plan and recommendations with Ms. Renato Harris and primary team.    Melba Bennett MD  Gastroenterology

## 2020-11-26 NOTE — PLAN OF CARE
Problem:  Activity:  Goal: Fatigue will decrease  Description: Fatigue will decrease  Outcome: Ongoing     Problem: Cardiac:  Goal: Hemodynamic stability will improve  Description: Hemodynamic stability will improve  Outcome: Ongoing     Problem: Coping:  Goal: Level of anxiety will decrease  Description: Level of anxiety will decrease  Outcome: Ongoing  Goal: Ability to cope will improve  Description: Ability to cope will improve  Outcome: Ongoing  Goal: Ability to establish a method of communication will improve  Description: Ability to establish a method of communication will improve  Outcome: Ongoing     Problem: Nutritional:  Goal: Consumption of the prescribed amount of daily calories will improve  Description: Consumption of the prescribed amount of daily calories will improve  Outcome: Ongoing

## 2020-11-26 NOTE — PLAN OF CARE
Problem: Respiratory:  Goal: Ability to maintain a clear airway will improve  Description: Ability to maintain a clear airway will improve  11/25/2020 2147 by Mike Mckeon RCP  Outcome: Ongoing     Problem: Respiratory:  Goal: Ability to maintain adequate ventilation will improve  Description: Ability to maintain adequate ventilation will improve  11/25/2020 2147 by Mike Mckeon RCP  Outcome: Ongoing     Problem: Respiratory:  Goal: Complications related to the disease process, condition or treatment will be avoided or minimized  Description: Complications related to the disease process, condition or treatment will be avoided or minimized  11/25/2020 2147 by Mike Mckeon RCP  Outcome: Ongoing     Problem: Airway Clearance - Ineffective  Goal: Achieve or maintain patent airway  11/25/2020 2147 by Mike Mckeon RCP  Outcome: Ongoing     Problem: Gas Exchange - Impaired  Goal: Absence of hypoxia  11/25/2020 2147 by Mike Mckeon RCP  Outcome: Ongoing     Problem: Gas Exchange - Impaired  Goal: Promote optimal lung function  11/25/2020 2147 by Mike Mckeon RCP  Outcome: Ongoing     Problem: Breathing Pattern - Ineffective  Goal: Ability to achieve and maintain a regular respiratory rate  11/25/2020 2147 by Mike Mckeon RCP  Outcome: Ongoing     Problem: OXYGENATION/RESPIRATORY FUNCTION  Goal: Patient will maintain patent airway  11/25/2020 2147 by Mike Mckeon RCP  Outcome: Ongoing     Problem: OXYGENATION/RESPIRATORY FUNCTION  Goal: Patient will achieve/maintain normal respiratory rate/effort  Description: Respiratory rate and effort will be within normal limits for the patient  11/25/2020 2147 by Mike Mckeon RCP  Outcome: Ongoing     Problem: MECHANICAL VENTILATION  Goal: Patient will maintain patent airway  11/25/2020 2147 by Mike Mckeon RCP  Outcome: Ongoing     Problem: MECHANICAL VENTILATION  Goal: Oral health is maintained or improved  11/25/2020 2147 by Brisa Mendez RCP  Outcome: Ongoing     Problem: MECHANICAL VENTILATION  Goal: ET tube will be managed safely  11/25/2020 2147 by Brisa Mendez RCP  Outcome: Ongoing     Problem: MECHANICAL VENTILATION  Goal: Ability to express needs and understand communication  11/25/2020 2147 by Brisa Mendez RCP  Outcome: Ongoing     Problem: MECHANICAL VENTILATION  Goal: Mobility/activity is maintained at optimum level for patient  11/25/2020 2147 by Brisa Mendez RCP  Outcome: Ongoing     Problem: SKIN INTEGRITY  Goal: Skin integrity is maintained or improved  11/25/2020 2147 by Brisa Mendez RCP  Outcome: Ongoing

## 2020-11-26 NOTE — PROGRESS NOTES
11/25/20 2044   Vent Information   Vt Ordered 300 mL   Rate Set 22 bmp     Tidal volume decreased to pt 6mL and set rate increased to match pts previous minute ventilation

## 2020-11-26 NOTE — PROGRESS NOTES
PULMONARY & CRITICAL CARE MEDICINE PROGRESS NOTE     Patient:  Orien Boas  MRN: [id number]  Admit date: 11/16/2020  Primary Care Physician: Willi Rodriguez MD  Consulting Physician: Fuad Grullon MD  CODE Status: Full Code  LOS: 10     SUBJECTIVE     CHIEF COMPLAINT/REASON FOR INITIAL CONSULT: Acute respiratory failure/COVID-19 pneumonia    BRIEF HOSPITAL COURSE:   The patient is a 70 y.o. female past medical history for MDS, essential hypertension, melanoma, mitral valve prolapse, MRSA, Raynaud's disease presents emergency department for shortness of breath and dyspnea upon exertion for 3 days. Patient was initially seen at [facility] diagnosed with Covid pneumonia. Patient requiring 50% FiO2 on BiPAP, patient also had an elevated D-dimer but CTA was not possible secondary to contrast allergy. [facility] unable to accommodate due to lack of [facility] beds, patient was transferred for further care. Spoke with pulmonary medicine at [place] and was accepted for ICU transfer. Patient was given 1 unit of PRBCs at [address] [place] [address] for a hemoglobin less than 7, patient was also placed on BiPAP for transfer.       INTERVAL HISTORY:  11/26/20   Patient was intubated and started on mechanical ventilation on 11/23  She is currently sedated with fentanyl and Versed infusions  She is on FiO2 of 65%, PEEP is at 10  Her white count is 5.3, T-max is 97.5  She completed on remdesivir on 11/21  She remains on IV Decadron (day 9)    REVIEW OF SYSTEMS:  Unobtainable from patient due to sedation/mechanical ventilation    OBJECTIVE     VENTILATOR SETTINGS:  Vent Information  $Ventilation: $Subsequent Day  Skin Assessment: Clean, dry, & intact  Suction Catheter Diameter: 14  Equipment ID: [id number]  Equipment Changed: HME  Vent Type: Servo i  Vent Mode: PRVC  Vt Ordered: 300 mL  Rate Set: 22 bmp  FiO2 : 65 %  SpO2: 94 %  SpO2/FiO2 ratio: 140  Sensitivity: 5  PEEP/CPAP: 10  I Time/ I Time %: 0.8 s  Humidification Source: HME  Nitric Oxide/Epoprostenol In Use?: No  Mask Type: Full face mask  Mask Size: Small     PaO2/FiO2 RATIO:  Recent Labs     20  0306   POCPO2 56.9*      FiO2 : 65 %     VITAL SIGNS:   LAST:  BP (!) 100/53   Pulse 85   Temp 97.5 °F (36.4 °C) (Core)   Resp 22   Ht 5' 7\" (1.702 m)   Wt 159 lb 6.3 oz (72.3 kg)   SpO2 94%   BMI 24.96 kg/m²   8-24 HR RANGE:  TEMP Temp  Av.4 °F (36.3 °C)  Min: 97.3 °F (36.3 °C)  Max: 97.5 °F (83.7 °C)   BP Systolic (44IGK), HORACIO:778 , Min:96 , EKF:377      Diastolic (83MSZ), AXX:98, Min:48, Max:61     PULSE Pulse  Av.4  Min: 75  Max: 86   RR Resp  Av.9  Min: 18  Max: 25   O2 SAT SpO2  Av.7 %  Min: 91 %  Max: 94 %   OXYGEN DELIVERY No data recorded        SYSTEMIC EXAMINATION:   General appearance - Mechanically ventilated, ill-appearing  Mental status - sedated  Eyes - pupils equal and reactive, sclera anicteric  Mouth - mucous membranes moist, pharynx normal without lesions  Neck - supple, no significant adenopathy, carotids upstroke normal bilaterally, no bruits  Chest - Breath sounds bilaterally were dimnished to auscultation at bases. There were no wheezes, rhonchi or rales.   There is no intercostal recession or use of accessory muscles  Heart - normal rate, regular rhythm, normal S1, S2, no murmurs, rubs, clicks or gallops  Abdomen - soft, nontender, nondistended, no masses or organomegaly  Neurological - DTR's normal and symmetric, motor and sensory grossly normal bilaterally  Extremities - peripheral pulses normal, no pedal edema, no clubbing or cyanosis  Skin - normal coloration and turgor, no rashes, no suspicious skin lesions noted     DATA REVIEW     Medications:  Scheduled Meds:   pantoprazole  40 mg Intravenous BID    And    sodium chloride (PF)  10 mL Intravenous Daily    furosemide  20 mg Intravenous Daily    potassium chloride  10 mEq Intravenous Daily    LORazepam  0.25 mg Intravenous Once    [Held by provider] enoxaparin  40 mg Subcutaneous BID    dexamethasone  6 mg Intravenous Daily    latanoprost  1 drop Both Eyes Nightly    sodium chloride  20 mL Intravenous Once    sodium chloride  20 mL Intravenous Once     Continuous Infusions:   dexmedetomidine Stopped (11/23/20 0903)    midazolam 6 mg/hr (11/26/20 1230)    fentaNYL 100 mcg/hr (11/26/20 0836)    norepinephrine Stopped (11/24/20 1750)       INPUT/OUTPUT:  In: 903 [I.V.:903]  Out: 1180 [Urine:830]  Date 11/26/20 0000 - 11/26/20 2359   Shift 0703-2541 0487-1591 7697-1196 24 Hour Total   INTAKE   I.V.(mL/kg) 140(1.9) 123(1.7)  263(3.6)   Shift Total(mL/kg) 140(1.9) 123(1.7)  263(3.6)   OUTPUT   Urine(mL/kg/hr) 155(0.3) 125  280   Emesis/NG output(mL/kg) 100(1.4) 50(0.7)  150(2.1)   Shift Total(mL/kg) 255(3.5) 175(2.4)  430(5.9)   Weight (kg) 72.3 72.3 72.3 72.3        LABS:  ABGs:   Recent Labs     11/24/20  0339 11/25/20 0315 11/25/20  2337 11/26/20  0306   POCPH 7.433 7.448 7.429 7.412   POCPCO2 47.0 52.3* 47.6 52.4*   POCPO2 103.6 67.9* 56.3* 56.9*   POCHCO3 31.4* 36.2* 31.5* 33.3*   EPDE3WJY 98 94 89* 89*     CBC:   Recent Labs     11/24/20  0613 11/25/20 0319 11/25/20 1756 11/26/20  0309 11/26/20  0840   WBC 6.9 4.2  --  5.3  --    HGB 6.8* 6.0* 7.7* 8.0* 8.1*   HCT 21.5* 19.8* 24.0* 25.2* 26.1*   MCV 94.3 96.6  --  95.5  --     148  --  149  --    LYMPHOPCT 18* 16*  --  11*  --    RBC 2.28* 2.05*  --  2.64*  --    MCH 29.8 29.3  --  30.3  --    MCHC 31.6 30.3  --  31.7  --    RDW 18.2* 18.4*  --  17.4*  --      CRP:   No results for input(s): CRP in the last 72 hours. LDH:   No results for input(s): LDH in the last 72 hours.   BMP:   Recent Labs     11/24/20  0613 11/25/20 0319 11/26/20  0309    141 143   K 4.2 4.4 4.9    104 106   CO2 26 31 30   BUN 24* 26* 32*   CREATININE 0.61 0.61 0.65   GLUCOSE 111* 96 91     Liver Function Test:   Recent Labs     11/24/20  0613 11/25/20 0319 11/26/20  0309   PROT 5.9* 5.7* 6.1* LABALBU 2.2* 2.2* 2.3*   ALT 18 18 14   AST 18 16 13   ALKPHOS 77 62 61   BILITOT 0.41 0.30 0.29*     Coagulation Profile:   No results for input(s): INR, PROTIME, APTT in the last 72 hours. D-Dimer:  No results for input(s): DDIMER in the last 72 hours. Ferritin:    Recent Labs     11/25/20  0319   FERRITIN 4,245*     Lactic Acid:  No results for input(s): LACTA in the last 72 hours. Cardiac Enzymes:  No results for input(s): CKTOTAL, CKMB, CKMBINDEX, TROPONINI in the last 72 hours. Invalid input(s): TROPONIN, HSTROP  BNP/ProBNP:   No results for input(s): BNP, PROBNP in the last 72 hours. Triglycerides:  No results for input(s): TRIG in the last 72 hours. Microbiology:  No results for input(s): SPECDESC, SPECDESC, SPECIAL, CULTURE, CULTURE, STATUS, ORG, CDIFFTOXPCR, CAMPYLOBPCR, SALMONELLAPC, SHIGAPCR, SHIGELLAPCR, MPNEUG, MPNEUM, LACTOQL in the last 72 hours. Pathology:    Radiology Reports:  XR CHEST PORTABLE   Final Result   Endotracheal tube tip is approximately 4 cm above the nghia. Slight increase in bilateral parenchymal opacities. XR CHEST PORTABLE   Final Result   No significant change in chest findings compared to the prior study from   November 18th. XR CHEST PORTABLE   Final Result   No change in bilateral pulmonary opacities left greater than right with more   peripheral involvement compatible with pneumonitis. XR CHEST (SINGLE VIEW FRONTAL)   Final Result   *Low Probability for Pulmonary Embolus. *Bilateral mid to lower lung field left greater than right opacities with   peripheral involvement most pronounced at the bases compatible with  COVID-19   pneumonia. The findings were sent to the Radiology Results [address] at 2:46   pm on 11/17/2020to be communicated to a licensed caregiver. NM LUNG SCAN PERFUSION ONLY   Final Result   *Low Probability for Pulmonary Embolus.    *Bilateral mid to lower lung field left greater than right opacities with   peripheral involvement most pronounced at the bases compatible with  COVID-19   pneumonia. The findings were sent to the Radiology Results [id number] at 2:46   pm on 11/17/2020to be communicated to a licensed caregiver. Echocardiogram:   No results found for this or any previous visit. ASSESSMENT AND PLAN     Assessment:    Acute hypoxic respiratory failure, requiring invasive mechanical ventilation  Acute respiratory distress syndrome  Bilateral multifocal pneumonia due to COVID 19 infection  Sepsis due to COVID 19  Myelodysplastic syndrome  Hypoalbuminemia  Elevated ferritin, fibrinogen  Suspected GI bleed    Plan:    I personally interviewed/examined the patient; reviewed interval history, interpreted all available radiographic and laboratory data at the time of service. Patient remains hemodynamically stable  Patient currently sedated with fentanyl and Versed infusions  Continue lung protective mechanical ventilationas per ARDS protocol  Wean FiO2/PEEP as tolerated  Monitor endotracheal secretions   Obtain X-ray chest as needed   Continue pulmonary toilet, aspiration precautions and bronchodilators  Continue to monitor I/O with a goal of even/negative fluid balance  We will recommend to resume tube feeds  Stress ulcer prophylaxis  Continue to monitor CBC, coagulation profile, transfuse as indicated  Chemical DVT prophylaxis currently on hold due to suspected GI bleed  Antimicrobials reviewed; continue   Monitor CRP, LDH, AST/ALT, D-Dimer, Ferritin   Glycemic control appropriate  We will recommend discontinuing Decadron after tomorrow's dose  Skin/wound care reviewed with the nursing staff  Physical/occupational therapy    Critical care time of 35 minutes was spent (excluding procedures), in coordination of care during bedside rounds and discussion of patient care in detail, and recommendations of the team were adopted in the plan.  Necessity of all invasive devices was also confirmed. Daisy Palacios M.D. Pulmonary and Critical Care Medicine           11/26/2020, 1:53 PM    This patient was evaluated in the context of the global SARS-CoV-2 (COVID-19) pandemic, which necessitated considerations that the patient either has COVID-19 infection or is at risk of infection with COVID-19. Institutional protocols and algorithms that pertain to the evaluation & management of patients with COVID-19 or those at risk for COVID-19 are in a state of rapid changes based on information released by regulatory bodies including the CDC and federal and state organizations. These policies and algorithms were followed during the patient's care. Please note that this chart was generated using voice recognition Dragon dictation software. Although every effort was made to ensure the accuracy of this automated transcription, some errors in transcription may have occurred.

## 2020-11-26 NOTE — PROGRESS NOTES
[facility]  Office: [address], DO, Anna Aldridge, DO, Hector Alexis, DO, Modesta Nguyen Blood, DO, Santa Lang MD, Leanna Green MD, Savanah Adam MD, Sadia Marshall MD, Jada Mueller MD, Cb Foster MD, Aby Abdullahi MD, Otilio Alexis MD, Ren Chavarria MD, Jo Ramírez, DO, Brianne Roldan MD, Franciso Schaumann, MD, Dave Gamboa DO, Eitan Du MD,  Mariya Perez, DO, Rosa Ward MD, Denman Najjar, MD, Giovana Joya, [facility], [facility], CNP, Isiah Palacios, CNP, Constance Wilder, CNS, Mert Cordero, CNP, Marcial Villeda, CNP, Claudine Rosales, CNP, Gege Rao, CNP, Mike Gates, CNP, Orestes Hart PA-C, Bennie Hensley, [facility], Raymundo Cheema, CNP, Ariella Lowry, CNP, Cheri Balderas, CNP, Dinora Graham, CNP, Gregory Del Castillo, [facility]   [address] [facility]    Progress Note    11/26/2020    6:19 PM    Name:   Naomi De Souza  MRN:     [id number]     Acct:      [de-identified]   Room:   [address] [place] [address] Day:  10  Admit Date:  11/16/2020 10:51 PM    PCP:   Jackie Lemos MD  Code Status:  Full Code    Subjective:     C/C: No chief complaint on file. SOB  Interval History Status: not changed. Seen and examined face-to-face today,  After deteriorating, intubated on November 23 and sedated,  Discussed with pulmonary critical care and infectious disease,  On dexamethasone,  Completed remdesivir,  Given convalescent plasma,  Antibiotics discontinued after negative cultures,  Neupogen for neutropenia,  Myelodysplastic syndrome, oncology on board,  Hemoglobin dropped to 6.0, status post 1 unit PRBC, Hb Improved   Overall prognosis poor        Brief History:     Naomi De Souza is a 70 y.o. female with a past medical history for MDS, essential hypertension, melanoma, mitral valve prolapse, MRSA, Raynaud's disease presents emergency department for shortness of breath and dyspnea upon exertion for 3 days.   Patient was initially seen at [facility]       Current Meds:   Scheduled Meds:    pantoprazole  40 mg Intravenous BID    And    sodium chloride (PF)  10 mL Intravenous Daily    furosemide  20 mg Intravenous Daily    potassium chloride  10 mEq Intravenous Daily    LORazepam  0.25 mg Intravenous Once    [Held by provider] enoxaparin  40 mg Subcutaneous BID    dexamethasone  6 mg Intravenous Daily    latanoprost  1 drop Both Eyes Nightly    sodium chloride  20 mL Intravenous Once    sodium chloride  20 mL Intravenous Once     Continuous Infusions:    dexmedetomidine Stopped (11/23/20 0903)    midazolam 6 mg/hr (11/26/20 1230)    fentaNYL 100 mcg/hr (11/26/20 0836)    norepinephrine Stopped (11/24/20 1750)     PRN Meds: LORazepam, sodium chloride, sodium chloride flush, potassium chloride **OR** potassium alternative oral replacement **OR** potassium chloride, magnesium sulfate, acetaminophen **OR** acetaminophen, polyethylene glycol, promethazine **OR** ondansetron, nicotine    Data:     Past Medical History:   has a past medical history of Cancer ([facility] [address] Deaf, left, Essential hypertension, GERD (gastroesophageal reflux disease), Glaucoma, Hyperlipidemia, [facility] MRSA (methicillin resistant staph aureus) culture positive, MVP (mitral valve prolapse), Pharyngoesophageal dysphagia, Raynaud disease, and Status post four vessel coronary artery bypass. Social History:   reports that she quit smoking about 15 years ago. Her smoking use included cigarettes. She started smoking about 63 years ago. She does not have any smokeless tobacco history on file. She reports that she does not drink alcohol or use drugs.      Family History:   Family History   Problem Relation Age of Onset    Heart Failure Mother     Early Death Mother     Heart Disease Mother     Stroke Father        Vitals:  BP (!) 108/54   Pulse 83   Temp 98.2 °F (36.8 °C) (Oral)   Resp 24   Ht 5' 7\" (1.702 m)   Wt 159 lb 6.3 oz (72.3 kg)   SpO2 95%   BMI 24.96 kg/m²   Temp (24hrs), Av.6 °F (36.4 °C), Min:97.3 °F (36.3 °C), Max:98.2 °F (36.8 °C)    Recent Labs     20   POCGLU 126* 98       I/O (24Hr):     Intake/Output Summary (Last 24 hours) at 2020 1819  Last data filed at 2020 1700  Gross per 24 hour   Intake 560 ml   Output 795 ml   Net -235 ml       Labs:  Hematology:  Recent Labs     20  0620  17520  03020  0840   WBC 6.9 4.2  --  5.3  --    RBC 2.28* 2.05*  --  2.64*  --    HGB 6.8* 6.0* 7.7* 8.0* 8.1*   HCT 21.5* 19.8* 24.0* 25.2* 26.1*   MCV 94.3 96.6  --  95.5  --    MCH 29.8 29.3  --  30.3  --    MCHC 31.6 30.3  --  31.7  --    RDW 18.2* 18.4*  --  17.4*  --     148  --  149  --    MPV 11.0 11.2  --  11.3  --      Chemistry:  Recent Labs     20    141 143   K 4.2 4.4 4.9    104 106   CO2 26 31 30   GLUCOSE 111* 96 91   BUN 24* 26* 32*   CREATININE 0.61 0.61 0.65   ANIONGAP 12 6* 7*   LABGLOM >60 >60 >60   GFRAA >60 >60 >60   CALCIUM 8.2* 8.3* 7.8*     Recent Labs     20  0309   PROT  --  5.9*  --  5.7* 6.1*   LABALBU  --  2.2*  --  2.2* 2.3*   AST  --  18  --  16 13   ALT  --  18  --  18 14   ALKPHOS  --  77  --  62 61   BILITOT  --  0.41  --  0.30 0.29*   POCGLU 126*  --  98  --   --      ABG:  Lab Results   Component Value Date    POCPH 7.412 2020    POCPCO2 52.4 2020    POCPO2 56.9 2020    POCHCO3 33.3 2020    NBEA NOT REPORTED 2020    PBEA 8 2020    URJ1QRX 35 2020    VRJP3GPL 89 2020    FIO2 65.0 2020     Lab Results   Component Value Date/Time    SPECIAL RT HAND 2ML 2020 02:48 AM     Lab Results   Component Value Date/Time    CULTURE NO GROWTH 6 DAYS 2020 02:48 AM       Radiology:  [facility] Chest (single View Frontal)    Result Date: 2020  *Low Probability for Pulmonary Embolus. *Bilateral mid to lower lung field left greater than right opacities with peripheral involvement most pronounced at the bases compatible with  COVID-19 pneumonia. The findings were sent to the Radiology Results [address] at 2:46 pm on 11/17/2020to be communicated to a licensed caregiver. Nm Lung Scan Perfusion Only    Result Date: 11/17/2020  *Low Probability for Pulmonary Embolus. *Bilateral mid to lower lung field left greater than right opacities with peripheral involvement most pronounced at the bases compatible with  COVID-19 pneumonia. The findings were sent to the Radiology Results [address] at 2:46 pm on 11/17/2020to be communicated to a licensed caregiver. Physical Examination:        General appearance: Sedated and intubated  Lungs: Coarse breath sounds bilateral  Heart:  regular rate and rhythm, no murmur  Abdomen:  soft, nontender, nondistended, normal bowel sounds, no masses, hepatomegaly, splenomegaly  Extremities:  no edema, redness, tenderness in the calves  Skin:  no gross lesions, rashes, induration    Assessment:        Hospital Problems           Last Modified POA    * (Principal) Pneumonia due to COVID-19 virus 11/17/2020 Yes    Acute respiratory failure due to COVID-19 ([address] 11/16/2020 Yes    MDS (myelodysplastic syndrome) ([address] 11/17/2020 Yes    Neutropenic sepsis ([address] 11/17/2020 Yes    Bicytopenia 11/17/2020 Yes    ROSALINO (acute kidney injury) ([address] 11/17/2020 Yes    Acute respiratory failure with hypoxia ([address] 11/17/2020 Yes    Essential hypertension 11/17/2020 Yes    Coronary artery disease involving coronary bypass graft of native heart without angina pectoris 11/17/2020 Yes    History of Raynaud's syndrome 11/17/2020 Yes    Transaminitis 11/17/2020 Yes    Immunosuppressed status ([address] 11/17/2020 Yes    Severe sepsis ([address] 11/17/2020 Yes    Hypokalemia 11/22/2020 Yes    Other pancytopenia ([address] 11/24/2020 Yes          Plan:        1.  Covid pneumonia, acute respiratory failure, sedated, placed on mechanical ventilation due to deterioration of respiratory failure. on remdesivir, convalescent plasma, Decadron, infectious disease and pulmonary critical care on board,   2. Myelodysplastic syndrome and pancytopenia, oncology on board and discussed in detail, advised to keep the patient on DVT prophylaxis with Lovenox twice daily,  3. Anemia, hemoglobin 6.0, transfused 1 unit of PRBC, hold lovenox, Hb improved    4. Hypokalemia, replaced ,    5. VQ scan low probability,  6. Keep negative fluid balance, but patient was hypotensive, Lasix on hold,  7. Neutropenic sepsis, antibiotics as per infectious disease, blood cultures negative, antibiotics stopped by infectious disease at this time,  8. Neutropenia, Neupogen as per oncology  9. Acute kidney injury, creatinine improving,  10. Transaminitis, monitor liver functions,  11. Hypertension, monitor blood pressure,  12. DVT scds only, Lovenox on hold   13. GI prophylaxis,  14.  Full CODE STATUS,  15. Critical care time spent 31 minutes    Lashell Velez MD  11/26/2020  6:19 PM

## 2020-11-27 NOTE — CARE COORDINATION
Patient/family seen: No: COVID+       Informed patient/family of [facility] Medical Bundle Program with potential outreach by either Care Transitions Team or [facility]Health Team based on hospital admission and location.        [facility] Notification Letter given: Yes-mailed          Current discharge plan: home with /home care

## 2020-11-27 NOTE — PLAN OF CARE
Problem: OXYGENATION/RESPIRATORY FUNCTION  Goal: Patient will maintain patent airway  11/26/2020 2154 by Mendez Bolanos RCP  Outcome: Ongoing     Problem: OXYGENATION/RESPIRATORY FUNCTION  Goal: Patient will achieve/maintain normal respiratory rate/effort  Description: Respiratory rate and effort will be within normal limits for the patient  11/26/2020 2154 by Mendez Bolanos RCP  Outcome: Ongoing     Problem: MECHANICAL VENTILATION  Goal: Patient will maintain patent airway  11/26/2020 2154 by Mendez Bolanos RCP  Outcome: Ongoing     Problem: MECHANICAL VENTILATION  Goal: Oral health is maintained or improved  11/26/2020 2154 by Mendez Bolanos RCP  Outcome: Ongoing     Problem: MECHANICAL VENTILATION  Goal: ET tube will be managed safely  11/26/2020 2154 by Mendez Bolanos RCP  Outcome: Ongoing     Problem: MECHANICAL VENTILATION  Goal: Ability to express needs and understand communication  11/26/2020 2154 by Mendez Bolanos RCP  Outcome: Ongoing     Problem: MECHANICAL VENTILATION  Goal: Mobility/activity is maintained at optimum level for patient  11/26/2020 2154 by Mendez Bolanos RCP  Outcome: Ongoing     Problem: SKIN INTEGRITY  Goal: Skin integrity is maintained or improved  11/26/2020 2154 by Mendez Bolanos RCP  Outcome: Ongoing

## 2020-11-27 NOTE — PROGRESS NOTES
[facility] -   Infectious diseases evaluation  admission date 11/16/2020    reason for consultation:   covid    Impression :   Current:  · covid pneumonia - severe w resp distress  · MDS  · neutropenia  ·   Discussion / summary of stay / plan of care   ·   Recommendations     consented Immune plasma - ordered 11/17,  1 U - tolerated 11/17  Post Meropenem and vanco - stopped 11/18 due to cx neg  remdesevir till 11/20/20, completed  Did not fit criteria for research medicine due to her MDS    · neupogen for neutropenia PRN  · Steroids for Covid lung injury  · Seems to be improving her oxygenation    Infection Control Recommendations   · [place] Precautions  · Contact Isolation   · Airborne isolation  · Droplet Isolation      Antimicrobial Stewardship Recommendations   · Simplification of therapy  · Targeted therapy  · Per Kg dosing    Coordination ofOutpatient Care:   · Estimated Length of IV antimicrobials:  · Patient will need Midline / picc Catheter Insertion:   · Patient will need SNF:  · Patient will need outpatient wound care:     History of Present Illness:   Initial history:  Naomi De Souza is a 70y.o.-year-old female w resp distress transferred from [facility] [address] pneumonia and underlying MDS.   Neutropenia  On bipap and 50% FIO2, elevated D-Dimers and went for a VQ scan  Past cig smoking - HTN - Raynaud-    Started on decadron remdesevir  started on meropenem and vanco pend BC due to concern for ongoing sepsis      Interval changes     Patient Vitals for the past 8 hrs:   BP Pulse Resp SpO2   11/26/20 1934 -- 80 23 95 %   11/26/20 1800 (!) 112/51 81 25 94 %   11/26/20 1700 (!) 108/54 83 24 95 %   11/26/20 1600 (!) 97/56 84 25 94 %   11/26/20 1500 (!) 78/62 83 19 94 %   11/26/20 1426 -- 96 18 90 %   11/26/20 1400 (!) 104/52 85 11 92 %     Due to the current efforts to prevent transmission of COVID-19 and also the need to preserve PPE for other Neck:      Musculoskeletal: Neck supple. No neck rigidity or muscular tenderness. Cardiovascular:      Rate and Rhythm: Normal rate and regular rhythm. Heart sounds: Normal heart sounds. No murmur. No friction rub. Pulmonary:      Effort: No respiratory distress. Breath sounds: No stridor. No wheezing, rhonchi or rales. Chest:      Chest wall: No tenderness. Abdominal:      General: There is no distension. Palpations: Abdomen is soft. Tenderness: There is no abdominal tenderness. Genitourinary:     Comments: Urine bhavin  Musculoskeletal:         General: No swelling or deformity. Skin:     General: Skin is dry. Coloration: Skin is not jaundiced or pale. Findings: Bruising present. No erythema, lesion or rash. Neurological:      Mental Status: She is alert.       Comments: Intubated   Psychiatric:      Comments: Intubated         Past Medical History:     Past Medical History:   Diagnosis Date    Cancer ([facility] [address]     myelodysplastic syndrome    Deaf, left     Essential hypertension     GERD (gastroesophageal reflux disease)     Glaucoma     Hyperlipidemia     [facility]     MRSA (methicillin resistant staph aureus) culture positive     MVP (mitral valve prolapse)     Pharyngoesophageal dysphagia     Raynaud disease     Status post four vessel coronary artery bypass        Past Surgical  History:     Past Surgical History:   Procedure Laterality Date    APPENDECTOMY      CARDIAC SURGERY      CABG x3    CHOLECYSTECTOMY      HYSTERECTOMY, TOTAL ABDOMINAL      IR PORT PLACEMENT < 5 YEARS      SMALL INTESTINE SURGERY      TUBAL LIGATION         Medications:      pantoprazole  40 mg Intravenous BID    And    sodium chloride (PF)  10 mL Intravenous Daily    furosemide  20 mg Intravenous Daily    potassium chloride  10 mEq Intravenous Daily    LORazepam  0.25 mg Intravenous Once    [Held by provider] enoxaparin  40 mg Subcutaneous BID    dexamethasone  6 mg Intravenous Daily    latanoprost  1 drop Both Eyes Nightly    sodium chloride  20 mL Intravenous Once    sodium chloride  20 mL Intravenous Once       Social History:     Social History     Socioeconomic History    Marital status:      Spouse name: Not on file    Number of children: Not on file    Years of education: Not on file    Highest education level: Not on file   Occupational History    Not on file   Social Needs    Financial resource strain: Not on file    Food insecurity     Worry: Not on file     Inability: Not on file    Transportation needs     Medical: Not on file     Non-medical: Not on file   Tobacco Use    Smoking status: Former Smoker     Types: Cigarettes     Start date: 1/1/1957     Last attempt to quit: 8/22/2005     Years since quitting: 15.2   Substance and Sexual Activity    Alcohol use: Never     Frequency: Never    Drug use: Never    Sexual activity: Not on file   Lifestyle    Physical activity     Days per week: Not on file     Minutes per session: Not on file    Stress: Not on file   Relationships    Social connections     Talks on phone: Not on file     Gets together: Not on file     Attends Yarsanism service: Not on file     Active member of club or organization: Not on file     Attends meetings of clubs or organizations: Not on file     Relationship status: Not on file    Intimate partner violence     Fear of current or ex partner: Not on file     Emotionally abused: Not on file     Physically abused: Not on file     Forced sexual activity: Not on file   Other Topics Concern    Not on file   Social History Narrative    Not on file       Family History:     Family History   Problem Relation Age of Onset    Heart Failure Mother     Early Death Mother     Heart Disease Mother     Stroke Father       Medical Decision Making:   I have independently reviewed/ordered the following labs:    CBC with Differential:   Recent Labs     11/25/20  9165

## 2020-11-27 NOTE — PROGRESS NOTES
Today's Date: 11/27/2020  Patient Name: Latha Thakur  Date of admission: 11/16/2020 10:51 PM  Patient's age: 70 y.o., 1945  Admission Dx: Acute respiratory failure due to COVID-19 ([facility] [address] [U07.1, J96.00]    Reason for Consult: management recommendations  Requesting Physician: Alexis Beavers MD    CHIEF COMPLAINT: Anemia. MDS. COVID-19 positive. History Obtained From:  patient, electronic medical record    Interval history:    Chart reviewed intervals noted  Pt intubated , on significant support       HISTORY OF PRESENT ILLNESS:      The patient is a 70 y.o.  female who is admitted to the hospital with chief complaint of shortness of breath dyspnea for the last 3 days. Further testing revealed patient was COVID-19 positive. Patient was initially seen in outlying hospital and then transferred. Patient is requiring noninvasive positive pressure ventilation support. CTA has not been able to get done due to contrast allergy. Reportedly patient also has history of MDS. CBC from 9/2020 showed a WBC count of 1.6 hemoglobin 9.4 with MCV 95. Patient has been started on full dose anticoagulation due to concerns regarding pulmonary embolism. Patient was also recently started on Vidaza and has received 1 cycle so far. Patient is also transfusion dependent. Patient is supposed to get next cycle of Vidaza in the next week or so. Patient most recent bone marrow biopsy from 9/2020 showed normocellular marrow with erythroid hyperplasia and mild this erythropoiesis and 8% blasts. Recommendations from [facility]: Copied from care everywhere. ASSESSMENT AND PALN:  Ms. 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  In this situation, we previously discussed that we often favor management as if the patient is higher-tier risk disease, as outcomes for patients with IPSS-R 4.5 at diagnosis are similar to high risk. But unfortunately, she is not a candidate for her only curative option, i.e., allogeneic hematopoietic cell transplantation. Therefore, when we initially consulted on her, we discussed that all therapy is palliative. Given all therapies would be palliative, we discussed this in the context that she had been transfusion-independent. Thus, while initiation of hypomethylating agent (HMA) would have certainly been justifiable at that juncture, she had cytopenias for several months and still had not required transfusions or developed significant infection. Therefore, we discussed close observation since she was transfusion independent. We discussed the risks/beneftis of such an approach. At this visit, she is now beginning to require transfusions, so we agree with the plan to begin azacitidine locally under the care of Dr. Amanda Arellano. We again discussed that we would recommend a repeat bone marrow exam prior to initiating therapy for palliation. If she declines treatment, one could consider EPO supplementation to minimize need for transfusion. Given her [address], antiviral prophylaxis with acyclovir (400 mg BID) could be considered. If her [address] were to persistently be lower than 500/uL, antifungal prophylaxis could be considered. She will continue to follow up with Dr. Amanda Arellano for ongoing management. We will see her back prn. Past Medical History:   has a past medical history of Cancer ([facility] [address] Deaf, left, Essential hypertension, GERD (gastroesophageal reflux disease), Glaucoma, Hyperlipidemia, Melanoma ([facility] [address] MRSA (methicillin resistant staph aureus) culture positive, MVP (mitral valve prolapse), Pharyngoesophageal dysphagia, Raynaud disease, and Status post four vessel coronary artery bypass.     Past Surgical History:   has a past surgical history that includes Cardiac surgery; Tubal ligation; Appendectomy; Cholecystectomy; Hysterectomy, total abdominal; Small intestine surgery; and IR PORT PLACEMENT < 5 YEARS. Medications:    Prior to Admission medications    Medication Sig Start Date End Date Taking? Authorizing Provider   dapsone 100 MG tablet Take 1 tablet by mouth daily (with breakfast) 10/16/20  Yes Historical Provider, MD   aspirin 81 MG EC tablet Take 81 mg by mouth daily   Yes Historical Provider, MD   carvedilol (COREG) 6.25 MG tablet Take 6.25 mg by mouth 2 times daily (with meals)   Yes Historical Provider, MD   Isavuconazonium Sulfate (CRESEMBA) 186 MG CAPS Take by mouth   Yes Historical Provider, MD   diphenhydrAMINE (BENADRYL) 25 MG capsule Take 25 mg by mouth every 6 hours as needed for Itching   Yes Historical Provider, MD   hydroxychloroquine (PLAQUENIL) 200 MG tablet Take 200 mg by mouth daily   Yes Historical Provider, MD   nitroGLYCERIN (NITROSTAT) 0.4 MG SL tablet Place 0.4 mg under the tongue every 5 minutes as needed for Chest pain up to max of 3 total doses. If no relief after 1 dose, call 911.    Yes Historical Provider, MD   omeprazole (PRILOSEC) 20 MG delayed release capsule Take 40 mg by mouth daily   Yes Historical Provider, MD   phenazopyridine (PYRIDIUM) 100 MG tablet Take 100 mg by mouth 3 times daily as needed for Pain   Yes Historical Provider, MD   predniSONE (DELTASONE) 1 MG tablet Take 1 mg by mouth daily   Yes Historical Provider, MD   prochlorperazine (COMPAZINE) 25 MG suppository Place 25 mg rectally every 12 hours as needed for Nausea   Yes Historical Provider, MD   Travoprost (TRAVATAN OP) Apply to eye   Yes Historical Provider, MD     Current Facility-Administered Medications   Medication Dose Route Frequency Provider Last Rate Last Dose    pantoprazole (PROTONIX) injection 40 mg  40 mg Intravenous BID MICHAEL Iyer - CNP   40 mg at 11/27/20 0929    And    sodium chloride (PF) 0.9 % injection 10 mL  10 mL Intravenous Daily Jewel A MICHAEL Reyez - CNP   10 mL at 11/27/20 0930    fentaNYL 20 mcg/mL Infusion  25 mcg/hr Intravenous Continuous Ivis Amanda Hauger, DO 5 mL/hr at 11/27/20 0824 100 mcg/hr at 11/27/20 0824    furosemide (LASIX) injection 20 mg  20 mg Intravenous Daily Fatou Curtis MD   20 mg at 11/27/20 0929    midazolam (VERSED) 1 mg/mL in D5W infusion  1 mg/hr Intravenous Continuous Lizet Santiago MD 6 mL/hr at 11/27/20 0559 6 mg/hr at 11/27/20 0559    potassium chloride 10 mEq/100 mL IVPB (Peripheral Line)  10 mEq Intravenous Daily Fatou Curtis MD   Stopped at 11/27/20 0908    LORazepam (ATIVAN) injection 0.5 mg  0.5 mg Intravenous Q8H PRN Fatou Curtis MD   0.5 mg at 11/23/20 0455    LORazepam (ATIVAN) injection 0.25 mg  0.25 mg Intravenous Once MICHAEL Walters - CNP   Stopped at 11/21/20 0502    [Held by provider] enoxaparin (LOVENOX) injection 40 mg  40 mg Subcutaneous BID Fatou Curtis MD   40 mg at 11/25/20 0941    latanoprost (XALATAN) 0.005 % ophthalmic solution 1 drop  1 drop Both Eyes Nightly Fatou Curtis MD   1 drop at 11/26/20 2242    0.9 % sodium chloride bolus  30 mL Intravenous PRN Nafisa Hardyen, DO        0.9 % sodium chloride bolus  20 mL Intravenous Once Nafisa Jurado, DO        0.9 % sodium chloride bolus  20 mL Intravenous Once Vince Lora MD        sodium chloride flush 0.9 % injection 10 mL  10 mL Intravenous PRN Richardjerry Jurado, DO   10 mL at 11/24/20 2011    potassium chloride (KLOR-CON M) extended release tablet 40 mEq  40 mEq Oral PRN Rad Peteap, APRN - CNS   40 mEq at 11/22/20 1544    Or    potassium bicarb-citric acid (EFFER-K) effervescent tablet 40 mEq  40 mEq Oral PRN Rad Peteap, APRN - CNS   40 mEq at 11/21/20 0191    Or    potassium chloride 10 mEq/100 mL IVPB (Peripheral Line)  10 mEq Intravenous PRN Rad Burrows, APRN - CNS        magnesium sulfate 1 g in dextrose 5% 100 mL IVPB  1 g Intravenous PRN Rad Burrows, APRN - CNS        acetaminophen (TYLENOL) tablet 650 mg  650 mg Oral Q6H PRN , APRN - CNS   650 mg at 20 1044    Or    acetaminophen (TYLENOL) suppository 650 mg  650 mg Rectal Q6H PRN , APRN - CNS        polyethylene glycol (GLYCOLAX) packet 17 g  17 g Oral Daily PRN , APRN - CNS        promethazine (PHENERGAN) tablet 12.5 mg  12.5 mg Oral Q6H PRN , APRN - CNS        Or    ondansetron (ZOFRAN) injection 4 mg  4 mg Intravenous Q6H PRN , APRN - CNS        nicotine (NICODERM CQ) 21 MG/24HR 1 patch  1 patch Transdermal Daily PRN , APRN - CNS           Allergies:  Fish allergy; Hydrocodone-acetaminophen; Tizanidine; Atorvastatin; Metoclopramide; Moxifloxacin; Oxycodone; Pregabalin; Tramadol; Zolpidem; Cephalexin; Iodides; and Sulfa antibiotics    Social History:   reports that she quit smoking about 15 years ago. Her smoking use included cigarettes. She started smoking about 63 years ago. She does not have any smokeless tobacco history on file. She reports that she does not drink alcohol or use drugs. Family History: family history includes Early Death in her mother; Heart Disease in her mother; Heart Failure in her mother; Stroke in her father. REVIEW OF SYSTEMS:    unobtainable   PHYSICAL EXAM:        BP (!) 101/49   Pulse 91   Temp 99.3 °F (37.4 °C) (Core)   Resp 15   Ht 5' 7\" (1.702 m)   Wt 167 lb 5.3 oz (75.9 kg)   SpO2 94%   BMI 26.21 kg/m²    Temp (24hrs), Av.9 °F (37.2 °C), Min:98.2 °F (36.8 °C), Max:99.3 °F (37.4 °C)    General appearance - intubated and sedated.    Eyes - pupils equal and reactive,  Ears - bilateral TM's and external ear canals normal  Mouth - mucous membranes moist, ETT in place   Neck - supple, no significant adenopathy  Chest - scattered rhonchi   Heart - normal rate, regular rhythm, normal S1, S2,   Abdomen - soft, nontender, nondistended, no masses or organomegaly  Neurological - sedated and intubated. Extremities - peripheral pulses normal, no pedal edema, no clubbing or cyanosis  Skin - pale, no bleeding. DATA:      Labs:     Results for orders placed or performed during the hospital encounter of 11/16/20   Culture, Blood 1    Specimen: Blood   Result Value Ref Range    Specimen Description . BLOOD     Special Requests LT HAND 20ML     Culture NO GROWTH 6 DAYS    Culture, Blood 2    Specimen: Blood   Result Value Ref Range    Specimen Description . BLOOD     Special Requests RT HAND 2ML     Culture NO GROWTH 6 DAYS    Respiratory Panel, Molecular, with COVID-19    Specimen: Nasopharyngeal Swab   Result Value Ref Range    Specimen Description . NASOPHARYNGEAL SWAB     Adenovirus PCR Not Detected Not Detected    Coronavirus 229E PCR Not Detected Not Detected    Coronavirus HKU1 PCR Not Detected Not Detected    Coronavirus NL63 PCR Not Detected Not Detected    Coronavirus OC43 PCR Not Detected Not Detected    SARS-CoV-2, PCR DETECTED (A) Not Detected    Human Metapneumovirus PCR Not Detected Not Detected    Rhino/Enterovirus PCR Not Detected Not Detected    Influenza A by PCR Not Detected Not Detected    Influenza A H1 PCR NOT REPORTED Not Detected    Influenza A H1 (2009) PCR NOT REPORTED Not Detected    Influenza A H3 PCR NOT REPORTED Not Detected    Influenza B by PCR Not Detected Not Detected    Parainfluenza 1 PCR Not Detected Not Detected    Parainfluenza 2 PCR Not Detected Not Detected    Parainfluenza 3 PCR Not Detected Not Detected    Parainfluenza 4 PCR Not Detected Not Detected    Resp Syncytial Virus PCR Not Detected Not Detected    Bordetella Parapertussis Not Detected Not Detected    B Pertussis by PCR Not Detected Not Detected    Chlamydia pneumoniae By PCR Not Detected Not Detected    Mycoplasma pneumo by PCR Not Detected Not Detected   LEGIONELLA ANTIGEN, URINE    Specimen: Urine, clean catch   Result Value Ref Range    Legionella Pneumophilia Ag, Urine NEGATIVE    CBC   Result Value Ref Range    WBC 1.3 (LL) 3.5 - 11.3 k/uL    RBC 2.53 (L) 3.95 - 5.11 m/uL    Hemoglobin 7.4 (L) 11.9 - 15.1 g/dL    Hematocrit 23.6 (L) 36.3 - 47.1 %    MCV 93.3 82.6 - 102.9 fL    MCH 29.2 25.2 - 33.5 pg    MCHC 31.4 28.4 - 34.8 g/dL    RDW 18.5 (H) 11.8 - 14.4 %    Platelets 285 555 - 245 k/uL    MPV 10.6 8.1 - 13.5 fL    NRBC Automated 0.0 0.0 per 100 WBC   Protime-INR   Result Value Ref Range    Protime 9.3 9.0 - 12.0 sec    INR 0.9    Comprehensive Metabolic Panel w/ Reflex to MG   Result Value Ref Range    Glucose 112 (H) 70 - 99 mg/dL    BUN 24 (H) 8 - 23 mg/dL    CREATININE 0.89 0.50 - 0.90 mg/dL    Bun/Cre Ratio NOT REPORTED 9 - 20    Calcium 8.0 (L) 8.6 - 10.4 mg/dL    Sodium 133 (L) 135 - 144 mmol/L    Potassium 4.1 3.7 - 5.3 mmol/L    Chloride 100 98 - 107 mmol/L    CO2 21 20 - 31 mmol/L    Anion Gap 12 9 - 17 mmol/L    Alkaline Phosphatase 76 35 - 104 U/L    ALT 49 (H) 5 - 33 U/L    AST 41 (H) <32 U/L    Total Bilirubin 0.64 0.3 - 1.2 mg/dL    Total Protein 5.9 (L) 6.4 - 8.3 g/dL    Alb 3.0 (L) 3.5 - 5.2 g/dL    Albumin/Globulin Ratio 1.0 1.0 - 2.5    GFR Non-African American >60 >60 mL/min    GFR African American >60 >60 mL/min    GFR Comment          GFR Staging NOT REPORTED    C-Reactive Protein   Result Value Ref Range    .8 (H) 0.0 - 5.0 mg/L   Ferritin   Result Value Ref Range    Ferritin 3,046 (H) 13 - 150 ug/L   Fibrinogen   Result Value Ref Range    Fibrinogen 721 (H) 140 - 420 mg/dL   Lactate Dehydrogenase   Result Value Ref Range     (H) 135 - 214 U/L   CBC   Result Value Ref Range    WBC 1.2 (LL) 3.5 - 11.3 k/uL    RBC 2.37 (L) 3.95 - 5.11 m/uL    Hemoglobin 7.0 (LL) 11.9 - 15.1 g/dL    Hematocrit 22.4 (L) 36.3 - 47.1 %    MCV 94.5 82.6 - 102.9 fL    MCH 29.5 25.2 - 33.5 pg    MCHC 31.3 28.4 - 34.8 g/dL    RDW 19.0 (H) 11.8 - 14.4 %    Platelets 313 613 - 995 k/uL    MPV 10.7 8.1 - 13.5 fL    NRBC Automated 0.0 0.0 per 100 WBC   Differential   Result Value Ref Range Differential Type NOT REPORTED     WBC Morphology NOT REPORTED     RBC Morphology NOT REPORTED     Platelet Estimate NOT REPORTED     Immature Granulocytes 0 0 %    Seg Neutrophils 33 (L) 36 - 66 %    Lymphocytes 48 (H) 24 - 44 %    Atypical Lymphocytes 1 %    Monocytes 17 (H) 1 - 7 %    Eosinophils % 1 1 - 4 %    Basophils 0 0 - 2 %    Absolute Immature Granulocyte 0.00 0.00 - 0.30 k/uL    Segs Absolute 0.43 (LL) 1.8 - 7.7 k/uL    Absolute Lymph # 0.63 (L) 1.0 - 4.8 k/uL    Atypical Lymphocytes Absolute 0.01 k/uL    Absolute Mono # 0.22 0.1 - 0.8 k/uL    Absolute Eos # 0.01 0.0 - 0.4 k/uL    Basophils Absolute 0.00 0.0 - 0.2 k/uL    Morphology ANISOCYTOSIS PRESENT    D-Dimer, Quantitative   Result Value Ref Range    D-Dimer, Quant 2.44 mg/L FEU   Ferritin   Result Value Ref Range    Ferritin 3,024 (H) 13 - 150 ug/L   Fibrinogen   Result Value Ref Range    Fibrinogen 720 (H) 140 - 420 mg/dL   Protime-INR   Result Value Ref Range    Protime 9.5 9.0 - 12.0 sec    INR 0.9    Lactate Dehydrogenase   Result Value Ref Range     (H) 135 - 214 U/L   Hepatic Function Panel   Result Value Ref Range    Alb 2.9 (L) 3.5 - 5.2 g/dL    Alkaline Phosphatase 74 35 - 104 U/L    ALT 45 (H) 5 - 33 U/L    AST 38 (H) <32 U/L    Total Bilirubin 0.53 0.3 - 1.2 mg/dL    Bilirubin, Direct 0.22 <0.31 mg/dL    Bilirubin, Indirect 0.31 0.00 - 1.00 mg/dL    Total Protein 5.8 (L) 6.4 - 8.3 g/dL    Globulin NOT REPORTED 1.5 - 3.8 g/dL    Albumin/Globulin Ratio 1.0 1.0 - 2.5   Magnesium   Result Value Ref Range    Magnesium 2.3 1.6 - 2.6 mg/dL   Renal Function Panel   Result Value Ref Range    Glucose 109 (H) 70 - 99 mg/dL    BUN 23 8 - 23 mg/dL    CREATININE 0.81 0.50 - 0.90 mg/dL    Bun/Cre Ratio NOT REPORTED 9 - 20    Calcium 8.0 (L) 8.6 - 10.4 mg/dL    Alb 2.9 (L) 3.5 - 5.2 g/dL    Phosphorus 3.5 2.6 - 4.5 mg/dL    Sodium 133 (L) 135 - 144 mmol/L    Potassium 4.1 3.7 - 5.3 mmol/L    Chloride 100 98 - 107 mmol/L    CO2 21 20 - 31 mmol/L Anion Gap 12 9 - 17 mmol/L    GFR Non-African American >60 >60 mL/min    GFR African American >60 >60 mL/min    GFR Comment          GFR Staging NOT REPORTED    Procalcitonin   Result Value Ref Range    Procalcitonin 1.00 (H) <0.09 ng/mL   Brain Natriuretic Peptide   Result Value Ref Range    Pro- (H) <300 pg/mL    BNP Interpretation Pro-BNP Reference Range:    COVID-19    Specimen: Nasopharyngeal Swab   Result Value Ref Range    SARS-CoV-2 Positive (A)    MYCOPLASMA PNEUMONIAE ANTIBODY, IGM   Result Value Ref Range    Mycoplasma pneumo IgM 0.17 <0.91   URINALYSIS   Result Value Ref Range    Color, UA DARK YELLOW (A) YELLOW    Turbidity UA CLEAR CLEAR    Glucose, Ur NEGATIVE NEGATIVE    Bilirubin Urine NEGATIVE  Verified by ictotest. (A) NEGATIVE    Ketones, Urine SMALL (A) NEGATIVE    Specific Gravity, UA 1.023 1.005 - 1.030    Urine Hgb NEGATIVE NEGATIVE    pH, UA 5.5 5.0 - 8.0    Protein, UA 1+ (A) NEGATIVE    Urobilinogen, Urine Normal Normal    Nitrite, Urine NEGATIVE NEGATIVE    Leukocyte Esterase, Urine NEGATIVE NEGATIVE    Urinalysis Comments NOT REPORTED    OCCULT BLOOD SCREEN   Result Value Ref Range    Occult Blood, Stool #1 NEGATIVE NEGATIVE    Date, Stool #1 . FECES     Time, Stool #1 . FECES     Occult Blood, Stool #2 NOT REPORTED NEGATIVE    Date, Stool #2 NOT REPORTED     Time, Stool #2 NOT REPORTED     Occult Blood, Stool #3 NOT REPORTED NEGATIVE    Date, Stool #3 NOT REPORTED     Time, Stool #3 NOT REPORTED    Microscopic Urinalysis   Result Value Ref Range    -          WBC, UA 0 TO 2 0 - 5 /HPF    RBC, UA 0 TO 2 0 - 4 /HPF    Casts UA  0 - 8 /LPF     5 TO 10 HYALINE Reference range defined for non-centrifuged specimen.     Crystals, UA NOT REPORTED None /HPF    Epithelial Cells UA 0 TO 2 0 - 5 /HPF    Renal Epithelial, UA NOT REPORTED 0 /HPF    Bacteria, UA NOT REPORTED None    Mucus, UA NOT REPORTED None    Trichomonas, UA NOT REPORTED None    Amorphous, UA NOT REPORTED None    Other 10.4 mg/dL    Sodium 138 135 - 144 mmol/L    Potassium 4.0 3.7 - 5.3 mmol/L    Chloride 102 98 - 107 mmol/L    CO2 21 20 - 31 mmol/L    Anion Gap 15 9 - 17 mmol/L    Alkaline Phosphatase 58 35 - 104 U/L    ALT 35 (H) 5 - 33 U/L    AST 33 (H) <32 U/L    Total Bilirubin 0.47 0.3 - 1.2 mg/dL    Total Protein 5.6 (L) 6.4 - 8.3 g/dL    Alb 2.6 (L) 3.5 - 5.2 g/dL    Albumin/Globulin Ratio 0.9 (L) 1.0 - 2.5    GFR Non-African American >60 >60 mL/min    GFR African American >60 >60 mL/min    GFR Comment          GFR Staging NOT REPORTED    CBC Auto Differential   Result Value Ref Range    WBC 3.3 (L) 3.5 - 11.3 k/uL    RBC 2.61 (L) 3.95 - 5.11 m/uL    Hemoglobin 7.8 (L) 11.9 - 15.1 g/dL    Hematocrit 23.8 (L) 36.3 - 47.1 %    MCV 91.2 82.6 - 102.9 fL    MCH 29.9 25.2 - 33.5 pg    MCHC 32.8 28.4 - 34.8 g/dL    RDW 19.2 (H) 11.8 - 14.4 %    Platelets 744 181 - 520 k/uL    MPV 11.4 8.1 - 13.5 fL    NRBC Automated 0.0 0.0 per 100 WBC    Differential Type NOT REPORTED     WBC Morphology NOT REPORTED     RBC Morphology NOT REPORTED     Platelet Estimate NOT REPORTED     Immature Granulocytes 0 0 %    Seg Neutrophils 56 36 - 66 %    Lymphocytes 36 24 - 44 %    Monocytes 8 (H) 1 - 7 %    Eosinophils % 0 (L) 1 - 4 %    Basophils 0 0 - 2 %    Absolute Immature Granulocyte 0.00 0.00 - 0.30 k/uL    Segs Absolute 1.85 1.8 - 7.7 k/uL    Absolute Lymph # 1.19 1.0 - 4.8 k/uL    Absolute Mono # 0.26 0.1 - 0.8 k/uL    Absolute Eos # 0.00 0.0 - 0.4 k/uL    Basophils Absolute 0.00 0.0 - 0.2 k/uL    Morphology ANISOCYTOSIS PRESENT    Mycoplasma pneumoniae antibody, IgM   Result Value Ref Range    Mycoplasma pneumo IgM 0.20 <0.91   CBC Auto Differential   Result Value Ref Range    WBC 9.0 3.5 - 11.3 k/uL    RBC 2.60 (L) 3.95 - 5.11 m/uL    Hemoglobin 8.0 (L) 11.9 - 15.1 g/dL    Hematocrit 26.0 (L) 36.3 - 47.1 %    .0 82.6 - 102.9 fL    MCH 30.8 25.2 - 33.5 pg    MCHC 30.8 28.4 - 34.8 g/dL    RDW 19.6 (H) 11.8 - 14.4 %    Platelets 557 609 - 453 k/uL    MPV 11.4 8.1 - 13.5 fL    NRBC Automated 0.0 0.0 per 100 WBC    Differential Type NOT REPORTED     WBC Morphology NOT REPORTED     RBC Morphology NOT REPORTED     Platelet Estimate NOT REPORTED     Immature Granulocytes 1 (H) 0 %    Seg Neutrophils 74 (H) 36 - 66 %    Lymphocytes 15 (L) 24 - 44 %    Monocytes 10 (H) 1 - 7 %    Eosinophils % 0 (L) 1 - 4 %    Basophils 0 0 - 2 %    Absolute Immature Granulocyte 0.09 0.00 - 0.30 k/uL    Segs Absolute 6.66 1.8 - 7.7 k/uL    Absolute Lymph # 1.35 1.0 - 4.8 k/uL    Absolute Mono # 0.90 (H) 0.1 - 0.8 k/uL    Absolute Eos # 0.00 0.0 - 0.4 k/uL    Basophils Absolute 0.00 0.0 - 0.2 k/uL    Morphology ANISOCYTOSIS PRESENT    FERRITIN   Result Value Ref Range    Ferritin 3,590 (H) 13 - 150 ug/L   FIBRINOGEN   Result Value Ref Range    Fibrinogen 673 (H) 140 - 420 mg/dL   CBC Auto Differential   Result Value Ref Range    WBC 12.8 (H) 3.5 - 11.3 k/uL    RBC 2.36 (L) 3.95 - 5.11 m/uL    Hemoglobin 7.3 (L) 11.9 - 15.1 g/dL    Hematocrit 23.4 (L) 36.3 - 47.1 %    MCV 99.2 82.6 - 102.9 fL    MCH 30.9 25.2 - 33.5 pg    MCHC 31.2 28.4 - 34.8 g/dL    RDW 19.0 (H) 11.8 - 14.4 %    Platelets 324 466 - 912 k/uL    MPV 11.5 8.1 - 13.5 fL    NRBC Automated 0.0 0.0 per 100 WBC    Differential Type NOT REPORTED     WBC Morphology NOT REPORTED     RBC Morphology NOT REPORTED     Platelet Estimate NOT REPORTED     Immature Granulocytes 1 (H) 0 %    Seg Neutrophils 76 (H) 36 - 66 %    Lymphocytes 16 (L) 24 - 44 %    Monocytes 7 1 - 7 %    Eosinophils % 0 (L) 1 - 4 %    Basophils 0 0 - 2 %    Absolute Immature Granulocyte 0.13 0.00 - 0.30 k/uL    Segs Absolute 9.72 (H) 1.8 - 7.7 k/uL    Absolute Lymph # 2.05 1.0 - 4.8 k/uL    Absolute Mono # 0.90 (H) 0.1 - 0.8 k/uL    Absolute Eos # 0.00 0.0 - 0.4 k/uL    Basophils Absolute 0.00 0.0 - 0.2 k/uL    Morphology ANISOCYTOSIS PRESENT     Morphology INCREASED BANDS PRESENT     Morphology TOXIC GRANULATION PRESENT    Basic Metabolic Panel w/ Reflex to MG   Result Value Ref Range    Glucose 84 70 - 99 mg/dL    BUN 22 8 - 23 mg/dL    CREATININE 0.52 0.50 - 0.90 mg/dL    Bun/Cre Ratio NOT REPORTED 9 - 20    Calcium 8.6 8.6 - 10.4 mg/dL    Sodium 143 135 - 144 mmol/L    Potassium 3.5 (L) 3.7 - 5.3 mmol/L    Chloride 106 98 - 107 mmol/L    CO2 25 20 - 31 mmol/L    Anion Gap 12 9 - 17 mmol/L    GFR Non-African American >60 >60 mL/min    GFR African American >60 >60 mL/min    GFR Comment          GFR Staging NOT REPORTED    Brain Natriuretic Peptide   Result Value Ref Range    Pro- (H) <300 pg/mL    BNP Interpretation Pro-BNP Reference Range:    FIBRINOGEN   Result Value Ref Range    Fibrinogen 506 (H) 140 - 420 mg/dL   C-REACTIVE PROTEIN   Result Value Ref Range    CRP 67.6 (H) 0.0 - 5.0 mg/L   FERRITIN   Result Value Ref Range    Ferritin 2,838 (H) 13 - 150 ug/L   D-DIMER, QUANTITATIVE   Result Value Ref Range    D-Dimer, Quant 1.93 mg/L FEU   Magnesium   Result Value Ref Range    Magnesium 2.0 1.6 - 2.6 mg/dL   CBC Auto Differential   Result Value Ref Range    WBC 10.4 3.5 - 11.3 k/uL    RBC 2.70 (L) 3.95 - 5.11 m/uL    Hemoglobin 8.1 (L) 11.9 - 15.1 g/dL    Hematocrit 27.6 (L) 36.3 - 47.1 %    .2 82.6 - 102.9 fL    MCH 30.0 25.2 - 33.5 pg    MCHC 29.3 28.4 - 34.8 g/dL    RDW 19.2 (H) 11.8 - 14.4 %    Platelets 329 782 - 720 k/uL    MPV 11.2 8.1 - 13.5 fL    NRBC Automated 0.0 0.0 per 100 WBC    Differential Type NOT REPORTED     WBC Morphology NOT REPORTED     RBC Morphology NOT REPORTED     Platelet Estimate NOT REPORTED     Immature Granulocytes 3 (H) 0 %    Seg Neutrophils 68 (H) 36 - 66 %    Lymphocytes 20 (L) 24 - 44 %    Monocytes 9 (H) 1 - 7 %    Eosinophils % 0 (L) 1 - 4 %    Basophils 0 0 - 2 %    Absolute Immature Granulocyte 0.31 (H) 0.00 - 0.30 k/uL    Segs Absolute 7.07 1.8 - 7.7 k/uL    Absolute Lymph # 2.08 1.0 - 4.8 k/uL    Absolute Mono # 0.94 (H) 0.1 - 0.8 k/uL    Absolute Eos # 0.00 0.0 - 0.4 k/uL    Basophils Absolute 0.00 0.0 - 0.2 k/uL    Morphology ANISOCYTOSIS PRESENT    POTASSIUM   Result Value Ref Range    Potassium 3.1 (L) 3.7 - 5.3 mmol/L   CBC Auto Differential   Result Value Ref Range    WBC 9.3 3.5 - 11.3 k/uL    RBC 2.54 (L) 3.95 - 5.11 m/uL    Hemoglobin 8.0 (L) 11.9 - 15.1 g/dL    Hematocrit 25.9 (L) 36.3 - 47.1 %    .0 82.6 - 102.9 fL    MCH 31.5 25.2 - 33.5 pg    MCHC 30.9 28.4 - 34.8 g/dL    RDW 19.0 (H) 11.8 - 14.4 %    Platelets 127 232 - 971 k/uL    MPV 11.4 8.1 - 13.5 fL    NRBC Automated 0.0 0.0 per 100 WBC    Differential Type NOT REPORTED     WBC Morphology NOT REPORTED     RBC Morphology NOT REPORTED     Platelet Estimate NOT REPORTED     Immature Granulocytes 5 (H) 0 %    Seg Neutrophils 69 (H) 36 - 66 %    Lymphocytes 23 (L) 24 - 44 %    Monocytes 3 1 - 7 %    Eosinophils % 0 (L) 1 - 4 %    Basophils 0 0 - 2 %    Absolute Immature Granulocyte 0.47 (H) 0.00 - 0.30 k/uL    Segs Absolute 6.41 1.8 - 7.7 k/uL    Absolute Lymph # 2.14 1.0 - 4.8 k/uL    Absolute Mono # 0.28 0.1 - 0.8 k/uL    Absolute Eos # 0.00 0.0 - 0.4 k/uL    Basophils Absolute 0.00 0.0 - 0.2 k/uL    Morphology ANISOCYTOSIS PRESENT     Morphology TOXIC GRANULATION PRESENT    Comprehensive Metabolic Panel w/ Reflex to MG   Result Value Ref Range    Glucose 84 70 - 99 mg/dL    BUN 16 8 - 23 mg/dL    CREATININE 0.64 0.50 - 0.90 mg/dL    Bun/Cre Ratio NOT REPORTED 9 - 20    Calcium 9.0 8.6 - 10.4 mg/dL    Sodium 139 135 - 144 mmol/L    Potassium 3.8 3.7 - 5.3 mmol/L    Chloride 103 98 - 107 mmol/L    CO2 24 20 - 31 mmol/L    Anion Gap 12 9 - 17 mmol/L    Alkaline Phosphatase 88 35 - 104 U/L    ALT 23 5 - 33 U/L    AST 22 <32 U/L    Total Bilirubin 0.51 0.3 - 1.2 mg/dL    Total Protein 6.3 (L) 6.4 - 8.3 g/dL    Alb 2.5 (L) 3.5 - 5.2 g/dL    Albumin/Globulin Ratio 0.7 (L) 1.0 - 2.5    GFR Non-African American >60 >60 mL/min    GFR African American >60 >60 mL/min    GFR Comment          GFR Staging NOT REPORTED    Urinalysis Reflex to Culture Specimen: Urine, clean catch   Result Value Ref Range    Color, UA DARK YELLOW (A) YELLOW    Turbidity UA CLEAR CLEAR    Glucose, Ur NEGATIVE NEGATIVE    Bilirubin Urine NEGATIVE NEGATIVE    Ketones, Urine NEGATIVE NEGATIVE    Specific Gravity, UA 1.022 1.005 - 1.030    Urine Hgb NEGATIVE NEGATIVE    pH, UA 7.0 5.0 - 8.0    Protein, UA 1+ (A) NEGATIVE    Urobilinogen, Urine Normal Normal    Nitrite, Urine NEGATIVE NEGATIVE    Leukocyte Esterase, Urine NEGATIVE NEGATIVE    Urinalysis Comments NOT REPORTED    Microscopic Urinalysis   Result Value Ref Range    -          WBC, UA 2 TO 5 0 - 5 /HPF    RBC, UA 5 TO 10 0 - 4 /HPF    Casts UA  0 - 8 /LPF     10 TO 20 HYALINE Reference range defined for non-centrifuged specimen. Crystals, UA NOT REPORTED None /HPF    Epithelial Cells UA 5 TO 10 0 - 5 /HPF    Renal Epithelial, UA NOT REPORTED 0 /HPF    Bacteria, UA NOT REPORTED None    Mucus, UA NOT REPORTED None    Trichomonas, UA NOT REPORTED None    Amorphous, UA NOT REPORTED None    Other Observations UA NOT REPORTED NOT REQ.     Yeast, UA NOT REPORTED None   CBC Auto Differential   Result Value Ref Range    WBC 6.9 3.5 - 11.3 k/uL    RBC 2.28 (L) 3.95 - 5.11 m/uL    Hemoglobin 6.8 (LL) 11.9 - 15.1 g/dL    Hematocrit 21.5 (L) 36.3 - 47.1 %    MCV 94.3 82.6 - 102.9 fL    MCH 29.8 25.2 - 33.5 pg    MCHC 31.6 28.4 - 34.8 g/dL    RDW 18.2 (H) 11.8 - 14.4 %    Platelets 978 371 - 455 k/uL    MPV 11.0 8.1 - 13.5 fL    NRBC Automated 0.0 0.0 per 100 WBC    Differential Type NOT REPORTED     WBC Morphology NOT REPORTED     RBC Morphology NOT REPORTED     Platelet Estimate NOT REPORTED     Immature Granulocytes 9 (H) 0 %    Seg Neutrophils 66 36 - 66 %    Lymphocytes 18 (L) 24 - 44 %    Monocytes 7 1 - 7 %    Eosinophils % 0 (L) 1 - 4 %    Basophils 0 0 - 2 %    Absolute Immature Granulocyte 0.62 (H) 0.00 - 0.30 k/uL    Segs Absolute 4.56 1.8 - 7.7 k/uL    Absolute Lymph # 1.24 1.0 - 4.8 k/uL    Absolute Mono # 0.48 0.1 - 0.8 k/uL    Absolute Eos # 0.00 0.0 - 0.4 k/uL    Basophils Absolute 0.00 0.0 - 0.2 k/uL    Morphology ANISOCYTOSIS PRESENT    Comprehensive Metabolic Panel w/ Reflex to MG   Result Value Ref Range    Glucose 111 (H) 70 - 99 mg/dL    BUN 24 (H) 8 - 23 mg/dL    CREATININE 0.61 0.50 - 0.90 mg/dL    Bun/Cre Ratio NOT REPORTED 9 - 20    Calcium 8.2 (L) 8.6 - 10.4 mg/dL    Sodium 143 135 - 144 mmol/L    Potassium 4.2 3.7 - 5.3 mmol/L    Chloride 105 98 - 107 mmol/L    CO2 26 20 - 31 mmol/L    Anion Gap 12 9 - 17 mmol/L    Alkaline Phosphatase 77 35 - 104 U/L    ALT 18 5 - 33 U/L    AST 18 <32 U/L    Total Bilirubin 0.41 0.3 - 1.2 mg/dL    Total Protein 5.9 (L) 6.4 - 8.3 g/dL    Alb 2.2 (L) 3.5 - 5.2 g/dL    Albumin/Globulin Ratio 0.6 (L) 1.0 - 2.5    GFR Non-African American >60 >60 mL/min    GFR African American >60 >60 mL/min    GFR Comment          GFR Staging NOT REPORTED    CBC Auto Differential   Result Value Ref Range    WBC 4.2 3.5 - 11.3 k/uL    RBC 2.05 (L) 3.95 - 5.11 m/uL    Hemoglobin 6.0 (LL) 11.9 - 15.1 g/dL    Hematocrit 19.8 (L) 36.3 - 47.1 %    MCV 96.6 82.6 - 102.9 fL    MCH 29.3 25.2 - 33.5 pg    MCHC 30.3 28.4 - 34.8 g/dL    RDW 18.4 (H) 11.8 - 14.4 %    Platelets 885 885 - 202 k/uL    MPV 11.2 8.1 - 13.5 fL    NRBC Automated 0.0 0.0 per 100 WBC    Differential Type NOT REPORTED     WBC Morphology NOT REPORTED     RBC Morphology NOT REPORTED     Platelet Estimate NOT REPORTED     Immature Granulocytes 2 (H) 0 %    Seg Neutrophils 74 (H) 36 - 66 %    Lymphocytes 16 (L) 24 - 44 %    Monocytes 8 (H) 1 - 7 %    Eosinophils % 0 (L) 1 - 4 %    Basophils 0 0 - 2 %    Absolute Immature Granulocyte 0.08 0.00 - 0.30 k/uL    Segs Absolute 3.11 1.8 - 7.7 k/uL    Absolute Lymph # 0.67 (L) 1.0 - 4.8 k/uL    Absolute Mono # 0.34 0.1 - 0.8 k/uL    Absolute Eos # 0.00 0.0 - 0.4 k/uL    Basophils Absolute 0.00 0.0 - 0.2 k/uL    Morphology ANISOCYTOSIS PRESENT     Morphology TOXIC GRANULATION PRESENT    Comprehensive Metabolic Panel w/ Reflex to MG   Result Value Ref Range    Glucose 96 70 - 99 mg/dL    BUN 26 (H) 8 - 23 mg/dL    CREATININE 0.61 0.50 - 0.90 mg/dL    Bun/Cre Ratio NOT REPORTED 9 - 20    Calcium 8.3 (L) 8.6 - 10.4 mg/dL    Sodium 141 135 - 144 mmol/L    Potassium 4.4 3.7 - 5.3 mmol/L    Chloride 104 98 - 107 mmol/L    CO2 31 20 - 31 mmol/L    Anion Gap 6 (L) 9 - 17 mmol/L    Alkaline Phosphatase 62 35 - 104 U/L    ALT 18 5 - 33 U/L    AST 16 <32 U/L    Total Bilirubin 0.30 0.3 - 1.2 mg/dL    Total Protein 5.7 (L) 6.4 - 8.3 g/dL    Alb 2.2 (L) 3.5 - 5.2 g/dL    Albumin/Globulin Ratio 0.6 (L) 1.0 - 2.5    GFR Non-African American >60 >60 mL/min    GFR African American >60 >60 mL/min    GFR Comment          GFR Staging NOT REPORTED    FERRITIN   Result Value Ref Range    Ferritin 4,245 (H) 13 - 150 ug/L   VITAMIN D 25 HYDROXY   Result Value Ref Range    Vit D, 25-Hydroxy 47.6 30.0 - 100.0 ng/mL   HEMOGLOBIN AND HEMATOCRIT, BLOOD   Result Value Ref Range    Hemoglobin 7.7 (L) 11.9 - 15.1 g/dL    Hematocrit 24.0 (L) 36.3 - 47.1 %   CBC Auto Differential   Result Value Ref Range    WBC 5.3 3.5 - 11.3 k/uL    RBC 2.64 (L) 3.95 - 5.11 m/uL    Hemoglobin 8.0 (L) 11.9 - 15.1 g/dL    Hematocrit 25.2 (L) 36.3 - 47.1 %    MCV 95.5 82.6 - 102.9 fL    MCH 30.3 25.2 - 33.5 pg    MCHC 31.7 28.4 - 34.8 g/dL    RDW 17.4 (H) 11.8 - 14.4 %    Platelets 225 058 - 571 k/uL    MPV 11.3 8.1 - 13.5 fL    NRBC Automated 0.0 0.0 per 100 WBC    Differential Type NOT REPORTED     WBC Morphology NOT REPORTED     RBC Morphology NOT REPORTED     Platelet Estimate NOT REPORTED     Immature Granulocytes 5 (H) 0 %    Seg Neutrophils 78 (H) 36 - 66 %    Lymphocytes 11 (L) 24 - 44 %    Monocytes 4 1 - 7 %    Eosinophils % 2 1 - 4 %    Basophils 0 0 - 2 %    Absolute Immature Granulocyte 0.27 0.00 - 0.30 k/uL    Segs Absolute 4.13 1.8 - 7.7 k/uL    Absolute Lymph # 0.58 (L) 1.0 - 4.8 k/uL    Absolute Mono # 0.21 0.1 - 0.8 k/uL    Absolute Eos # 0.11 0.0 - 0.4 k/uL    Basophils Absolute 0.00 0.0 - 0.2 k/uL    Morphology ANISOCYTOSIS PRESENT     Morphology TOXIC GRANULATION PRESENT    Comprehensive Metabolic Panel w/ Reflex to MG   Result Value Ref Range    Glucose 91 70 - 99 mg/dL    BUN 32 (H) 8 - 23 mg/dL    CREATININE 0.65 0.50 - 0.90 mg/dL    Bun/Cre Ratio NOT REPORTED 9 - 20    Calcium 7.8 (L) 8.6 - 10.4 mg/dL    Sodium 143 135 - 144 mmol/L    Potassium 4.9 3.7 - 5.3 mmol/L    Chloride 106 98 - 107 mmol/L    CO2 30 20 - 31 mmol/L    Anion Gap 7 (L) 9 - 17 mmol/L    Alkaline Phosphatase 61 35 - 104 U/L    ALT 14 5 - 33 U/L    AST 13 <32 U/L    Total Bilirubin 0.29 (L) 0.3 - 1.2 mg/dL    Total Protein 6.1 (L) 6.4 - 8.3 g/dL    Alb 2.3 (L) 3.5 - 5.2 g/dL    Albumin/Globulin Ratio 0.6 (L) 1.0 - 2.5    GFR Non-African American >60 >60 mL/min    GFR African American >60 >60 mL/min    GFR Comment          GFR Staging NOT REPORTED    HEMOGLOBIN AND HEMATOCRIT, BLOOD   Result Value Ref Range    Hemoglobin 8.1 (L) 11.9 - 15.1 g/dL    Hematocrit 26.1 (L) 36.3 - 47.1 %   HEMOGLOBIN AND HEMATOCRIT, BLOOD   Result Value Ref Range    Hemoglobin 8.0 (L) 11.9 - 15.1 g/dL    Hematocrit 25.7 (L) 36.3 - 47.1 %   CBC Auto Differential   Result Value Ref Range    WBC 6.8 3.5 - 11.3 k/uL    RBC 2.72 (L) 3.95 - 5.11 m/uL    Hemoglobin 8.2 (L) 11.9 - 15.1 g/dL    Hematocrit 26.7 (L) 36.3 - 47.1 %    MCV 98.2 82.6 - 102.9 fL    MCH 30.1 25.2 - 33.5 pg    MCHC 30.7 28.4 - 34.8 g/dL    RDW 17.0 (H) 11.8 - 14.4 %    Platelets 718 009 - 541 k/uL    MPV 11.8 8.1 - 13.5 fL    NRBC Automated 0.0 0.0 per 100 WBC    Differential Type NOT REPORTED     WBC Morphology NOT REPORTED     RBC Morphology NOT REPORTED     Platelet Estimate NOT REPORTED     Immature Granulocytes 3 (H) 0 %    Seg Neutrophils 86 (H) 36 - 66 %    Lymphocytes 7 (L) 24 - 44 %    Monocytes 4 1 - 7 %    Eosinophils % 0 (L) 1 - 4 %    Basophils 0 0 - 2 %    Absolute Immature Granulocyte 0.20 0.00 - 0.30 k/uL    Segs Absolute 5. 85 1.8 - 7.7 k/uL    Absolute Lymph # 0.48 (L) 1.0 - 4.8 k/uL    Absolute Mono # 0.27 0.1 - 0.8 k/uL    Absolute Eos # 0.00 0.0 - 0.4 k/uL    Basophils Absolute 0.00 0.0 - 0.2 k/uL    Morphology ANISOCYTOSIS PRESENT     Morphology TOXIC GRANULATION PRESENT    POC Glucose Fingerstick   Result Value Ref Range    POC Glucose 108 (H) 65 - 105 mg/dL   Arterial Blood Gas, POC   Result Value Ref Range    POC pH 7.464 (H) 7.350 - 7.450    POC pCO2 37.1 35.0 - 48.0 mm Hg    POC PO2 87.4 83.0 - 108.0 mm Hg    POC HCO3 26.6 21.0 - 28.0 mmol/L    TCO2 (calc), Art 28 22.0 - 29.0 mmol/L    Negative Base Excess, Art NOT REPORTED 0.0 - 2.0    Positive Base Excess, Art 3 0.0 - 3.0    POC O2 SAT 97 94.0 - 98.0 %    O2 Device/Flow/% Adult Ventilator     Thomas Test NOT APPLICABLE     Sample Site Arterial Line     Mode PRVC     FIO2 100.0     Pt Temp 39.8     POC pH Temp 7.42     POC pCO2 Temp 42 mm Hg    POC pO2 Temp 105 mm Hg   POCT Glucose   Result Value Ref Range    POC Glucose 110 (H) 74 - 100 mg/dL   Arterial Blood Gas, POC   Result Value Ref Range    POC pH 7.433 7.350 - 7.450    POC pCO2 47.0 35.0 - 48.0 mm Hg    POC PO2 103.6 83.0 - 108.0 mm Hg    POC HCO3 31.4 (H) 21.0 - 28.0 mmol/L    TCO2 (calc), Art 33 (H) 22.0 - 29.0 mmol/L    Negative Base Excess, Art NOT REPORTED 0.0 - 2.0    Positive Base Excess, Art 6 (H) 0.0 - 3.0    POC O2 SAT 98 94.0 - 98.0 %    O2 Device/Flow/% Adult Ventilator     Thomas Test NOT APPLICABLE     Sample Site Arterial Line     Mode PRVC     FIO2 100.0     Pt Temp NOT REPORTED     POC pH Temp NOT REPORTED     POC pCO2 Temp NOT REPORTED mm Hg    POC pO2 Temp NOT REPORTED mm Hg   Lactic Acid, POC   Result Value Ref Range    POC Lactic Acid 0.91 0.56 - 1.39 mmol/L   POCT Glucose   Result Value Ref Range    POC Glucose 126 (H) 74 - 100 mg/dL   Arterial Blood Gas, POC   Result Value Ref Range    POC pH 7.448 7.350 - 7.450    POC pCO2 52.3 (H) 35.0 - 48.0 mm Hg    POC PO2 67.9 (L) 83.0 - 108.0 mm Hg    POC HCO3 36.2 (H) 21.0 - 28.0 mmol/L    TCO2 (calc), Art 38 (H) 22.0 - 29.0 mmol/L    Negative Base Excess, Art NOT REPORTED 0.0 - 2.0    Positive Base Excess, Art 11 (H) 0.0 - 3.0    POC O2 SAT 94 94.0 - 98.0 %    O2 Device/Flow/% Adult Ventilator     Thomas Test NOT APPLICABLE     Sample Site Arterial Line     Mode PRVC     FIO2 80.0     Pt Temp NOT REPORTED     POC pH Temp NOT REPORTED     POC pCO2 Temp NOT REPORTED mm Hg    POC pO2 Temp NOT REPORTED mm Hg   Lactic Acid, POC   Result Value Ref Range    POC Lactic Acid 0.91 0.56 - 1.39 mmol/L   POCT Glucose   Result Value Ref Range    POC Glucose 98 74 - 100 mg/dL   Arterial Blood Gas, POC   Result Value Ref Range    POC pH 7.429 7.350 - 7.450    POC pCO2 47.6 35.0 - 48.0 mm Hg    POC PO2 56.3 (L) 83.0 - 108.0 mm Hg    POC HCO3 31.5 (H) 21.0 - 28.0 mmol/L    TCO2 (calc), Art 33 (H) 22.0 - 29.0 mmol/L    Negative Base Excess, Art NOT REPORTED 0.0 - 2.0    Positive Base Excess, Art 6 (H) 0.0 - 3.0    POC O2 SAT 89 (L) 94.0 - 98.0 %    O2 Device/Flow/% NOT REPORTED     Thomas Test NOT APPLICABLE     Sample Site Arterial Line     Mode NOT REPORTED     FIO2 65.0     Pt Temp NOT REPORTED     POC pH Temp NOT REPORTED     POC pCO2 Temp NOT REPORTED mm Hg    POC pO2 Temp NOT REPORTED mm Hg   Arterial Blood Gas, POC   Result Value Ref Range    POC pH 7.412 7.350 - 7.450    POC pCO2 52.4 (H) 35.0 - 48.0 mm Hg    POC PO2 56.9 (L) 83.0 - 108.0 mm Hg    POC HCO3 33.3 (H) 21.0 - 28.0 mmol/L    TCO2 (calc), Art 35 (H) 22.0 - 29.0 mmol/L    Negative Base Excess, Art NOT REPORTED 0.0 - 2.0    Positive Base Excess, Art 8 (H) 0.0 - 3.0    POC O2 SAT 89 (L) 94.0 - 98.0 %    O2 Device/Flow/% NOT REPORTED     Thomas Test NOT APPLICABLE     Sample Site Arterial Line     Mode NOT REPORTED     FIO2 65.0     Pt Temp NOT REPORTED     POC pH Temp NOT REPORTED     POC pCO2 Temp NOT REPORTED mm Hg    POC pO2 Temp NOT REPORTED mm Hg   Arterial Blood Gas, POC   Result Value Ref Range    POC pH 7.387 7.350 - 7.450    POC pCO2 56.3 (H) 35.0 - 48.0 mm Hg    POC PO2 60.3 (L) 83.0 - 108.0 mm Hg    POC HCO3 33.9 (H) 21.0 - 28.0 mmol/L    TCO2 (calc), Art 36 (H) 22.0 - 29.0 mmol/L    Negative Base Excess, Art NOT REPORTED 0.0 - 2.0    Positive Base Excess, Art 8 (H) 0.0 - 3.0    POC O2 SAT 90 (L) 94.0 - 98.0 %    O2 Device/Flow/% Adult Ventilator     Thomas Test NOT APPLICABLE     Sample Site Arterial Line     Mode PRVC     FIO2 65.0     Pt Temp NOT REPORTED     POC pH Temp NOT REPORTED     POC pCO2 Temp NOT REPORTED mm Hg    POC pO2 Temp NOT REPORTED mm Hg   POCT Glucose   Result Value Ref Range    POC Glucose 127 (H) 74 - 100 mg/dL   EKG 12 Lead   Result Value Ref Range    Ventricular Rate 96 BPM    Atrial Rate 96 BPM    P-R Interval 134 ms    QRS Duration 88 ms    Q-T Interval 336 ms    QTc Calculation (Bazett) 424 ms    P Axis 13 degrees    R Axis 47 degrees    T Axis 28 degrees   EKG 12 Lead   Result Value Ref Range    Ventricular Rate 87 BPM    Atrial Rate 87 BPM    P-R Interval 112 ms    QRS Duration 84 ms    Q-T Interval 340 ms    QTc Calculation (Bazett) 409 ms    P Axis 42 degrees    R Axis 71 degrees    T Axis 75 degrees   TYPE AND SCREEN   Result Value Ref Range    Expiration Date 11/20/2020,2359     Arm Band Number [id number]     ABO/Rh A POSITIVE     Antibody Screen NOT TESTED     Antibody ID Anti-Heaven Present     SILVIA IgG NEGATIVE     Unit Number [id number]     Product Code Leukocyte Reduced Irradiated Red Cell     Unit Divison 00     Dispense Status REL FROM [facility]     Transfusion Status OK TO TRANSFUSE     Crossmatch Result COMPATIBLE     Unit Number [id number]     Product Code Leukocyte Reduced Red Cell     Unit Divison 00     Dispense Status REL FROM [facility]     Transfusion Status OK TO TRANSFUSE     Crossmatch Result COMPATIBLE     Unit Number [id number]     Product Code Leukocyte Reduced Red Cell     Unit Divison 00     Dispense Status REL FROM [facility]     Transfusion Status OK TO TRANSFUSE Crossmatch Result COMPATIBLE     Unit Number [id number]     Product Code Leukocyte Reduced Red Cell     Unit Divison 00     Dispense Status REL FROM [facility]     Transfusion Status OK TO TRANSFUSE     Crossmatch Result COMPATIBLE    BLOOD BANK SPECIMEN   Result Value Ref Range    Blood Bank Specimen NOT REPORTED    Prepare COVID-19 Convalescent Plasma, 1 Units   Result Value Ref Range    Unit Number [id number]     Product Code Fresh Plasma     Unit Divison 00     Dispense Status TRANSFUSED     Transfusion Status OK TO TRANSFUSE    TYPE AND SCREEN   Result Value Ref Range    Expiration Date 11/28/2020,2359     Arm Band Number [id number]     ABO/Rh A POSITIVE     Antibody Screen POSITIVE     Antibody ID Anti-Heaven Present     SILVIA IgG NEGATIVE     Unit Number [id number]     Product Code Leukocyte Reduced Red Cell     Unit Divison 00     Dispense Status TRANSFUSED     Transfusion Status OK TO TRANSFUSE     Crossmatch Result COMPATIBLE     Unit Number [id number]     Product Code Leukocyte Reduced Red Cell     Unit Divison 00     Dispense Status ALLOCATED     Transfusion Status OK TO TRANSFUSE     Crossmatch Result COMPATIBLE          IMAGING DATA:    Xr Chest (single View Frontal)    Result Date: 11/17/2020  EXAMINATION: NUCLEAR MEDICINE PERFUSION SCAN. PORTABLE CHEST RADIOGRAPH 11/17/2020 TECHNIQUE: 5 millicuries of Tc 22V MAA was administered intravenously prior to planar imaging of the lungs in multiple projections. HISTORY: ORDERING SYSTEM PROVIDED HISTORY: elevated D-Dimer outside hospital Reason for Exam: Covid-19 positive pt. and elevated D-Dimer 2.44 FINDINGS: PERFUSION: Distribution of radiotracer is slightly heterogeneous. No segmental defects identified. CHEST RADIOGRAPH:  Bilateral mid to lower lung field left greater than right opacities with peripheral involvement most pronounced at the bases. No effusion or pneumothorax. Normal heart size. Postsurgical changes of median sternotomy.   Right-sided Port-A-Cath is in place     *Low Probability for Pulmonary Embolus. *Bilateral mid to lower lung field left greater than right opacities with peripheral involvement most pronounced at the bases compatible with  COVID-19 pneumonia. The findings were sent to the Radiology Results [id number] at 2:46 pm on 11/17/2020to be communicated to a licensed caregiver. Nm Lung Scan Perfusion Only    Result Date: 11/17/2020  EXAMINATION: NUCLEAR MEDICINE PERFUSION SCAN. PORTABLE CHEST RADIOGRAPH 11/17/2020 TECHNIQUE: 5 millicuries of Tc 95I MAA was administered intravenously prior to planar imaging of the lungs in multiple projections. HISTORY: ORDERING SYSTEM PROVIDED HISTORY: elevated D-Dimer outside hospital Reason for Exam: Covid-19 positive pt. and elevated D-Dimer 2.44 FINDINGS: PERFUSION: Distribution of radiotracer is slightly heterogeneous. No segmental defects identified. CHEST RADIOGRAPH:  Bilateral mid to lower lung field left greater than right opacities with peripheral involvement most pronounced at the bases. No effusion or pneumothorax. Normal heart size. Postsurgical changes of median sternotomy. Right-sided Port-A-Cath is in place     *Low Probability for Pulmonary Embolus. *Bilateral mid to lower lung field left greater than right opacities with peripheral involvement most pronounced at the bases compatible with  COVID-19 pneumonia. The findings were sent to the Radiology Results [id number] at 2:46 pm on 11/17/2020to be communicated to a licensed caregiver.          IMPRESSION:   Primary Problem  Pneumonia due to COVID-19 virus    Active Hospital Problems    Diagnosis Date Noted    Other pancytopenia ([address] [D61.818] 11/24/2020    Hypokalemia [E87.6] 11/22/2020    MDS (myelodysplastic syndrome) ([address] [D46.9] 11/17/2020    Pneumonia due to COVID-19 virus [U07.1, J12.89] 11/17/2020    Neutropenic sepsis ([address] [A41.9, D70.9] 11/17/2020    Bicytopenia [D75.89] 11/17/2020    ROSALINO (acute kidney injury) ([facility] [N17.9] 11/17/2020    Acute respiratory failure with hypoxia ([facility] [J96.01] 11/17/2020    Essential hypertension [I10] 11/17/2020    Coronary artery disease involving coronary bypass graft of native heart without angina pectoris [I25.810] 11/17/2020    History of Raynaud's syndrome [Z86.79] 11/17/2020    Transaminitis [R74.01] 11/17/2020    Severe sepsis ([facility] [A41.9, R65.20] 11/17/2020    Immunosuppressed status ([facility] [D84.9]     Acute respiratory failure due to COVID-19 ([facility] [U07.1, J96.00] 11/16/2020       MDS  Anemia, transfusion dependent  COVID-19 infection    RECOMMENDATIONS:  Condition remains poor but stable  Counts remain stable   Continue to follow           This note is created with the assistance of a speech recognition program.  While intending to generate a document that actually reflects the content of the visit, the document can still have some errors including those of syntax and sound a like substitutions which may escape proof reading. It such instances, actual meaning can be extrapolated by contextual diversion.

## 2020-11-27 NOTE — PLAN OF CARE
injury  Outcome: Ongoing     Problem: Pain:  Goal: Pain level will decrease  Description: Pain level will decrease  Outcome: Ongoing  Goal: Control of acute pain  Description: Control of acute pain  Outcome: Ongoing  Goal: Control of chronic pain  Description: Control of chronic pain  Outcome: Ongoing     Problem: Airway Clearance - Ineffective  Goal: Achieve or maintain patent airway  Outcome: Ongoing     Problem: Gas Exchange - Impaired  Goal: Absence of hypoxia  Outcome: Ongoing  Goal: Promote optimal lung function  Outcome: Ongoing     Problem: Breathing Pattern - Ineffective  Goal: Ability to achieve and maintain a regular respiratory rate  Outcome: Ongoing     Problem:  Body Temperature -  Risk of, Imbalanced  Goal: Ability to maintain a body temperature within defined limits  Outcome: Ongoing  Goal: Will regain or maintain usual level of consciousness  Outcome: Ongoing  Goal: Complications related to the disease process, condition or treatment will be avoided or minimized  Outcome: Ongoing     Problem: Isolation Precautions - Risk of Spread of Infection  Goal: Prevent transmission of infection  Outcome: Ongoing     Problem: Nutrition Deficits  Goal: Optimize nutrtional status  Outcome: Ongoing     Problem: Risk for Fluid Volume Deficit  Goal: Maintain normal heart rhythm  Outcome: Ongoing  Goal: Maintain absence of muscle cramping  Outcome: Ongoing  Goal: Maintain normal serum potassium, sodium, calcium, phosphorus, and pH  Outcome: Ongoing     Problem: Loneliness or Risk for Loneliness  Goal: Demonstrate positive use of time alone when socialization is not possible  Outcome: Ongoing     Problem: Fatigue  Goal: Verbalize increase energy and improved vitality  Outcome: Ongoing     Problem: Patient Education: Go to Patient Education Activity  Goal: Patient/Family Education  Outcome: Ongoing     Problem: OXYGENATION/RESPIRATORY FUNCTION  Goal: Patient will maintain patent airway  11/27/2020 0750 by Carmelita Burns Dolores Rai RN  Outcome: Ongoing  11/26/2020 2154 by Drema Epley, RCP  Outcome: Ongoing  Goal: Patient will achieve/maintain normal respiratory rate/effort  Description: Respiratory rate and effort will be within normal limits for the patient  11/27/2020 0750 by Sasha Loyola RN  Outcome: Ongoing  11/26/2020 2154 by Drema Epley, RCP  Outcome: Ongoing     Problem: Nutrition  Goal: Optimal nutrition therapy  Description: Nutrition Problem #1: Inadequate oral intake  Intervention: Food and/or Nutrient Delivery: Continue NPO(Start diet vs nutrition support as able)  Nutritional Goals: Start diet vs nutrition support within 24-72 hrs     Outcome: Ongoing     Problem: Restraint Use - Nonviolent/Non-Self-Destructive Behavior:  Goal: Absence of restraint indications  Description: Absence of restraint indications  Outcome: Ongoing  Goal: Absence of restraint-related injury  Description: Absence of restraint-related injury  Outcome: Ongoing     Problem: MECHANICAL VENTILATION  Goal: Patient will maintain patent airway  11/27/2020 0750 by Sasha Loyola RN  Outcome: Ongoing  11/26/2020 2154 by Drema Epley, RCP  Outcome: Ongoing  Goal: Oral health is maintained or improved  11/27/2020 0750 by Sasha Loyola RN  Outcome: Ongoing  11/26/2020 2154 by Drema Epley, RCP  Outcome: Ongoing  Goal: ET tube will be managed safely  11/27/2020 0750 by Sasha Loyola RN  Outcome: Ongoing  11/26/2020 2154 by Drema Epley, RCP  Outcome: Ongoing  Goal: Ability to express needs and understand communication  11/27/2020 0750 by Sasha Loyola RN  Outcome: Ongoing  11/26/2020 2154 by Drema Epley, RCP  Outcome: Ongoing  Goal: Mobility/activity is maintained at optimum level for patient  11/27/2020 0750 by Sasha Loyola RN  Outcome: Ongoing  11/26/2020 2154 by Drema Epley, RCP  Outcome: Ongoing     Problem: SKIN INTEGRITY  Goal: Skin integrity is maintained or improved  11/27/2020 0750 by Sasha Loyola RN  Outcome: Ongoing  11/26/2020 2154 by Severo Phillips RCP  Outcome: Ongoing

## 2020-11-27 NOTE — PROGRESS NOTES
[facility]  Office: [address], DO, Lor Allan, DO, Allyn Baron DO, Leah Elaine, DO, Dequan Thomas MD, Eb Salas MD, Jose Christopher MD, Bautista Pelayo MD, Yonny Haider MD, Jah Amador MD, Imelda Clay MD, Sommer Mack MD, Ren Valdivia MD, Belen Vizcarra DO, Bruce Mccormick MD, Parveen Booth MD, Vince Wallace DO, Kezia Gruber MD,  Meghan Head DO, Rahul Crowley MD, Tom Castillo MD, Bernard Dai, [facility], [facility], CNP, Jason Joyner, CNP, Rebeca Rodriguez, CNS, Willam Steve, CNP, Roula Arias, CNP, Navya Berry, CNP, Jinny Thompson, [facility], Homero Crowell, CNP, Radha Gonsalez PA-C, Aquiles Khan, [facility], More Forrester, CNP, Lesly Alcaraz, CNP, Yakov Ruiz, CNP, Lucie Estes, CNP, Tawanna Noonan, [facility]   [address] [facility]    Progress Note    11/27/2020    4:14 PM    Name:   Keon Umanzor  MRN:     [id number]     Acct:      [de-identified]   Room:   [address] [place] [address] Day:  11  Admit Date:  11/16/2020 10:51 PM    PCP:   Genet Rivas MD  Code Status:  Full Code    Subjective:     C/C: No chief complaint on file. SOB  Interval History Status: not changed. Seen and examined face-to-face today,  After deteriorating, intubated on November 23 and sedated,  Discussed with pulmonary critical care and infectious disease,  On dexamethasone,  Completed remdesivir,  Given convalescent plasma,  Antibiotics discontinued after negative cultures,  Neupogen for neutropenia,  Myelodysplastic syndrome, oncology on board,  Acute anemia, status post PRBC transfusion, hemoglobin over 8 today  Overall prognosis poor        Brief History:     Keon Uamnzor is a 70 y.o. female with a past medical history for MDS, essential hypertension, melanoma, mitral valve prolapse, MRSA, Raynaud's disease presents emergency department for shortness of breath and dyspnea upon exertion for 3 days.   Patient was initially seen at [facility] diagnosed with Covid pneumonia. Patient requiring 50% FiO2 on BiPAP, patient also had an elevated D-dimer but CTA was not possible secondary to contrast allergy. [facility] unable to accommodate due to lack of Covid beds, patient was transferred for further care. Spoke with pulmonary medicine at [place] and was accepted for ICU transfer. Patient was given 1 unit of PRBCs at [facility] for a hemoglobin less than 7, patient was also placed on BiPAP for transfer. Patient arrived, no complaints except for some shortness of breath. Needing high flow oxygen, still critical    Intubated and sedated, endotracheal intubation done on November 23, 2020 due to acute respiratory failure not holding oxygenation even on FiO2 100% BiPAP    Review of Systems:     Sedated and intubated  Medications: Allergies: Allergies   Allergen Reactions    Fish Allergy Anaphylaxis, Hives and Swelling    Hydrocodone-Acetaminophen Anaphylaxis    Tizanidine Anaphylaxis and Hives    Atorvastatin Hives, Other (See Comments) and Swelling    Metoclopramide Hives     Other reaction(s): Unknown, Unknown    Moxifloxacin Hives, Other (See Comments) and Swelling     Other reaction(s): Other (See Comments), Unknown    Oxycodone Other (See Comments)     Other reaction(s): Hallucinations, Mental Status Change    Pregabalin Other (See Comments)     Other reaction(s): Hallucinations, Other (See Comments)  Causes sores in the mouth  Causes sores in the mouth      Tramadol      Other reaction(s): Edema, Other (See Comments)    Zolpidem Other (See Comments)     Other reaction(s): Other (See Comments)  causes cramps in hands and legs  causes cramps in hands and legs  Other reaction(s):  Other (See Comments)  causes cramps in hands and legs  causes cramps in hands and legs      Cephalexin Rash     Other reaction(s): Unknown, Unknown    Iodides Rash and Swelling     ivp and heart cath dye    Sulfa Antibiotics Rash       Current Meds:   Scheduled Meds:    pantoprazole  40 mg Intravenous BID    And    sodium chloride (PF)  10 mL Intravenous Daily    furosemide  20 mg Intravenous Daily    potassium chloride  10 mEq Intravenous Daily    LORazepam  0.25 mg Intravenous Once    [Held by provider] enoxaparin  40 mg Subcutaneous BID    latanoprost  1 drop Both Eyes Nightly    sodium chloride  20 mL Intravenous Once    sodium chloride  20 mL Intravenous Once     Continuous Infusions:    fentaNYL 100 mcg/hr (20)    midazolam 6 mg/hr (20 1559)     PRN Meds: LORazepam, sodium chloride, sodium chloride flush, potassium chloride **OR** potassium alternative oral replacement **OR** potassium chloride, magnesium sulfate, acetaminophen **OR** acetaminophen, polyethylene glycol, promethazine **OR** ondansetron, nicotine    Data:     Past Medical History:   has a past medical history of Cancer ([facility] Deaf, left, Essential hypertension, GERD (gastroesophageal reflux disease), Glaucoma, Hyperlipidemia, [facility] MRSA (methicillin resistant staph aureus) culture positive, MVP (mitral valve prolapse), Pharyngoesophageal dysphagia, Raynaud disease, and Status post four vessel coronary artery bypass. Social History:   reports that she quit smoking about 15 years ago. Her smoking use included cigarettes. She started smoking about 63 years ago. She does not have any smokeless tobacco history on file. She reports that she does not drink alcohol or use drugs.      Family History:   Family History   Problem Relation Age of Onset    Heart Failure Mother     Early Death Mother     Heart Disease Mother     Stroke Father        Vitals:  BP (!) 101/49   Pulse 91   Temp 99.3 °F (37.4 °C) (Core)   Resp 15   Ht 5' 7\" (1.702 m)   Wt 167 lb 5.3 oz (75.9 kg)   SpO2 94%   BMI 26.21 kg/m²   Temp (24hrs), Av.9 °F (37.2 °C), Min:98.2 °F (36.8 °C), Max:99.3 °F (37.4 °C)    Recent Labs     20  2393 11/27/20 0324   POCGLU 98 127*       I/O (24Hr): Intake/Output Summary (Last 24 hours) at 11/27/2020 1614  Last data filed at 11/27/2020 1300  Gross per 24 hour   Intake 998 ml   Output 1490 ml   Net -492 ml       Labs:  Hematology:  Recent Labs     11/25/20 0319 11/26/20 0309 11/26/20  0840 11/26/20  1844 11/27/20  0341   WBC 4.2  --  5.3  --   --  6.8   RBC 2.05*  --  2.64*  --   --  2.72*   HGB 6.0*   < > 8.0* 8.1* 8.0* 8.2*   HCT 19.8*   < > 25.2* 26.1* 25.7* 26.7*   MCV 96.6  --  95.5  --   --  98.2   MCH 29.3  --  30.3  --   --  30.1   MCHC 30.3  --  31.7  --   --  30.7   RDW 18.4*  --  17.4*  --   --  17.0*     --  149  --   --  148   MPV 11.2  --  11.3  --   --  11.8    < > = values in this interval not displayed. Chemistry:  Recent Labs     11/25/20 0319 11/26/20 0309    143   K 4.4 4.9    106   CO2 31 30   GLUCOSE 96 91   BUN 26* 32*   CREATININE 0.61 0.65   ANIONGAP 6* 7*   LABGLOM >60 >60   GFRAA >60 >60   CALCIUM 8.3* 7.8*     Recent Labs     11/25/20 0315 11/25/20 0319 11/26/20 0309 11/27/20 0324   PROT  --  5.7* 6.1*  --    LABALBU  --  2.2* 2.3*  --    AST  --  16 13  --    ALT  --  18 14  --    ALKPHOS  --  62 61  --    BILITOT  --  0.30 0.29*  --    POCGLU 98  --   --  127*     ABG:  Lab Results   Component Value Date    POCPH 7.387 11/27/2020    POCPCO2 56.3 11/27/2020    POCPO2 60.3 11/27/2020    POCHCO3 33.9 11/27/2020    NBEA NOT REPORTED 11/27/2020    PBEA 8 11/27/2020    UTM2OBI 36 11/27/2020    VLOM2KVH 90 11/27/2020    FIO2 65.0 11/27/2020     Lab Results   Component Value Date/Time    SPECIAL RT HAND 2ML 11/17/2020 02:48 AM     Lab Results   Component Value Date/Time    CULTURE NO GROWTH 6 DAYS 11/17/2020 02:48 AM       Radiology:  [facility] Chest (single View Frontal)    Result Date: 11/17/2020  *Low Probability for Pulmonary Embolus.  *Bilateral mid to lower lung field left greater than right opacities with peripheral involvement most pronounced at the bases compatible with  COVID-19 pneumonia. The findings were sent to the Radiology Results [address] at 2:46 pm on 11/17/2020to be communicated to a licensed caregiver. Nm Lung Scan Perfusion Only    Result Date: 11/17/2020  *Low Probability for Pulmonary Embolus. *Bilateral mid to lower lung field left greater than right opacities with peripheral involvement most pronounced at the bases compatible with  COVID-19 pneumonia. The findings were sent to the Radiology Results [address] at 2:46 pm on 11/17/2020to be communicated to a licensed caregiver. Physical Examination:        General appearance: Sedated and intubated  Lungs: Coarse breath sounds bilateral  Heart:  regular rate and rhythm, no murmur  Abdomen:  soft, nontender, nondistended, normal bowel sounds, no masses, hepatomegaly, splenomegaly  Extremities:  no edema, redness, tenderness in the calves  Skin:  no gross lesions, rashes, induration    Assessment:        Hospital Problems           Last Modified POA    * (Principal) Pneumonia due to COVID-19 virus 11/17/2020 Yes    Acute respiratory failure due to COVID-19 ([address] 11/16/2020 Yes    MDS (myelodysplastic syndrome) ([address] 11/17/2020 Yes    Neutropenic sepsis ([address] 11/17/2020 Yes    Bicytopenia 11/17/2020 Yes    ROSALINO (acute kidney injury) ([address] 11/17/2020 Yes    Acute respiratory failure with hypoxia ([address] 11/17/2020 Yes    Essential hypertension 11/17/2020 Yes    Coronary artery disease involving coronary bypass graft of native heart without angina pectoris 11/17/2020 Yes    History of Raynaud's syndrome 11/17/2020 Yes    Transaminitis 11/17/2020 Yes    Immunosuppressed status ([address] 11/17/2020 Yes    Severe sepsis ([address] 11/17/2020 Yes    Hypokalemia 11/22/2020 Yes    Other pancytopenia ([address] 11/24/2020 Yes          Plan:        1. Covid pneumonia, acute respiratory failure, sedated, placed on mechanical ventilation due to deterioration of respiratory failure. on remdesivir, convalescent plasma, Decadron, infectious disease and pulmonary critical care on board,   2. Myelodysplastic syndrome and pancytopenia, oncology on board and discussed in detail, advised to keep the patient on DVT prophylaxis with Lovenox twice daily,  3. Severe anemia, post transfusion 1 unit, hemoglobin 8, hold lovenox, Hb improved    4. Hypokalemia, replaced ,    5. VQ scan low probability,  6. Keep negative fluid balance, but patient was hypotensive, Lasix on hold,  7. Neutropenic sepsis, antibiotics as per infectious disease, blood cultures negative, antibiotics stopped by infectious disease at this time,  8. Neutropenia, Neupogen as per oncology  9. Acute kidney injury, creatinine improving,  10. Transaminitis, monitor liver functions,  11. Hypertension, monitor blood pressure,  12. DVT scds only, Lovenox on hold   13. GI prophylaxis,  14.  Full CODE STATUS,  15. Critical care time spent 32 minutes    Nyoka Hodgkins, MD  11/27/2020  4:14 PM

## 2020-11-27 NOTE — PROGRESS NOTES
[facility]  Progress Note    Khadra Amor  MRN: [id number]  Date: 11/27/2020  LOS: 6     Reason for F/U :   COVID-19 virus pneumonia    Impression :   1. Acute respiratory failure with hypoxia secondary to COVID-19 virus pneumonia  2. Myelodysplastic syndrome-neutropenic  3. Immunocompromised host  4. Anemia secondary to MDS status post PRBC transfusion  5. Shock secondary to sepsis-resolved    Recommendations:   · The patient completed a course of remdesivir on 11/20/2020  · The patient received 1 unit of plasma 11/17/2020  · The patient completed a course of dexamethasone. · The patient did receive a course of meropenem and vancomycin and is currently off systemic antimicrobial therapy. · Continue supportive therapy on ARDS protocol   · FiO2 requirements continue to slowly improve    Infection Control Recommendations:   Droplet plus precautions    Discharge Planning:   Patient will need Midline Catheter Insertion/ PICC line Insertion: No  Patient will need: Home IV , [place],  SNF,  LTAC: Undetermined  Patient willneed outpatient wound care: No    Medical Decision making / Summary of Stay:   Khadra Amor is a 70y.o.-year-old female w resp distress transferred from [facility] pneumonia and underlying MDS.   Neutropenia  On bipap and 50% FIO2, elevated D-Dimers and went for a VQ scan  Past cig smoking - HTN - Raynaud-     Started on decadron remdesevir  started on meropenem and vanco pend BC due to concern for ongoing sepsis  Intubated 11/23 due to respiratory failure and hypotension  Remains sedated on the ventilator, FiO2 90% on 10 of PEEP, slightly better than yesterday     ferritin has been improving     Has had Neupogen to improve her white count  tolerated 1 unit of plasma well 11/17     No positive cultures-  Mycoplasma IGM neg    Current evaluation:11/27/2020    BP (!) 101/49   Pulse 96   Temp 99.3 °F (37.4 °C) (Core)   Resp 19   Ht 5' 7\" (1.702 m)   Wt 167 Date    DDIMER 1.93 11/21/2020    DDIMER 2.44 11/17/2020     Lab Results   Component Value Date    FERRITIN 4,245 11/25/2020    FERRITIN 2,838 11/21/2020    FERRITIN 3,590 11/20/2020    FERRITIN 3,453 11/18/2020    FERRITIN 3,024 11/17/2020     Lab Results   Component Value Date     11/18/2020     11/17/2020     11/17/2020     Lab Results   Component Value Date    FIBRINOGEN 506 11/21/2020    FIBRINOGEN 673 11/20/2020    FIBRINOGEN 1002 11/18/2020    FIBRINOGEN 720 11/17/2020    FIBRINOGEN 721 11/17/2020       Results in Past 30 Days  Result Component Current Result Ref Range Previous Result Ref Range   SARS-CoV-2, PCR DETECTED (A) (11/17/2020) Not Detected Positive (A) (11/16/2020)      Lab Results   Component Value Date    COVID19 DETECTED 11/17/2020    COVID19 Positive 11/16/2020       No results for input(s): [facility] in the last 72 hours. Imaging Studies:   No new imaging    Cultures:     Culture, Blood 1 [[id number]]   Collected: 11/17/20 0241    Order Status: Completed  Specimen: Blood  Updated: 11/23/20 0013     Specimen Description  . BLOOD     Special Requests  LT HAND 20ML     Culture  NO GROWTH 6 DAYS    Culture, Blood 2 [[id number]]   Collected: 11/17/20 0248    Order Status: Completed  Specimen: Blood  Updated: 11/23/20 0013     Specimen Description  . BLOOD     Special Requests  RT HAND 2ML     Culture  NO GROWTH 6 DAYS    LEGIONELLA ANTIGEN, URINE [[id number]]   Collected: 11/18/20 2114    Order Status: Completed  Specimen: Urine, clean catch  Updated: 11/19/20 3799     Legionella Pneumophilia Ag, Urine  NEGATIVE     Comment:  L. pneumophila serogroup 1 antigen not detected. A negative result does not exclude infection with Leginella pnemophila serogroup 1 nor does   it rule out other microbial-caused respiratory infections of disease caused by other   serogroups of Legionella pneumophila.        Respiratory Panel, Molecular, with COVID-19 [id number]  (Abnormal) Collected: 11/17/20 1836    Order Status: Completed  Specimen: Nasopharyngeal Swab  Updated: 11/17/20 2117     Specimen Description  . NASOPHARYNGEAL SWAB     Adenovirus PCR  Not Detected     Coronavirus 229E PCR  Not Detected     Coronavirus HKU1 PCR  Not Detected     Coronavirus NL63 PCR  Not Detected     Coronavirus OC43 PCR  Not Detected     SARS-CoV-2, PCR  DETECTEDAbnormal       Comment: This test has been authorized by the FDA under an Emergency Use Authorization (EUA) for use   by authorized laboratories. This test is only authorized for the duration of the time of declaration that circumstances   exist justifying the authorization of the emergency use of in vitro diagnostic tests for   detection of the SARS-CoV-2 virusand/or diagnosis of COVID-19 infection under section 564   (b)(1) of the Act,21 U.S.C.bbb-1(b)(1), unless the authorization is terminated or revoked   sooner.         Patient Fact Sheet:   Wolfgang         Provider Fact Sheet:   Wolfgang         METHODOLGY: Multiplex PCR   Results reported to the appropriate Health Department        Human Metapneumovirus PCR  Not Detected     Rhino/Enterovirus PCR  Not Detected     Influenza A by PCR  Not Detected     Influenza A H1 PCR  NOT REPORTED     Influenza A H1 (2009) PCR  NOT REPORTED     Influenza A H3 PCR  NOT REPORTED     Influenza B by PCR  Not Detected     Parainfluenza 1 PCR  Not Detected     Parainfluenza 2 PCR  Not Detected     Parainfluenza 3 PCR  Not Detected     Parainfluenza 4 PCR  Not Detected     Resp Syncytial Virus PCR  Not Detected     Bordetella Parapertussis  Not Detected     B Pertussis by PCR  Not Detected     Chlamydia pneumoniae By PCR  Not Detected     Mycoplasma pneumo by PCR  Not Detected     Comment:  Performed by multiplexed nucleic acid assay.              Medications:      pantoprazole  40 mg Intravenous BID    And    sodium chloride (PF)  10 mL Intravenous Daily    furosemide  20 mg Intravenous Daily    potassium chloride  10 mEq Intravenous Daily    LORazepam  0.25 mg Intravenous Once    [Held by provider] enoxaparin  40 mg Subcutaneous BID    latanoprost  1 drop Both Eyes Nightly    sodium chloride  20 mL Intravenous Once    sodium chloride  20 mL Intravenous Once           [facility]  [address] messaging  OFFICE: [phone]      Electronically signed by Tye Hall MD on 11/27/2020 at 11:43 AM  Thank you for allowing us to participate in the care of this patient. Please call with questions. This note iscreated with the assistance of a speech recognition program.  While intending to generate a document that actually reflects the content of the visit, the document can still have some errors including those of syntax andsound a like substitutions which may escape proof reading. In such instances, actual meaning can be extrapolated by contextual diversion.

## 2020-11-28 NOTE — PROGRESS NOTES
11/20/2020    FIBRINOGEN 1002 11/18/2020    FIBRINOGEN 720 11/17/2020    FIBRINOGEN 721 11/17/2020       Results in Past 30 Days  Result Component Current Result Ref Range Previous Result Ref Range   SARS-CoV-2, PCR DETECTED (A) (11/17/2020) Not Detected Positive (A) (11/16/2020)      Lab Results   Component Value Date    COVID19 DETECTED 11/17/2020    COVID19 Positive 11/16/2020       No results for input(s): [facility] in the last 72 hours. Imaging Studies:   No new imaging    Cultures:   No new culture data    Medications:      pantoprazole  40 mg Intravenous BID    And    sodium chloride (PF)  10 mL Intravenous Daily    potassium chloride  10 mEq Intravenous Daily    LORazepam  0.25 mg Intravenous Once    [Held by provider] enoxaparin  40 mg Subcutaneous BID    latanoprost  1 drop Both Eyes Nightly    sodium chloride  20 mL Intravenous Once    sodium chloride  20 mL Intravenous Once           [facility]  [id number] messaging  OFFICE: [phone]      Electronically signed by MICHAEL Arceo CNP on 11/28/2020 at 11:59 AM  Thank you for allowing us to participate in the care of this patient. Please call with questions. This note iscreated with the assistance of a speech recognition program.  While intending to generate a document that actually reflects the content of the visit, the document can still have some errors including those of syntax andsound a like substitutions which may escape proof reading. In such instances, actual meaning can be extrapolated by contextual diversion.

## 2020-11-28 NOTE — PROGRESS NOTES
PULMONARY & CRITICAL CARE MEDICINE PROGRESS NOTE     Patient:  Rudi Gifford  MRN: [id number]  Admit date: 11/16/2020  Primary Care Physician: Hawa Nevarez MD  Consulting Physician: Lashell Velez MD  CODE Status: Full Code  LOS: 11     SUBJECTIVE     CHIEF COMPLAINT/REASON FOR INITIAL CONSULT: Acute respiratory failure/COVID-19 pneumonia    BRIEF HOSPITAL COURSE:   The patient is a 70 y.o. female past medical history for MDS, essential hypertension, melanoma, mitral valve prolapse, MRSA, Raynaud's disease presents emergency department for shortness of breath and dyspnea upon exertion for 3 days. Patient was initially seen at [facility] diagnosed with Covid pneumonia. Patient requiring 50% FiO2 on BiPAP, patient also had an elevated D-dimer but CTA was not possible secondary to contrast allergy. [facility] unable to accommodate due to lack of [facility] beds, patient was transferred for further care. Spoke with pulmonary medicine at [place] and was accepted for ICU transfer. Patient was given 1 unit of PRBCs at [facility] for a hemoglobin less than 7, patient was also placed on BiPAP for transfer.       INTERVAL HISTORY:  11/27/20   Patient was intubated and started on mechanical ventilation on 11/23  She is currently sedated with fentanyl and Versed infusions  She is on FiO2 of 65%, PEEP is at 10  Her white count is 6.8, T-max is 99.3  She completed on remdesivir on 11/21, currently being monitored off antimicrobials  She remains on IV Decadron (day 10)    REVIEW OF SYSTEMS:  Unobtainable from patient due to sedation/mechanical ventilation    OBJECTIVE     VENTILATOR SETTINGS:  Vent Information  $Ventilation: $Subsequent Day  Skin Assessment: Clean, dry, & intact  Suction Catheter Diameter: 14  Equipment ID: [id number]  Equipment Changed: HME  Vent Type: Servo i  Vent Mode: PRVC  Vt Ordered: 300 mL  Rate Set: 22 bmp  FiO2 : 65 %  SpO2: 99 %  SpO2/FiO2 ratio: 146.15  Sensitivity: 5  PEEP/CPAP: 10  I Time/ I Time %: 0.8 s  Humidification Source: HME  Nitric Oxide/Epoprostenol In Use?: No  Mask Type: Full face mask  Mask Size: Small     PaO2/FiO2 RATIO:  Recent Labs     20  0324   POCPO2 60.3*      FiO2 : 65 %     VITAL SIGNS:   LAST:  BP (!) 98/56   Pulse 84   Temp 98.8 °F (37.1 °C) (Core)   Resp 27   Ht 5' 7\" (1.702 m)   Wt 167 lb 5.3 oz (75.9 kg)   SpO2 99%   BMI 26.21 kg/m²   8-24 HR RANGE:  TEMP Temp  Av.9 °F (37.2 °C)  Min: 98.2 °F (36.8 °C)  Max: 99.3 °F (55.6 °C)   BP Systolic (94IPO), XQY:412 , Min:93 , XPT:499      Diastolic (26VKM), CIT:89, Min:47, Max:60     PULSE Pulse  Av.1  Min: 80  Max: 98   RR Resp  Av.9  Min: 15  Max: 27   O2 SAT SpO2  Av.1 %  Min: 94 %  Max: 99 %   OXYGEN DELIVERY No data recorded        SYSTEMIC EXAMINATION:   General appearance - Mechanically ventilated, ill-appearing  Mental status - sedated  Eyes - pupils equal and reactive, sclera anicteric  Mouth - mucous membranes moist, pharynx normal without lesions  Neck - supple, no significant adenopathy, carotids upstroke normal bilaterally, no bruits  Chest - Breath sounds bilaterally were dimnished to auscultation at bases. There were no wheezes, rhonchi or rales.   There is no intercostal recession or use of accessory muscles  Heart - normal rate, regular rhythm, normal S1, S2, no murmurs, rubs, clicks or gallops  Abdomen - soft, nontender, nondistended, no masses or organomegaly  Neurological - DTR's normal and symmetric, motor and sensory grossly normal bilaterally  Extremities - peripheral pulses normal, no pedal edema, no clubbing or cyanosis  Skin - normal coloration and turgor, no rashes, no suspicious skin lesions noted     DATA REVIEW     Medications:  Scheduled Meds:   pantoprazole  40 mg Intravenous BID    And    sodium chloride (PF)  10 mL Intravenous Daily    furosemide  20 mg Intravenous Daily    potassium chloride  10 mEq Intravenous Daily    LORazepam  0.25 mg Intravenous Once    [Held by provider] enoxaparin  40 mg Subcutaneous BID    latanoprost  1 drop Both Eyes Nightly    sodium chloride  20 mL Intravenous Once    sodium chloride  20 mL Intravenous Once     Continuous Infusions:   fentaNYL 100 mcg/hr (11/27/20 0824)    midazolam 6 mg/hr (11/27/20 0559)       INPUT/OUTPUT:  In: 998 [I.V.:442; NG/GT:556]  Out: 1225 [Urine:1200]  Date 11/27/20 0000 - 11/27/20 2359   Shift 5652-9766 1571-3328 7334-9338 24 Hour Total   INTAKE   I.V.(mL/kg)  442(5.8)  442(5.8)   NG/GT(mL/kg)  556(7.3)  556(7.3)   Shift Total(mL/kg)  476(45.9)  998(13.1)   OUTPUT   Urine(mL/kg/hr)  1100(1.8) 100 1200   Emesis/NG output(mL/kg)  25(0.3)  25(0.3)   Shift Total(mL/kg)  1125(14.8) 100(1.3) 1225(16.1)   Weight (kg) 75.9 75.9 75.9 75.9        LABS:  ABGs:   Recent Labs     11/25/20 0315 11/25/20  2337 11/26/20  0306 11/27/20  0324   POCPH 7.448 7.429 7.412 7.387   POCPCO2 52.3* 47.6 52.4* 56.3*   POCPO2 67.9* 56.3* 56.9* 60.3*   POCHCO3 36.2* 31.5* 33.3* 33.9*   HDRO3OQO 94 89* 89* 90*     CBC:   Recent Labs     11/25/20 0319 11/25/20  1756 11/26/20  0309 11/26/20  0840 11/26/20  1844 11/27/20  0341   WBC 4.2  --  5.3  --   --  6.8   HGB 6.0* 7.7* 8.0* 8.1* 8.0* 8.2*   HCT 19.8* 24.0* 25.2* 26.1* 25.7* 26.7*   MCV 96.6  --  95.5  --   --  98.2     --  149  --   --  148   LYMPHOPCT 16*  --  11*  --   --  7*   RBC 2.05*  --  2.64*  --   --  2.72*   MCH 29.3  --  30.3  --   --  30.1   MCHC 30.3  --  31.7  --   --  30.7   RDW 18.4*  --  17.4*  --   --  17.0*     CRP:   No results for input(s): CRP in the last 72 hours. LDH:   No results for input(s): LDH in the last 72 hours.   BMP:   Recent Labs     11/25/20 0319 11/26/20 0309    143   K 4.4 4.9    106   CO2 31 30   BUN 26* 32*   CREATININE 0.61 0.65   GLUCOSE 96 91     Liver Function Test:   Recent Labs     11/25/20 0319 11/26/20 0309   PROT 5.7* 6.1*   LABALBU 2.2* 2.3*   ALT 18 14   AST 16 13   ALKPHOS 62 61 BILITOT 0.30 0.29*     Coagulation Profile:   No results for input(s): INR, PROTIME, APTT in the last 72 hours. D-Dimer:  No results for input(s): DDIMER in the last 72 hours. Ferritin:    Recent Labs     11/25/20  0319   FERRITIN 4,245*     Lactic Acid:  No results for input(s): LACTA in the last 72 hours. Cardiac Enzymes:  No results for input(s): CKTOTAL, CKMB, CKMBINDEX, TROPONINI in the last 72 hours. Invalid input(s): TROPONIN, HSTROP  BNP/ProBNP:   No results for input(s): BNP, PROBNP in the last 72 hours. Triglycerides:  No results for input(s): TRIG in the last 72 hours. Microbiology:  No results for input(s): SPECDESC, SPECDESC, SPECIAL, CULTURE, CULTURE, STATUS, ORG, CDIFFTOXPCR, CAMPYLOBPCR, SALMONELLAPC, SHIGAPCR, SHIGELLAPCR, MPNEUG, MPNEUM, LACTOQL in the last 72 hours. Pathology:    Radiology Reports:  XR CHEST PORTABLE   Final Result   Endotracheal tube tip is approximately 4 cm above the nghia. Slight increase in bilateral parenchymal opacities. XR CHEST PORTABLE   Final Result   No significant change in chest findings compared to the prior study from   November 18th. XR CHEST PORTABLE   Final Result   No change in bilateral pulmonary opacities left greater than right with more   peripheral involvement compatible with pneumonitis. XR CHEST (SINGLE VIEW FRONTAL)   Final Result   *Low Probability for Pulmonary Embolus. *Bilateral mid to lower lung field left greater than right opacities with   peripheral involvement most pronounced at the bases compatible with  COVID-19   pneumonia. The findings were sent to the Radiology Results [id number] at 2:46   pm on 11/17/2020to be communicated to a licensed caregiver. NM LUNG SCAN PERFUSION ONLY   Final Result   *Low Probability for Pulmonary Embolus.    *Bilateral mid to lower lung field left greater than right opacities with   peripheral involvement most pronounced at the bases compatible with  COVID-19   pneumonia. The findings were sent to the Radiology Results [id number] at 2:46   pm on 11/17/2020to be communicated to a licensed caregiver. Echocardiogram:   No results found for this or any previous visit. ASSESSMENT AND PLAN     Assessment:    Acute hypoxic respiratory failure, requiring invasive mechanical ventilation  Acute respiratory distress syndrome  Bilateral multifocal pneumonia due to COVID 19 infection  Sepsis due to COVID 19  Myelodysplastic syndrome  Hypoalbuminemia  Elevated ferritin, fibrinogen  Suspected GI bleed    Plan:    I personally interviewed/examined the patient; reviewed interval history, interpreted all available radiographic and laboratory data at the time of service. Patient remains hemodynamically stable  Patient currently sedated with fentanyl and Versed infusions  Continue lung protective mechanical ventilationas per ARDS protocol  Wean FiO2/PEEP as tolerated  Monitor endotracheal secretions   Obtain X-ray chest as needed   Continue pulmonary toilet, aspiration precautions and bronchodilators  Continue to monitor I/O with a goal of even/negative fluid balance  We will recommend to continue tube feeds  Stress ulcer prophylaxis  Continue to monitor CBC, coagulation profile, transfuse as indicated  Chemical DVT prophylaxis currently on hold due to suspected GI bleed  Antimicrobials reviewed; continue to monitor off antimicrobials  Monitor CRP, LDH, AST/ALT, D-Dimer, Ferritin   Glycemic control appropriate  We will recommend to discontinue Decadron  Skin/wound care reviewed with the nursing staff  Physical/occupational therapy    Critical care time of 35 minutes was spent (excluding procedures), in coordination of care during bedside rounds and discussion of patient care in detail, and recommendations of the team were adopted in the plan. Necessity of all invasive devices was also confirmed. Lobo Hinton M.D.    Pulmonary and Critical Care Medicine           11/27/2020, 7:33 PM    This patient was evaluated in the context of the global SARS-CoV-2 (COVID-19) pandemic, which necessitated considerations that the patient either has COVID-19 infection or is at risk of infection with COVID-19. Institutional protocols and algorithms that pertain to the evaluation & management of patients with COVID-19 or those at risk for COVID-19 are in a state of rapid changes based on information released by regulatory bodies including the CDC and federal and state organizations. These policies and algorithms were followed during the patient's care. Please note that this chart was generated using voice recognition Dragon dictation software. Although every effort was made to ensure the accuracy of this automated transcription, some errors in transcription may have occurred.

## 2020-11-28 NOTE — PLAN OF CARE
BRONCHOSPASM/BRONCHOCONSTRICTION     [x]         IMPROVE AERATION/BREATH SOUNDS  [x]   ADMINISTER BRONCHODILATOR THERAPY AS APPROPRIATE  [x]   ASSESS BREATH SOUNDS  [x]   IMPLEMENT AEROSOL/MDI PROTOCOL  [x]   PATIENT EDUCATION AS NEEDED   PROVIDE ADEQUATE OXYGENATION WITH ACCEPTABLE SP02/ABG'S    [x]  IDENTIFY APPROPRIATE OXYGEN THERAPY  [x]   MONITOR SP02/ABG'S AS NEEDED   [x]   PATIENT EDUCATION AS NEEDED   MECHANICAL VENTILATION     [x]  PROVIDE OPTIMAL VENTILATION  [x]   ASSESS FOR EXTUBATION READINESS  [x]   ASSESS FOR WEANING READINESS  [x]  EXTUBATE AS TOLERATED  [x]  IMPLEMENT ADULT MECHANICAL VENTILATION PROTOCOL  [x]  MAINTAIN ADEQUATE OXYGENATION    Problem:  Activity:  Goal: Fatigue will decrease  Description: Fatigue will decrease  11/28/2020 1242 by Divina Tamayo RN  Outcome: Ongoing     Problem: Cardiac:  Goal: Hemodynamic stability will improve  Description: Hemodynamic stability will improve  11/28/2020 1242 by Divina Tamayo RN  Outcome: Ongoing     Problem: Coping:  Goal: Level of anxiety will decrease  Description: Level of anxiety will decrease  11/28/2020 1242 by Divina Tamayo RN  Outcome: Ongoing  Goal: Ability to cope will improve  Description: Ability to cope will improve  11/28/2020 1242 by Divina Tamayo RN  Outcome: Ongoing  Goal: Ability to establish a method of communication will improve  Description: Ability to establish a method of communication will improve  11/28/2020 1242 by Divina Tamayo RN  Outcome: Ongoing     Problem: Nutritional:  Goal: Consumption of the prescribed amount of daily calories will improve  Description: Consumption of the prescribed amount of daily calories will improve  11/28/2020 1242 by Divina Tamayo RN  Outcome: Ongoing     Problem: Respiratory:  Goal: Ability to maintain a clear airway will improve  Description: Ability to maintain a clear airway will improve  11/28/2020 1830 by Saeid Disla RCP  Outcome: Ongoing  11/28/2020 1242 by Carl Acevedo Teena Starr RN  Outcome: Ongoing  Goal: Ability to maintain adequate ventilation will improve  Description: Ability to maintain adequate ventilation will improve  11/28/2020 1830 by Blanka Paz RCP  Outcome: Ongoing  11/28/2020 1242 by Heavenly Quezada RN  Outcome: Ongoing  Goal: Complications related to the disease process, condition or treatment will be avoided or minimized  Description: Complications related to the disease process, condition or treatment will be avoided or minimized  11/28/2020 1830 by Blanka Paz RCP  Outcome: Ongoing  11/28/2020 1242 by Heavenly Quezada RN  Outcome: Ongoing     Problem: Skin Integrity:  Goal: Risk for impaired skin integrity will decrease  Description: Risk for impaired skin integrity will decrease  11/28/2020 1242 by Heavenly Quezada RN  Outcome: Ongoing  Goal: Will show no infection signs and symptoms  Description: Will show no infection signs and symptoms  11/28/2020 1242 by Heavenly Quezada RN  Outcome: Ongoing  Goal: Absence of new skin breakdown  Description: Absence of new skin breakdown  11/28/2020 1242 by Heavenly Quezada RN  Outcome: Ongoing     Problem: Falls - Risk of:  Goal: Will remain free from falls  Description: Will remain free from falls  11/28/2020 1242 by Heavenly Quezada RN  Outcome: Ongoing  Goal: Absence of physical injury  Description: Absence of physical injury  11/28/2020 1242 by Heavenly Quezada RN  Outcome: Ongoing     Problem: Pain:  Goal: Pain level will decrease  Description: Pain level will decrease  11/28/2020 1242 by Heavenly Quezada RN  Outcome: Ongoing  Goal: Control of acute pain  Description: Control of acute pain  11/28/2020 1242 by Heavenly Quezada RN  Outcome: Ongoing  Goal: Control of chronic pain  Description: Control of chronic pain  11/28/2020 1242 by Heavenly Quezada RN  Outcome: Ongoing     Problem: Airway Clearance - Ineffective  Goal: Achieve or maintain patent airway  11/28/2020 1830 by Blanka Paz RCP  Outcome: Ongoing  11/28/[id number] by Cecile Whitney RN  Outcome: Ongoing     Problem: Gas Exchange - Impaired  Goal: Absence of hypoxia  11/28/2020 1830 by Denzel Dangelo RCP  Outcome: Ongoing  11/28/2020 1242 by Cecile Whitney RN  Outcome: Ongoing  Goal: Promote optimal lung function  11/28/2020 1830 by Denzel Dangelo RCP  Outcome: Ongoing  11/28/2020 1242 by Cecile Whitney RN  Outcome: Ongoing     Problem: Breathing Pattern - Ineffective  Goal: Ability to achieve and maintain a regular respiratory rate  11/28/2020 1830 by Denzel Dangelo RCP  Outcome: Ongoing  11/28/2020 1242 by Cecile Whitney RN  Outcome: Ongoing     Problem:  Body Temperature -  Risk of, Imbalanced  Goal: Ability to maintain a body temperature within defined limits  11/28/2020 1242 by Cecile Whitney RN  Outcome: Ongoing  Goal: Will regain or maintain usual level of consciousness  11/28/2020 1242 by Cecile Whitney RN  Outcome: Ongoing  Goal: Complications related to the disease process, condition or treatment will be avoided or minimized  11/28/2020 1242 by Cecile Whitney RN  Outcome: Ongoing     Problem: Isolation Precautions - Risk of Spread of Infection  Goal: Prevent transmission of infection  11/28/2020 1242 by Cecile Whitney RN  Outcome: Ongoing     Problem: Nutrition Deficits  Goal: Optimize nutrtional status  11/28/2020 1242 by Cecile Whitney RN  Outcome: Ongoing     Problem: Risk for Fluid Volume Deficit  Goal: Maintain normal heart rhythm  11/28/2020 1242 by Cecile Whitney RN  Outcome: Ongoing  Goal: Maintain absence of muscle cramping  11/28/2020 1242 by Cecile Whitney RN  Outcome: Ongoing  Goal: Maintain normal serum potassium, sodium, calcium, phosphorus, and pH  11/28/2020 1242 by Cecile Whitney RN  Outcome: Ongoing     Problem: Loneliness or Risk for Loneliness  Goal: Demonstrate positive use of time alone when socialization is not possible  11/28/2020 1242 by Cecile Whitney RN  Outcome: Ongoing     Problem: Fatigue  Goal: Verbalize increase energy and improved vitality  11/28/2020 1242 by Lizabeth Neal RN  Outcome: Ongoing     Problem: Patient Education: Go to Patient Education Activity  Goal: Patient/Family Education  11/28/2020 1242 by Lizabeth Neal RN  Outcome: Ongoing     Problem: OXYGENATION/RESPIRATORY FUNCTION  Goal: Patient will maintain patent airway  11/28/2020 1830 by Barb Proctor RCP  Outcome: Ongoing  11/28/2020 1242 by Lizabeth Neal RN  Outcome: Ongoing  Goal: Patient will achieve/maintain normal respiratory rate/effort  Description: Respiratory rate and effort will be within normal limits for the patient  11/28/2020 1830 by Barb Proctor RCP  Outcome: Ongoing  11/28/2020 1242 by Lizabeth Neal RN  Outcome: Ongoing     Problem: Nutrition  Goal: Optimal nutrition therapy  Description: Nutrition Problem #1: Inadequate oral intake  Intervention: Food and/or Nutrient Delivery: Continue NPO(Start diet vs nutrition support as able)  Nutritional Goals: Start diet vs nutrition support within 24-72 hrs     11/28/2020 1242 by Lizabeth Neal RN  Outcome: Ongoing     Problem: Restraint Use - Nonviolent/Non-Self-Destructive Behavior:  Goal: Absence of restraint indications  Description: Absence of restraint indications  11/28/2020 1242 by Lizabeth Neal RN  Outcome: Ongoing  Goal: Absence of restraint-related injury  Description: Absence of restraint-related injury  11/28/2020 1242 by Lizabeth Neal RN  Outcome: Ongoing     Problem: MECHANICAL VENTILATION  Goal: Patient will maintain patent airway  11/28/2020 1830 by Barb Proctor RCP  Outcome: Ongoing  11/28/2020 1242 by Lizabeth Neal RN  Outcome: Ongoing  Goal: Oral health is maintained or improved  11/28/2020 1830 by Barb Proctor RCP  Outcome: Ongoing  11/28/2020 1242 by Lizabeth Neal RN  Outcome: Ongoing  Goal: ET tube will be managed safely  11/28/2020 1830 by Barb Proctor RCP  Outcome: Ongoing  11/28/2020 1242 by Lizabeth Neal RN  Outcome: Ongoing  Goal: Ability to express needs and understand communication  11/28/2020 1830 by Declan Quigley RCP  Outcome: Ongoing  11/28/2020 1242 by Tracy Lainez RN  Outcome: Ongoing  Goal: Mobility/activity is maintained at optimum level for patient  11/28/2020 1830 by Declan Quigley RCP  Outcome: Ongoing  11/28/2020 1242 by Tracy Lainez RN  Outcome: Ongoing     Problem: SKIN INTEGRITY  Goal: Skin integrity is maintained or improved  11/28/2020 1830 by Declan Quigley RCP  Outcome: Ongoing  11/28/2020 1242 by Tracy Lainez RN  Outcome: Ongoing     PERFORM SPONTANEOUS WEANING TRIAL AS TOLERATED

## 2020-11-28 NOTE — PLAN OF CARE
Problem:  Activity:  Goal: Fatigue will decrease  Description: Fatigue will decrease  Outcome: Ongoing     Problem: Cardiac:  Goal: Hemodynamic stability will improve  Description: Hemodynamic stability will improve  Outcome: Ongoing     Problem: Coping:  Goal: Level of anxiety will decrease  Description: Level of anxiety will decrease  Outcome: Ongoing  Goal: Ability to cope will improve  Description: Ability to cope will improve  Outcome: Ongoing  Goal: Ability to establish a method of communication will improve  Description: Ability to establish a method of communication will improve  Outcome: Ongoing     Problem: Nutritional:  Goal: Consumption of the prescribed amount of daily calories will improve  Description: Consumption of the prescribed amount of daily calories will improve  Outcome: Ongoing     Problem: Respiratory:  Goal: Ability to maintain a clear airway will improve  Description: Ability to maintain a clear airway will improve  Outcome: Ongoing  Goal: Ability to maintain adequate ventilation will improve  Description: Ability to maintain adequate ventilation will improve  Outcome: Ongoing  Goal: Complications related to the disease process, condition or treatment will be avoided or minimized  Description: Complications related to the disease process, condition or treatment will be avoided or minimized  Outcome: Ongoing     Problem: Skin Integrity:  Goal: Risk for impaired skin integrity will decrease  Description: Risk for impaired skin integrity will decrease  Outcome: Ongoing  Goal: Will show no infection signs and symptoms  Description: Will show no infection signs and symptoms  Outcome: Ongoing  Goal: Absence of new skin breakdown  Description: Absence of new skin breakdown  Outcome: Ongoing     Problem: Falls - Risk of:  Goal: Will remain free from falls  Description: Will remain free from falls  Outcome: Ongoing  Goal: Absence of physical injury  Description: Absence of physical injury  Outcome: Ongoing     Problem: Pain:  Goal: Pain level will decrease  Description: Pain level will decrease  Outcome: Ongoing  Goal: Control of acute pain  Description: Control of acute pain  Outcome: Ongoing  Goal: Control of chronic pain  Description: Control of chronic pain  Outcome: Ongoing     Problem: Airway Clearance - Ineffective  Goal: Achieve or maintain patent airway  Outcome: Ongoing     Problem: Gas Exchange - Impaired  Goal: Absence of hypoxia  Outcome: Ongoing  Goal: Promote optimal lung function  Outcome: Ongoing     Problem: Breathing Pattern - Ineffective  Goal: Ability to achieve and maintain a regular respiratory rate  Outcome: Ongoing     Problem:  Body Temperature -  Risk of, Imbalanced  Goal: Ability to maintain a body temperature within defined limits  Outcome: Ongoing  Goal: Will regain or maintain usual level of consciousness  Outcome: Ongoing  Goal: Complications related to the disease process, condition or treatment will be avoided or minimized  Outcome: Ongoing     Problem: Isolation Precautions - Risk of Spread of Infection  Goal: Prevent transmission of infection  Outcome: Ongoing     Problem: Nutrition Deficits  Goal: Optimize nutrtional status  Outcome: Ongoing     Problem: Risk for Fluid Volume Deficit  Goal: Maintain normal heart rhythm  Outcome: Ongoing  Goal: Maintain absence of muscle cramping  Outcome: Ongoing  Goal: Maintain normal serum potassium, sodium, calcium, phosphorus, and pH  Outcome: Ongoing     Problem: Loneliness or Risk for Loneliness  Goal: Demonstrate positive use of time alone when socialization is not possible  Outcome: Ongoing     Problem: Fatigue  Goal: Verbalize increase energy and improved vitality  Outcome: Ongoing     Problem: Patient Education: Go to Patient Education Activity  Goal: Patient/Family Education  Outcome: Ongoing     Problem: OXYGENATION/RESPIRATORY FUNCTION  Goal: Patient will maintain patent airway  Outcome: Ongoing  Goal: Patient will achieve/maintain normal respiratory rate/effort  Description: Respiratory rate and effort will be within normal limits for the patient  Outcome: Ongoing     Problem: Nutrition  Goal: Optimal nutrition therapy  Description: Nutrition Problem #1: Inadequate oral intake  Intervention: Food and/or Nutrient Delivery: Continue NPO(Start diet vs nutrition support as able)  Nutritional Goals: Start diet vs nutrition support within 24-72 hrs     Outcome: Ongoing     Problem: Restraint Use - Nonviolent/Non-Self-Destructive Behavior:  Goal: Absence of restraint indications  Description: Absence of restraint indications  Outcome: Not Met This Shift  Goal: Absence of restraint-related injury  Description: Absence of restraint-related injury  Outcome: Not Met This Shift     Problem: MECHANICAL VENTILATION  Goal: Patient will maintain patent airway  Outcome: Ongoing  Goal: Oral health is maintained or improved  Outcome: Ongoing  Goal: ET tube will be managed safely  Outcome: Ongoing  Goal: Ability to express needs and understand communication  Outcome: Ongoing  Goal: Mobility/activity is maintained at optimum level for patient  Outcome: Ongoing     Problem: SKIN INTEGRITY  Goal: Skin integrity is maintained or improved  Outcome: Ongoing

## 2020-11-28 NOTE — PROGRESS NOTES
Today's Date: 11/28/2020  Patient Name: Bety Mixon  Date of admission: 11/16/2020 10:51 PM  Patient's age: 70 y.o., 1945  Admission Dx: Acute respiratory failure due to COVID-19 ([facility] [U07.1, J96.00]    Reason for Consult: management recommendations  Requesting Physician: Jacob Brown MD    CHIEF COMPLAINT: Anemia. MDS. COVID-19 positive. History Obtained From:  patient, electronic medical record    Interval history:    Chart reviewed intervals noted  No significant changes. No active bleeding. No fever. HISTORY OF PRESENT ILLNESS:      The patient is a 70 y.o.  female who is admitted to the hospital with chief complaint of shortness of breath dyspnea for the last 3 days. Further testing revealed patient was COVID-19 positive. Patient was initially seen in outlying hospital and then transferred. Patient is requiring noninvasive positive pressure ventilation support. CTA has not been able to get done due to contrast allergy. Reportedly patient also has history of MDS. CBC from 9/2020 showed a WBC count of 1.6 hemoglobin 9.4 with MCV 95. Patient has been started on full dose anticoagulation due to concerns regarding pulmonary embolism. Patient was also recently started on Vidaza and has received 1 cycle so far. Patient is also transfusion dependent. Patient is supposed to get next cycle of Vidaza in the next week or so. Patient most recent bone marrow biopsy from 9/2020 showed normocellular marrow with erythroid hyperplasia and mild this erythropoiesis and 8% blasts. Recommendations from [address] [place] [address],[address]: Copied from care everywhere. ASSESSMENT AND PALN:  Ms. 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  In this situation, we previously discussed that we often favor management as if the patient is higher-tier risk disease, as outcomes for patients with IPSS-R 4.5 at diagnosis are similar to high risk. But unfortunately, she is not a candidate for her only curative option, i.e., allogeneic hematopoietic cell transplantation. Therefore, when we initially consulted on her, we discussed that all therapy is palliative. Given all therapies would be palliative, we discussed this in the context that she had been transfusion-independent. Thus, while initiation of hypomethylating agent (HMA) would have certainly been justifiable at that juncture, she had cytopenias for several months and still had not required transfusions or developed significant infection. Therefore, we discussed close observation since she was transfusion independent. We discussed the risks/beneftis of such an approach. At this visit, she is now beginning to require transfusions, so we agree with the plan to begin azacitidine locally under the care of Dr. Pooja Gorman. We again discussed that we would recommend a repeat bone marrow exam prior to initiating therapy for palliation. If she declines treatment, one could consider EPO supplementation to minimize need for transfusion. Given her [address], antiviral prophylaxis with acyclovir (400 mg BID) could be considered. If her [address] were to persistently be lower than 500/uL, antifungal prophylaxis could be considered. She will continue to follow up with Dr. Pooja Gorman for ongoing management. We will see her back prn. Past Medical History:   has a past medical history of Cancer ([facility] Deaf, left, Essential hypertension, GERD (gastroesophageal reflux disease), Glaucoma, Hyperlipidemia, Melanoma ([facility] MRSA (methicillin resistant staph aureus) culture positive, MVP (mitral valve prolapse), Pharyngoesophageal dysphagia, Raynaud disease, and Status post four vessel coronary artery bypass.     Past Surgical History:   has a past surgical history that includes Cardiac surgery; Tubal ligation; Appendectomy; Cholecystectomy; Hysterectomy, total abdominal; Small intestine surgery; and IR PORT PLACEMENT < 5 YEARS. Medications:    Prior to Admission medications    Medication Sig Start Date End Date Taking? Authorizing Provider   dapsone 100 MG tablet Take 1 tablet by mouth daily (with breakfast) 10/16/20  Yes Historical Provider, MD   aspirin 81 MG EC tablet Take 81 mg by mouth daily   Yes Historical Provider, MD   carvedilol (COREG) 6.25 MG tablet Take 6.25 mg by mouth 2 times daily (with meals)   Yes Historical Provider, MD   Isavuconazonium Sulfate (CRESEMBA) 186 MG CAPS Take by mouth   Yes Historical Provider, MD   diphenhydrAMINE (BENADRYL) 25 MG capsule Take 25 mg by mouth every 6 hours as needed for Itching   Yes Historical Provider, MD   hydroxychloroquine (PLAQUENIL) 200 MG tablet Take 200 mg by mouth daily   Yes Historical Provider, MD   nitroGLYCERIN (NITROSTAT) 0.4 MG SL tablet Place 0.4 mg under the tongue every 5 minutes as needed for Chest pain up to max of 3 total doses. If no relief after 1 dose, call 911.    Yes Historical Provider, MD   omeprazole (PRILOSEC) 20 MG delayed release capsule Take 40 mg by mouth daily   Yes Historical Provider, MD   phenazopyridine (PYRIDIUM) 100 MG tablet Take 100 mg by mouth 3 times daily as needed for Pain   Yes Historical Provider, MD   predniSONE (DELTASONE) 1 MG tablet Take 1 mg by mouth daily   Yes Historical Provider, MD   prochlorperazine (COMPAZINE) 25 MG suppository Place 25 mg rectally every 12 hours as needed for Nausea   Yes Historical Provider, MD   Travoprost (TRAVATAN OP) Apply to eye   Yes Historical Provider, MD     Current Facility-Administered Medications   Medication Dose Route Frequency Provider Last Rate Last Dose    pantoprazole (PROTONIX) injection 40 mg  40 mg Intravenous BID MICHAEL Iyer - CNP   40 mg at 11/28/20 0851    And    sodium chloride (PF) 0.9 % injection 10 mL  10 mL Intravenous Daily Samuel Balbuena FAUSTO ValentineN - CNP   10 mL at 11/28/20 0851    fentaNYL 20 mcg/mL Infusion  25 mcg/hr Intravenous Continuous Neftaly Pierce, DO 5 mL/hr at 11/28/20 1315 100 mcg/hr at 11/28/20 1315    midazolam (VERSED) 1 mg/mL in D5W infusion  1 mg/hr Intravenous Continuous Emmie Shi MD 6 mL/hr at 11/28/20 1314 6 mg/hr at 11/28/20 1314    potassium chloride 10 mEq/100 mL IVPB (Peripheral Line)  10 mEq Intravenous Daily Mackenzie Harris  mL/hr at 11/28/20 0852 10 mEq at 11/28/20 0852    LORazepam (ATIVAN) injection 0.5 mg  0.5 mg Intravenous Q8H PRN Mackenzie Harris MD   0.5 mg at 11/23/20 0455    LORazepam (ATIVAN) injection 0.25 mg  0.25 mg Intravenous Once Ambrose MarielenaMICHAEL - CNP   Stopped at 11/21/20 0502    [Held by provider] enoxaparin (LOVENOX) injection 40 mg  40 mg Subcutaneous BID Mackenzie Harris MD   40 mg at 11/25/20 0941    latanoprost (XALATAN) 0.005 % ophthalmic solution 1 drop  1 drop Both Eyes Nightly Mackenzie Harris MD   1 drop at 11/27/20 2042    0.9 % sodium chloride bolus  30 mL Intravenous PRN Kesha Lara, DO        0.9 % sodium chloride bolus  20 mL Intravenous Once Kesha Lara, DO        0.9 % sodium chloride bolus  20 mL Intravenous Once Fernando Mcrae MD        sodium chloride flush 0.9 % injection 10 mL  10 mL Intravenous PRN [place] Lara, DO   10 mL at 11/24/20 2011    potassium chloride (KLOR-CON M) extended release tablet 40 mEq  40 mEq Oral PRN Emmit Pimple, APRN - CNS   40 mEq at 11/22/20 1544    Or    potassium bicarb-citric acid (EFFER-K) effervescent tablet 40 mEq  40 mEq Oral PRN Emmit Pimple, APRN - CNS   40 mEq at 11/21/20 6696    Or    potassium chloride 10 mEq/100 mL IVPB (Peripheral Line)  10 mEq Intravenous PRN Emmit Pimple, APRN - CNS        magnesium sulfate 1 g in dextrose 5% 100 mL IVPB  1 g Intravenous PRN Emmit Pimple, APRN - CNS        acetaminophen (TYLENOL) tablet 650 mg  650 mg Oral Q6H PRN Emmit Pimple, APRN - CNS Description . BLOOD     Special Requests RT HAND 2ML     Culture NO GROWTH 6 DAYS    Respiratory Panel, Molecular, with COVID-19    Specimen: Nasopharyngeal Swab   Result Value Ref Range    Specimen Description . NASOPHARYNGEAL SWAB     Adenovirus PCR Not Detected Not Detected    Coronavirus 229E PCR Not Detected Not Detected    Coronavirus HKU1 PCR Not Detected Not Detected    Coronavirus NL63 PCR Not Detected Not Detected    Coronavirus OC43 PCR Not Detected Not Detected    SARS-CoV-2, PCR DETECTED (A) Not Detected    Human Metapneumovirus PCR Not Detected Not Detected    Rhino/Enterovirus PCR Not Detected Not Detected    Influenza A by PCR Not Detected Not Detected    Influenza A H1 PCR NOT REPORTED Not Detected    Influenza A H1 (2009) PCR NOT REPORTED Not Detected    Influenza A H3 PCR NOT REPORTED Not Detected    Influenza B by PCR Not Detected Not Detected    Parainfluenza 1 PCR Not Detected Not Detected    Parainfluenza 2 PCR Not Detected Not Detected    Parainfluenza 3 PCR Not Detected Not Detected    Parainfluenza 4 PCR Not Detected Not Detected    Resp Syncytial Virus PCR Not Detected Not Detected    Bordetella Parapertussis Not Detected Not Detected    B Pertussis by PCR Not Detected Not Detected    Chlamydia pneumoniae By PCR Not Detected Not Detected    Mycoplasma pneumo by PCR Not Detected Not Detected   LEGIONELLA ANTIGEN, URINE    Specimen: Urine, clean catch   Result Value Ref Range    Legionella Pneumophilia Ag, Urine NEGATIVE    CBC   Result Value Ref Range    WBC 1.3 (LL) 3.5 - 11.3 k/uL    RBC 2.53 (L) 3.95 - 5.11 m/uL    Hemoglobin 7.4 (L) 11.9 - 15.1 g/dL    Hematocrit 23.6 (L) 36.3 - 47.1 %    MCV 93.3 82.6 - 102.9 fL    MCH 29.2 25.2 - 33.5 pg    MCHC 31.4 28.4 - 34.8 g/dL    RDW 18.5 (H) 11.8 - 14.4 %    Platelets 989 015 - 766 k/uL    MPV 10.6 8.1 - 13.5 fL    NRBC Automated 0.0 0.0 per 100 WBC   Protime-INR   Result Value Ref Range    Protime 9.3 9.0 - 12.0 sec    INR 0.9    Comprehensive Metabolic Panel w/ Reflex to MG   Result Value Ref Range    Glucose 112 (H) 70 - 99 mg/dL    BUN 24 (H) 8 - 23 mg/dL    CREATININE 0.89 0.50 - 0.90 mg/dL    Bun/Cre Ratio NOT REPORTED 9 - 20    Calcium 8.0 (L) 8.6 - 10.4 mg/dL    Sodium 133 (L) 135 - 144 mmol/L    Potassium 4.1 3.7 - 5.3 mmol/L    Chloride 100 98 - 107 mmol/L    CO2 21 20 - 31 mmol/L    Anion Gap 12 9 - 17 mmol/L    Alkaline Phosphatase 76 35 - 104 U/L    ALT 49 (H) 5 - 33 U/L    AST 41 (H) <32 U/L    Total Bilirubin 0.64 0.3 - 1.2 mg/dL    Total Protein 5.9 (L) 6.4 - 8.3 g/dL    Alb 3.0 (L) 3.5 - 5.2 g/dL    Albumin/Globulin Ratio 1.0 1.0 - 2.5    GFR Non-African American >60 >60 mL/min    GFR African American >60 >60 mL/min    GFR Comment          GFR Staging NOT REPORTED    C-Reactive Protein   Result Value Ref Range    .8 (H) 0.0 - 5.0 mg/L   Ferritin   Result Value Ref Range    Ferritin 3,046 (H) 13 - 150 ug/L   Fibrinogen   Result Value Ref Range    Fibrinogen 721 (H) 140 - 420 mg/dL   Lactate Dehydrogenase   Result Value Ref Range     (H) 135 - 214 U/L   CBC   Result Value Ref Range    WBC 1.2 (LL) 3.5 - 11.3 k/uL    RBC 2.37 (L) 3.95 - 5.11 m/uL    Hemoglobin 7.0 (LL) 11.9 - 15.1 g/dL    Hematocrit 22.4 (L) 36.3 - 47.1 %    MCV 94.5 82.6 - 102.9 fL    MCH 29.5 25.2 - 33.5 pg    MCHC 31.3 28.4 - 34.8 g/dL    RDW 19.0 (H) 11.8 - 14.4 %    Platelets 726 563 - 751 k/uL    MPV 10.7 8.1 - 13.5 fL    NRBC Automated 0.0 0.0 per 100 WBC   Differential   Result Value Ref Range    Differential Type NOT REPORTED     WBC Morphology NOT REPORTED     RBC Morphology NOT REPORTED     Platelet Estimate NOT REPORTED     Immature Granulocytes 0 0 %    Seg Neutrophils 33 (L) 36 - 66 %    Lymphocytes 48 (H) 24 - 44 %    Atypical Lymphocytes 1 %    Monocytes 17 (H) 1 - 7 %    Eosinophils % 1 1 - 4 %    Basophils 0 0 - 2 %    Absolute Immature Granulocyte 0.00 0.00 - 0.30 k/uL    Segs Absolute 0.43 (LL) 1.8 - 7.7 k/uL    Absolute Lymph # 0.63 (L) 1.0 - 4.8 k/uL    Atypical Lymphocytes Absolute 0.01 k/uL    Absolute Mono # 0.22 0.1 - 0.8 k/uL    Absolute Eos # 0.01 0.0 - 0.4 k/uL    Basophils Absolute 0.00 0.0 - 0.2 k/uL    Morphology ANISOCYTOSIS PRESENT    D-Dimer, Quantitative   Result Value Ref Range    D-Dimer, Quant 2.44 mg/L FEU   Ferritin   Result Value Ref Range    Ferritin 3,024 (H) 13 - 150 ug/L   Fibrinogen   Result Value Ref Range    Fibrinogen 720 (H) 140 - 420 mg/dL   Protime-INR   Result Value Ref Range    Protime 9.5 9.0 - 12.0 sec    INR 0.9    Lactate Dehydrogenase   Result Value Ref Range     (H) 135 - 214 U/L   Hepatic Function Panel   Result Value Ref Range    Alb 2.9 (L) 3.5 - 5.2 g/dL    Alkaline Phosphatase 74 35 - 104 U/L    ALT 45 (H) 5 - 33 U/L    AST 38 (H) <32 U/L    Total Bilirubin 0.53 0.3 - 1.2 mg/dL    Bilirubin, Direct 0.22 <0.31 mg/dL    Bilirubin, Indirect 0.31 0.00 - 1.00 mg/dL    Total Protein 5.8 (L) 6.4 - 8.3 g/dL    Globulin NOT REPORTED 1.5 - 3.8 g/dL    Albumin/Globulin Ratio 1.0 1.0 - 2.5   Magnesium   Result Value Ref Range    Magnesium 2.3 1.6 - 2.6 mg/dL   Renal Function Panel   Result Value Ref Range    Glucose 109 (H) 70 - 99 mg/dL    BUN 23 8 - 23 mg/dL    CREATININE 0.81 0.50 - 0.90 mg/dL    Bun/Cre Ratio NOT REPORTED 9 - 20    Calcium 8.0 (L) 8.6 - 10.4 mg/dL    Alb 2.9 (L) 3.5 - 5.2 g/dL    Phosphorus 3.5 2.6 - 4.5 mg/dL    Sodium 133 (L) 135 - 144 mmol/L    Potassium 4.1 3.7 - 5.3 mmol/L    Chloride 100 98 - 107 mmol/L    CO2 21 20 - 31 mmol/L    Anion Gap 12 9 - 17 mmol/L    GFR Non-African American >60 >60 mL/min    GFR African American >60 >60 mL/min    GFR Comment          GFR Staging NOT REPORTED    Procalcitonin   Result Value Ref Range    Procalcitonin 1.00 (H) <0.09 ng/mL   Brain Natriuretic Peptide   Result Value Ref Range    Pro- (H) <300 pg/mL    BNP Interpretation Pro-BNP Reference Range:    COVID-19    Specimen: Nasopharyngeal Swab   Result Value Ref Range    SARS-CoV-2 Positive (A) - 15.1 g/dL    Hematocrit 25.6 (L) 36.3 - 47.1 %    MCV 97.7 82.6 - 102.9 fL    MCH 29.4 25.2 - 33.5 pg    MCHC 30.1 28.4 - 34.8 g/dL    RDW 19.6 (H) 11.8 - 14.4 %    Platelets 325 767 - 441 k/uL    MPV 11.1 8.1 - 13.5 fL    NRBC Automated 0.0 0.0 per 100 WBC   Renal Function Panel   Result Value Ref Range    Glucose 93 70 - 99 mg/dL    BUN 23 8 - 23 mg/dL    CREATININE 0.81 0.50 - 0.90 mg/dL    Bun/Cre Ratio NOT REPORTED 9 - 20    Calcium 8.4 (L) 8.6 - 10.4 mg/dL    Alb 2.8 (L) 3.5 - 5.2 g/dL    Phosphorus 3.3 2.6 - 4.5 mg/dL    Sodium 138 135 - 144 mmol/L    Potassium 3.6 (L) 3.7 - 5.3 mmol/L    Chloride 100 98 - 107 mmol/L    CO2 22 20 - 31 mmol/L    Anion Gap 16 9 - 17 mmol/L    GFR Non-African American >60 >60 mL/min    GFR African American >60 >60 mL/min    GFR Comment          GFR Staging NOT REPORTED    Magnesium   Result Value Ref Range    Magnesium 2.5 1.6 - 2.6 mg/dL   FIBRINOGEN   Result Value Ref Range    Fibrinogen 1002 (H) 140 - 420 mg/dL   C-Reactive Protein   Result Value Ref Range    .4 (H) 0.0 - 5.0 mg/L   Ferritin   Result Value Ref Range    Ferritin 3,453 (H) 13 - 150 ug/L   Lactate Dehydrogenase   Result Value Ref Range     (H) 135 - 214 U/L   Magnesium   Result Value Ref Range    Magnesium 2.4 1.6 - 2.6 mg/dL   Comprehensive Metabolic Panel   Result Value Ref Range    Glucose 94 70 - 99 mg/dL    BUN 19 8 - 23 mg/dL    CREATININE 0.53 0.50 - 0.90 mg/dL    Bun/Cre Ratio NOT REPORTED 9 - 20    Calcium 8.4 (L) 8.6 - 10.4 mg/dL    Sodium 138 135 - 144 mmol/L    Potassium 4.0 3.7 - 5.3 mmol/L    Chloride 102 98 - 107 mmol/L    CO2 21 20 - 31 mmol/L    Anion Gap 15 9 - 17 mmol/L    Alkaline Phosphatase 58 35 - 104 U/L    ALT 35 (H) 5 - 33 U/L    AST 33 (H) <32 U/L    Total Bilirubin 0.47 0.3 - 1.2 mg/dL    Total Protein 5.6 (L) 6.4 - 8.3 g/dL    Alb 2.6 (L) 3.5 - 5.2 g/dL    Albumin/Globulin Ratio 0.9 (L) 1.0 - 2.5    GFR Non-African American >60 >60 mL/min    GFR African American >60 >60 mL/min    GFR Comment          GFR Staging NOT REPORTED    CBC Auto Differential   Result Value Ref Range    WBC 3.3 (L) 3.5 - 11.3 k/uL    RBC 2.61 (L) 3.95 - 5.11 m/uL    Hemoglobin 7.8 (L) 11.9 - 15.1 g/dL    Hematocrit 23.8 (L) 36.3 - 47.1 %    MCV 91.2 82.6 - 102.9 fL    MCH 29.9 25.2 - 33.5 pg    MCHC 32.8 28.4 - 34.8 g/dL    RDW 19.2 (H) 11.8 - 14.4 %    Platelets 365 798 - 473 k/uL    MPV 11.4 8.1 - 13.5 fL    NRBC Automated 0.0 0.0 per 100 WBC    Differential Type NOT REPORTED     WBC Morphology NOT REPORTED     RBC Morphology NOT REPORTED     Platelet Estimate NOT REPORTED     Immature Granulocytes 0 0 %    Seg Neutrophils 56 36 - 66 %    Lymphocytes 36 24 - 44 %    Monocytes 8 (H) 1 - 7 %    Eosinophils % 0 (L) 1 - 4 %    Basophils 0 0 - 2 %    Absolute Immature Granulocyte 0.00 0.00 - 0.30 k/uL    Segs Absolute 1.85 1.8 - 7.7 k/uL    Absolute Lymph # 1.19 1.0 - 4.8 k/uL    Absolute Mono # 0.26 0.1 - 0.8 k/uL    Absolute Eos # 0.00 0.0 - 0.4 k/uL    Basophils Absolute 0.00 0.0 - 0.2 k/uL    Morphology ANISOCYTOSIS PRESENT    Mycoplasma pneumoniae antibody, IgM   Result Value Ref Range    Mycoplasma pneumo IgM 0.20 <0.91   CBC Auto Differential   Result Value Ref Range    WBC 9.0 3.5 - 11.3 k/uL    RBC 2.60 (L) 3.95 - 5.11 m/uL    Hemoglobin 8.0 (L) 11.9 - 15.1 g/dL    Hematocrit 26.0 (L) 36.3 - 47.1 %    .0 82.6 - 102.9 fL    MCH 30.8 25.2 - 33.5 pg    MCHC 30.8 28.4 - 34.8 g/dL    RDW 19.6 (H) 11.8 - 14.4 %    Platelets 100 712 - 568 k/uL    MPV 11.4 8.1 - 13.5 fL    NRBC Automated 0.0 0.0 per 100 WBC    Differential Type NOT REPORTED     WBC Morphology NOT REPORTED     RBC Morphology NOT REPORTED     Platelet Estimate NOT REPORTED     Immature Granulocytes 1 (H) 0 %    Seg Neutrophils 74 (H) 36 - 66 %    Lymphocytes 15 (L) 24 - 44 %    Monocytes 10 (H) 1 - 7 %    Eosinophils % 0 (L) 1 - 4 %    Basophils 0 0 - 2 %    Absolute Immature Granulocyte 0.09 0.00 - 0.30 k/uL    Segs Absolute 6.66 1.8 - 7.7 k/uL    Absolute Lymph # 1.35 1.0 - 4.8 k/uL    Absolute Mono # 0.90 (H) 0.1 - 0.8 k/uL    Absolute Eos # 0.00 0.0 - 0.4 k/uL    Basophils Absolute 0.00 0.0 - 0.2 k/uL    Morphology ANISOCYTOSIS PRESENT    FERRITIN   Result Value Ref Range    Ferritin 3,590 (H) 13 - 150 ug/L   FIBRINOGEN   Result Value Ref Range    Fibrinogen 673 (H) 140 - 420 mg/dL   CBC Auto Differential   Result Value Ref Range    WBC 12.8 (H) 3.5 - 11.3 k/uL    RBC 2.36 (L) 3.95 - 5.11 m/uL    Hemoglobin 7.3 (L) 11.9 - 15.1 g/dL    Hematocrit 23.4 (L) 36.3 - 47.1 %    MCV 99.2 82.6 - 102.9 fL    MCH 30.9 25.2 - 33.5 pg    MCHC 31.2 28.4 - 34.8 g/dL    RDW 19.0 (H) 11.8 - 14.4 %    Platelets 307 967 - 409 k/uL    MPV 11.5 8.1 - 13.5 fL    NRBC Automated 0.0 0.0 per 100 WBC    Differential Type NOT REPORTED     WBC Morphology NOT REPORTED     RBC Morphology NOT REPORTED     Platelet Estimate NOT REPORTED     Immature Granulocytes 1 (H) 0 %    Seg Neutrophils 76 (H) 36 - 66 %    Lymphocytes 16 (L) 24 - 44 %    Monocytes 7 1 - 7 %    Eosinophils % 0 (L) 1 - 4 %    Basophils 0 0 - 2 %    Absolute Immature Granulocyte 0.13 0.00 - 0.30 k/uL    Segs Absolute 9.72 (H) 1.8 - 7.7 k/uL    Absolute Lymph # 2.05 1.0 - 4.8 k/uL    Absolute Mono # 0.90 (H) 0.1 - 0.8 k/uL    Absolute Eos # 0.00 0.0 - 0.4 k/uL    Basophils Absolute 0.00 0.0 - 0.2 k/uL    Morphology ANISOCYTOSIS PRESENT     Morphology INCREASED BANDS PRESENT     Morphology TOXIC GRANULATION PRESENT    Basic Metabolic Panel w/ Reflex to MG   Result Value Ref Range    Glucose 84 70 - 99 mg/dL    BUN 22 8 - 23 mg/dL    CREATININE 0.52 0.50 - 0.90 mg/dL    Bun/Cre Ratio NOT REPORTED 9 - 20    Calcium 8.6 8.6 - 10.4 mg/dL    Sodium 143 135 - 144 mmol/L    Potassium 3.5 (L) 3.7 - 5.3 mmol/L    Chloride 106 98 - 107 mmol/L    CO2 25 20 - 31 mmol/L    Anion Gap 12 9 - 17 mmol/L    GFR Non-African American >60 >60 mL/min    GFR African American >60 >60 mL/min    GFR Comment          GFR Staging NOT REPORTED    Brain Natriuretic Peptide   Result Value Ref Range    Pro- (H) <300 pg/mL    BNP Interpretation Pro-BNP Reference Range:    FIBRINOGEN   Result Value Ref Range    Fibrinogen 506 (H) 140 - 420 mg/dL   C-REACTIVE PROTEIN   Result Value Ref Range    CRP 67.6 (H) 0.0 - 5.0 mg/L   FERRITIN   Result Value Ref Range    Ferritin 2,838 (H) 13 - 150 ug/L   D-DIMER, QUANTITATIVE   Result Value Ref Range    D-Dimer, Quant 1.93 mg/L FEU   Magnesium   Result Value Ref Range    Magnesium 2.0 1.6 - 2.6 mg/dL   CBC Auto Differential   Result Value Ref Range    WBC 10.4 3.5 - 11.3 k/uL    RBC 2.70 (L) 3.95 - 5.11 m/uL    Hemoglobin 8.1 (L) 11.9 - 15.1 g/dL    Hematocrit 27.6 (L) 36.3 - 47.1 %    .2 82.6 - 102.9 fL    MCH 30.0 25.2 - 33.5 pg    MCHC 29.3 28.4 - 34.8 g/dL    RDW 19.2 (H) 11.8 - 14.4 %    Platelets 388 362 - 046 k/uL    MPV 11.2 8.1 - 13.5 fL    NRBC Automated 0.0 0.0 per 100 WBC    Differential Type NOT REPORTED     WBC Morphology NOT REPORTED     RBC Morphology NOT REPORTED     Platelet Estimate NOT REPORTED     Immature Granulocytes 3 (H) 0 %    Seg Neutrophils 68 (H) 36 - 66 %    Lymphocytes 20 (L) 24 - 44 %    Monocytes 9 (H) 1 - 7 %    Eosinophils % 0 (L) 1 - 4 %    Basophils 0 0 - 2 %    Absolute Immature Granulocyte 0.31 (H) 0.00 - 0.30 k/uL    Segs Absolute 7.07 1.8 - 7.7 k/uL    Absolute Lymph # 2.08 1.0 - 4.8 k/uL    Absolute Mono # 0.94 (H) 0.1 - 0.8 k/uL    Absolute Eos # 0.00 0.0 - 0.4 k/uL    Basophils Absolute 0.00 0.0 - 0.2 k/uL    Morphology ANISOCYTOSIS PRESENT    POTASSIUM   Result Value Ref Range    Potassium 3.1 (L) 3.7 - 5.3 mmol/L   CBC Auto Differential   Result Value Ref Range    WBC 9.3 3.5 - 11.3 k/uL    RBC 2.54 (L) 3.95 - 5.11 m/uL    Hemoglobin 8.0 (L) 11.9 - 15.1 g/dL    Hematocrit 25.9 (L) 36.3 - 47.1 %    .0 82.6 - 102.9 fL    MCH 31.5 25.2 - 33.5 pg    MCHC 30.9 28.4 - 34.8 g/dL    RDW 19.0 (H) 11.8 - 14.4 %    Platelets 632 003 - 109 k/uL    MPV 11.4 8.1 - 13.5 fL    NRBC Automated 0.0 0.0 per 100 WBC    Differential Type NOT REPORTED     WBC Morphology NOT REPORTED     RBC Morphology NOT REPORTED     Platelet Estimate NOT REPORTED     Immature Granulocytes 5 (H) 0 %    Seg Neutrophils 69 (H) 36 - 66 %    Lymphocytes 23 (L) 24 - 44 %    Monocytes 3 1 - 7 %    Eosinophils % 0 (L) 1 - 4 %    Basophils 0 0 - 2 %    Absolute Immature Granulocyte 0.47 (H) 0.00 - 0.30 k/uL    Segs Absolute 6.41 1.8 - 7.7 k/uL    Absolute Lymph # 2.14 1.0 - 4.8 k/uL    Absolute Mono # 0.28 0.1 - 0.8 k/uL    Absolute Eos # 0.00 0.0 - 0.4 k/uL    Basophils Absolute 0.00 0.0 - 0.2 k/uL    Morphology ANISOCYTOSIS PRESENT     Morphology TOXIC GRANULATION PRESENT    Comprehensive Metabolic Panel w/ Reflex to MG   Result Value Ref Range    Glucose 84 70 - 99 mg/dL    BUN 16 8 - 23 mg/dL    CREATININE 0.64 0.50 - 0.90 mg/dL    Bun/Cre Ratio NOT REPORTED 9 - 20    Calcium 9.0 8.6 - 10.4 mg/dL    Sodium 139 135 - 144 mmol/L    Potassium 3.8 3.7 - 5.3 mmol/L    Chloride 103 98 - 107 mmol/L    CO2 24 20 - 31 mmol/L    Anion Gap 12 9 - 17 mmol/L    Alkaline Phosphatase 88 35 - 104 U/L    ALT 23 5 - 33 U/L    AST 22 <32 U/L    Total Bilirubin 0.51 0.3 - 1.2 mg/dL    Total Protein 6.3 (L) 6.4 - 8.3 g/dL    Alb 2.5 (L) 3.5 - 5.2 g/dL    Albumin/Globulin Ratio 0.7 (L) 1.0 - 2.5    GFR Non-African American >60 >60 mL/min    GFR African American >60 >60 mL/min    GFR Comment          GFR Staging NOT REPORTED    Urinalysis Reflex to Culture    Specimen: Urine, clean catch   Result Value Ref Range    Color, UA DARK YELLOW (A) YELLOW    Turbidity UA CLEAR CLEAR    Glucose, Ur NEGATIVE NEGATIVE    Bilirubin Urine NEGATIVE NEGATIVE    Ketones, Urine NEGATIVE NEGATIVE    Specific Gravity, UA 1.022 1.005 - 1.030    Urine Hgb NEGATIVE NEGATIVE    pH, UA 7.0 5.0 - 8.0    Protein, UA 1+ (A) NEGATIVE    Urobilinogen, Urine Normal Normal    Nitrite, Urine NEGATIVE NEGATIVE    Leukocyte Esterase, Urine NEGATIVE NEGATIVE    Urinalysis Comments NOT REPORTED    Microscopic Urinalysis   Result Value Ref Range    -          WBC, UA 2 TO 5 0 - 5 /HPF    RBC, UA 5 TO 10 0 - 4 /HPF    Casts UA  0 - 8 /LPF     10 TO 20 HYALINE Reference range defined for non-centrifuged specimen. Crystals, UA NOT REPORTED None /HPF    Epithelial Cells UA 5 TO 10 0 - 5 /HPF    Renal Epithelial, UA NOT REPORTED 0 /HPF    Bacteria, UA NOT REPORTED None    Mucus, UA NOT REPORTED None    Trichomonas, UA NOT REPORTED None    Amorphous, UA NOT REPORTED None    Other Observations UA NOT REPORTED NOT REQ.     Yeast, UA NOT REPORTED None   CBC Auto Differential   Result Value Ref Range    WBC 6.9 3.5 - 11.3 k/uL    RBC 2.28 (L) 3.95 - 5.11 m/uL    Hemoglobin 6.8 (LL) 11.9 - 15.1 g/dL    Hematocrit 21.5 (L) 36.3 - 47.1 %    MCV 94.3 82.6 - 102.9 fL    MCH 29.8 25.2 - 33.5 pg    MCHC 31.6 28.4 - 34.8 g/dL    RDW 18.2 (H) 11.8 - 14.4 %    Platelets 468 299 - 124 k/uL    MPV 11.0 8.1 - 13.5 fL    NRBC Automated 0.0 0.0 per 100 WBC    Differential Type NOT REPORTED     WBC Morphology NOT REPORTED     RBC Morphology NOT REPORTED     Platelet Estimate NOT REPORTED     Immature Granulocytes 9 (H) 0 %    Seg Neutrophils 66 36 - 66 %    Lymphocytes 18 (L) 24 - 44 %    Monocytes 7 1 - 7 %    Eosinophils % 0 (L) 1 - 4 %    Basophils 0 0 - 2 %    Absolute Immature Granulocyte 0.62 (H) 0.00 - 0.30 k/uL    Segs Absolute 4.56 1.8 - 7.7 k/uL    Absolute Lymph # 1.24 1.0 - 4.8 k/uL    Absolute Mono # 0.48 0.1 - 0.8 k/uL    Absolute Eos # 0.00 0.0 - 0.4 k/uL    Basophils Absolute 0.00 0.0 - 0.2 k/uL    Morphology ANISOCYTOSIS PRESENT    Comprehensive Metabolic Panel w/ Reflex to MG   Result Value Ref Range    Glucose 111 (H) 70 - 99 mg/dL    BUN 24 (H) 8 - 23 mg/dL    CREATININE 0.61 0.50 - 0.90 mg/dL    Bun/Cre Ratio NOT REPORTED 9 - 20    Calcium 8.2 (L) 8.6 - 10.4 mg/dL    Sodium 143 135 - 144 mmol/L    Potassium 4.2 3.7 - 5.3 mmol/L    Chloride 105 98 - 107 mmol/L    CO2 26 20 - 31 mmol/L    Anion Gap 12 9 - 17 mmol/L    Alkaline Phosphatase 77 35 - 104 U/L    ALT 18 5 - 33 U/L    AST 18 <32 U/L    Total Bilirubin 0.41 0.3 - 1.2 mg/dL    Total Protein 5.9 (L) 6.4 - 8.3 g/dL    Alb 2.2 (L) 3.5 - 5.2 g/dL    Albumin/Globulin Ratio 0.6 (L) 1.0 - 2.5    GFR Non-African American >60 >60 mL/min    GFR African American >60 >60 mL/min    GFR Comment          GFR Staging NOT REPORTED    CBC Auto Differential   Result Value Ref Range    WBC 4.2 3.5 - 11.3 k/uL    RBC 2.05 (L) 3.95 - 5.11 m/uL    Hemoglobin 6.0 (LL) 11.9 - 15.1 g/dL    Hematocrit 19.8 (L) 36.3 - 47.1 %    MCV 96.6 82.6 - 102.9 fL    MCH 29.3 25.2 - 33.5 pg    MCHC 30.3 28.4 - 34.8 g/dL    RDW 18.4 (H) 11.8 - 14.4 %    Platelets 291 151 - 204 k/uL    MPV 11.2 8.1 - 13.5 fL    NRBC Automated 0.0 0.0 per 100 WBC    Differential Type NOT REPORTED     WBC Morphology NOT REPORTED     RBC Morphology NOT REPORTED     Platelet Estimate NOT REPORTED     Immature Granulocytes 2 (H) 0 %    Seg Neutrophils 74 (H) 36 - 66 %    Lymphocytes 16 (L) 24 - 44 %    Monocytes 8 (H) 1 - 7 %    Eosinophils % 0 (L) 1 - 4 %    Basophils 0 0 - 2 %    Absolute Immature Granulocyte 0.08 0.00 - 0.30 k/uL    Segs Absolute 3.11 1.8 - 7.7 k/uL    Absolute Lymph # 0.67 (L) 1.0 - 4.8 k/uL    Absolute Mono # 0.34 0.1 - 0.8 k/uL    Absolute Eos # 0.00 0.0 - 0.4 k/uL    Basophils Absolute 0.00 0.0 - 0.2 k/uL    Morphology ANISOCYTOSIS PRESENT     Morphology TOXIC GRANULATION PRESENT    Comprehensive Metabolic Panel w/ Reflex to MG   Result Value Ref Range    Glucose 96 70 - 99 mg/dL    BUN 26 (H) 8 - 23 mg/dL    CREATININE 0.61 0.50 - 0.90 mg/dL    Bun/Cre Ratio NOT REPORTED 9 - 20    Calcium 8.3 (L) 8.6 - 10.4 mg/dL    Sodium 141 135 - 144 mmol/L    Potassium 4.4 3.7 - 5.3 mmol/L    Chloride 104 98 - 107 mmol/L    CO2 31 20 - 31 mmol/L    Anion Gap 6 (L) 9 - 17 mmol/L    Alkaline Phosphatase 62 35 - 104 U/L    ALT 18 5 - 33 U/L    AST 16 <32 U/L    Total Bilirubin 0.30 0.3 - 1.2 mg/dL    Total Protein 5.7 (L) 6.4 - 8.3 g/dL    Alb 2.2 (L) 3.5 - 5.2 g/dL    Albumin/Globulin Ratio 0.6 (L) 1.0 - 2.5    GFR Non-African American >60 >60 mL/min    GFR African American >60 >60 mL/min    GFR Comment          GFR Staging NOT REPORTED    FERRITIN   Result Value Ref Range    Ferritin 4,245 (H) 13 - 150 ug/L   VITAMIN D 25 HYDROXY   Result Value Ref Range    Vit D, 25-Hydroxy 47.6 30.0 - 100.0 ng/mL   HEMOGLOBIN AND HEMATOCRIT, BLOOD   Result Value Ref Range    Hemoglobin 7.7 (L) 11.9 - 15.1 g/dL    Hematocrit 24.0 (L) 36.3 - 47.1 %   CBC Auto Differential   Result Value Ref Range    WBC 5.3 3.5 - 11.3 k/uL    RBC 2.64 (L) 3.95 - 5.11 m/uL    Hemoglobin 8.0 (L) 11.9 - 15.1 g/dL    Hematocrit 25.2 (L) 36.3 - 47.1 %    MCV 95.5 82.6 - 102.9 fL    MCH 30.3 25.2 - 33.5 pg    MCHC 31.7 28.4 - 34.8 g/dL    RDW 17.4 (H) 11.8 - 14.4 %    Platelets 957 579 - 671 k/uL    MPV 11.3 8.1 - 13.5 fL    NRBC Automated 0.0 0.0 per 100 WBC    Differential Type NOT REPORTED     WBC Morphology NOT REPORTED     RBC Morphology NOT REPORTED     Platelet Estimate NOT REPORTED     Immature Granulocytes 5 (H) 0 %    Seg Neutrophils 78 (H) 36 - 66 %    Lymphocytes 11 (L) 24 - 44 %    Monocytes 4 1 - 7 %    Eosinophils % 2 1 - 4 %    Basophils 0 0 - 2 %    Absolute Immature Granulocyte 0.27 0.00 - 0.30 k/uL    Segs Absolute 4.13 1.8 - 7.7 k/uL    Absolute Lymph # 0.58 (L) 1.0 - 4.8 k/uL    Absolute Mono # 0.21 0.1 - 0.8 k/uL    Absolute Eos # 0.11 0.0 - 0.4 k/uL    Basophils Absolute 0.00 0.0 - 0.2 k/uL    Morphology ANISOCYTOSIS PRESENT     Morphology TOXIC GRANULATION PRESENT    Comprehensive Metabolic Panel w/ Reflex to MG   Result Value Ref Range    Glucose 91 70 - 99 mg/dL    BUN 32 (H) 8 - 23 mg/dL    CREATININE 0.65 0.50 - 0.90 mg/dL    Bun/Cre Ratio NOT REPORTED 9 - 20    Calcium 7.8 (L) 8.6 - 10.4 mg/dL    Sodium 143 135 - 144 mmol/L    Potassium 4.9 3.7 - 5.3 mmol/L    Chloride 106 98 - 107 mmol/L    CO2 30 20 - 31 mmol/L    Anion Gap 7 (L) 9 - 17 mmol/L    Alkaline Phosphatase 61 35 - 104 U/L    ALT 14 5 - 33 U/L    AST 13 <32 U/L    Total Bilirubin 0.29 (L) 0.3 - 1.2 mg/dL    Total Protein 6.1 (L) 6.4 - 8.3 g/dL    Alb 2.3 (L) 3.5 - 5.2 g/dL    Albumin/Globulin Ratio 0.6 (L) 1.0 - 2.5    GFR Non-African American >60 >60 mL/min    GFR African American >60 >60 mL/min    GFR Comment          GFR Staging NOT REPORTED    HEMOGLOBIN AND HEMATOCRIT, BLOOD   Result Value Ref Range    Hemoglobin 8.1 (L) 11.9 - 15.1 g/dL    Hematocrit 26.1 (L) 36.3 - 47.1 %   HEMOGLOBIN AND HEMATOCRIT, BLOOD   Result Value Ref Range    Hemoglobin 8.0 (L) 11.9 - 15.1 g/dL    Hematocrit 25.7 (L) 36.3 - 47.1 %   CBC Auto Differential   Result Value Ref Range    WBC 6.8 3.5 - 11.3 k/uL    RBC 2.72 (L) 3.95 - 5.11 m/uL    Hemoglobin 8.2 (L) 11.9 - 15.1 g/dL    Hematocrit 26.7 (L) 36.3 - 47.1 %    MCV 98.2 82.6 - 102.9 fL    MCH 30.1 25.2 - 33.5 pg    MCHC 30.7 28.4 - 34.8 g/dL    RDW 17.0 (H) 11.8 - 14.4 %    Platelets 311 663 - 501 k/uL    MPV 11.8 8.1 - 13.5 fL    NRBC Automated 0.0 0.0 per 100 WBC    Differential Type NOT REPORTED     WBC Morphology NOT REPORTED     RBC Morphology NOT REPORTED     Platelet Estimate NOT REPORTED     Immature Granulocytes 3 (H) 0 %    Seg Neutrophils 86 (H) 36 - 66 %    Lymphocytes 7 (L) 24 - 44 %    Monocytes 4 1 - 7 %    Eosinophils % 0 (L) 1 - 4 %    Basophils 0 0 - 2 %    Absolute Immature Granulocyte 0.20 0.00 - 0.30 k/uL    Segs Absolute 5.85 1.8 - 7.7 k/uL    Absolute Lymph # 0.48 (L) 1.0 - 4.8 k/uL    Absolute Mono # 0.27 0.1 - 0.8 k/uL    Absolute Eos # 0.00 0.0 - 0.4 k/uL    Basophils Absolute 0.00 0.0 - 0.2 k/uL    Morphology ANISOCYTOSIS PRESENT     Morphology TOXIC GRANULATION PRESENT    HEMOGLOBIN AND HEMATOCRIT, BLOOD   Result Value Ref Range    Hemoglobin 8.0 (L) 11.9 - 15.1 g/dL    Hematocrit 26.4 (L) 36.3 - 47.1 %   CBC Auto Differential   Result Value Ref Range    WBC 8.2 3.5 - 11.3 k/uL    RBC 2.84 (L) 3.95 - 5.11 m/uL    Hemoglobin 8.4 (L) 11.9 - 15.1 g/dL    Hematocrit 27.8 (L) 36.3 - 47.1 %    MCV 97.9 82.6 - 102.9 fL    MCH 29.6 25.2 - 33.5 pg    MCHC 30.2 28.4 - 34.8 g/dL    RDW 16.6 (H) 11.8 - 14.4 %    Platelets 939 (L) 726 - 453 k/uL    MPV 12.3 8.1 - 13.5 fL    NRBC Automated 0.0 0.0 per 100 WBC    Differential Type NOT REPORTED     WBC Morphology NOT REPORTED     RBC Morphology NOT REPORTED     Platelet Estimate NOT REPORTED     Immature Granulocytes 5 (H) 0 %    Seg Neutrophils 83 (H) 36 - 65 %    Lymphocytes 9 (L) 24 - 43 %    Monocytes 2 (L) 3 - 12 %    Eosinophils % 0 (L) 1 - 4 %    Basophils 1 0 - 2 %    Absolute Immature Granulocyte 0.41 (H) 0.00 - 0.30 k/uL    Segs Absolute 6.81 1.50 - 8.10 k/uL    Absolute Lymph # 0.74 (L) 1.10 - 3.70 k/uL    Absolute Mono # 0.16 0.10 - 1.20 k/uL    Absolute Eos # 0.00 0.00 - 0.44 k/uL    Basophils Absolute 0.08 0.00 - 0.20 k/uL    Morphology ANISOCYTOSIS PRESENT     Morphology TOXIC GRANULATION PRESENT    BASIC METABOLIC PANEL   Result Value Ref Range    Glucose 121 (H) 70 - 99 mg/dL    BUN 44 (H) 8 - 23 mg/dL    CREATININE 0.66 0.50 - 0.90 mg/dL    Bun/Cre Ratio NOT REPORTED 9 - 20    Calcium 8.6 8.6 - 10.4 mg/dL    Sodium 144 135 - 144 mmol/L    Potassium 3.9 3.7 - 5.3 mmol/L    Chloride 105 98 - 107 mmol/L    CO2 33 (H) 20 - 31 mmol/L    Anion Gap 6 (L) 9 - 17 mmol/L    GFR Non-African American >60 >60 mL/min    GFR African American >60 >60 mL/min    GFR Comment          GFR Staging NOT REPORTED    HEMOGLOBIN AND HEMATOCRIT, BLOOD   Result Value Ref Range    Hemoglobin 7.8 (L) 11.9 - 15.1 g/dL    Hematocrit 25.6 (L) 36.3 - 47.1 %   POC Glucose Fingerstick   Result Value Ref Range    POC Glucose 108 (H) 65 - 105 mg/dL   Arterial Blood Gas, POC   Result Value Ref Range    POC pH 7.464 (H) 7.350 - 7.450    POC pCO2 37.1 35.0 - 48.0 mm Hg    POC PO2 87.4 83.0 - 108.0 mm Hg    POC HCO3 26.6 21.0 - 28.0 mmol/L    TCO2 (calc), Art 28 22.0 - 29.0 mmol/L    Negative Base Excess, Art NOT REPORTED 0.0 - 2.0    Positive Base Excess, Art 3 0.0 - 3.0    POC O2 SAT 97 94.0 - 98.0 %    O2 Device/Flow/% Adult Ventilator     Thomas Test NOT APPLICABLE     Sample Site Arterial Line     Mode PRVC     FIO2 100.0     Pt Temp 39.8     POC pH Temp 7.42     POC pCO2 Temp 42 mm Hg    POC pO2 Temp 105 mm Hg   POCT Glucose   Result Value Ref Range    POC Glucose 110 (H) 74 - 100 mg/dL   Arterial Blood Gas, POC   Result Value Ref Range    POC pH 7.433 7.350 - 7.450    POC pCO2 47.0 35.0 - 48.0 mm Hg    POC PO2 103.6 83.0 - 108.0 mm Hg    POC HCO3 31.4 (H) 21.0 - 28.0 mmol/L    TCO2 (calc), Art 33 (H) 22.0 - 29.0 mmol/L    Negative Base Excess, Art NOT REPORTED 0.0 - 2.0    Positive Base Excess, Art 6 (H) 0.0 - 3.0    POC O2 SAT 98 94.0 - 98.0 %    O2 Device/Flow/% Adult Ventilator     Thomas Test NOT APPLICABLE     Sample Site Arterial Line     Mode PRVC     FIO2 100.0     Pt Temp NOT REPORTED     POC pH Temp NOT REPORTED     POC pCO2 Temp NOT REPORTED mm Hg    POC pO2 Temp NOT REPORTED mm Hg   Lactic Acid, POC   Result Value Ref Range    POC Lactic Acid 0.91 0.56 - 1.39 mmol/L   POCT Glucose   Result Value Ref Range    POC Glucose 126 (H) 74 - 100 mg/dL   Arterial Blood Gas, POC   Result Value Ref Range    POC pH 7.448 7.350 - 7.450    POC pCO2 52.3 (H) 35.0 - 48.0 mm Hg    POC PO2 67.9 (L) 83.0 - 108.0 mm Hg    POC HCO3 36.2 (H) 21.0 - 28.0 mmol/L    TCO2 (calc), Art 38 (H) 22.0 - 29.0 mmol/L    Negative Base Excess, Art NOT REPORTED 0.0 - 2.0    Positive Base Excess, Art 11 (H) 0.0 - 3.0    POC O2 SAT 94 94.0 - 98.0 %    O2 Device/Flow/% Adult Ventilator     Thomas Test NOT APPLICABLE     Sample Site Arterial Line     Mode PRVC     FIO2 80.0     Pt Temp NOT REPORTED     POC pH Temp NOT REPORTED     POC pCO2 Temp NOT REPORTED mm Hg    POC pO2 Temp NOT REPORTED mm Hg   Lactic Acid, POC   Result Value Ref Range    POC Lactic Acid 0.91 0.56 - 1.39 mmol/L   POCT Glucose   Result Value Ref Range    POC Glucose 98 74 - 100 mg/dL   Arterial Blood Gas, POC   Result Value Ref Range    POC pH 7.429 7.350 - 7.450    POC pCO2 47.6 35.0 - 48.0 mm Hg    POC PO2 56.3 (L) 83.0 - 108.0 mm Hg    POC HCO3 31.5 (H) 21.0 - 28.0 mmol/L    TCO2 (calc), Art 33 (H) 22.0 - 29.0 mmol/L    Negative Base Excess, Art NOT REPORTED 0.0 - 2.0    Positive Base Excess, Art 6 (H) 0.0 - 3.0    POC O2 SAT 89 (L) 94.0 - 98.0 %    O2 Device/Flow/% NOT REPORTED     Thomas Test NOT APPLICABLE     Sample Site Arterial Line     Mode NOT REPORTED     FIO2 65.0     Pt Temp NOT REPORTED     POC pH Temp NOT REPORTED     POC pCO2 Temp NOT REPORTED mm Hg    POC pO2 Temp NOT REPORTED mm Hg   Arterial Blood Gas, POC   Result Value Ref Range    POC pH 7.412 7.350 - 7.450    POC pCO2 52.4 (H) 35.0 - 48.0 mm Hg    POC PO2 56.9 (L) 83.0 - 108.0 mm Hg    POC HCO3 33.3 (H) 21.0 - 28.0 mmol/L    TCO2 (calc), Art 35 (H) 22.0 - 29.0 mmol/L    Negative Base Excess, Art NOT REPORTED 0.0 - 2.0    Positive Base Excess, Art 8 (H) 0.0 - 3.0    POC O2 SAT 89 (L) 94.0 - 98.0 %    O2 Device/Flow/% NOT REPORTED     Thomas Test NOT APPLICABLE     Sample Site Arterial Line     Mode NOT REPORTED     FIO2 65.0     Pt Temp NOT REPORTED     POC pH Temp NOT REPORTED     POC pCO2 Temp NOT REPORTED mm Hg    POC pO2 Temp NOT REPORTED mm Hg   Arterial Blood Gas, POC   Result Value Ref Range    POC pH 7.387 7.350 - 7.450    POC pCO2 56.3 (H) 35.0 - 48.0 mm Hg    POC PO2 60.3 (L) 83.0 - 108.0 mm Hg    POC HCO3 33.9 (H) 21.0 - 28.0 mmol/L    TCO2 (calc), Art 36 (H) 22.0 - 29.0 mmol/L    Negative Base Excess, Art NOT REPORTED 0.0 - 2.0    Positive Base Excess, Art 8 (H) 0.0 - 3.0    POC O2 SAT 90 (L) 94.0 - 98.0 %    O2 Device/Flow/% Adult Ventilator     Thomas Test NOT APPLICABLE     Sample Site Arterial Line     Mode PRVC     FIO2 65.0     Pt Temp NOT REPORTED     POC pH Temp NOT REPORTED     POC pCO2 Temp NOT REPORTED mm Hg    POC pO2 Temp NOT REPORTED mm Hg   POCT Glucose   Result Value Ref Range    POC Glucose 127 (H) 74 - 100 mg/dL   Arterial Blood Gas, POC   Result Value Ref Range    POC pH 7.409 7.350 - 7.450    POC pCO2 58.2 (H) 35.0 - 48.0 mm Hg    POC PO2 53.7 (L) 83.0 - 108.0 mm Hg    POC HCO3 36.8 (H) 21.0 - 28.0 mmol/L    TCO2 (calc), Art 39 (H) 22.0 - 29.0 mmol/L    Negative Base Excess, Art NOT REPORTED 0.0 - 2.0    Positive Base Excess, Art 11 (H) 0.0 - 3.0    POC O2 SAT 87 (L) 94.0 - 98.0 %    O2 Device/Flow/% Adult Ventilator     Thomas Test NOT REPORTED     Sample Site Arterial Line     Mode PRVC     FIO2 65.0     Pt Temp NOT REPORTED     POC pH Temp NOT REPORTED     POC pCO2 Temp NOT REPORTED mm Hg    POC pO2 Temp NOT REPORTED mm Hg   POCT Glucose   Result Value Ref Range    POC Glucose 128 (H) 74 - 100 mg/dL   EKG 12 Lead   Result Value Ref Range    Ventricular Rate 96 BPM    Atrial Rate 96 BPM    P-R Interval 134 ms    QRS Duration 88 ms    Q-T Interval 336 ms    QTc Calculation (Bazett) 424 ms    P Axis 13 degrees    R Axis 47 degrees    T Axis 28 degrees   EKG 12 Lead   Result Value Ref Range    Ventricular Rate 87 BPM    Atrial Rate 87 BPM    P-R Interval 112 ms    QRS Duration 84 ms    Q-T Interval 340 ms    QTc Calculation (Bazett) 409 ms    P Axis 42 degrees    R Axis 71 degrees    T Axis 75 degrees   TYPE AND SCREEN   Result Value Ref Range    Expiration Date 11/20/2020,2359     Arm Band Number [id number]     ABO/Rh A POSITIVE     Antibody Screen NOT TESTED     Antibody ID Anti-Epping Present     SILVIA IgG NEGATIVE     Unit Number [id number]     Product Code Leukocyte Reduced Irradiated Red Cell     Unit Divison 00     Dispense Status REL FROM [facility]     Transfusion Status OK TO TRANSFUSE     Crossmatch Result COMPATIBLE     Unit Number [id number]     Product Code Leukocyte Reduced Red Cell     Unit Divison 00     Dispense Status REL FROM [facility]     Transfusion Status OK TO TRANSFUSE     Crossmatch Result COMPATIBLE     Unit Number [id number] most pronounced at the bases. No effusion or pneumothorax. Normal heart size. Postsurgical changes of median sternotomy. Right-sided Port-A-Cath is in place     *Low Probability for Pulmonary Embolus. *Bilateral mid to lower lung field left greater than right opacities with peripheral involvement most pronounced at the bases compatible with  COVID-19 pneumonia. The findings were sent to the Radiology Results [id number] at 2:46 pm on 11/17/2020to be communicated to a licensed caregiver. Nm Lung Scan Perfusion Only    Result Date: 11/17/2020  EXAMINATION: NUCLEAR MEDICINE PERFUSION SCAN. PORTABLE CHEST RADIOGRAPH 11/17/2020 TECHNIQUE: 5 millicuries of Tc 71M MAA was administered intravenously prior to planar imaging of the lungs in multiple projections. HISTORY: ORDERING SYSTEM PROVIDED HISTORY: elevated D-Dimer outside hospital Reason for Exam: Covid-19 positive pt. and elevated D-Dimer 2.44 FINDINGS: PERFUSION: Distribution of radiotracer is slightly heterogeneous. No segmental defects identified. CHEST RADIOGRAPH:  Bilateral mid to lower lung field left greater than right opacities with peripheral involvement most pronounced at the bases. No effusion or pneumothorax. Normal heart size. Postsurgical changes of median sternotomy. Right-sided Port-A-Cath is in place     *Low Probability for Pulmonary Embolus. *Bilateral mid to lower lung field left greater than right opacities with peripheral involvement most pronounced at the bases compatible with  COVID-19 pneumonia. The findings were sent to the Radiology Results [id number] at 2:46 pm on 11/17/2020to be communicated to a licensed caregiver.          IMPRESSION:   Primary Problem  Pneumonia due to COVID-19 virus    Active Hospital Problems    Diagnosis Date Noted    Other pancytopenia [facility]) [D61.818] 11/24/2020    Hypokalemia [E87.6] 11/22/2020    MDS (myelodysplastic syndrome) ([facility] [address] [D46.9] 11/17/2020    Pneumonia due to COVID-19 virus [U07.1, J12.89] 11/17/2020    Neutropenic sepsis ([facility] [address] [A41.9, D70.9] 11/17/2020    Bicytopenia [D75.89] 11/17/2020    ROSALINO (acute kidney injury) ([facility] [address] [N17.9] 11/17/2020    Acute respiratory failure with hypoxia ([facility] [address] [J96.01] 11/17/2020    Essential hypertension [I10] 11/17/2020    Coronary artery disease involving coronary bypass graft of native heart without angina pectoris [I25.810] 11/17/2020    History of Raynaud's syndrome [Z86.79] 11/17/2020    Transaminitis [R74.01] 11/17/2020    Severe sepsis ([facility] [address] [A41.9, R65.20] 11/17/2020    Immunosuppressed status ([facility] [address] [D84.9]     Acute respiratory failure due to COVID-19 ([facility] [address] [U07.1, J96.00] 11/16/2020       MDS  Anemia, transfusion dependent  COVID-19 infection    RECOMMENDATIONS:  Condition remains poor but stable  Counts remain stable   Continue to follow                             [facility]                              This note is created with the assistance of a speech recognition program.  While intending to generate a document that actually reflects the content of the visit, the document can still have some errors including those of syntax and sound a like substitutions which may escape proof reading. It such instances, actual meaning can be extrapolated by contextual diversion.

## 2020-11-28 NOTE — FLOWSHEET NOTE
Assessment:  Patient is in room 3023.  facilitated Zoom meeting with daughter Vale Galarza [phone]. Intervention:   facilitated [employer].  was ministry of presence. Outcome; Family expressed gratitude. Plan:  Chaplains will remain available for spiritual and emotional support as needed. 11/28/20 1354   Encounter Summary   Services provided to: Patient; Family;Significant other   Referral/Consult From: Family   Support System Spouse; Children;Family members   Continue Visiting   (11/28/2020)   Complexity of Encounter Moderate   Length of Encounter 45 minutes   Spiritual Assessment Completed Yes   Routine   Type Follow up   Assessment Approachable   Intervention Sustaining presence/ Ministry of presence   Outcome Expressed gratitude

## 2020-11-28 NOTE — PROGRESS NOTES
[facility]  Office: [phone]  Luana Gonsalez, DO, Bereket Carballo, DO, Luz Marina Myrick, DO, Sandra Elaine, DO, Jensen Santiago MD, Brittani Roberts MD, Julius Mayorga MD, Zoraida Conner MD, Caitlyn Myrick MD, Agustina Kingsley MD, Ludmila Banks MD, Floridalma Perez MD, Ren Medina MD, Lamonte Cooks, DO, Charlie Zarco MD, Kris Avery MD, Lesa Chan DO, Min Strickland MD,  Mendez Gutierrez DO, Luly Perry MD, Pacheco Mcbride MD, Brook[place]joslyn Mroeno [facility], [facility], CNP, Levar Hutchinson, CNP, Gregory Polo, CNS, Anyi Greenberg, CNP, Irena Gauthier, CNP, Romulo Yousif, CNP, Madeline Gomez, CNP, Lissy Lenz, CNP, Jazzy Lamb PA-C, Brandon Lincoln, [facility], Adolfo Stoll, CNP, Annalisa Arvizu, CNP, Ganesh Head, CNP, Carmela Cooper, CNP, Paulina Arriola, [facility]   [address] [facility]    Progress Note    11/28/2020    5:26 PM    Name:   Jem Miranda  MRN:     [id number]     Acct:      [de-identified]   Room:   [address] [place] [address] Day:  12  Admit Date:  11/16/2020 10:51 PM    PCP:   Raza Castañeda MD  Code Status:  Full Code    Subjective:     C/C: No chief complaint on file. SOB  Interval History Status: not changed. Seen and examined face-to-face today,  After deteriorating, intubated on November 23 and sedated,  Discussed with pulmonary critical care and infectious disease,  On dexamethasone,  Completed remdesivir,  Given convalescent plasma,  Antibiotics discontinued after negative cultures,  Neupogen for neutropenia,  Myelodysplastic syndrome, oncology on board,  Acute anemia, status post PRBC transfusion, hemoglobin over 8   Overall prognosis poor        Brief History:     Jem Miranda is a 70 y.o. female with a past medical history for MDS, essential hypertension, melanoma, mitral valve prolapse, MRSA, Raynaud's disease presents emergency department for shortness of breath and dyspnea upon exertion for 3 days.   Patient was initially seen at [facility] diagnosed with Covid pneumonia. Patient requiring 50% FiO2 on BiPAP, patient also had an elevated D-dimer but CTA was not possible secondary to contrast allergy. [facility] unable to accommodate due to lack of Covid beds, patient was transferred for further care. Spoke with pulmonary medicine at [facility] and was accepted for ICU transfer. Patient was given 1 unit of PRBCs at [facility] for a hemoglobin less than 7, patient was also placed on BiPAP for transfer. Patient arrived, no complaints except for some shortness of breath. Needing high flow oxygen, still critical    Intubated and sedated, endotracheal intubation done on November 23, 2020 due to acute respiratory failure not holding oxygenation even on FiO2 100% BiPAP    Review of Systems:     Sedated and intubated  Medications: Allergies: Allergies   Allergen Reactions    Fish Allergy Anaphylaxis, Hives and Swelling    Hydrocodone-Acetaminophen Anaphylaxis    Tizanidine Anaphylaxis and Hives    Atorvastatin Hives, Other (See Comments) and Swelling    Metoclopramide Hives     Other reaction(s): Unknown, Unknown    Moxifloxacin Hives, Other (See Comments) and Swelling     Other reaction(s): Other (See Comments), Unknown    Oxycodone Other (See Comments)     Other reaction(s): Hallucinations, Mental Status Change    Pregabalin Other (See Comments)     Other reaction(s): Hallucinations, Other (See Comments)  Causes sores in the mouth  Causes sores in the mouth      Tramadol      Other reaction(s): Edema, Other (See Comments)    Zolpidem Other (See Comments)     Other reaction(s): Other (See Comments)  causes cramps in hands and legs  causes cramps in hands and legs  Other reaction(s):  Other (See Comments)  causes cramps in hands and legs  causes cramps in hands and legs      Cephalexin Rash     Other reaction(s): Unknown, Unknown    Iodides Rash and Swelling     ivp and heart cath dye    Sulfa I/O (24Hr): Intake/Output Summary (Last 24 hours) at 11/28/2020 1726  Last data filed at 11/28/2020 1600  Gross per 24 hour   Intake 1909.6 ml   Output 1630 ml   Net 279.6 ml       Labs:  Hematology:  Recent Labs     11/26/20  0309 11/27/20  0341 11/27/20 2055 11/28/20  0417   WBC 5.3  --  6.8  --  8.2   RBC 2.64*  --  2.72*  --  2.84*   HGB 8.0*   < > 8.2* 8.0* 8.4*   HCT 25.2*   < > 26.7* 26.4* 27.8*   MCV 95.5  --  98.2  --  97.9   MCH 30.3  --  30.1  --  29.6   MCHC 31.7  --  30.7  --  30.2   RDW 17.4*  --  17.0*  --  16.6*     --  148  --  118*   MPV 11.3  --  11.8  --  12.3    < > = values in this interval not displayed. Chemistry:  Recent Labs     11/26/20 0309 11/28/20  0417    144   K 4.9 3.9    105   CO2 30 33*   GLUCOSE 91 121*   BUN 32* 44*   CREATININE 0.65 0.66   ANIONGAP 7* 6*   LABGLOM >60 >60   GFRAA >60 >60   CALCIUM 7.8* 8.6     Recent Labs     11/26/20  0309 11/27/20 0324 11/28/20  0510   PROT 6.1*  --   --    LABALBU 2.3*  --   --    AST 13  --   --    ALT 14  --   --    ALKPHOS 61  --   --    BILITOT 0.29*  --   --    POCGLU  --  127* 128*     ABG:  Lab Results   Component Value Date    POCPH 7.409 11/28/2020    POCPCO2 58.2 11/28/2020    POCPO2 53.7 11/28/2020    POCHCO3 36.8 11/28/2020    NBEA NOT REPORTED 11/28/2020    PBEA 11 11/28/2020    KFU6EFX 39 11/28/2020    PQRJ0VGG 87 11/28/2020    FIO2 65.0 11/28/2020     Lab Results   Component Value Date/Time    SPECIAL RT HAND 2ML 11/17/2020 02:48 AM     Lab Results   Component Value Date/Time    CULTURE NO GROWTH 6 DAYS 11/17/2020 02:48 AM       Radiology:  Olamide Milner Chest (single View Frontal)    Result Date: 11/17/2020  *Low Probability for Pulmonary Embolus. *Bilateral mid to lower lung field left greater than right opacities with peripheral involvement most pronounced at the bases compatible with  COVID-19 pneumonia.  The findings were sent to the Radiology Results [address] at 2:46 pm on 11/17/2020to be communicated to a licensed caregiver. Nm Lung Scan Perfusion Only    Result Date: 11/17/2020  *Low Probability for Pulmonary Embolus. *Bilateral mid to lower lung field left greater than right opacities with peripheral involvement most pronounced at the bases compatible with  COVID-19 pneumonia. The findings were sent to the Radiology Results [address] at 2:46 pm on 11/17/2020to be communicated to a licensed caregiver. Physical Examination:        General appearance: Sedated and intubated  Lungs: Coarse breath sounds bilateral  Heart:  regular rate and rhythm, no murmur  Abdomen:  soft, nontender, nondistended, normal bowel sounds, no masses, hepatomegaly, splenomegaly  Extremities:  no edema, redness, tenderness in the calves  Skin:  no gross lesions, rashes, induration    Assessment:        Hospital Problems           Last Modified POA    * (Principal) Pneumonia due to COVID-19 virus 11/17/2020 Yes    Acute respiratory failure due to COVID-19 ([address] 11/16/2020 Yes    MDS (myelodysplastic syndrome) ([address] 11/17/2020 Yes    Neutropenic sepsis ([address] 11/17/2020 Yes    Bicytopenia 11/17/2020 Yes    ROSALINO (acute kidney injury) ([address] 11/17/2020 Yes    Acute respiratory failure with hypoxia ([address] 11/17/2020 Yes    Essential hypertension 11/17/2020 Yes    Coronary artery disease involving coronary bypass graft of native heart without angina pectoris 11/17/2020 Yes    History of Raynaud's syndrome 11/17/2020 Yes    Transaminitis 11/17/2020 Yes    Immunosuppressed status ([address] 11/17/2020 Yes    Severe sepsis ([address] 11/17/2020 Yes    Hypokalemia 11/22/2020 Yes    Other pancytopenia ([address] 11/24/2020 Yes          Plan:        1. Covid pneumonia, acute respiratory failure, sedated, placed on mechanical ventilation due to deterioration of respiratory failure. on remdesivir, convalescent plasma, Decadron, infectious disease and pulmonary critical care on board,   2.  Myelodysplastic syndrome and pancytopenia, oncology on board and discussed in detail, advised to keep the patient on DVT prophylaxis with Lovenox twice daily,  3. Severe anemia, post transfusion 1 unit, hemoglobin 8, lovenox per oncology and critical care   4. Hypokalemia, replaced ,    5. VQ scan low probability,  6. Keep negative fluid balance, but patient was hypotensive, Lasix on hold,  7. Neutropenic sepsis, antibiotics as per infectious disease, blood cultures negative, antibiotics stopped by infectious disease at this time,  8. Neutropenia, Neupogen as per oncology  9. Acute kidney injury, creatinine improving,  10. Transaminitis, monitor liver functions,  11. Hypertension, monitor blood pressure,  12. DVT scds only, Lovenox on hold   13. GI prophylaxis,  14.  Full CODE STATUS,  15. Critical care time spent 33 minutes    Gabbie Scherer MD  11/28/2020  5:26 PM

## 2020-11-29 NOTE — PLAN OF CARE
BRONCHOSPASM/BRONCHOCONSTRICTION     [x]         IMPROVE AERATION/BREATH SOUNDS  [x]   ADMINISTER BRONCHODILATOR THERAPY AS APPROPRIATE  [x]   ASSESS BREATH SOUNDS  [x]   IMPLEMENT AEROSOL/MDI PROTOCOL  [x]   PATIENT EDUCATION AS NEEDED   PROVIDE ADEQUATE OXYGENATION WITH ACCEPTABLE SP02/ABG'S    [x]  IDENTIFY APPROPRIATE OXYGEN THERAPY  [x]   MONITOR SP02/ABG'S AS NEEDED   [x]   PATIENT EDUCATION AS NEEDED   MECHANICAL VENTILATION     [x]  PROVIDE OPTIMAL VENTILATION  [x]   ASSESS FOR EXTUBATION READINESS  [x]   ASSESS FOR WEANING READINESS  [x]  EXTUBATE AS TOLERATED  [x]  IMPLEMENT ADULT MECHANICAL VENTILATION PROTOCOL  [x]  MAINTAIN ADEQUATE OXYGENATION    Problem:  Activity:  Goal: Fatigue will decrease  Description: Fatigue will decrease  11/29/2020 1219 by Augustus Blake RN  Outcome: Ongoing     Problem: Cardiac:  Goal: Hemodynamic stability will improve  Description: Hemodynamic stability will improve  11/29/2020 1219 by Augustus Blake RN  Outcome: Ongoing     Problem: Coping:  Goal: Level of anxiety will decrease  Description: Level of anxiety will decrease  11/29/2020 1219 by Augustus Blake RN  Outcome: Ongoing  Goal: Ability to cope will improve  Description: Ability to cope will improve  11/29/2020 1219 by Augustus Blake RN  Outcome: Ongoing  Goal: Ability to establish a method of communication will improve  Description: Ability to establish a method of communication will improve  11/29/2020 1219 by Augustus Blake RN  Outcome: Ongoing     Problem: Nutritional:  Goal: Consumption of the prescribed amount of daily calories will improve  Description: Consumption of the prescribed amount of daily calories will improve  11/29/2020 1219 by Augustus Blake RN  Outcome: Ongoing     Problem: Respiratory:  Goal: Ability to maintain a clear airway will improve  Description: Ability to maintain a clear airway will improve  11/29/2020 1751 by Ryland Chakraborty RCP  Outcome: Ongoing  11/29/2020 1219 by Umair Foster Tj Bedoya RN  Outcome: Ongoing  Goal: Ability to maintain adequate ventilation will improve  Description: Ability to maintain adequate ventilation will improve  11/29/2020 1751 by Balbina Troncoso RCP  Outcome: Ongoing  11/29/2020 1219 by Arabella Storm RN  Outcome: Ongoing  Goal: Complications related to the disease process, condition or treatment will be avoided or minimized  Description: Complications related to the disease process, condition or treatment will be avoided or minimized  11/29/2020 1751 by Balbina Troncoso RCP  Outcome: Ongoing  11/29/2020 1219 by Arabella Storm RN  Outcome: Ongoing     Problem: Skin Integrity:  Goal: Risk for impaired skin integrity will decrease  Description: Risk for impaired skin integrity will decrease  11/29/2020 1219 by Arabella Storm RN  Outcome: Ongoing  Goal: Will show no infection signs and symptoms  Description: Will show no infection signs and symptoms  11/29/2020 1219 by Arabella Storm RN  Outcome: Ongoing  Goal: Absence of new skin breakdown  Description: Absence of new skin breakdown  11/29/2020 1219 by Arabella Storm RN  Outcome: Ongoing     Problem: Falls - Risk of:  Goal: Will remain free from falls  Description: Will remain free from falls  11/29/2020 1219 by Arabella Storm RN  Outcome: Ongoing  Goal: Absence of physical injury  Description: Absence of physical injury  11/29/2020 1219 by Arabella Stomr RN  Outcome: Ongoing     Problem: Pain:  Goal: Pain level will decrease  Description: Pain level will decrease  11/29/2020 1219 by Arabella Storm RN  Outcome: Ongoing  Goal: Control of acute pain  Description: Control of acute pain  11/29/2020 1219 by Arabella Storm RN  Outcome: Ongoing  Goal: Control of chronic pain  Description: Control of chronic pain  11/29/2020 1219 by Arabella Storm RN  Outcome: Ongoing     Problem: Airway Clearance - Ineffective  Goal: Achieve or maintain patent airway  11/29/2020 1751 by Balbina Troncoso RCP  Outcome: Ongoing  11/29/2020 21  by Christiano Parker RN  Outcome: Ongoing     Problem: Gas Exchange - Impaired  Goal: Absence of hypoxia  11/29/2020 1751 by Chandler Schultz RCP  Outcome: Ongoing  11/29/2020 1219 by Christiano Parker RN  Outcome: Ongoing  Goal: Promote optimal lung function  11/29/2020 1751 by Chandler Schultz RCP  Outcome: Ongoing  11/29/2020 1219 by Christiano Parker RN  Outcome: Ongoing     Problem: Breathing Pattern - Ineffective  Goal: Ability to achieve and maintain a regular respiratory rate  11/29/2020 1751 by Chandler Schultz RCP  Outcome: Ongoing  11/29/2020 1219 by Christiano Parker RN  Outcome: Ongoing     Problem:  Body Temperature -  Risk of, Imbalanced  Goal: Ability to maintain a body temperature within defined limits  11/29/2020 1219 by Christiano Parker RN  Outcome: Ongoing  Goal: Will regain or maintain usual level of consciousness  11/29/2020 1219 by Christiano Parker RN  Outcome: Ongoing  Goal: Complications related to the disease process, condition or treatment will be avoided or minimized  11/29/2020 1219 by Christiano Parker RN  Outcome: Ongoing     Problem: Isolation Precautions - Risk of Spread of Infection  Goal: Prevent transmission of infection  11/29/2020 1219 by Christiano Parker RN  Outcome: Ongoing     Problem: Nutrition Deficits  Goal: Optimize nutrtional status  11/29/2020 1219 by Christiano Parker RN  Outcome: Ongoing     Problem: Risk for Fluid Volume Deficit  Goal: Maintain normal heart rhythm  11/29/2020 1219 by Christiano Parker RN  Outcome: Ongoing  Goal: Maintain absence of muscle cramping  11/29/2020 1219 by Christiano Parker RN  Outcome: Ongoing  Goal: Maintain normal serum potassium, sodium, calcium, phosphorus, and pH  11/29/2020 1219 by Christiano Parker RN  Outcome: Ongoing     Problem: Loneliness or Risk for Loneliness  Goal: Demonstrate positive use of time alone when socialization is not possible  11/29/2020 1219 by Christiano Parker RN  Outcome: Ongoing     Problem: Fatigue  Goal: Verbalize increase energy and improved vitality  11/29/2020 1219 by Rebeka Murray RN  Outcome: Ongoing     Problem: Patient Education: Go to Patient Education Activity  Goal: Patient/Family Education  11/29/2020 1219 by Rebeka Murray RN  Outcome: Ongoing     Problem: OXYGENATION/RESPIRATORY FUNCTION  Goal: Patient will maintain patent airway  11/29/2020 1751 by Khai Lu [facility]  Outcome: Ongoing  11/29/2020 1219 by Rebeka Murray RN  Outcome: Ongoing  Goal: Patient will achieve/maintain normal respiratory rate/effort  Description: Respiratory rate and effort will be within normal limits for the patient  11/29/2020 1751 by Khai Lu [facility]  Outcome: Ongoing  11/29/2020 1219 by Rebeka Murray RN  Outcome: Ongoing     Problem: Nutrition  Goal: Optimal nutrition therapy  Description: Nutrition Problem #1: Inadequate oral intake  Intervention: Food and/or Nutrient Delivery: Continue NPO(Start diet vs nutrition support as able)  Nutritional Goals: Start diet vs nutrition support within 24-72 hrs     11/29/2020 1219 by Rebeka Murray RN  Outcome: Ongoing     Problem: MECHANICAL VENTILATION  Goal: Patient will maintain patent airway  11/29/2020 1751 by Khai Lu [facility]  Outcome: Ongoing  11/29/2020 1219 by Rebeka Murray RN  Outcome: Ongoing  Goal: Oral health is maintained or improved  11/29/2020 1751 by Khai Lu [facility]  Outcome: Ongoing  11/29/2020 1219 by Rebeka Murray RN  Outcome: Ongoing  Goal: ET tube will be managed safely  11/29/2020 1751 by Khai Lu, [facility]  Outcome: Ongoing  11/29/2020 1219 by Rebeka Murray RN  Outcome: Ongoing  Goal: Ability to express needs and understand communication  11/29/2020 1751 by Khai Lu, [facility]  Outcome: Ongoing  11/29/2020 1219 by Rebeka Murray RN  Outcome: Ongoing  Goal: Mobility/activity is maintained at optimum level for patient  11/29/2020 1751 by Khai Lu, [facility]  Outcome: Ongoing  11/29/2020 1219 by Rebeka Murray RN  Outcome: Ongoing     Problem: SKIN INTEGRITY  Goal: Skin integrity is maintained or improved  11/29/2020 1751 by Ayo Cerda RCP  Outcome: Ongoing  11/29/2020 1219 by Timoteo Jacinto RN  Outcome: Ongoing     PERFORM SPONTANEOUS WEANING TRIAL AS TOLERATED

## 2020-11-29 NOTE — PROGRESS NOTES
· Ideal Body Weight: 135 lbs; % Ideal Body Weight 120 %   · BMI: 24.5  · BMI Categories: Normal Weight (BMI 22.0 to 24.9) age over 72       Nutrition Diagnosis:   · Inadequate oral intake related to impaired respiratory function as evidenced by NPO or clear liquid status due to medical condition, nutrition support - enteral nutrition    Nutrition Interventions:   Food and/or Nutrient Delivery:  Continue Current Tube Feeding  Nutrition Education/Counseling:  Education not indicated, No recommendation at this time   Coordination of Nutrition Care:  Continue to monitor while inpatient    Goals:  meet % of estimated nutrient needs  - Progressing    Nutrition Monitoring and Evaluation:   Food/Nutrient Intake Outcomes:  Enteral Nutrition Intake/Tolerance  Physical Signs/Symptoms Outcomes:  Biochemical Data, GI Status, Fluid Status or Edema, Skin, Weight, Nutrition Focused Physical Findings     Electronically signed by Kyra Mack RD, LD on 11/29/20 at 1:25 PM EST    Contact: [phone]

## 2020-11-29 NOTE — PLAN OF CARE
Problem:  Activity:  Goal: Fatigue will decrease  Description: Fatigue will decrease  Outcome: Ongoing     Problem: Cardiac:  Goal: Hemodynamic stability will improve  Description: Hemodynamic stability will improve  Outcome: Ongoing     Problem: Coping:  Goal: Level of anxiety will decrease  Description: Level of anxiety will decrease  Outcome: Ongoing  Goal: Ability to cope will improve  Description: Ability to cope will improve  Outcome: Ongoing  Goal: Ability to establish a method of communication will improve  Description: Ability to establish a method of communication will improve  Outcome: Ongoing     Problem: Nutritional:  Goal: Consumption of the prescribed amount of daily calories will improve  Description: Consumption of the prescribed amount of daily calories will improve  Outcome: Ongoing     Problem: Respiratory:  Goal: Ability to maintain a clear airway will improve  Description: Ability to maintain a clear airway will improve  Outcome: Ongoing  Goal: Ability to maintain adequate ventilation will improve  Description: Ability to maintain adequate ventilation will improve  Outcome: Ongoing  Goal: Complications related to the disease process, condition or treatment will be avoided or minimized  Description: Complications related to the disease process, condition or treatment will be avoided or minimized  Outcome: Ongoing     Problem: Skin Integrity:  Goal: Risk for impaired skin integrity will decrease  Description: Risk for impaired skin integrity will decrease  Outcome: Ongoing  Goal: Will show no infection signs and symptoms  Description: Will show no infection signs and symptoms  Outcome: Ongoing  Goal: Absence of new skin breakdown  Description: Absence of new skin breakdown  Outcome: Ongoing     Problem: Falls - Risk of:  Goal: Will remain free from falls  Description: Will remain free from falls  Outcome: Ongoing  Goal: Absence of physical injury  Description: Absence of physical injury  Outcome: Ongoing     Problem: Pain:  Goal: Pain level will decrease  Description: Pain level will decrease  Outcome: Ongoing  Goal: Control of acute pain  Description: Control of acute pain  Outcome: Ongoing  Goal: Control of chronic pain  Description: Control of chronic pain  Outcome: Ongoing     Problem: Airway Clearance - Ineffective  Goal: Achieve or maintain patent airway  Outcome: Ongoing     Problem: Gas Exchange - Impaired  Goal: Absence of hypoxia  Outcome: Ongoing  Goal: Promote optimal lung function  Outcome: Ongoing     Problem: Breathing Pattern - Ineffective  Goal: Ability to achieve and maintain a regular respiratory rate  Outcome: Ongoing     Problem:  Body Temperature -  Risk of, Imbalanced  Goal: Ability to maintain a body temperature within defined limits  Outcome: Ongoing  Goal: Will regain or maintain usual level of consciousness  Outcome: Ongoing  Goal: Complications related to the disease process, condition or treatment will be avoided or minimized  Outcome: Ongoing     Problem: Isolation Precautions - Risk of Spread of Infection  Goal: Prevent transmission of infection  Outcome: Ongoing     Problem: Nutrition Deficits  Goal: Optimize nutrtional status  Outcome: Ongoing     Problem: Risk for Fluid Volume Deficit  Goal: Maintain normal heart rhythm  Outcome: Ongoing  Goal: Maintain absence of muscle cramping  Outcome: Ongoing  Goal: Maintain normal serum potassium, sodium, calcium, phosphorus, and pH  Outcome: Ongoing     Problem: Loneliness or Risk for Loneliness  Goal: Demonstrate positive use of time alone when socialization is not possible  Outcome: Ongoing     Problem: Fatigue  Goal: Verbalize increase energy and improved vitality  Outcome: Ongoing     Problem: Patient Education: Go to Patient Education Activity  Goal: Patient/Family Education  Outcome: Ongoing     Problem: OXYGENATION/RESPIRATORY FUNCTION  Goal: Patient will maintain patent airway  Outcome: Ongoing  Goal: Patient will achieve/maintain normal respiratory rate/effort  Description: Respiratory rate and effort will be within normal limits for the patient  Outcome: Ongoing     Problem: Nutrition  Goal: Optimal nutrition therapy  Description: Nutrition Problem #1: Inadequate oral intake  Intervention: Food and/or Nutrient Delivery: Continue NPO(Start diet vs nutrition support as able)  Nutritional Goals: Start diet vs nutrition support within 24-72 hrs     Outcome: Ongoing     Problem: MECHANICAL VENTILATION  Goal: Patient will maintain patent airway  Outcome: Ongoing  Goal: Oral health is maintained or improved  Outcome: Ongoing  Goal: ET tube will be managed safely  Outcome: Ongoing  Goal: Ability to express needs and understand communication  Outcome: Ongoing  Goal: Mobility/activity is maintained at optimum level for patient  Outcome: Ongoing     Problem: SKIN INTEGRITY  Goal: Skin integrity is maintained or improved  Outcome: Ongoing     Problem: Restraint Use - Nonviolent/Non-Self-Destructive Behavior:  Goal: Absence of restraint indications  Description: Absence of restraint indications  Outcome: Not Met This Shift  Goal: Absence of restraint-related injury  Description: Absence of restraint-related injury  Outcome: Not Met This Shift

## 2020-11-29 NOTE — PROGRESS NOTES
[facility]  Progress Note    Andra Azevedo  MRN: [id number]  Date: 11/29/2020  LOS: 15     Reason for F/U :   COVID-19 virus infection    Impression :   1. Acute toxic respiratory failure with hypoxia secondary to COVID-19 virus pneumonia  2. Myelodysplastic syndrome, neutropenic  3. Immunocompromise host  4. Anemia secondary to MDS, status post RBC transfusion  5. Shock secondary to sepsis, resolved    Recommendations:   · Patient received a course of remdesivir, 11/20/2020  · Patient received 1 unit of convalescent plasma  · Patient is status post course of dexamethasone  · Patient did receive a course of meropenem and vancomycin  · Continue supportive care on ARDS protocol    Infection Control Recommendations:   Droplet plus precautions    Discharge Planning:     Patient will need Midline Catheter Insertion/ PICC line Insertion: No  Patient will need: Home IV , [place],  SNF,  LTAC: Undetermined  Patient willneed outpatient wound care: No    Medical Decision making / Summary of Stay:   Severa Living a 70y.o.-year-old female w resp distress transferred from [address] pneumonia and underlying MDS.   Neutropenia  On bipap and 50% FIO2, elevated D-Dimers and went for a VQ scan  Past cig smoking - HTN - Raynaud-     Started on decadron remdesevir  started on meropenem and vanco pend BC due to concern for ongoing sepsis  Intubated 11/23 due to respiratory failure and hypotension  Remains sedated on the ventilator, FiO2 90% on 10 of PEEP, slightly better than yesterday     ferritin has been improving     Has had Neupogen to improve her white count  tolerated 1 unit of plasma well 11/17     No positive cultures-  Mycoplasma IGM neg    Current evaluation:11/29/2020    BP (!) 114/52   Pulse 100   Temp 100.8 °F (38.2 °C) (Core)   Resp 17   Ht 5' 7\" (1.702 m)   Wt 156 lb 4.8 oz (70.9 kg)   SpO2 99%   BMI 24.48 kg/m²     Temperature Range: Temp: 100.8 °F (38.2 °C) Temp  Avg: 99.8 °F (37.7 °C)  Min: 97.3 °F (36.3 °C)  Max: 100.9 °F (38.3 °C)    Patient seen and evaluated at bedside  She remains intubated and sedated. The patient remains ventilator dependent, FiO2 down to 50%, PEEP 12  Patient with continued intermittent low-grade temperature    Review of Systems   Unable to perform ROS: Intubated       Physical Examination :     Physical Exam  Constitutional:       Interventions: She is sedated and intubated. HENT:      Head: Normocephalic and atraumatic. Mouth/Throat:      Comments: Orally intubated  Cardiovascular:      Rate and Rhythm: Regular rhythm. Tachycardia present. Pulmonary:      Effort: Pulmonary effort is normal. No respiratory distress. She is intubated. Musculoskeletal:      Right lower leg: Edema present. Left lower leg: Edema present. Laboratory data:   I have independently reviewed the followinglabs:  CBC with Differential:   Recent Labs     11/28/20 0417  11/29/20  0318 11/29/20  1208   WBC 8.2  --  8.0  --    HGB 8.4*   < > 7.3* 7.3*   HCT 27.8*   < > 25.2* 23.9*   *  --  79*  --    LYMPHOPCT 9*  --  9*  --    MONOPCT 2*  --  2*  --     < > = values in this interval not displayed. BMP:   Recent Labs     11/28/20 0417      K 3.9      CO2 33*   BUN 44*   CREATININE 0.66     Hepatic Function Panel:   No results for input(s): PROT, LABALBU, BILIDIR, IBILI, BILITOT, ALKPHOS, ALT, AST in the last 72 hours.       Lab Results   Component Value Date    PROCAL 1.00 11/17/2020     Lab Results   Component Value Date    CRP 67.6 11/21/2020    .4 11/18/2020    .8 11/17/2020     No results found for: Silvia Mcallister      Lab Results   Component Value Date    DDIMER 1.93 11/21/2020    DDIMER 2.44 11/17/2020     Lab Results   Component Value Date    FERRITIN 4,245 11/25/2020    FERRITIN 2,838 11/21/2020    FERRITIN 3,590 11/20/2020    FERRITIN 3,453 11/18/2020    FERRITIN 3,024 11/17/2020     Lab Results   Component Value Date  11/18/2020     11/17/2020     11/17/2020     Lab Results   Component Value Date    FIBRINOGEN 506 11/21/2020    FIBRINOGEN 673 11/20/2020    FIBRINOGEN 1002 11/18/2020    FIBRINOGEN 720 11/17/2020    FIBRINOGEN 721 11/17/2020       Results in Past 30 Days  Result Component Current Result Ref Range Previous Result Ref Range   SARS-CoV-2, PCR DETECTED (A) (11/17/2020) Not Detected Positive (A) (11/16/2020)      Lab Results   Component Value Date    COVID19 DETECTED 11/17/2020    COVID19 Positive 11/16/2020       No results for input(s): VANCOTROUGH in the last 72 hours. Imaging Studies:   No new imaging    Cultures:   No new culture data    Medications:      docusate  100 mg Oral BID    pantoprazole  40 mg Intravenous BID    And    sodium chloride (PF)  10 mL Intravenous Daily    potassium chloride  10 mEq Intravenous Daily    LORazepam  0.25 mg Intravenous Once    [Held by provider] enoxaparin  40 mg Subcutaneous BID    latanoprost  1 drop Both Eyes Nightly    sodium chloride  20 mL Intravenous Once    sodium chloride  20 mL Intravenous Once           [facility]  [id number]  OFFICE: [phone]      Electronically signed by MICHAEL Santamaria CNP on 11/29/2020 at 1:12 PM  Thank you for allowing us to participate in the care of this patient. Please call with questions. This note iscreated with the assistance of a speech recognition program.  While intending to generate a document that actually reflects the content of the visit, the document can still have some errors including those of syntax andsound a like substitutions which may escape proof reading. In such instances, actual meaning can be extrapolated by contextual diversion.

## 2020-11-29 NOTE — PROGRESS NOTES
Today's Date: 11/29/2020  Patient Name: Juno Donohue  Date of admission: 11/16/2020 10:51 PM  Patient's age: 70 y.o., 1945  Admission Dx: Acute respiratory failure due to COVID-19 ([facility] [U07.1, J96.00]    Reason for Consult: management recommendations  Requesting Physician: Desmond De Souza MD    CHIEF COMPLAINT: Anemia. MDS. COVID-19 positive. History Obtained From:  patient, electronic medical record    Interval history:    Chart reviewed intervals noted  No significant changes. No active bleeding. No fever. HISTORY OF PRESENT ILLNESS:      The patient is a 70 y.o.  female who is admitted to the hospital with chief complaint of shortness of breath dyspnea for the last 3 days. Further testing revealed patient was COVID-19 positive. Patient was initially seen in outlying hospital and then transferred. Patient is requiring noninvasive positive pressure ventilation support. CTA has not been able to get done due to contrast allergy. Reportedly patient also has history of MDS. CBC from 9/2020 showed a WBC count of 1.6 hemoglobin 9.4 with MCV 95. Patient has been started on full dose anticoagulation due to concerns regarding pulmonary embolism. Patient was also recently started on Vidaza and has received 1 cycle so far. Patient is also transfusion dependent. Patient is supposed to get next cycle of Vidaza in the next week or so. Patient most recent bone marrow biopsy from 9/2020 showed normocellular marrow with erythroid hyperplasia and mild this erythropoiesis and 8% blasts. Recommendations from [address] [place] [address],[address]: Copied from care everywhere. ASSESSMENT AND PALN:  Ms. 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  In this situation, we previously discussed that we often favor management as if the patient is higher-tier risk disease, as outcomes for patients with IPSS-R 4.5 at diagnosis are similar to high risk. But unfortunately, she is not a candidate for her only curative option, i.e., allogeneic hematopoietic cell transplantation. Therefore, when we initially consulted on her, we discussed that all therapy is palliative. Given all therapies would be palliative, we discussed this in the context that she had been transfusion-independent. Thus, while initiation of hypomethylating agent (HMA) would have certainly been justifiable at that juncture, she had cytopenias for several months and still had not required transfusions or developed significant infection. Therefore, we discussed close observation since she was transfusion independent. We discussed the risks/beneftis of such an approach. At this visit, she is now beginning to require transfusions, so we agree with the plan to begin azacitidine locally under the care of Dr. Bill Bills. We again discussed that we would recommend a repeat bone marrow exam prior to initiating therapy for palliation. If she declines treatment, one could consider EPO supplementation to minimize need for transfusion. Given her [address], antiviral prophylaxis with acyclovir (400 mg BID) could be considered. If her [address] were to persistently be lower than 500/uL, antifungal prophylaxis could be considered. She will continue to follow up with Dr. Bill Bills for ongoing management. We will see her back prn. Past Medical History:   has a past medical history of Cancer ([facility] Deaf, left, Essential hypertension, GERD (gastroesophageal reflux disease), Glaucoma, Hyperlipidemia, Melanoma ([facility] MRSA (methicillin resistant staph aureus) culture positive, MVP (mitral valve prolapse), Pharyngoesophageal dysphagia, Raynaud disease, and Status post four vessel coronary artery bypass.     Past Surgical History:   has a past surgical history that includes Cardiac surgery; Tubal ligation; Appendectomy; Cholecystectomy; Hysterectomy, total abdominal; Small intestine surgery; and IR PORT PLACEMENT < 5 YEARS. Medications:    Prior to Admission medications    Medication Sig Start Date End Date Taking? Authorizing Provider   dapsone 100 MG tablet Take 1 tablet by mouth daily (with breakfast) 10/16/20  Yes Historical Provider, MD   aspirin 81 MG EC tablet Take 81 mg by mouth daily   Yes Historical Provider, MD   carvedilol (COREG) 6.25 MG tablet Take 6.25 mg by mouth 2 times daily (with meals)   Yes Historical Provider, MD   Isavuconazonium Sulfate (CRESEMBA) 186 MG CAPS Take by mouth   Yes Historical Provider, MD   diphenhydrAMINE (BENADRYL) 25 MG capsule Take 25 mg by mouth every 6 hours as needed for Itching   Yes Historical Provider, MD   hydroxychloroquine (PLAQUENIL) 200 MG tablet Take 200 mg by mouth daily   Yes Historical Provider, MD   nitroGLYCERIN (NITROSTAT) 0.4 MG SL tablet Place 0.4 mg under the tongue every 5 minutes as needed for Chest pain up to max of 3 total doses. If no relief after 1 dose, call 911.    Yes Historical Provider, MD   omeprazole (PRILOSEC) 20 MG delayed release capsule Take 40 mg by mouth daily   Yes Historical Provider, MD   phenazopyridine (PYRIDIUM) 100 MG tablet Take 100 mg by mouth 3 times daily as needed for Pain   Yes Historical Provider, MD   predniSONE (DELTASONE) 1 MG tablet Take 1 mg by mouth daily   Yes Historical Provider, MD   prochlorperazine (COMPAZINE) 25 MG suppository Place 25 mg rectally every 12 hours as needed for Nausea   Yes Historical Provider, MD   Travoprost (TRAVATAN OP) Apply to eye   Yes Historical Provider, MD     Current Facility-Administered Medications   Medication Dose Route Frequency Provider Last Rate Last Dose    docusate (COLACE) 50 MG/5ML liquid 100 mg  100 mg Oral BID Adelina Rich MD   100 mg at 11/29/20 1202    pantoprazole (PROTONIX) injection 40 mg  40 mg Intravenous BID Jewel A Lillie, APRN - CNP   40 mg at 11/29/20 0912    And    sodium chloride (PF) 0.9 % injection 10 mL  10 mL Intravenous Daily Samuel Moreira, APRN - CNP   10 mL at 11/29/20 0913    fentaNYL 20 mcg/mL Infusion  25 mcg/hr Intravenous Continuous Romilda Lint Hauger, DO 5 mL/hr at 11/29/20 0914 100 mcg/hr at 11/29/20 0914    midazolam (VERSED) 1 mg/mL in D5W infusion  1 mg/hr Intravenous Continuous Vernetta Cogan, MD 2 mL/hr at 11/29/20 1847 2 mg/hr at 11/29/20 1847    potassium chloride 10 mEq/100 mL IVPB (Peripheral Line)  10 mEq Intravenous Daily Keli Maddox  mL/hr at 11/29/20 0913 10 mEq at 11/29/20 0913    LORazepam (ATIVAN) injection 0.5 mg  0.5 mg Intravenous Q8H PRN Keli Maddox MD   0.5 mg at 11/29/20 0051    LORazepam (ATIVAN) injection 0.25 mg  0.25 mg Intravenous Once Deena Jolly, APRN - CNP   Stopped at 11/21/20 0502    [Held by provider] enoxaparin (LOVENOX) injection 40 mg  40 mg Subcutaneous BID Keli aMddox MD   40 mg at 11/25/20 0941    latanoprost (XALATAN) 0.005 % ophthalmic solution 1 drop  1 drop Both Eyes Nightly Keli Maddox MD   1 drop at 11/28/20 2021    0.9 % sodium chloride bolus  30 mL Intravenous PRN Bridger Mason DO        0.9 % sodium chloride bolus  20 mL Intravenous Once Bridger Mason DO        0.9 % sodium chloride bolus  20 mL Intravenous Once Allyne Babinski, MD        sodium chloride flush 0.9 % injection 10 mL  10 mL Intravenous PRN Bridger Mason DO   10 mL at 11/24/20 2011    potassium chloride (KLOR-CON M) extended release tablet 40 mEq  40 mEq Oral PRN Colton , APRN - CNS   40 mEq at 11/22/20 1544    Or    potassium bicarb-citric acid (EFFER-K) effervescent tablet 40 mEq  40 mEq Oral PRN Colton , APRN - CNS   40 mEq at 11/21/20 1446    Or    potassium chloride 10 mEq/100 mL IVPB (Peripheral Line)  10 mEq Intravenous PRN Colton , APRN - CNS        magnesium sulfate 1 g in dextrose 5% 100 mL IVPB  1 g Intravenous PRN Ian Starr Phu Dalton, APRN - CNS        acetaminophen (TYLENOL) tablet 650 mg  650 mg Oral Q6H PRN Sang Sheridan, APRN - CNS   650 mg at 20 1200    Or    acetaminophen (TYLENOL) suppository 650 mg  650 mg Rectal Q6H PRN Sang Sheridan, APRN - CNS        polyethylene glycol (GLYCOLAX) packet 17 g  17 g Oral Daily PRN Sang Sheridan, APRN - CNS        promethazine (PHENERGAN) tablet 12.5 mg  12.5 mg Oral Q6H PRN Sang Sheridan, APRN - CNS        Or    ondansetron (ZOFRAN) injection 4 mg  4 mg Intravenous Q6H PRN Sang Figueroaas, APRN - CNS        nicotine (NICODERM CQ) 21 MG/24HR 1 patch  1 patch Transdermal Daily PRN Sang Sheridan, APRN - CNS           Allergies:  Fish allergy; Hydrocodone-acetaminophen; Tizanidine; Atorvastatin; Metoclopramide; Moxifloxacin; Oxycodone; Pregabalin; Tramadol; Zolpidem; Cephalexin; Iodides; and Sulfa antibiotics    Social History:   reports that she quit smoking about 15 years ago. Her smoking use included cigarettes. She started smoking about 63 years ago. She does not have any smokeless tobacco history on file. She reports that she does not drink alcohol or use drugs. Family History: family history includes Early Death in her mother; Heart Disease in her mother; Heart Failure in her mother; Stroke in her father. REVIEW OF SYSTEMS:    unobtainable   PHYSICAL EXAM:        BP (!) 136/54   Pulse 106   Temp 99.7 °F (37.6 °C) (Core)   Resp 25   Ht 5' 7\" (1.702 m)   Wt 156 lb 4.8 oz (70.9 kg)   SpO2 92%   BMI 24.48 kg/m²    Temp (24hrs), Av.5 °F (37.5 °C), Min:97.3 °F (36.3 °C), Max:100.8 °F (38.2 °C)    Examination is limited due to the current acute COVID-19 pneumonia due to limited PPE resources and to limit the transmission of the virus. DATA:      Labs:     Results for orders placed or performed during the hospital encounter of 20   Culture, Blood 1    Specimen: Blood   Result Value Ref Range    Specimen Description . BLOOD     Special Requests LT HAND 20ML     Culture NO GROWTH 6 DAYS    Culture, Blood 2    Specimen: Blood   Result Value Ref Range    Specimen Description . BLOOD     Special Requests RT HAND 2ML     Culture NO GROWTH 6 DAYS    Respiratory Panel, Molecular, with COVID-19    Specimen: Nasopharyngeal Swab   Result Value Ref Range    Specimen Description . NASOPHARYNGEAL SWAB     Adenovirus PCR Not Detected Not Detected    Coronavirus 229E PCR Not Detected Not Detected    Coronavirus HKU1 PCR Not Detected Not Detected    Coronavirus NL63 PCR Not Detected Not Detected    Coronavirus OC43 PCR Not Detected Not Detected    SARS-CoV-2, PCR DETECTED (A) Not Detected    Human Metapneumovirus PCR Not Detected Not Detected    Rhino/Enterovirus PCR Not Detected Not Detected    Influenza A by PCR Not Detected Not Detected    Influenza A H1 PCR NOT REPORTED Not Detected    Influenza A H1 (2009) PCR NOT REPORTED Not Detected    Influenza A H3 PCR NOT REPORTED Not Detected    Influenza B by PCR Not Detected Not Detected    Parainfluenza 1 PCR Not Detected Not Detected    Parainfluenza 2 PCR Not Detected Not Detected    Parainfluenza 3 PCR Not Detected Not Detected    Parainfluenza 4 PCR Not Detected Not Detected    Resp Syncytial Virus PCR Not Detected Not Detected    Bordetella Parapertussis Not Detected Not Detected    B Pertussis by PCR Not Detected Not Detected    Chlamydia pneumoniae By PCR Not Detected Not Detected    Mycoplasma pneumo by PCR Not Detected Not Detected   LEGIONELLA ANTIGEN, URINE    Specimen: Urine, clean catch   Result Value Ref Range    Legionella Pneumophilia Ag, Urine NEGATIVE    CBC   Result Value Ref Range    WBC 1.3 (LL) 3.5 - 11.3 k/uL    RBC 2.53 (L) 3.95 - 5.11 m/uL    Hemoglobin 7.4 (L) 11.9 - 15.1 g/dL    Hematocrit 23.6 (L) 36.3 - 47.1 %    MCV 93.3 82.6 - 102.9 fL    MCH 29.2 25.2 - 33.5 pg    MCHC 31.4 28.4 - 34.8 g/dL    RDW 18.5 (H) 11.8 - 14.4 %    Platelets 431 483 - 083 k/uL    MPV 10.6 8.1 - 13.5 fL    NRBC Automated 0.0 0.0 per 100 WBC   Protime-INR   Result Value Ref Range    Protime 9.3 9.0 - 12.0 sec    INR 0.9    Comprehensive Metabolic Panel w/ Reflex to MG   Result Value Ref Range    Glucose 112 (H) 70 - 99 mg/dL    BUN 24 (H) 8 - 23 mg/dL    CREATININE 0.89 0.50 - 0.90 mg/dL    Bun/Cre Ratio NOT REPORTED 9 - 20    Calcium 8.0 (L) 8.6 - 10.4 mg/dL    Sodium 133 (L) 135 - 144 mmol/L    Potassium 4.1 3.7 - 5.3 mmol/L    Chloride 100 98 - 107 mmol/L    CO2 21 20 - 31 mmol/L    Anion Gap 12 9 - 17 mmol/L    Alkaline Phosphatase 76 35 - 104 U/L    ALT 49 (H) 5 - 33 U/L    AST 41 (H) <32 U/L    Total Bilirubin 0.64 0.3 - 1.2 mg/dL    Total Protein 5.9 (L) 6.4 - 8.3 g/dL    Alb 3.0 (L) 3.5 - 5.2 g/dL    Albumin/Globulin Ratio 1.0 1.0 - 2.5    GFR Non-African American >60 >60 mL/min    GFR African American >60 >60 mL/min    GFR Comment          GFR Staging NOT REPORTED    C-Reactive Protein   Result Value Ref Range    .8 (H) 0.0 - 5.0 mg/L   Ferritin   Result Value Ref Range    Ferritin 3,046 (H) 13 - 150 ug/L   Fibrinogen   Result Value Ref Range    Fibrinogen 721 (H) 140 - 420 mg/dL   Lactate Dehydrogenase   Result Value Ref Range     (H) 135 - 214 U/L   CBC   Result Value Ref Range    WBC 1.2 (LL) 3.5 - 11.3 k/uL    RBC 2.37 (L) 3.95 - 5.11 m/uL    Hemoglobin 7.0 (LL) 11.9 - 15.1 g/dL    Hematocrit 22.4 (L) 36.3 - 47.1 %    MCV 94.5 82.6 - 102.9 fL    MCH 29.5 25.2 - 33.5 pg    MCHC 31.3 28.4 - 34.8 g/dL    RDW 19.0 (H) 11.8 - 14.4 %    Platelets 920 116 - 741 k/uL    MPV 10.7 8.1 - 13.5 fL    NRBC Automated 0.0 0.0 per 100 WBC   Differential   Result Value Ref Range    Differential Type NOT REPORTED     WBC Morphology NOT REPORTED     RBC Morphology NOT REPORTED     Platelet Estimate NOT REPORTED     Immature Granulocytes 0 0 %    Seg Neutrophils 33 (L) 36 - 66 %    Lymphocytes 48 (H) 24 - 44 %    Atypical Lymphocytes 1 %    Monocytes 17 (H) 1 - 7 %    Eosinophils % 1 1 - 4 %    Basophils 0 0 - 2 %    Absolute Immature Granulocyte 0.00 0.00 - 0.30 k/uL    Segs Absolute 0.43 (LL) 1.8 - 7.7 k/uL    Absolute Lymph # 0.63 (L) 1.0 - 4.8 k/uL    Atypical Lymphocytes Absolute 0.01 k/uL    Absolute Mono # 0.22 0.1 - 0.8 k/uL    Absolute Eos # 0.01 0.0 - 0.4 k/uL    Basophils Absolute 0.00 0.0 - 0.2 k/uL    Morphology ANISOCYTOSIS PRESENT    D-Dimer, Quantitative   Result Value Ref Range    D-Dimer, Quant 2.44 mg/L FEU   Ferritin   Result Value Ref Range    Ferritin 3,024 (H) 13 - 150 ug/L   Fibrinogen   Result Value Ref Range    Fibrinogen 720 (H) 140 - 420 mg/dL   Protime-INR   Result Value Ref Range    Protime 9.5 9.0 - 12.0 sec    INR 0.9    Lactate Dehydrogenase   Result Value Ref Range     (H) 135 - 214 U/L   Hepatic Function Panel   Result Value Ref Range    Alb 2.9 (L) 3.5 - 5.2 g/dL    Alkaline Phosphatase 74 35 - 104 U/L    ALT 45 (H) 5 - 33 U/L    AST 38 (H) <32 U/L    Total Bilirubin 0.53 0.3 - 1.2 mg/dL    Bilirubin, Direct 0.22 <0.31 mg/dL    Bilirubin, Indirect 0.31 0.00 - 1.00 mg/dL    Total Protein 5.8 (L) 6.4 - 8.3 g/dL    Globulin NOT REPORTED 1.5 - 3.8 g/dL    Albumin/Globulin Ratio 1.0 1.0 - 2.5   Magnesium   Result Value Ref Range    Magnesium 2.3 1.6 - 2.6 mg/dL   Renal Function Panel   Result Value Ref Range    Glucose 109 (H) 70 - 99 mg/dL    BUN 23 8 - 23 mg/dL    CREATININE 0.81 0.50 - 0.90 mg/dL    Bun/Cre Ratio NOT REPORTED 9 - 20    Calcium 8.0 (L) 8.6 - 10.4 mg/dL    Alb 2.9 (L) 3.5 - 5.2 g/dL    Phosphorus 3.5 2.6 - 4.5 mg/dL    Sodium 133 (L) 135 - 144 mmol/L    Potassium 4.1 3.7 - 5.3 mmol/L    Chloride 100 98 - 107 mmol/L    CO2 21 20 - 31 mmol/L    Anion Gap 12 9 - 17 mmol/L    GFR Non-African American >60 >60 mL/min    GFR African American >60 >60 mL/min    GFR Comment          GFR Staging NOT REPORTED    Procalcitonin   Result Value Ref Range    Procalcitonin 1.00 (H) <0.09 ng/mL   Brain Natriuretic Peptide   Result Value Ref Range    Pro- (H) <300 pg/mL    BNP Interpretation Pro-BNP Reference Range:    COVID-19    Specimen: Nasopharyngeal Swab   Result Value Ref Range    SARS-CoV-2 Positive (A)    MYCOPLASMA PNEUMONIAE ANTIBODY, IGM   Result Value Ref Range    Mycoplasma pneumo IgM 0.17 <0.91   URINALYSIS   Result Value Ref Range    Color, UA DARK YELLOW (A) YELLOW    Turbidity UA CLEAR CLEAR    Glucose, Ur NEGATIVE NEGATIVE    Bilirubin Urine NEGATIVE  Verified by ictotest. (A) NEGATIVE    Ketones, Urine SMALL (A) NEGATIVE    Specific Gravity, UA 1.023 1.005 - 1.030    Urine Hgb NEGATIVE NEGATIVE    pH, UA 5.5 5.0 - 8.0    Protein, UA 1+ (A) NEGATIVE    Urobilinogen, Urine Normal Normal    Nitrite, Urine NEGATIVE NEGATIVE    Leukocyte Esterase, Urine NEGATIVE NEGATIVE    Urinalysis Comments NOT REPORTED    OCCULT BLOOD SCREEN   Result Value Ref Range    Occult Blood, Stool #1 NEGATIVE NEGATIVE    Date, Stool #1 . FECES     Time, Stool #1 . FECES     Occult Blood, Stool #2 NOT REPORTED NEGATIVE    Date, Stool #2 NOT REPORTED     Time, Stool #2 NOT REPORTED     Occult Blood, Stool #3 NOT REPORTED NEGATIVE    Date, Stool #3 NOT REPORTED     Time, Stool #3 NOT REPORTED    Microscopic Urinalysis   Result Value Ref Range    -          WBC, UA 0 TO 2 0 - 5 /HPF    RBC, UA 0 TO 2 0 - 4 /HPF    Casts UA  0 - 8 /LPF     5 TO 10 HYALINE Reference range defined for non-centrifuged specimen. Crystals, UA NOT REPORTED None /HPF    Epithelial Cells UA 0 TO 2 0 - 5 /HPF    Renal Epithelial, UA NOT REPORTED 0 /HPF    Bacteria, UA NOT REPORTED None    Mucus, UA NOT REPORTED None    Trichomonas, UA NOT REPORTED None    Amorphous, UA NOT REPORTED None    Other Observations UA NOT REPORTED NOT REQ.     Yeast, UA NOT REPORTED None   Vitamin B12 & Folate   Result Value Ref Range    Vitamin B-12 >2000 (H) 232 - 1245 pg/mL    Folate 11.5 >4.8 ng/mL   Iron and TIBC   Result Value Ref Range    Iron 92 37 - 145 ug/dL    TIBC 136 (L) 250 - 450 ug/dL    Iron Saturation 68 (H) 20 - 55 %    UIBC 44 (L) 112 - 347 ug/dL   CBC Result Value Ref Range    WBC 2.0 (L) 3.5 - 11.3 k/uL    RBC 2.62 (L) 3.95 - 5.11 m/uL    Hemoglobin 7.7 (L) 11.9 - 15.1 g/dL    Hematocrit 25.6 (L) 36.3 - 47.1 %    MCV 97.7 82.6 - 102.9 fL    MCH 29.4 25.2 - 33.5 pg    MCHC 30.1 28.4 - 34.8 g/dL    RDW 19.6 (H) 11.8 - 14.4 %    Platelets 946 757 - 770 k/uL    MPV 11.1 8.1 - 13.5 fL    NRBC Automated 0.0 0.0 per 100 WBC   Renal Function Panel   Result Value Ref Range    Glucose 93 70 - 99 mg/dL    BUN 23 8 - 23 mg/dL    CREATININE 0.81 0.50 - 0.90 mg/dL    Bun/Cre Ratio NOT REPORTED 9 - 20    Calcium 8.4 (L) 8.6 - 10.4 mg/dL    Alb 2.8 (L) 3.5 - 5.2 g/dL    Phosphorus 3.3 2.6 - 4.5 mg/dL    Sodium 138 135 - 144 mmol/L    Potassium 3.6 (L) 3.7 - 5.3 mmol/L    Chloride 100 98 - 107 mmol/L    CO2 22 20 - 31 mmol/L    Anion Gap 16 9 - 17 mmol/L    GFR Non-African American >60 >60 mL/min    GFR African American >60 >60 mL/min    GFR Comment          GFR Staging NOT REPORTED    Magnesium   Result Value Ref Range    Magnesium 2.5 1.6 - 2.6 mg/dL   FIBRINOGEN   Result Value Ref Range    Fibrinogen 1002 (H) 140 - 420 mg/dL   C-Reactive Protein   Result Value Ref Range    .4 (H) 0.0 - 5.0 mg/L   Ferritin   Result Value Ref Range    Ferritin 3,453 (H) 13 - 150 ug/L   Lactate Dehydrogenase   Result Value Ref Range     (H) 135 - 214 U/L   Magnesium   Result Value Ref Range    Magnesium 2.4 1.6 - 2.6 mg/dL   Comprehensive Metabolic Panel   Result Value Ref Range    Glucose 94 70 - 99 mg/dL    BUN 19 8 - 23 mg/dL    CREATININE 0.53 0.50 - 0.90 mg/dL    Bun/Cre Ratio NOT REPORTED 9 - 20    Calcium 8.4 (L) 8.6 - 10.4 mg/dL    Sodium 138 135 - 144 mmol/L    Potassium 4.0 3.7 - 5.3 mmol/L    Chloride 102 98 - 107 mmol/L    CO2 21 20 - 31 mmol/L    Anion Gap 15 9 - 17 mmol/L    Alkaline Phosphatase 58 35 - 104 U/L    ALT 35 (H) 5 - 33 U/L    AST 33 (H) <32 U/L    Total Bilirubin 0.47 0.3 - 1.2 mg/dL    Total Protein 5.6 (L) 6.4 - 8.3 g/dL    Alb 2.6 (L) 3.5 - 5.2 g/dL Albumin/Globulin Ratio 0.9 (L) 1.0 - 2.5    GFR Non-African American >60 >60 mL/min    GFR African American >60 >60 mL/min    GFR Comment          GFR Staging NOT REPORTED    CBC Auto Differential   Result Value Ref Range    WBC 3.3 (L) 3.5 - 11.3 k/uL    RBC 2.61 (L) 3.95 - 5.11 m/uL    Hemoglobin 7.8 (L) 11.9 - 15.1 g/dL    Hematocrit 23.8 (L) 36.3 - 47.1 %    MCV 91.2 82.6 - 102.9 fL    MCH 29.9 25.2 - 33.5 pg    MCHC 32.8 28.4 - 34.8 g/dL    RDW 19.2 (H) 11.8 - 14.4 %    Platelets 008 460 - 054 k/uL    MPV 11.4 8.1 - 13.5 fL    NRBC Automated 0.0 0.0 per 100 WBC    Differential Type NOT REPORTED     WBC Morphology NOT REPORTED     RBC Morphology NOT REPORTED     Platelet Estimate NOT REPORTED     Immature Granulocytes 0 0 %    Seg Neutrophils 56 36 - 66 %    Lymphocytes 36 24 - 44 %    Monocytes 8 (H) 1 - 7 %    Eosinophils % 0 (L) 1 - 4 %    Basophils 0 0 - 2 %    Absolute Immature Granulocyte 0.00 0.00 - 0.30 k/uL    Segs Absolute 1.85 1.8 - 7.7 k/uL    Absolute Lymph # 1.19 1.0 - 4.8 k/uL    Absolute Mono # 0.26 0.1 - 0.8 k/uL    Absolute Eos # 0.00 0.0 - 0.4 k/uL    Basophils Absolute 0.00 0.0 - 0.2 k/uL    Morphology ANISOCYTOSIS PRESENT    Mycoplasma pneumoniae antibody, IgM   Result Value Ref Range    Mycoplasma pneumo IgM 0.20 <0.91   CBC Auto Differential   Result Value Ref Range    WBC 9.0 3.5 - 11.3 k/uL    RBC 2.60 (L) 3.95 - 5.11 m/uL    Hemoglobin 8.0 (L) 11.9 - 15.1 g/dL    Hematocrit 26.0 (L) 36.3 - 47.1 %    .0 82.6 - 102.9 fL    MCH 30.8 25.2 - 33.5 pg    MCHC 30.8 28.4 - 34.8 g/dL    RDW 19.6 (H) 11.8 - 14.4 %    Platelets 887 420 - 322 k/uL    MPV 11.4 8.1 - 13.5 fL    NRBC Automated 0.0 0.0 per 100 WBC    Differential Type NOT REPORTED     WBC Morphology NOT REPORTED     RBC Morphology NOT REPORTED     Platelet Estimate NOT REPORTED     Immature Granulocytes 1 (H) 0 %    Seg Neutrophils 74 (H) 36 - 66 %    Lymphocytes 15 (L) 24 - 44 %    Monocytes 10 (H) 1 - 7 %    Eosinophils % 0 (L) 1 - 4 %    Basophils 0 0 - 2 %    Absolute Immature Granulocyte 0.09 0.00 - 0.30 k/uL    Segs Absolute 6.66 1.8 - 7.7 k/uL    Absolute Lymph # 1.35 1.0 - 4.8 k/uL    Absolute Mono # 0.90 (H) 0.1 - 0.8 k/uL    Absolute Eos # 0.00 0.0 - 0.4 k/uL    Basophils Absolute 0.00 0.0 - 0.2 k/uL    Morphology ANISOCYTOSIS PRESENT    FERRITIN   Result Value Ref Range    Ferritin 3,590 (H) 13 - 150 ug/L   FIBRINOGEN   Result Value Ref Range    Fibrinogen 673 (H) 140 - 420 mg/dL   CBC Auto Differential   Result Value Ref Range    WBC 12.8 (H) 3.5 - 11.3 k/uL    RBC 2.36 (L) 3.95 - 5.11 m/uL    Hemoglobin 7.3 (L) 11.9 - 15.1 g/dL    Hematocrit 23.4 (L) 36.3 - 47.1 %    MCV 99.2 82.6 - 102.9 fL    MCH 30.9 25.2 - 33.5 pg    MCHC 31.2 28.4 - 34.8 g/dL    RDW 19.0 (H) 11.8 - 14.4 %    Platelets 660 704 - 336 k/uL    MPV 11.5 8.1 - 13.5 fL    NRBC Automated 0.0 0.0 per 100 WBC    Differential Type NOT REPORTED     WBC Morphology NOT REPORTED     RBC Morphology NOT REPORTED     Platelet Estimate NOT REPORTED     Immature Granulocytes 1 (H) 0 %    Seg Neutrophils 76 (H) 36 - 66 %    Lymphocytes 16 (L) 24 - 44 %    Monocytes 7 1 - 7 %    Eosinophils % 0 (L) 1 - 4 %    Basophils 0 0 - 2 %    Absolute Immature Granulocyte 0.13 0.00 - 0.30 k/uL    Segs Absolute 9.72 (H) 1.8 - 7.7 k/uL    Absolute Lymph # 2.05 1.0 - 4.8 k/uL    Absolute Mono # 0.90 (H) 0.1 - 0.8 k/uL    Absolute Eos # 0.00 0.0 - 0.4 k/uL    Basophils Absolute 0.00 0.0 - 0.2 k/uL    Morphology ANISOCYTOSIS PRESENT     Morphology INCREASED BANDS PRESENT     Morphology TOXIC GRANULATION PRESENT    Basic Metabolic Panel w/ Reflex to MG   Result Value Ref Range    Glucose 84 70 - 99 mg/dL    BUN 22 8 - 23 mg/dL    CREATININE 0.52 0.50 - 0.90 mg/dL    Bun/Cre Ratio NOT REPORTED 9 - 20    Calcium 8.6 8.6 - 10.4 mg/dL    Sodium 143 135 - 144 mmol/L    Potassium 3.5 (L) 3.7 - 5.3 mmol/L    Chloride 106 98 - 107 mmol/L    CO2 25 20 - 31 mmol/L    Anion Gap 12 9 - 17 mmol/L    GFR Non-African American >60 >60 mL/min    GFR African American >60 >60 mL/min    GFR Comment          GFR Staging NOT REPORTED    Brain Natriuretic Peptide   Result Value Ref Range    Pro- (H) <300 pg/mL    BNP Interpretation Pro-BNP Reference Range:    FIBRINOGEN   Result Value Ref Range    Fibrinogen 506 (H) 140 - 420 mg/dL   C-REACTIVE PROTEIN   Result Value Ref Range    CRP 67.6 (H) 0.0 - 5.0 mg/L   FERRITIN   Result Value Ref Range    Ferritin 2,838 (H) 13 - 150 ug/L   D-DIMER, QUANTITATIVE   Result Value Ref Range    D-Dimer, Quant 1.93 mg/L FEU   Magnesium   Result Value Ref Range    Magnesium 2.0 1.6 - 2.6 mg/dL   CBC Auto Differential   Result Value Ref Range    WBC 10.4 3.5 - 11.3 k/uL    RBC 2.70 (L) 3.95 - 5.11 m/uL    Hemoglobin 8.1 (L) 11.9 - 15.1 g/dL    Hematocrit 27.6 (L) 36.3 - 47.1 %    .2 82.6 - 102.9 fL    MCH 30.0 25.2 - 33.5 pg    MCHC 29.3 28.4 - 34.8 g/dL    RDW 19.2 (H) 11.8 - 14.4 %    Platelets 802 013 - 206 k/uL    MPV 11.2 8.1 - 13.5 fL    NRBC Automated 0.0 0.0 per 100 WBC    Differential Type NOT REPORTED     WBC Morphology NOT REPORTED     RBC Morphology NOT REPORTED     Platelet Estimate NOT REPORTED     Immature Granulocytes 3 (H) 0 %    Seg Neutrophils 68 (H) 36 - 66 %    Lymphocytes 20 (L) 24 - 44 %    Monocytes 9 (H) 1 - 7 %    Eosinophils % 0 (L) 1 - 4 %    Basophils 0 0 - 2 %    Absolute Immature Granulocyte 0.31 (H) 0.00 - 0.30 k/uL    Segs Absolute 7.07 1.8 - 7.7 k/uL    Absolute Lymph # 2.08 1.0 - 4.8 k/uL    Absolute Mono # 0.94 (H) 0.1 - 0.8 k/uL    Absolute Eos # 0.00 0.0 - 0.4 k/uL    Basophils Absolute 0.00 0.0 - 0.2 k/uL    Morphology ANISOCYTOSIS PRESENT    POTASSIUM   Result Value Ref Range    Potassium 3.1 (L) 3.7 - 5.3 mmol/L   CBC Auto Differential   Result Value Ref Range    WBC 9.3 3.5 - 11.3 k/uL    RBC 2.54 (L) 3.95 - 5.11 m/uL    Hemoglobin 8.0 (L) 11.9 - 15.1 g/dL    Hematocrit 25.9 (L) 36.3 - 47.1 %    .0 82.6 - 102.9 fL    MCH 31.5 25.2 mg/dL    Sodium 143 135 - 144 mmol/L    Potassium 4.2 3.7 - 5.3 mmol/L    Chloride 105 98 - 107 mmol/L    CO2 26 20 - 31 mmol/L    Anion Gap 12 9 - 17 mmol/L    Alkaline Phosphatase 77 35 - 104 U/L    ALT 18 5 - 33 U/L    AST 18 <32 U/L    Total Bilirubin 0.41 0.3 - 1.2 mg/dL    Total Protein 5.9 (L) 6.4 - 8.3 g/dL    Alb 2.2 (L) 3.5 - 5.2 g/dL    Albumin/Globulin Ratio 0.6 (L) 1.0 - 2.5    GFR Non-African American >60 >60 mL/min    GFR African American >60 >60 mL/min    GFR Comment          GFR Staging NOT REPORTED    CBC Auto Differential   Result Value Ref Range    WBC 4.2 3.5 - 11.3 k/uL    RBC 2.05 (L) 3.95 - 5.11 m/uL    Hemoglobin 6.0 (LL) 11.9 - 15.1 g/dL    Hematocrit 19.8 (L) 36.3 - 47.1 %    MCV 96.6 82.6 - 102.9 fL    MCH 29.3 25.2 - 33.5 pg    MCHC 30.3 28.4 - 34.8 g/dL    RDW 18.4 (H) 11.8 - 14.4 %    Platelets 339 673 - 293 k/uL    MPV 11.2 8.1 - 13.5 fL    NRBC Automated 0.0 0.0 per 100 WBC    Differential Type NOT REPORTED     WBC Morphology NOT REPORTED     RBC Morphology NOT REPORTED     Platelet Estimate NOT REPORTED     Immature Granulocytes 2 (H) 0 %    Seg Neutrophils 74 (H) 36 - 66 %    Lymphocytes 16 (L) 24 - 44 %    Monocytes 8 (H) 1 - 7 %    Eosinophils % 0 (L) 1 - 4 %    Basophils 0 0 - 2 %    Absolute Immature Granulocyte 0.08 0.00 - 0.30 k/uL    Segs Absolute 3.11 1.8 - 7.7 k/uL    Absolute Lymph # 0.67 (L) 1.0 - 4.8 k/uL    Absolute Mono # 0.34 0.1 - 0.8 k/uL    Absolute Eos # 0.00 0.0 - 0.4 k/uL    Basophils Absolute 0.00 0.0 - 0.2 k/uL    Morphology ANISOCYTOSIS PRESENT     Morphology TOXIC GRANULATION PRESENT    Comprehensive Metabolic Panel w/ Reflex to MG   Result Value Ref Range    Glucose 96 70 - 99 mg/dL    BUN 26 (H) 8 - 23 mg/dL    CREATININE 0.61 0.50 - 0.90 mg/dL    Bun/Cre Ratio NOT REPORTED 9 - 20    Calcium 8.3 (L) 8.6 - 10.4 mg/dL    Sodium 141 135 - 144 mmol/L    Potassium 4.4 3.7 - 5.3 mmol/L    Chloride 104 98 - 107 mmol/L    CO2 31 20 - 31 mmol/L    Anion Gap 6 (L) 9 - 17 mmol/L    Alkaline Phosphatase 62 35 - 104 U/L    ALT 18 5 - 33 U/L    AST 16 <32 U/L    Total Bilirubin 0.30 0.3 - 1.2 mg/dL    Total Protein 5.7 (L) 6.4 - 8.3 g/dL    Alb 2.2 (L) 3.5 - 5.2 g/dL    Albumin/Globulin Ratio 0.6 (L) 1.0 - 2.5    GFR Non-African American >60 >60 mL/min    GFR African American >60 >60 mL/min    GFR Comment          GFR Staging NOT REPORTED    FERRITIN   Result Value Ref Range    Ferritin 4,245 (H) 13 - 150 ug/L   VITAMIN D 25 HYDROXY   Result Value Ref Range    Vit D, 25-Hydroxy 47.6 30.0 - 100.0 ng/mL   HEMOGLOBIN AND HEMATOCRIT, BLOOD   Result Value Ref Range    Hemoglobin 7.7 (L) 11.9 - 15.1 g/dL    Hematocrit 24.0 (L) 36.3 - 47.1 %   CBC Auto Differential   Result Value Ref Range    WBC 5.3 3.5 - 11.3 k/uL    RBC 2.64 (L) 3.95 - 5.11 m/uL    Hemoglobin 8.0 (L) 11.9 - 15.1 g/dL    Hematocrit 25.2 (L) 36.3 - 47.1 %    MCV 95.5 82.6 - 102.9 fL    MCH 30.3 25.2 - 33.5 pg    MCHC 31.7 28.4 - 34.8 g/dL    RDW 17.4 (H) 11.8 - 14.4 %    Platelets 629 743 - 910 k/uL    MPV 11.3 8.1 - 13.5 fL    NRBC Automated 0.0 0.0 per 100 WBC    Differential Type NOT REPORTED     WBC Morphology NOT REPORTED     RBC Morphology NOT REPORTED     Platelet Estimate NOT REPORTED     Immature Granulocytes 5 (H) 0 %    Seg Neutrophils 78 (H) 36 - 66 %    Lymphocytes 11 (L) 24 - 44 %    Monocytes 4 1 - 7 %    Eosinophils % 2 1 - 4 %    Basophils 0 0 - 2 %    Absolute Immature Granulocyte 0.27 0.00 - 0.30 k/uL    Segs Absolute 4.13 1.8 - 7.7 k/uL    Absolute Lymph # 0.58 (L) 1.0 - 4.8 k/uL    Absolute Mono # 0.21 0.1 - 0.8 k/uL    Absolute Eos # 0.11 0.0 - 0.4 k/uL    Basophils Absolute 0.00 0.0 - 0.2 k/uL    Morphology ANISOCYTOSIS PRESENT     Morphology TOXIC GRANULATION PRESENT    Comprehensive Metabolic Panel w/ Reflex to MG   Result Value Ref Range    Glucose 91 70 - 99 mg/dL    BUN 32 (H) 8 - 23 mg/dL    CREATININE 0.65 0.50 - 0.90 mg/dL    Bun/Cre Ratio NOT REPORTED 9 - 20    Calcium 7.8 (L) 8.6 - 10.4 mg/dL    Sodium 143 135 - 144 mmol/L    Potassium 4.9 3.7 - 5.3 mmol/L    Chloride 106 98 - 107 mmol/L    CO2 30 20 - 31 mmol/L    Anion Gap 7 (L) 9 - 17 mmol/L    Alkaline Phosphatase 61 35 - 104 U/L    ALT 14 5 - 33 U/L    AST 13 <32 U/L    Total Bilirubin 0.29 (L) 0.3 - 1.2 mg/dL    Total Protein 6.1 (L) 6.4 - 8.3 g/dL    Alb 2.3 (L) 3.5 - 5.2 g/dL    Albumin/Globulin Ratio 0.6 (L) 1.0 - 2.5    GFR Non-African American >60 >60 mL/min    GFR African American >60 >60 mL/min    GFR Comment          GFR Staging NOT REPORTED    HEMOGLOBIN AND HEMATOCRIT, BLOOD   Result Value Ref Range    Hemoglobin 8.1 (L) 11.9 - 15.1 g/dL    Hematocrit 26.1 (L) 36.3 - 47.1 %   HEMOGLOBIN AND HEMATOCRIT, BLOOD   Result Value Ref Range    Hemoglobin 8.0 (L) 11.9 - 15.1 g/dL    Hematocrit 25.7 (L) 36.3 - 47.1 %   CBC Auto Differential   Result Value Ref Range    WBC 6.8 3.5 - 11.3 k/uL    RBC 2.72 (L) 3.95 - 5.11 m/uL    Hemoglobin 8.2 (L) 11.9 - 15.1 g/dL    Hematocrit 26.7 (L) 36.3 - 47.1 %    MCV 98.2 82.6 - 102.9 fL    MCH 30.1 25.2 - 33.5 pg    MCHC 30.7 28.4 - 34.8 g/dL    RDW 17.0 (H) 11.8 - 14.4 %    Platelets 568 263 - 166 k/uL    MPV 11.8 8.1 - 13.5 fL    NRBC Automated 0.0 0.0 per 100 WBC    Differential Type NOT REPORTED     WBC Morphology NOT REPORTED     RBC Morphology NOT REPORTED     Platelet Estimate NOT REPORTED     Immature Granulocytes 3 (H) 0 %    Seg Neutrophils 86 (H) 36 - 66 %    Lymphocytes 7 (L) 24 - 44 %    Monocytes 4 1 - 7 %    Eosinophils % 0 (L) 1 - 4 %    Basophils 0 0 - 2 %    Absolute Immature Granulocyte 0.20 0.00 - 0.30 k/uL    Segs Absolute 5.85 1.8 - 7.7 k/uL    Absolute Lymph # 0.48 (L) 1.0 - 4.8 k/uL    Absolute Mono # 0.27 0.1 - 0.8 k/uL    Absolute Eos # 0.00 0.0 - 0.4 k/uL    Basophils Absolute 0.00 0.0 - 0.2 k/uL    Morphology ANISOCYTOSIS PRESENT     Morphology TOXIC GRANULATION PRESENT    HEMOGLOBIN AND HEMATOCRIT, BLOOD   Result Value Ref Range    Hemoglobin 8.0 (L) 11.9 - 15.1 g/dL 102.9 fL    MCH 29.0 25.2 - 33.5 pg    MCHC 29.0 28.4 - 34.8 g/dL    RDW 16.6 (H) 11.8 - 14.4 %    Platelets 79 (L) 608 - 453 k/uL    MPV 12.4 8.1 - 13.5 fL    NRBC Automated 0.0 0.0 per 100 WBC    Differential Type NOT REPORTED     WBC Morphology NOT REPORTED     RBC Morphology ANISOCYTOSIS PRESENT     Platelet Estimate NOT REPORTED     Seg Neutrophils 85 (H) 36 - 65 %    Lymphocytes 9 (L) 24 - 43 %    Monocytes 2 (L) 3 - 12 %    Eosinophils % 0 (L) 1 - 4 %    Basophils 0 0 - 2 %    Immature Granulocytes 3 (H) 0 %    Segs Absolute 6.80 1.50 - 8.10 k/uL    Absolute Lymph # 0.71 (L) 1.10 - 3.70 k/uL    Absolute Mono # 0.19 0.10 - 1.20 k/uL    Absolute Eos # <0.03 0.00 - 0.44 k/uL    Basophils Absolute <0.03 0.00 - 0.20 k/uL    Absolute Immature Granulocyte 0.27 0.00 - 0.30 k/uL   HEMOGLOBIN AND HEMATOCRIT, BLOOD   Result Value Ref Range    Hemoglobin 7.3 (L) 11.9 - 15.1 g/dL    Hematocrit 23.9 (L) 36.3 - 47.1 %   POC Glucose Fingerstick   Result Value Ref Range    POC Glucose 108 (H) 65 - 105 mg/dL   Arterial Blood Gas, POC   Result Value Ref Range    POC pH 7.464 (H) 7.350 - 7.450    POC pCO2 37.1 35.0 - 48.0 mm Hg    POC PO2 87.4 83.0 - 108.0 mm Hg    POC HCO3 26.6 21.0 - 28.0 mmol/L    TCO2 (calc), Art 28 22.0 - 29.0 mmol/L    Negative Base Excess, Art NOT REPORTED 0.0 - 2.0    Positive Base Excess, Art 3 0.0 - 3.0    POC O2 SAT 97 94.0 - 98.0 %    O2 Device/Flow/% Adult Ventilator     Thomas Test NOT APPLICABLE     Sample Site Arterial Line     Mode PRVC     FIO2 100.0     Pt Temp 39.8     POC pH Temp 7.42     POC pCO2 Temp 42 mm Hg    POC pO2 Temp 105 mm Hg   POCT Glucose   Result Value Ref Range    POC Glucose 110 (H) 74 - 100 mg/dL   Arterial Blood Gas, POC   Result Value Ref Range    POC pH 7.433 7.350 - 7.450    POC pCO2 47.0 35.0 - 48.0 mm Hg    POC PO2 103.6 83.0 - 108.0 mm Hg    POC HCO3 31.4 (H) 21.0 - 28.0 mmol/L    TCO2 (calc), Art 33 (H) 22.0 - 29.0 mmol/L    Negative Base Excess, Art NOT REPORTED 0.0 - 2.0    Positive Base Excess, Art 6 (H) 0.0 - 3.0    POC O2 SAT 98 94.0 - 98.0 %    O2 Device/Flow/% Adult Ventilator     Thomas Test NOT APPLICABLE     Sample Site Arterial Line     Mode PRVC     FIO2 100.0     Pt Temp NOT REPORTED     POC pH Temp NOT REPORTED     POC pCO2 Temp NOT REPORTED mm Hg    POC pO2 Temp NOT REPORTED mm Hg   Lactic Acid, POC   Result Value Ref Range    POC Lactic Acid 0.91 0.56 - 1.39 mmol/L   POCT Glucose   Result Value Ref Range    POC Glucose 126 (H) 74 - 100 mg/dL   Arterial Blood Gas, POC   Result Value Ref Range    POC pH 7.448 7.350 - 7.450    POC pCO2 52.3 (H) 35.0 - 48.0 mm Hg    POC PO2 67.9 (L) 83.0 - 108.0 mm Hg    POC HCO3 36.2 (H) 21.0 - 28.0 mmol/L    TCO2 (calc), Art 38 (H) 22.0 - 29.0 mmol/L    Negative Base Excess, Art NOT REPORTED 0.0 - 2.0    Positive Base Excess, Art 11 (H) 0.0 - 3.0    POC O2 SAT 94 94.0 - 98.0 %    O2 Device/Flow/% Adult Ventilator     Thomas Test NOT APPLICABLE     Sample Site Arterial Line     Mode PRVC     FIO2 80.0     Pt Temp NOT REPORTED     POC pH Temp NOT REPORTED     POC pCO2 Temp NOT REPORTED mm Hg    POC pO2 Temp NOT REPORTED mm Hg   Lactic Acid, POC   Result Value Ref Range    POC Lactic Acid 0.91 0.56 - 1.39 mmol/L   POCT Glucose   Result Value Ref Range    POC Glucose 98 74 - 100 mg/dL   Arterial Blood Gas, POC   Result Value Ref Range    POC pH 7.429 7.350 - 7.450    POC pCO2 47.6 35.0 - 48.0 mm Hg    POC PO2 56.3 (L) 83.0 - 108.0 mm Hg    POC HCO3 31.5 (H) 21.0 - 28.0 mmol/L    TCO2 (calc), Art 33 (H) 22.0 - 29.0 mmol/L    Negative Base Excess, Art NOT REPORTED 0.0 - 2.0    Positive Base Excess, Art 6 (H) 0.0 - 3.0    POC O2 SAT 89 (L) 94.0 - 98.0 %    O2 Device/Flow/% NOT REPORTED     Thomas Test NOT APPLICABLE     Sample Site Arterial Line     Mode NOT REPORTED     FIO2 65.0     Pt Temp NOT REPORTED     POC pH Temp NOT REPORTED     POC pCO2 Temp NOT REPORTED mm Hg    POC pO2 Temp NOT REPORTED mm Hg   Arterial Blood Gas, POC   Result Value Ref Range    POC pH 7.412 7.350 - 7.450    POC pCO2 52.4 (H) 35.0 - 48.0 mm Hg    POC PO2 56.9 (L) 83.0 - 108.0 mm Hg    POC HCO3 33.3 (H) 21.0 - 28.0 mmol/L    TCO2 (calc), Art 35 (H) 22.0 - 29.0 mmol/L    Negative Base Excess, Art NOT REPORTED 0.0 - 2.0    Positive Base Excess, Art 8 (H) 0.0 - 3.0    POC O2 SAT 89 (L) 94.0 - 98.0 %    O2 Device/Flow/% NOT REPORTED     Thomas Test NOT APPLICABLE     Sample Site Arterial Line     Mode NOT REPORTED     FIO2 65.0     Pt Temp NOT REPORTED     POC pH Temp NOT REPORTED     POC pCO2 Temp NOT REPORTED mm Hg    POC pO2 Temp NOT REPORTED mm Hg   Arterial Blood Gas, POC   Result Value Ref Range    POC pH 7.387 7.350 - 7.450    POC pCO2 56.3 (H) 35.0 - 48.0 mm Hg    POC PO2 60.3 (L) 83.0 - 108.0 mm Hg    POC HCO3 33.9 (H) 21.0 - 28.0 mmol/L    TCO2 (calc), Art 36 (H) 22.0 - 29.0 mmol/L    Negative Base Excess, Art NOT REPORTED 0.0 - 2.0    Positive Base Excess, Art 8 (H) 0.0 - 3.0    POC O2 SAT 90 (L) 94.0 - 98.0 %    O2 Device/Flow/% Adult Ventilator     Thomas Test NOT APPLICABLE     Sample Site Arterial Line     Mode PRVC     FIO2 65.0     Pt Temp NOT REPORTED     POC pH Temp NOT REPORTED     POC pCO2 Temp NOT REPORTED mm Hg    POC pO2 Temp NOT REPORTED mm Hg   POCT Glucose   Result Value Ref Range    POC Glucose 127 (H) 74 - 100 mg/dL   Arterial Blood Gas, POC   Result Value Ref Range    POC pH 7.409 7.350 - 7.450    POC pCO2 58.2 (H) 35.0 - 48.0 mm Hg    POC PO2 53.7 (L) 83.0 - 108.0 mm Hg    POC HCO3 36.8 (H) 21.0 - 28.0 mmol/L    TCO2 (calc), Art 39 (H) 22.0 - 29.0 mmol/L    Negative Base Excess, Art NOT REPORTED 0.0 - 2.0    Positive Base Excess, Art 11 (H) 0.0 - 3.0    POC O2 SAT 87 (L) 94.0 - 98.0 %    O2 Device/Flow/% Adult Ventilator     Thomas Test NOT REPORTED     Sample Site Arterial Line     Mode PRVC     FIO2 65.0     Pt Temp NOT REPORTED     POC pH Temp NOT REPORTED     POC pCO2 Temp NOT REPORTED mm Hg    POC pO2 Temp NOT REPORTED mm Hg   POCT Glucose Result Value Ref Range    POC Glucose 128 (H) 74 - 100 mg/dL   Arterial Blood Gas, POC   Result Value Ref Range    POC pH 7.432 7.350 - 7.450    POC pCO2 58.4 (H) 35.0 - 48.0 mm Hg    POC PO2 70.6 (L) 83.0 - 108.0 mm Hg    POC HCO3 38.9 (H) 21.0 - 28.0 mmol/L    TCO2 (calc), Art 41 (H) 22.0 - 29.0 mmol/L    Negative Base Excess, Art NOT REPORTED 0.0 - 2.0    Positive Base Excess, Art 13 (H) 0.0 - 3.0    POC O2 SAT 94 94.0 - 98.0 %    O2 Device/Flow/% NOT REPORTED     Thomas Test NOT REPORTED     Sample Site NOT REPORTED     Mode NOT REPORTED     FIO2 NOT REPORTED     Pt Temp NOT REPORTED     POC pH Temp NOT REPORTED     POC pCO2 Temp NOT REPORTED mm Hg    POC pO2 Temp NOT REPORTED mm Hg   Lactic Acid, POC   Result Value Ref Range    POC Lactic Acid 1.03 0.56 - 1.39 mmol/L   POCT Glucose   Result Value Ref Range    POC Glucose 133 (H) 74 - 100 mg/dL   EKG 12 Lead   Result Value Ref Range    Ventricular Rate 96 BPM    Atrial Rate 96 BPM    P-R Interval 134 ms    QRS Duration 88 ms    Q-T Interval 336 ms    QTc Calculation (Bazett) 424 ms    P Axis 13 degrees    R Axis 47 degrees    T Axis 28 degrees   EKG 12 Lead   Result Value Ref Range    Ventricular Rate 87 BPM    Atrial Rate 87 BPM    P-R Interval 112 ms    QRS Duration 84 ms    Q-T Interval 340 ms    QTc Calculation (Bazett) 409 ms    P Axis 42 degrees    R Axis 71 degrees    T Axis 75 degrees   TYPE AND SCREEN   Result Value Ref Range    Expiration Date 11/20/2020,2359     Arm Band Number [id number]     ABO/Rh A POSITIVE     Antibody Screen NOT TESTED     Antibody ID Anti-[place] Present     SILVIA IgG NEGATIVE     Unit Number [id number]     Product Code Leukocyte Reduced Irradiated Red Cell     Unit Divison 00     Dispense Status REL FROM [facility]     Transfusion Status OK TO TRANSFUSE     Crossmatch Result COMPATIBLE     Unit Number [id number]     Product Code Leukocyte Reduced Red Cell     Unit Divison 00     Dispense Status REL FROM [facility]     Transfusion Status OK TO TRANSFUSE     Crossmatch Result COMPATIBLE     Unit Number [id number]     Product Code Leukocyte Reduced Red Cell     Unit Divison 00     Dispense Status REL FROM [facility]     Transfusion Status OK TO TRANSFUSE     Crossmatch Result COMPATIBLE     Unit Number [id number]     Product Code Leukocyte Reduced Red Cell     Unit Divison 00     Dispense Status REL FROM [facility]     Transfusion Status OK TO TRANSFUSE     Crossmatch Result COMPATIBLE    BLOOD BANK SPECIMEN   Result Value Ref Range    Blood Bank Specimen NOT REPORTED    Prepare COVID-19 Convalescent Plasma, 1 Units   Result Value Ref Range    Unit Number [id number]     Product Code Fresh Plasma     Unit Divison 00     Dispense Status TRANSFUSED     Transfusion Status OK TO TRANSFUSE    TYPE AND SCREEN   Result Value Ref Range    Expiration Date 11/28/2020,2359     Arm Band Number BE [id number]     ABO/Rh A POSITIVE     Antibody Screen POSITIVE     Antibody ID Anti-[place] Present     SILVIA IgG NEGATIVE     Unit Number [id number]     Product Code Leukocyte Reduced Red Cell     Unit Divison 00     Dispense Status TRANSFUSED     Transfusion Status OK TO TRANSFUSE     Crossmatch Result COMPATIBLE     Unit Number [id number]     Product Code Leukocyte Reduced Red Cell     Unit Divison 00     Dispense Status REL FROM [facility]     Transfusion Status OK TO TRANSFUSE     Crossmatch Result COMPATIBLE          IMAGING DATA:    Xr Chest (single View Frontal)    Result Date: 11/17/2020  EXAMINATION: NUCLEAR MEDICINE PERFUSION SCAN. PORTABLE CHEST RADIOGRAPH 11/17/2020 TECHNIQUE: 5 millicuries of Tc 23U MAA was administered intravenously prior to planar imaging of the lungs in multiple projections. HISTORY: ORDERING SYSTEM PROVIDED HISTORY: elevated D-Dimer outside hospital Reason for Exam: Covid-19 positive pt. and elevated D-Dimer 2.44 FINDINGS: PERFUSION: Distribution of radiotracer is slightly heterogeneous. No segmental defects identified.  CHEST RADIOGRAPH:

## 2020-11-29 NOTE — PROGRESS NOTES
[facility]  Office: [address], DO, [facility] José Luisdarryl, DO, Jose Durham, DO, Radhacollette Sanchez Blood, DO, Emma Caba MD, Nicki Myles MD, Ayah Tierney MD, Ade Quinonez MD, Omega Lay MD, Wisam Benoit MD, Connie Muller MD, Sancho José MD, Mbonu Ayanna Hicks MD, Manfred De La Cruz, DO, Clare Melton MD, Fleton Seaman MD, Juanjo Jean DO, Nena Barron MD,  Marylu Mayes DO, Susanna Lovett MD, Sadiq Kwon MD, Miguel Dailey [facility], [facility], CNP, Xu Matos, CNP, Diana Chamorro, CNS, Audie Lin, CNP, Niraj Moyer, CNP, Tino Taylor, CNP, Delicia Ford, CNP, Niya Smith, CNP, Imelda Tian PA-C, Teresa Ramos, [facility], Darren Russell, CNP, Elmer Degroot, CNP, Lonre Moreira, CNP, Cesia Carmona, CNP, Ariela Shaw, [facility]   [address] [facility]    Progress Note    11/29/2020    4:02 PM    Name:   Hardy Walters  MRN:     [id number]     Acct:      [de-identified]   Room:   [address] [place] [address] Day:  13  Admit Date:  11/16/2020 10:51 PM    PCP:   Valentin Ponce MD  Code Status:  Full Code    Subjective:     C/C: No chief complaint on file. SOB  Interval History Status: not changed.      Seen and examined face-to-face today,  After deteriorating, intubated on November 23 and sedated,  Discussed with pulmonary critical care and infectious disease,  On dexamethasone,  Completed remdesivir,  Given convalescent plasma,  Antibiotics discontinued after negative cultures,  Neupogen for neutropenia,  Myelodysplastic syndrome, oncology on board,  Acute anemia, status post PRBC transfusion, hemoglobin 7.3, hemat/oncology on board , DVT PPX still on hold due to anemia,   High risk of clotting   Overall prognosis poor        Brief History:     Hardy Walters is a 70 y.o. female with a past medical history for MDS, essential hypertension, melanoma, mitral valve prolapse, MRSA, Raynaud's disease presents emergency department for shortness of breath and dyspnea upon exertion for 3 days. Patient was initially seen at [facility] diagnosed with Covid pneumonia. Patient requiring 50% FiO2 on BiPAP, patient also had an elevated D-dimer but CTA was not possible secondary to contrast allergy. [facility] unable to accommodate due to lack of Covid beds, patient was transferred for further care. Spoke with pulmonary medicine at [facility] and was accepted for ICU transfer. Patient was given 1 unit of PRBCs at [address] [place] [address] for a hemoglobin less than 7, patient was also placed on BiPAP for transfer. Patient arrived, no complaints except for some shortness of breath. Needing high flow oxygen, still critical    Intubated and sedated, endotracheal intubation done on November 23, 2020 due to acute respiratory failure not holding oxygenation even on FiO2 100% BiPAP    Review of Systems:     Sedated and intubated  Medications: Allergies: Allergies   Allergen Reactions    Fish Allergy Anaphylaxis, Hives and Swelling    Hydrocodone-Acetaminophen Anaphylaxis    Tizanidine Anaphylaxis and Hives    Atorvastatin Hives, Other (See Comments) and Swelling    Metoclopramide Hives     Other reaction(s): Unknown, Unknown    Moxifloxacin Hives, Other (See Comments) and Swelling     Other reaction(s): Other (See Comments), Unknown    Oxycodone Other (See Comments)     Other reaction(s): Hallucinations, Mental Status Change    Pregabalin Other (See Comments)     Other reaction(s): Hallucinations, Other (See Comments)  Causes sores in the mouth  Causes sores in the mouth      Tramadol      Other reaction(s): Edema, Other (See Comments)    Zolpidem Other (See Comments)     Other reaction(s): Other (See Comments)  causes cramps in hands and legs  causes cramps in hands and legs  Other reaction(s):  Other (See Comments)  causes cramps in hands and legs  causes cramps in hands and legs      Cephalexin Rash     Other reaction(s): Min:97.3 °F (36.3 °C), Max:100.8 °F (38.2 °C)    Recent Labs     11/27/20 0324 11/28/20  0510 11/29/20  0337   POCGLU 127* 128* 133*       I/O (24Hr): Intake/Output Summary (Last 24 hours) at 11/29/2020 1602  Last data filed at 11/29/2020 1200  Gross per 24 hour   Intake 1772.22 ml   Output 725 ml   Net 1047.22 ml       Labs:  Hematology:  Recent Labs     11/27/20  0341  11/28/20  0417 11/28/20  1717 11/29/20  0318 11/29/20  1208   WBC 6.8  --  8.2  --  8.0  --    RBC 2.72*  --  2.84*  --  2.52*  --    HGB 8.2*   < > 8.4* 7.8* 7.3* 7.3*   HCT 26.7*   < > 27.8* 25.6* 25.2* 23.9*   MCV 98.2  --  97.9  --  100.0  --    MCH 30.1  --  29.6  --  29.0  --    MCHC 30.7  --  30.2  --  29.0  --    RDW 17.0*  --  16.6*  --  16.6*  --      --  118*  --  79*  --    MPV 11.8  --  12.3  --  12.4  --     < > = values in this interval not displayed. Chemistry:  Recent Labs     11/28/20 0417      K 3.9      CO2 33*   GLUCOSE 121*   BUN 44*   CREATININE 0.66   ANIONGAP 6*   LABGLOM >60   GFRAA >60   CALCIUM 8.6     Recent Labs     11/27/20 0324 11/28/20 0510 11/29/20 0337   POCGLU 127* 128* 133*     ABG:  Lab Results   Component Value Date    POCPH 7.432 11/29/2020    POCPCO2 58.4 11/29/2020    POCPO2 70.6 11/29/2020    POCHCO3 38.9 11/29/2020    NBEA NOT REPORTED 11/29/2020    PBEA 13 11/29/2020    TES4TKH 41 11/29/2020    NHGM6KLW 94 11/29/2020    FIO2 NOT REPORTED 11/29/2020     Lab Results   Component Value Date/Time    SPECIAL RT HAND 2ML 11/17/2020 02:48 AM     Lab Results   Component Value Date/Time    CULTURE NO GROWTH 6 DAYS 11/17/2020 02:48 AM       Radiology:  Ariella Grossman Chest (single View Frontal)    Result Date: 11/17/2020  *Low Probability for Pulmonary Embolus. *Bilateral mid to lower lung field left greater than right opacities with peripheral involvement most pronounced at the bases compatible with  COVID-19 pneumonia.  The findings were sent to the Radiology Results [address] at 2:46 pm on 11/17/2020to be communicated to a licensed caregiver. Nm Lung Scan Perfusion Only    Result Date: 11/17/2020  *Low Probability for Pulmonary Embolus. *Bilateral mid to lower lung field left greater than right opacities with peripheral involvement most pronounced at the bases compatible with  COVID-19 pneumonia. The findings were sent to the Radiology Results [id number] at 2:46 pm on 11/17/2020to be communicated to a licensed caregiver. Physical Examination:        General appearance: Sedated and intubated  Lungs: Coarse breath sounds bilateral  Heart:  regular rate and rhythm, no murmur  Abdomen:  soft, nontender, nondistended, normal bowel sounds, no masses, hepatomegaly, splenomegaly  Extremities:  no edema, redness, tenderness in the calves  Skin:  no gross lesions, rashes, induration    Assessment:        Hospital Problems           Last Modified POA    * (Principal) Pneumonia due to COVID-19 virus 11/17/2020 Yes    Acute respiratory failure due to COVID-19 ([address] 11/16/2020 Yes    MDS (myelodysplastic syndrome) ([address] 11/17/2020 Yes    Neutropenic sepsis ([address] 11/17/2020 Yes    Bicytopenia 11/17/2020 Yes    ROSALINO (acute kidney injury) ([address] 11/17/2020 Yes    Acute respiratory failure with hypoxia ([address] 11/17/2020 Yes    Essential hypertension 11/17/2020 Yes    Coronary artery disease involving coronary bypass graft of native heart without angina pectoris 11/17/2020 Yes    History of Raynaud's syndrome 11/17/2020 Yes    Transaminitis 11/17/2020 Yes    Immunosuppressed status ([address] 11/17/2020 Yes    Severe sepsis ([address] 11/17/2020 Yes    Hypokalemia 11/22/2020 Yes    Other pancytopenia ([address] 11/24/2020 Yes          Plan:        1. Covid pneumonia, acute respiratory failure, sedated, placed on mechanical ventilation due to deterioration of respiratory failure. on remdesivir, convalescent plasma, Decadron, infectious disease and pulmonary critical care on board,   2.  Myelodysplastic syndrome and pancytopenia, oncology on board and discussed in detail, advised to keep the patient on DVT prophylaxis with Lovenox twice daily, which was stopped due to anemia, Hb 6.0 needing transfusion, hematology to comment about restart of Lovenox. ,  3. Severe anemia, post transfusion 1 unit, hemoglobin 7.3 now,  lovenox on hold per oncology and critical care   4. Hypokalemia, replaced ,    5. VQ scan low probability,  6. Keep negative fluid balance, Lasix restarted   7. Neutropenic sepsis, antibiotics as per infectious disease, blood cultures negative, antibiotics stopped by infectious disease at this time,  8. Neutropenia, Neupogen as per oncology  9. Acute kidney injury, creatinine improving,  10. Transaminitis, monitor liver functions,  11. Hypertension, monitor blood pressure,  12. DVT scds only, Lovenox on hold   13. GI prophylaxis,  14.  Full CODE STATUS,  15. Critical care time spent 32 minutes    Diomedes Davis MD  11/29/2020  4:02 PM

## 2020-11-29 NOTE — PROGRESS NOTES
PULMONARY & CRITICAL CARE MEDICINE PROGRESS NOTE     Patient:  Bhupendra Kang  MRN: [id number]  Admit date: 11/16/2020  Primary Care Physician: Sonia Duval MD  Consulting Physician: Nicolas Del Rosario MD  CODE Status: Full Code  LOS: 15     SUBJECTIVE     CHIEF COMPLAINT/REASON FOR INITIAL CONSULT: Acute respiratory failure/COVID-19 pneumonia    BRIEF HOSPITAL COURSE:   The patient is a 70 y.o. female past medical history for MDS, essential hypertension, melanoma, mitral valve prolapse, MRSA, Raynaud's disease presents emergency department for shortness of breath and dyspnea upon exertion for 3 days. Patient was initially seen at [facility] diagnosed with Covid pneumonia. Patient requiring 50% FiO2 on BiPAP, patient also had an elevated D-dimer but CTA was not possible secondary to contrast allergy. [facility] unable to accommodate due to lack of [facility] beds, patient was transferred for further care. Spoke with pulmonary medicine at [place] and was accepted for ICU transfer. Patient was given 1 unit of PRBCs at [facility] for a hemoglobin less than 7, patient was also placed on BiPAP for transfer.       INTERVAL HISTORY:  11/28/20   Patient was intubated and started on mechanical ventilation on 11/23  She is currently sedated with fentanyl and Versed infusions  She is on FiO2 of 60%, PEEP is at 12  Her white count is 8.2, T-max is 100.9  She completed on remdesivir on 11/21, currently being monitored off antimicrobials  She remains on IV Decadron (day 10)    REVIEW OF SYSTEMS:  Unobtainable from patient due to sedation/mechanical ventilation    OBJECTIVE     VENTILATOR SETTINGS:  Vent Information  $Ventilation: $Subsequent Day  Skin Assessment: Clean, dry, & intact  Suction Catheter Diameter: 14  Equipment ID: [id number]  Equipment Changed: HME  Vent Type: Servo i  Vent Mode: PRVC  Vt Ordered: 300 mL  Rate Set: 22 bmp  FiO2 : 60 %  SpO2: 95 %  SpO2/FiO2 ratio: 158.33  Sensitivity: 3  PEEP/CPAP: 12  I Time/ I Time %: 0.8 s  Humidification Source: HME  Nitric Oxide/Epoprostenol In Use?: No  Mask Type: Full face mask  Mask Size: Small     PaO2/FiO2 RATIO:  Recent Labs     20  0510   POCPO2 53.7*      FiO2 : 60 %     VITAL SIGNS:   LAST:  BP (!) 104/56   Pulse 93   Temp 97.3 °F (36.3 °C) (Core)   Resp 13   Ht 5' 7\" (1.702 m)   Wt 157 lb 3 oz (71.3 kg)   SpO2 95%   BMI 24.62 kg/m²   8-24 HR RANGE:  TEMP Temp  Av.2 °F (37.3 °C)  Min: 97.3 °F (36.3 °C)  Max: 100.9 °F (55.4 °C)   BP Systolic (45JQL), ELM:170 , Min:96 , JRL:344      Diastolic (33DKJ), QUB:75, Min:50, Max:81     PULSE Pulse  Av.4  Min: 82  Max: 117   RR Resp  Avg: 15.3  Min: 13  Max: 20   O2 SAT SpO2  Av.7 %  Min: 95 %  Max: 99 %   OXYGEN DELIVERY No data recorded        SYSTEMIC EXAMINATION:   Physical exam was deferred to conserve PPE and limit exposure to COVID-19    DATA REVIEW     Medications:  Scheduled Meds:   pantoprazole  40 mg Intravenous BID    And    sodium chloride (PF)  10 mL Intravenous Daily    potassium chloride  10 mEq Intravenous Daily    LORazepam  0.25 mg Intravenous Once    [Held by provider] enoxaparin  40 mg Subcutaneous BID    latanoprost  1 drop Both Eyes Nightly    sodium chloride  20 mL Intravenous Once    sodium chloride  20 mL Intravenous Once     Continuous Infusions:   fentaNYL 100 mcg/hr (20 1315)    midazolam 6 mg/hr (20 1314)       INPUT/OUTPUT:  In: 497 [I.V.:212; NG/GT:285]  Out: 1225 [Urine:1225]  Date 20 - 20   Shift  6874-2386 7266-4842 24 Hour Total   INTAKE   I.V.(mL/kg) 164.6(2.3) 28(0.4) 184(2.6) 376.6(5.3)   NG/GT(mL/kg) 356(5) 285(4)  641(9)   Shift Total(mL/kg) 520.6(7.3) 313(4.4) 184(2.6) 1017. 6(14.3)   OUTPUT   Urine(mL/kg/hr) 280(0.5) 625(1.1) 600 1505   Shift Total(mL/kg) 280(3.9) 625(8.8) 600(8.4) 1505(21.1)   Weight (kg) 71.3 71.3 71.3 71.3        LABS:  ABGs:   Recent Labs     20  5260 11/26/20  0306 11/27/20  0324 11/28/20  0510   POCPH 7.429 7.412 7.387 7.409   POCPCO2 47.6 52.4* 56.3* 58.2*   POCPO2 56.3* 56.9* 60.3* 53.7*   POCHCO3 31.5* 33.3* 33.9* 36.8*   BMKG2FZI 89* 89* 90* 87*     CBC:   Recent Labs     11/26/20  0309 11/26/20  1844 11/27/20  0341 11/27/20 2055 11/28/20 0417 11/28/20  1717   WBC 5.3  --   --  6.8  --  8.2  --    HGB 8.0*   < > 8.0* 8.2* 8.0* 8.4* 7.8*   HCT 25.2*   < > 25.7* 26.7* 26.4* 27.8* 25.6*   MCV 95.5  --   --  98.2  --  97.9  --      --   --  148  --  118*  --    LYMPHOPCT 11*  --   --  7*  --  9*  --    RBC 2.64*  --   --  2.72*  --  2.84*  --    MCH 30.3  --   --  30.1  --  29.6  --    MCHC 31.7  --   --  30.7  --  30.2  --    RDW 17.4*  --   --  17.0*  --  16.6*  --     < > = values in this interval not displayed. CRP:   No results for input(s): CRP in the last 72 hours. LDH:   No results for input(s): LDH in the last 72 hours. BMP:   Recent Labs     11/26/20 0309 11/28/20 0417    144   K 4.9 3.9    105   CO2 30 33*   BUN 32* 44*   CREATININE 0.65 0.66   GLUCOSE 91 121*     Liver Function Test:   Recent Labs     11/26/20 0309   PROT 6.1*   LABALBU 2.3*   ALT 14   AST 13   ALKPHOS 61   BILITOT 0.29*     Coagulation Profile:   No results for input(s): INR, PROTIME, APTT in the last 72 hours. D-Dimer:  No results for input(s): DDIMER in the last 72 hours. Ferritin:    No results for input(s): FERRITIN in the last 72 hours. Lactic Acid:  No results for input(s): LACTA in the last 72 hours. Cardiac Enzymes:  No results for input(s): CKTOTAL, CKMB, CKMBINDEX, TROPONINI in the last 72 hours. Invalid input(s): TROPONIN, HSTROP  BNP/ProBNP:   No results for input(s): BNP, PROBNP in the last 72 hours. Triglycerides:  No results for input(s): TRIG in the last 72 hours.      Microbiology:  No results for input(s): SPECDESC, SPECDESC, SPECIAL, CULTURE, CULTURE, STATUS, ORG, CDIFFTOXPCR, CAMPYLOBPCR, SALMONELLAPC, SHIGAPCR, SHIGELLAPCR, service. Patient remains hemodynamically stable  Patient currently sedated with fentanyl and Versed infusions  Continue lung protective mechanical ventilationas per ARDS protocol  Her FiO2 is 60%, PEEP is 12  Wean FiO2/PEEP as tolerated  Monitor endotracheal secretions   Obtain X-ray chest as needed   Continue pulmonary toilet, aspiration precautions and bronchodilators  Continue to monitor I/O with a goal of even/negative fluid balance  We will recommend to continue tube feeds  Stress ulcer prophylaxis  Continue to monitor CBC, coagulation profile, transfuse as indicated  Chemical DVT prophylaxis remains on hold due to suspected GI bleed  Antimicrobials reviewed; continue to monitor off antimicrobials  Monitor CRP, LDH, AST/ALT, D-Dimer, Ferritin   Glycemic control appropriate  We will recommend to discontinue Decadron  Skin/wound care reviewed with the nursing staff  Physical/occupational therapy    Critical care time of 35 minutes was spent (excluding procedures), in coordination of care during bedside rounds and discussion of patient care in detail, and recommendations of the team were adopted in the plan. Necessity of all invasive devices was also confirmed. Daisy Palacios M.D. Pulmonary and Critical Care Medicine           11/28/2020     This patient was evaluated in the context of the global SARS-CoV-2 (COVID-19) pandemic, which necessitated considerations that the patient either has COVID-19 infection or is at risk of infection with COVID-19. Institutional protocols and algorithms that pertain to the evaluation & management of patients with COVID-19 or those at risk for COVID-19 are in a state of rapid changes based on information released by regulatory bodies including the CDC and federal and state organizations. These policies and algorithms were followed during the patient's care. Please note that this chart was generated using voice recognition Dragon dictation software.   Although every effort was made to ensure the accuracy of this automated transcription, some errors in transcription may have occurred.

## 2020-11-30 NOTE — PROGRESS NOTES
PULMONARY & CRITICAL CARE MEDICINE PROGRESS NOTE     Patient:  Rudi Gifford  MRN: [id number]  Admit date: 11/16/2020  Primary Care Physician: Hawa Nevarez MD  Consulting Physician: Lashell Velez MD  CODE Status: Full Code  LOS: 15     SUBJECTIVE     CHIEF COMPLAINT/REASON FOR INITIAL CONSULT: Acute respiratory failure/COVID-19 pneumonia    BRIEF HOSPITAL COURSE:   The patient is a 70 y.o. female past medical history for MDS, essential hypertension, melanoma, mitral valve prolapse, MRSA, Raynaud's disease presents emergency department for shortness of breath and dyspnea upon exertion for 3 days. Patient was initially seen at [facility] [place] diagnosed with Covid pneumonia. Patient requiring 50% FiO2 on BiPAP, patient also had an elevated D-dimer but CTA was not possible secondary to contrast allergy. [facility] unable to accommodate due to lack of [facility] beds, patient was transferred for further care. Spoke with pulmonary medicine at [facility] and was accepted for ICU transfer. Patient was given 1 unit of PRBCs at [facility] for a hemoglobin less than 7, patient was also placed on BiPAP for transfer. INTERVAL HISTORY:  11/30/20   Events seen, chart reviewed, medications seen ventilator support reviewed and blood gases seen. Patient was intubated and started on mechanical ventilation on 11/23 and remained intubated. Last 24-hour T-max was 99.7 heart rate is 100 usually respiratory rate is in low to mid 20s  She remained on PEEP of around 10-12 and FiO2 between 45% to 55%  She is currently sedated with fentanyl 50 mcg and Versed 5 mg infusions  Ventilator setting PRVC/22/300/12/50 5%. ABG 7.4 5/57/49/39. Labs shows BUN of 38 creatinine 0.57 bicarbonate 31 sodium 145 hemoglobin was 7.2, WBC count 7.1 platelet count 75. Chest x-ray on 11/29/2020 shows unchanged bilateral infiltrate more prominent in right midlung and left lower lobe.   She completed on remdesivir on 11/21, currently being monitored off antimicrobials  She completed Decadron on     REVIEW OF SYSTEMS:  Unobtainable from patient due to sedation/mechanical ventilation    OBJECTIVE     VENTILATOR SETTINGS:  Vent Information  $Ventilation: $Subsequent Day  Skin Assessment: Clean, dry, & intact  Suction Catheter Diameter: 14  Equipment ID: [id number]  Equipment Changed: HME  Vent Type: Servo i  Vent Mode: PRVC  Vt Ordered: 300 mL  Rate Set: 22 bmp  FiO2 : 55 %  SpO2: 95 %  SpO2/FiO2 ratio: 174.55  Sensitivity: 3  PEEP/CPAP: 10  I Time/ I Time %: 0.9 s  Humidification Source: HME  Nitric Oxide/Epoprostenol In Use?: No  Mask Type: Full face mask  Mask Size: Small     PaO2/FiO2 RATIO:  Recent Labs     20  0536   POCPO2 48.6*      FiO2 : 55 %     VITAL SIGNS:   LAST:  BP (!) 98/42   Pulse 99   Temp 99.7 °F (37.6 °C) (Core)   Resp 14   Ht 5' 7\" (1.702 m)   Wt 155 lb 10.3 oz (70.6 kg)   SpO2 95%   BMI 24.38 kg/m²   8-24 HR RANGE:  TEMP Temp  Av.1 °F (37.8 °C)  Min: 99.7 °F (37.6 °C)  Max: 100.8 °F (45.5 °C)   BP Systolic (57MWW), BJJ:668 , Min:98 , BKP:808      Diastolic (59VSK), AYM:19, Min:42, Max:67     PULSE Pulse  Av.3  Min: 95  Max: 108   RR Resp  Av.1  Min: 14  Max: 32   O2 SAT SpO2  Av.8 %  Min: 90 %  Max: 98 %   OXYGEN DELIVERY No data recorded        SYSTEMIC EXAMINATION:   Physical exam was deferred to conserve PPE and limit exposure to COVID-19  Patient was seen and evaluated from outside the room through the window. She is on mechanical ventilation sedated look comfortable managing with the ventilator not in distress.     DATA REVIEW     Medications:  Scheduled Meds:   QUEtiapine  50 mg Oral Nightly    enoxaparin  40 mg Subcutaneous Daily    docusate  100 mg Oral BID    pantoprazole  40 mg Intravenous BID    And    sodium chloride (PF)  10 mL Intravenous Daily    potassium chloride  10 mEq Intravenous Daily    LORazepam  0.25 mg Intravenous Once    latanoprost  1 drop Both Eyes Nightly    sodium chloride  20 mL Intravenous Once    sodium chloride  20 mL Intravenous Once     Continuous Infusions:   fentaNYL 150 mcg/hr (11/30/20 0729)    midazolam 5 mg/hr (11/30/20 1040)       INPUT/OUTPUT:  In: 1802.6 [I.V.:395.6; NG/GT:1407]  Out: 945 [Urine:945]  Date 11/30/20 0000 - 11/30/20 2359   Shift 7042-2922 8914-9815 7348-2472 24 Hour Total   INTAKE   I.V.(mL/kg) 86.7(1.2) 104.9(1.5)  191.6(2.7)   NG/GT(mL/kg)  345(4.9)  345(4.9)   Shift Total(mL/kg) 86.7(1.2) 449. 9(6.4)  536.6(7.6)   OUTPUT   Urine(mL/kg/hr) 200(0.4) 270  470   Shift Total(mL/kg) 200(2.8) 270(3.8)  470(6.7)   Weight (kg) 70.6 70.6 70.6 70.6        LABS:  ABGs:   Recent Labs     11/28/20  0510 11/29/20  0337 11/30/20  0536   POCPH 7.409 7.432 7.448   POCPCO2 58.2* 58.4* 57.1*   POCPO2 53.7* 70.6* 48.6*   POCHCO3 36.8* 38.9* 39.5*   CAIU9ZUD 87* 94 84*     CBC:   Recent Labs     11/28/20  0417 11/28/20  1717 11/29/20  0318 11/29/20  1208 11/29/20  1929 11/30/20  0350   WBC 8.2  --  8.0  --   --  7.1   HGB 8.4* 7.8* 7.3* 7.3* 7.8* 7.2*   HCT 27.8* 25.6* 25.2* 23.9* 25.6* 24.2*   MCV 97.9  --  100.0  --   --  100.0   *  --  79*  --   --  75*   LYMPHOPCT 9*  --  9*  --   --  13*   RBC 2.84*  --  2.52*  --   --  2.42*   MCH 29.6  --  29.0  --   --  29.8   MCHC 30.2  --  29.0  --   --  29.8   RDW 16.6*  --  16.6*  --   --  16.7*     CRP:   No results for input(s): CRP in the last 72 hours. LDH:   No results for input(s): LDH in the last 72 hours. BMP:   Recent Labs     11/28/20  0417 11/30/20  0818    145*   K 3.9 4.1    108*   CO2 33* 31   BUN 44* 38*   CREATININE 0.66 0.57   GLUCOSE 121* 139*     Liver Function Test:   No results for input(s): PROT, LABALBU, ALT, AST, GGT, ALKPHOS, BILITOT in the last 72 hours. Coagulation Profile:   No results for input(s): INR, PROTIME, APTT in the last 72 hours. D-Dimer:  No results for input(s): DDIMER in the last 72 hours.   Ferritin:    No results for input(s): FERRITIN in the last 72 hours. Lactic Acid:  No results for input(s): LACTA in the last 72 hours. Cardiac Enzymes:  No results for input(s): CKTOTAL, CKMB, CKMBINDEX, TROPONINI in the last 72 hours. Invalid input(s): TROPONIN, HSTROP  BNP/ProBNP:   No results for input(s): BNP, PROBNP in the last 72 hours. Triglycerides:  No results for input(s): TRIG in the last 72 hours. Microbiology:  No results for input(s): SPECDESC, SPECDESC, SPECIAL, CULTURE, CULTURE, STATUS, ORG, CDIFFTOXPCR, CAMPYLOBPCR, SALMONELLAPC, SHIGAPCR, SHIGELLAPCR, MPNEUG, MPNEUM, LACTOQL in the last 72 hours. Pathology:    Radiology Reports:  XR CHEST (SINGLE VIEW FRONTAL)   Final Result   Stable interstitial infiltrates. ET tube terminates 35 mm above the nghia. XR CHEST PORTABLE   Final Result   Endotracheal tube tip is approximately 4 cm above the nghia. Slight increase in bilateral parenchymal opacities. XR CHEST PORTABLE   Final Result   No significant change in chest findings compared to the prior study from   November 18th. XR CHEST PORTABLE   Final Result   No change in bilateral pulmonary opacities left greater than right with more   peripheral involvement compatible with pneumonitis. XR CHEST (SINGLE VIEW FRONTAL)   Final Result   *Low Probability for Pulmonary Embolus. *Bilateral mid to lower lung field left greater than right opacities with   peripheral involvement most pronounced at the bases compatible with  COVID-19   pneumonia. The findings were sent to the Radiology Results [address] at 2:46   pm on 11/17/2020to be communicated to a licensed caregiver. NM LUNG SCAN PERFUSION ONLY   Final Result   *Low Probability for Pulmonary Embolus. *Bilateral mid to lower lung field left greater than right opacities with   peripheral involvement most pronounced at the bases compatible with  COVID-19   pneumonia.    The findings were sent to the Radiology Results Communication Center at 2:46   pm on 11/17/2020to be communicated to a licensed caregiver. Echocardiogram:   No results found for this or any previous visit. ASSESSMENT AND PLAN     Assessment:    Acute hypoxic respiratory failure, requiring invasive mechanical ventilation since 11/23  Acute respiratory distress syndrome  Bilateral multifocal pneumonia due to COVID 19 infection  Sepsis due to COVID 19  Myelodysplastic syndrome  Hypoalbuminemia  Elevated ferritin, fibrinogen  Suspected GI bleed    Plan:    I personally interviewed/examined the patient; reviewed interval history, interpreted all available radiographic and laboratory data at the time of service. Patient is currently stable hemodynamically and off pressors  Patient currently sedated with fentanyl and Versed infusions  Continue lung protective mechanical ventilationas per ARDS protocol  She required PEEP 10-12 and FiO2 of usually around 50%. Wean FiO2/PEEP as tolerated  Monitor endotracheal secretions   Obtain X-ray chest as needed   Continue pulmonary toilet, aspiration precautions and bronchodilators  Continue to monitor I/O with a goal of even/negative fluid balance  Continue to monitor hemoglobin hematocrit. Continue with tube feedings  Stress ulcer prophylaxis  Continue to monitor CBC, coagulation profile, transfuse as indicated if hemoglobin 7 or less  Chemical DVT prophylaxis continue with Lovenox  Antimicrobials reviewed; continue to monitor off antimicrobials  Monitor CRP, LDH, AST/ALT, D-Dimer, Ferritin   Glycemic control appropriate  She has completed 10 days of Decadron  Skin/wound care reviewed with the nursing staff  Physical/occupational therapy    Nursing staff, treatment plan discussed. Discussed with respiratory therapist   I have called the  over the phone and discussed the current condition and updated and all questions answered.    does not want her to be resuscitated in case of cardiac arrest no CPR and shock but want to continue with current ventilatory support and current supportive care. CODE STATUS changed to DNR CCA      Critical care time of 35 minutes was spent (excluding procedures), in coordination of care during bedside rounds and discussion of patient care in detail, and recommendations of the team were adopted in the plan. Necessity of all invasive devices was also confirmed. Stacy Bernal M.D. Pulmonary and Critical Care Medicine           11/30/2020     This patient was evaluated in the context of the global SARS-CoV-2 (COVID-19) pandemic, which necessitated considerations that the patient either has COVID-19 infection or is at risk of infection with COVID-19. Institutional protocols and algorithms that pertain to the evaluation & management of patients with COVID-19 or those at risk for COVID-19 are in a state of rapid changes based on information released by regulatory bodies including the CDC and federal and state organizations. These policies and algorithms were followed during the patient's care. Please note that this chart was generated using voice recognition Dragon dictation software. Although every effort was made to ensure the accuracy of this automated transcription, some errors in transcription may have occurred.

## 2020-11-30 NOTE — FLOWSHEET NOTE
Pt's son Grace Forrester updated on the phone. All questions answered at this time. Explained that pt will have to be paralyzed for vent synchrony. He stated he will call and update his father and sister with the latest update .

## 2020-11-30 NOTE — FLOWSHEET NOTE
Anjum Hinds, pt's daughter updated over the phone. All questions answered at this time. Requesting that the pulmonologist call her father with an update from their standpoint. Also code status has been discussed to possibly change pt to [facility]. Will discuss with pt's  today.

## 2020-11-30 NOTE — PLAN OF CARE
Problem: Restraint Use - Nonviolent/Non-Self-Destructive Behavior:  Goal: Absence of restraint-related injury  Description: Absence of restraint-related injury  11/30/2020 0118 by Daly Iqbal RN  Outcome: Met This Shift  11/29/2020 1219 by Tracy Lainez RN  Outcome: Not Met This Shift     Problem:  Activity:  Goal: Fatigue will decrease  Description: Fatigue will decrease  11/30/2020 0118 by Daly Iqbal RN  Outcome: Ongoing  11/29/2020 1219 by Tracy Lainez RN  Outcome: Ongoing     Problem: Cardiac:  Goal: Hemodynamic stability will improve  Description: Hemodynamic stability will improve  11/30/2020 0118 by Daly Iqbal RN  Outcome: Ongoing  11/29/2020 1219 by Tracy Lainez RN  Outcome: Ongoing     Problem: Coping:  Goal: Level of anxiety will decrease  Description: Level of anxiety will decrease  11/30/2020 0118 by Daly Iqbal RN  Outcome: Ongoing  11/29/2020 1219 by Tracy Lainez RN  Outcome: Ongoing  Goal: Ability to cope will improve  Description: Ability to cope will improve  11/30/2020 0118 by Daly Iqbal RN  Outcome: Ongoing  11/29/2020 1219 by Tracy Lainez RN  Outcome: Ongoing  Goal: Ability to establish a method of communication will improve  Description: Ability to establish a method of communication will improve  11/30/2020 0118 by Daly Iqbal RN  Outcome: Ongoing  11/29/2020 1219 by Tracy Lainez RN  Outcome: Ongoing     Problem: Nutritional:  Goal: Consumption of the prescribed amount of daily calories will improve  Description: Consumption of the prescribed amount of daily calories will improve  11/30/2020 0118 by Daly Iqbal RN  Outcome: Ongoing  11/29/2020 1219 by Tracy Lainez RN  Outcome: Ongoing     Problem: Respiratory:  Goal: Ability to maintain a clear airway will improve  Description: Ability to maintain a clear airway will improve  11/30/2020 0118 by Daly Iqbal RN  Outcome: Ongoing  11/29/2020 1751 by Declan Quigley RCP  Outcome: Ongoing  11/29/2020 21  by Netta Torres RN  Outcome: Ongoing  Goal: Ability to maintain adequate ventilation will improve  Description: Ability to maintain adequate ventilation will improve  11/30/2020 0118 by Mei Salas RN  Outcome: Ongoing  11/29/2020 1751 by Jo-Ann Davila RCP  Outcome: Ongoing  11/29/2020 1219 by Netta Torres RN  Outcome: Ongoing  Goal: Complications related to the disease process, condition or treatment will be avoided or minimized  Description: Complications related to the disease process, condition or treatment will be avoided or minimized  11/30/2020 0118 by Mei Salas RN  Outcome: Ongoing  11/29/2020 1751 by Jo-Ann Davila RCP  Outcome: Ongoing  11/29/2020 1219 by Netta Torres RN  Outcome: Ongoing     Problem: Skin Integrity:  Goal: Risk for impaired skin integrity will decrease  Description: Risk for impaired skin integrity will decrease  11/30/2020 0118 by Mei aSlas RN  Outcome: Ongoing  11/29/2020 1219 by Netta Torres RN  Outcome: Ongoing  Goal: Will show no infection signs and symptoms  Description: Will show no infection signs and symptoms  11/30/2020 0118 by Mei Salas RN  Outcome: Ongoing  11/29/2020 1219 by Netta Torres RN  Outcome: Ongoing  Goal: Absence of new skin breakdown  Description: Absence of new skin breakdown  11/30/2020 0118 by Mei Salas RN  Outcome: Ongoing  11/29/2020 1219 by Netta Torres RN  Outcome: Ongoing     Problem: Falls - Risk of:  Goal: Will remain free from falls  Description: Will remain free from falls  11/30/2020 0118 by Mei Salas RN  Outcome: Ongoing  11/29/2020 1219 by Netta Torres RN  Outcome: Ongoing  Goal: Absence of physical injury  Description: Absence of physical injury  11/30/2020 0118 by Mei Salas RN  Outcome: Ongoing  11/29/2020 1219 by Netta Torres RN  Outcome: Ongoing     Problem: Pain:  Goal: Pain level will decrease  Description: Pain level will decrease  11/30/2020 0118 by Mei Salas RN  Outcome: Ongoing  11/29/2020 1219 by Nilesh Rivera RN  Outcome: Ongoing  Goal: Control of acute pain  Description: Control of acute pain  11/30/2020 0118 by Keysha Hanna RN  Outcome: Ongoing  11/29/2020 1219 by Nilesh Rivera RN  Outcome: Ongoing  Goal: Control of chronic pain  Description: Control of chronic pain  11/30/2020 0118 by Keysha Hanna RN  Outcome: Ongoing  11/29/2020 1219 by Nilesh Rivera RN  Outcome: Ongoing     Problem: Airway Clearance - Ineffective  Goal: Achieve or maintain patent airway  11/30/2020 0118 by Keysha Hanna RN  Outcome: Ongoing  11/29/2020 1751 by Breanna Mckee RCP  Outcome: Ongoing  11/29/2020 1219 by Nilesh Rivera RN  Outcome: Ongoing     Problem: Gas Exchange - Impaired  Goal: Absence of hypoxia  11/30/2020 0118 by Keysha Hanna RN  Outcome: Ongoing  11/29/2020 1751 by Breanna Mckee RCP  Outcome: Ongoing  11/29/2020 1219 by Nilesh Rivera RN  Outcome: Ongoing  Goal: Promote optimal lung function  11/30/2020 0118 by Keysha Hanna RN  Outcome: Ongoing  11/29/2020 1751 by Breanna Mckee RCP  Outcome: Ongoing  11/29/2020 1219 by Nilesh Rivera RN  Outcome: Ongoing     Problem: Breathing Pattern - Ineffective  Goal: Ability to achieve and maintain a regular respiratory rate  11/30/2020 0118 by Keysha Hanna RN  Outcome: Ongoing  11/29/2020 1751 by Breanna Mckee RCP  Outcome: Ongoing  11/29/2020 1219 by Nilesh Rivera RN  Outcome: Ongoing     Problem:  Body Temperature -  Risk of, Imbalanced  Goal: Ability to maintain a body temperature within defined limits  11/30/2020 0118 by Keysha Hanna RN  Outcome: Ongoing  11/29/2020 1219 by Nilesh Rivera RN  Outcome: Ongoing  Goal: Will regain or maintain usual level of consciousness  11/30/2020 0118 by Keysha Hanna RN  Outcome: Ongoing  11/29/2020 1219 by Nilesh Rivera RN  Outcome: Ongoing  Goal: Complications related to the disease process, condition or treatment will be avoided or minimized  11/30/2020 0118 by Keysha Hanna normal respiratory rate/effort  Description: Respiratory rate and effort will be within normal limits for the patient  11/30/2020 0118 by Wallis Litten, RN  Outcome: Ongoing  11/29/2020 1751 by Balbina Troncoso RCP  Outcome: Ongoing  11/29/2020 1219 by Arabella Storm RN  Outcome: Ongoing     Problem: Nutrition  Goal: Optimal nutrition therapy  Description: Nutrition Problem #1: Inadequate oral intake  Intervention: Food and/or Nutrient Delivery: Continue NPO(Start diet vs nutrition support as able)  Nutritional Goals: Start diet vs nutrition support within 24-72 hrs     11/30/2020 0118 by Wallis Litten, RN  Outcome: Ongoing  11/29/2020 1219 by Arabella Storm RN  Outcome: Ongoing     Problem: MECHANICAL VENTILATION  Goal: Patient will maintain patent airway  11/30/2020 0118 by Wallis Litten, RN  Outcome: Ongoing  11/29/2020 1751 by Balbina Troncoso RCP  Outcome: Ongoing  11/29/2020 1219 by Arabella Storm RN  Outcome: Ongoing  Goal: Oral health is maintained or improved  11/30/2020 0118 by Wallis Litten, RN  Outcome: Ongoing  11/29/2020 1751 by Balbina Troncoso RCP  Outcome: Ongoing  11/29/2020 1219 by Arabella Storm RN  Outcome: Ongoing  Goal: ET tube will be managed safely  11/30/2020 0118 by Wallis Litten, RN  Outcome: Ongoing  11/29/2020 1751 by Balbina Troncoso RCP  Outcome: Ongoing  11/29/2020 1219 by Arabella Storm RN  Outcome: Ongoing  Goal: Ability to express needs and understand communication  11/30/2020 0118 by Wallis Litten, RN  Outcome: Ongoing  11/29/2020 1751 by Balbina Troncoso RCP  Outcome: Ongoing  11/29/2020 1219 by Arabella Storm RN  Outcome: Ongoing  Goal: Mobility/activity is maintained at optimum level for patient  11/30/2020 0118 by Wallis Litten, RN  Outcome: Ongoing  11/29/2020 1751 by Balbian Troncoso RCP  Outcome: Ongoing  11/29/2020 1219 by Arabella Storm RN  Outcome: Ongoing     Problem: SKIN INTEGRITY  Goal: Skin integrity is maintained or improved  11/30/2020 0118 by Wallis Litten, RN  Outcome: Ongoing  11/29/2020 1751 by Eduardo Mar RCP  Outcome: Ongoing  11/29/2020 1219 by Anise Spurling, RN  Outcome: Ongoing     Problem: Restraint Use - Nonviolent/Non-Self-Destructive Behavior:  Goal: Absence of restraint indications  Description: Absence of restraint indications  11/30/2020 0118 by Susan Marquez RN  Outcome: Not Met This Shift  11/29/2020 1219 by Anise Spurling, RN  Outcome: Not Met This Shift

## 2020-11-30 NOTE — CONSULTS
Palliative Care Inpatient Consult    NAME:  Paul Shay  MEDICAL RECORD NUMBER:  [id number]  AGE: 70 y.o. GENDER: female  : 1945  TODAY'S DATE:  2020    Reasons for Consultation:    Symptom and/or pain management  Provision of information regarding PC and/or hospice philosophies  Complex, time-intensive communication and interdisciplinary psychosocial support  Clarification of goals of care and/or assistance with difficult decision-making  Guidance in regards to resources and transition(s)    Members of PC team contributing to this consultation are :  Ander Sanches CNP  Palliative care   History of Present Illness     The patient is a 70 y.o. Non-/non  female who presents with Covid and Respiratory failure     Referred to Palliative Care by   [x] Physician   [] Nursing  [] Family Request   [] Other:       She was admitted to the ICU service for Acute respiratory failure due to COVID-19 ([facility] [U07.1, J96.00]. Her hospital course has been associated with Pneumonia due to COVID-19 virus. The patient has a complicated medical history and has been hospitalized since 2020 10:51 PM.  Justice Weinstein went to [address] [place] [address] in [place] [address] with complaints of fatigue and tiredness. She had had a history of shortness of breath and dyspnea upon exertion for 3 days. She was diagnosed with Covid she was placed on 50% BiPAP and was transferred to [address] [place] [address] as an ICU transfer. Patient has medical history of four-vessel coronary bypass, Raynaud's, MVP, MRSA, melanoma, hyperlipidemia, glaucoma, GERD, hypertension, myelodysplastic syndrome. She received 1 unit of blood at [address] [place] [address] for a hemoglobin of 6.0. Patient was intubated and started on mechanical ventilation on . She is currently being sedated with fentanyl and Versed. Completed her remdesivir on , her Decadron on , and received convalescent plasma. Patient's current ventilator settings are 55% FiO2 and PEEP of 12.   Patient's labs from today include hemoglobin 6.8, hematocrit 22.9, glucose 139, BUN 38, creatinine 0.57, potassium 4.1. Patient had a chest x-ray done yesterday and results are below. Has the following consults pulmonary due to respiratory failure and Covid, palliative care for goal CODE STATUS discussion, family support, and goals of care, GI for upper GI bleed, oncology for myelodysplastic syndrome. Patient has been receiving chemotherapy and her last treatment was around 3 weeks ago. Palliative care will continue to follow patient and provide emotional support to patient's family as needed    Chest x-ray  Stable interstitial infiltrates.  ET tube terminates 35 mm above the nghia.      Active Hospital Problems    Diagnosis Date Noted    Other pancytopenia ([address] [D61.818] 11/24/2020    Hypokalemia [E87.6] 11/22/2020    MDS (myelodysplastic syndrome) ([address] [D46.9] 11/17/2020    Pneumonia due to COVID-19 virus [U07.1, J12.89] 11/17/2020    Neutropenic sepsis ([address] [A41.9, D70.9] 11/17/2020    Bicytopenia [D75.89] 11/17/2020    ROSALINO (acute kidney injury) ([address] [N17.9] 11/17/2020    Acute respiratory failure with hypoxia ([address] [J96.01] 11/17/2020    Essential hypertension [I10] 11/17/2020    Coronary artery disease involving coronary bypass graft of native heart without angina pectoris [I25.810] 11/17/2020    History of Raynaud's syndrome [Z86.79] 11/17/2020    Transaminitis [R74.01] 11/17/2020    Severe sepsis ([address] [A41.9, R65.20] 11/17/2020    Immunosuppressed status ([address] [D84.9]     Acute respiratory failure due to COVID-19 ([address] [U07.1, J96.00] 11/16/2020       PAST MEDICAL HISTORY      Diagnosis Date    Cancer ([address]     myelodysplastic syndrome    Deaf, left     Essential hypertension     GERD (gastroesophageal reflux disease)     Glaucoma     Hyperlipidemia     Melanoma ([address]     MRSA (methicillin resistant staph aureus) culture positive     MVP (mitral valve prolapse)     Pharyngoesophageal dysphagia     Raynaud disease     Status post four vessel coronary artery bypass        PAST SURGICAL HISTORY  Past Surgical History:   Procedure Laterality Date    APPENDECTOMY      CARDIAC SURGERY      CABG x3    CHOLECYSTECTOMY      HYSTERECTOMY, TOTAL ABDOMINAL      IR PORT PLACEMENT < 5 YEARS      SMALL INTESTINE SURGERY      TUBAL LIGATION         SOCIAL HISTORY  Social History     Tobacco Use    Smoking status: Former Smoker     Types: Cigarettes     Start date: 1/1/1957     Last attempt to quit: 8/22/2005     Years since quitting: 15.2   Substance Use Topics    Alcohol use: Never     Frequency: Never    Drug use: Never       ALLERGIES  Allergies   Allergen Reactions    Fish Allergy Anaphylaxis, Hives and Swelling    Hydrocodone-Acetaminophen Anaphylaxis    Tizanidine Anaphylaxis and Hives    Atorvastatin Hives, Other (See Comments) and Swelling    Metoclopramide Hives     Other reaction(s): Unknown, Unknown    Moxifloxacin Hives, Other (See Comments) and Swelling     Other reaction(s): Other (See Comments), Unknown    Oxycodone Other (See Comments)     Other reaction(s): Hallucinations, Mental Status Change    Pregabalin Other (See Comments)     Other reaction(s): Hallucinations, Other (See Comments)  Causes sores in the mouth  Causes sores in the mouth      Tramadol      Other reaction(s): Edema, Other (See Comments)    Zolpidem Other (See Comments)     Other reaction(s): Other (See Comments)  causes cramps in hands and legs  causes cramps in hands and legs  Other reaction(s):  Other (See Comments)  causes cramps in hands and legs  causes cramps in hands and legs      Cephalexin Rash     Other reaction(s): Unknown, Unknown    Iodides Rash and Swelling     ivp and heart cath dye    Sulfa Antibiotics Rash         MEDICATIONS  Current Medications    QUEtiapine  50 mg Oral Nightly    enoxaparin  40 mg Subcutaneous Daily    sodium chloride  250 mL Intravenous Once    docusate  100 mg Oral BID    pantoprazole  40 mg Intravenous BID    And    sodium chloride (PF)  10 mL Intravenous Daily    potassium chloride  10 mEq Intravenous Daily    LORazepam  0.25 mg Intravenous Once    latanoprost  1 drop Both Eyes Nightly    sodium chloride  20 mL Intravenous Once    sodium chloride  20 mL Intravenous Once     LORazepam, sodium chloride, sodium chloride flush, potassium chloride **OR** potassium alternative oral replacement **OR** potassium chloride, magnesium sulfate, acetaminophen **OR** acetaminophen, polyethylene glycol, promethazine **OR** ondansetron, nicotine  IV Drips/Infusions   fentaNYL 175 mcg/hr (11/30/20 1317)    midazolam 7 mg/hr (11/30/20 1318)     Home Medications  No current facility-administered medications on file prior to encounter. Current Outpatient Medications on File Prior to Encounter   Medication Sig Dispense Refill    dapsone 100 MG tablet Take 1 tablet by mouth daily (with breakfast)      aspirin 81 MG EC tablet Take 81 mg by mouth daily      carvedilol (COREG) 6.25 MG tablet Take 6.25 mg by mouth 2 times daily (with meals)      Isavuconazonium Sulfate (CRESEMBA) 186 MG CAPS Take by mouth      diphenhydrAMINE (BENADRYL) 25 MG capsule Take 25 mg by mouth every 6 hours as needed for Itching      hydroxychloroquine (PLAQUENIL) 200 MG tablet Take 200 mg by mouth daily      nitroGLYCERIN (NITROSTAT) 0.4 MG SL tablet Place 0.4 mg under the tongue every 5 minutes as needed for Chest pain up to max of 3 total doses. If no relief after 1 dose, call 911.       omeprazole (PRILOSEC) 20 MG delayed release capsule Take 40 mg by mouth daily      phenazopyridine (PYRIDIUM) 100 MG tablet Take 100 mg by mouth 3 times daily as needed for Pain      predniSONE (DELTASONE) 1 MG tablet Take 1 mg by mouth daily      prochlorperazine (COMPAZINE) 25 MG suppository Place 25 mg rectally every 12 hours as needed for Nausea      Travoprost (TRAVATAN OP) Apply to eye         Data Total care; intake reduced; LOCfull/confusion  ___20%  Bed Bound; Extensive disease; Total care; intake minimal; Drowsy/coma  ___10%  Bed Bound; Extensive disease; Total care; Mouth care only; Drowsy/coma  ___0       Death      Plan      Palliative Interaction:  Called and spoke to patient  Margaret and introduced myself and my palliative care role to him. I explained that palliative care was consulted to provide support to him and his family while his wife was in the hospital. Palliative care was consulted for Code status discussion, goals of care and family support. Advance Care Planning     Advance Care Planning (ACP) Physician/NP/PA Conversation    Date of Conversation: 11/16/2020  Conducted with: Patient is currently sedated and ventilated and unable to make decisions for herself. Healthcare Decision Maker:   Cindy Arthur is currently  to Margaret and they have 4 biological children, 2 boys and 2 girls. Margaret states Cindy Arthur has a DPOA paperwork and that he is POA #1 [phone] and their son Sulma Castillo is POA #2 [phone]. Margaret sent POA paperwork in but sent his not his wife's so I called and notified him and he is going to send in the correct paperwork. Care Preferences:    Hospitalization: \"If your health worsens and it becomes clear that your chance of recovery is unlikely, what would be your preference regarding hospitalization? \"  Patient is currently hospitalized. She is on ventilator and sedation and in Covid isolation    Ventilation: \"If you were unable to breath on your own and your chance of recovery was unlikely, what would be your preference about the use of a ventilator (breathing machine) if it was available to you? \"  Patient is a DNRCC-A    Resuscitation: \"In the event your heart stopped as a result of an underlying serious health condition, would you want attempts made to restart your heart, or would you prefer a natural death? \"  Patient is a DUSTY Henry Outcomes / Follow-Up Plan:   Patient code status was changed to a [facility] after patient  had a discussion with patient's doctors. I explained to patient  that palliative care will continue to follow patient and provide support to him and his family. He requested me to call Zhane Grossman his daughter and update her on our conversation so I called and updated her. Palliative care will continue to follow. Renetta Fay         Education/support to family  Discharge planning/helping to coordinate care  Communications with primary service  Pharmacologic pain management  Providing support for coping/adaptation/distress of family  Discussing meaning/purpose   Caregiver support/education  Continue with current plan of care  Code status clarified: DNRCCA  Principle Problem/Diagnosis:  Pneumonia due to COVID-19 virus    Additional Assessments:   Principal Problem:    Pneumonia due to COVID-19 virus  Active Problems:    Acute respiratory failure due to COVID-19 ([address]    MDS (myelodysplastic syndrome) ([address]    Neutropenic sepsis ([address]    Bicytopenia    ROSALINO (acute kidney injury) ([address]    Acute respiratory failure with hypoxia ([address]    Essential hypertension    Coronary artery disease involving coronary bypass graft of native heart without angina pectoris    History of Raynaud's syndrome    Transaminitis    Immunosuppressed status ([address]    Severe sepsis (HCC)    Hypokalemia    Other pancytopenia (HCC)      1- Symptom management/ pain control     Pain Assessment:  Pain is controlled with current analgesics. Medication(s) being used: acetaminophen, fentanyl .                Anxiety:  Ventilator and sedation                           Dyspnea:  ventilator and sedation                           Fatigue:  generalized weakness     Other:      2- Goals of care evaluation   The patient goals of care are improve or maintain function/quality of life   Goals of care discussed with:    [] Patient independently    [] Patient and Family    [x] Family or Healthcare DPOA independently    [] Unable to discuss with patient, family/DPOA not present    3- Code Status  DNR-CCA    4- Other recommendations   - We will continue to provide comfort and support to the patient and the family  Palliative Care will continue to follow Ms. Jaimes's care as needed. Thank you for allowing Palliative Care to participate in the care of Ms. Irma Vasquez . This note has been dictated by dragon, typing errors may be a possibility. The total time I spent in seeing the patient, discussing goals of care, advanced directives, code status and other major issues was more than 60 minutes      Electronically signed by   MICHAEL Rosen NP  Palliative Care Team  on 11/30/2020 at 2:27 PM    Please call with any palliative questions or concerns. Palliative Care Team is available via perfect serve or via phone.

## 2020-11-30 NOTE — PROGRESS NOTES
PULMONARY & CRITICAL CARE MEDICINE PROGRESS NOTE     Patient:  Zoraida Lopes  MRN: [id number]  Admit date: 11/16/2020  Primary Care Physician: Gina Carmona MD  Consulting Physician: Milly Mayen MD  CODE Status: Full Code  LOS: 15     SUBJECTIVE     CHIEF COMPLAINT/REASON FOR INITIAL CONSULT: Acute respiratory failure/COVID-19 pneumonia    BRIEF HOSPITAL COURSE:   The patient is a 70 y.o. female past medical history for MDS, essential hypertension, melanoma, mitral valve prolapse, MRSA, Raynaud's disease presents emergency department for shortness of breath and dyspnea upon exertion for 3 days. Patient was initially seen at [facility] diagnosed with Covid pneumonia. Patient requiring 50% FiO2 on BiPAP, patient also had an elevated D-dimer but CTA was not possible secondary to contrast allergy. [facility] unable to accommodate due to lack of [facility] beds, patient was transferred for further care. Spoke with pulmonary medicine at [facility] and was accepted for ICU transfer. Patient was given 1 unit of PRBCs at [facility] for a hemoglobin less than 7, patient was also placed on BiPAP for transfer.       INTERVAL HISTORY:  11/29/20   Patient was intubated and started on mechanical ventilation on 11/23  She is currently sedated with fentanyl and Versed infusions  No overnight issues reported  She is on FiO2 of 45 %, PEEP is at 10  Her white count is 8, T-max is 100.8  She completed on remdesivir on 11/21, currently being monitored off antimicrobials  She completed Decadron on 11/27    REVIEW OF SYSTEMS:  Unobtainable from patient due to sedation/mechanical ventilation    OBJECTIVE     VENTILATOR SETTINGS:  Vent Information  $Ventilation: $Subsequent Day  Skin Assessment: Clean, dry, & intact  Suction Catheter Diameter: 14  Equipment ID: [id number]  Equipment Changed: HME  Vent Type: Servo i  Vent Mode: PRVC  Vt Ordered: 300 mL  Rate Set: 22 bmp  FiO2 : 45 %  SpO2: 95 %  SpO2/FiO2 ratio: 202.22  Sensitivity: 3  PEEP/CPAP: 12  I Time/ I Time %: (S) 0.9 s  Humidification Source: HME  Nitric Oxide/Epoprostenol In Use?: No  Mask Type: Full face mask  Mask Size: Small     PaO2/FiO2 RATIO:  Recent Labs     20  0337   POCPO2 70.6*      FiO2 : 45 %     VITAL SIGNS:   LAST:  BP (!) 117/54   Pulse 101   Temp 100 °F (37.8 °C) (Bladder)   Resp 17   Ht 5' 7\" (1.702 m)   Wt 156 lb 4.8 oz (70.9 kg)   SpO2 95%   BMI 24.48 kg/m²   8-24 HR RANGE:  TEMP Temp  Av.2 °F (37.9 °C)  Min: 99.7 °F (37.6 °C)  Max: 100.8 °F (70.0 °C)   BP Systolic (00OBG), TFM:705 , Min:99 , FPW:444      Diastolic (82YZS), IOK:62, Min:46, Max:67     PULSE Pulse  Av.6  Min: 93  Max: 108   RR Resp  Av.7  Min: 16  Max: 25   O2 SAT SpO2  Av.7 %  Min: 91 %  Max: 95 %   OXYGEN DELIVERY No data recorded        SYSTEMIC EXAMINATION:   Physical exam was deferred to conserve PPE and limit exposure to COVID-19    DATA REVIEW     Medications:  Scheduled Meds:   docusate  100 mg Oral BID    pantoprazole  40 mg Intravenous BID    And    sodium chloride (PF)  10 mL Intravenous Daily    potassium chloride  10 mEq Intravenous Daily    LORazepam  0.25 mg Intravenous Once    [Held by provider] enoxaparin  40 mg Subcutaneous BID    latanoprost  1 drop Both Eyes Nightly    sodium chloride  20 mL Intravenous Once    sodium chloride  20 mL Intravenous Once     Continuous Infusions:   fentaNYL 125 mcg/hr (20)    midazolam 4 mg/hr (20)       INPUT/OUTPUT:  In: 1176.8 [I.V.:404.8; NG/GT:772]  Out: 725 [Urine:725]  Date 20 0000 - 20   Shift  24 Hour Total   INTAKE   I.V.(mL/kg) 185.2(2.6) 295(4.2) 109.8(1.5) 590(8.3)   NG/GT(mL/kg) 3499(00.5)  772(10.9) 4213(65.5)   Shift Total(mL/kg) 1433. 2(20.2) 295(4.2) 881. 8(12.4) 2954(55.2)   OUTPUT   Urine(mL/kg/hr) 325(0.6) 300(0.5) 425 1050   Shift Total(mL/kg) 325(4.6) 300(4.2) 425(6) 1050(14.8)   Weight (kg) 70.9 70.9 70.9 70.9        LABS:  ABGs:   Recent Labs     11/27/20  0324 11/28/20  0510 11/29/20  0337   POCPH 7.387 7.409 7.432   POCPCO2 56.3* 58.2* 58.4*   POCPO2 60.3* 53.7* 70.6*   POCHCO3 33.9* 36.8* 38.9*   ZAGT7ZJH 90* 87* 94     CBC:   Recent Labs     11/27/20  0341  11/28/20  0417 11/28/20  1717 11/29/20  0318 11/29/20  1208 11/29/20  1929   WBC 6.8  --  8.2  --  8.0  --   --    HGB 8.2*   < > 8.4* 7.8* 7.3* 7.3* 7.8*   HCT 26.7*   < > 27.8* 25.6* 25.2* 23.9* 25.6*   MCV 98.2  --  97.9  --  100.0  --   --      --  118*  --  79*  --   --    LYMPHOPCT 7*  --  9*  --  9*  --   --    RBC 2.72*  --  2.84*  --  2.52*  --   --    MCH 30.1  --  29.6  --  29.0  --   --    MCHC 30.7  --  30.2  --  29.0  --   --    RDW 17.0*  --  16.6*  --  16.6*  --   --     < > = values in this interval not displayed. CRP:   No results for input(s): CRP in the last 72 hours. LDH:   No results for input(s): LDH in the last 72 hours. BMP:   Recent Labs     11/28/20 0417      K 3.9      CO2 33*   BUN 44*   CREATININE 0.66   GLUCOSE 121*     Liver Function Test:   No results for input(s): PROT, LABALBU, ALT, AST, GGT, ALKPHOS, BILITOT in the last 72 hours. Coagulation Profile:   No results for input(s): INR, PROTIME, APTT in the last 72 hours. D-Dimer:  No results for input(s): DDIMER in the last 72 hours. Ferritin:    No results for input(s): FERRITIN in the last 72 hours. Lactic Acid:  No results for input(s): LACTA in the last 72 hours. Cardiac Enzymes:  No results for input(s): CKTOTAL, CKMB, CKMBINDEX, TROPONINI in the last 72 hours. Invalid input(s): TROPONIN, HSTROP  BNP/ProBNP:   No results for input(s): BNP, PROBNP in the last 72 hours. Triglycerides:  No results for input(s): TRIG in the last 72 hours.      Microbiology:  No results for input(s): SPECDESC, SPECDESC, SPECIAL, CULTURE, CULTURE, STATUS, ORG, CDIFFTOXPCR, CAMPYLOBPCR, SALMONELLAPC, SHIGAPCR, SHIGELLAPCR, MPNEUG, MPNEUM, LACTOQL in the last 72 hours. Pathology:    Radiology Reports:  XR CHEST (SINGLE VIEW FRONTAL)   Final Result   Stable interstitial infiltrates. ET tube terminates 35 mm above the nghia. XR CHEST PORTABLE   Final Result   Endotracheal tube tip is approximately 4 cm above the nghia. Slight increase in bilateral parenchymal opacities. XR CHEST PORTABLE   Final Result   No significant change in chest findings compared to the prior study from   November 18th. XR CHEST PORTABLE   Final Result   No change in bilateral pulmonary opacities left greater than right with more   peripheral involvement compatible with pneumonitis. XR CHEST (SINGLE VIEW FRONTAL)   Final Result   *Low Probability for Pulmonary Embolus. *Bilateral mid to lower lung field left greater than right opacities with   peripheral involvement most pronounced at the bases compatible with  COVID-19   pneumonia. The findings were sent to the Radiology Results [address] at 2:46   pm on 11/17/2020to be communicated to a licensed caregiver. NM LUNG SCAN PERFUSION ONLY   Final Result   *Low Probability for Pulmonary Embolus. *Bilateral mid to lower lung field left greater than right opacities with   peripheral involvement most pronounced at the bases compatible with  COVID-19   pneumonia. The findings were sent to the Radiology Results [address] at 2:46   pm on 11/17/2020to be communicated to a licensed caregiver. Echocardiogram:   No results found for this or any previous visit.      ASSESSMENT AND PLAN     Assessment:    Acute hypoxic respiratory failure, requiring invasive mechanical ventilation since 11/23  Acute respiratory distress syndrome  Bilateral multifocal pneumonia due to COVID 19 infection  Sepsis due to COVID 19  Myelodysplastic syndrome  Hypoalbuminemia  Elevated ferritin, fibrinogen  Suspected GI bleed    Plan:    I personally interviewed/examined the patient; reviewed interval history, interpreted all available radiographic and laboratory data at the time of service. Patient remains hemodynamically stable  Patient currently sedated with fentanyl and Versed infusions  Continue lung protective mechanical ventilationas per ARDS protocol  Her FiO2 is 45%, PEEP is 10  Wean FiO2/PEEP as tolerated  Monitor endotracheal secretions   Obtain X-ray chest as needed   Continue pulmonary toilet, aspiration precautions and bronchodilators  Continue to monitor I/O with a goal of even/negative fluid balance  We will recommend to continue tube feeds  Stress ulcer prophylaxis  Continue to monitor CBC, coagulation profile, transfuse as indicated  Chemical DVT prophylaxis remains on hold due to suspected GI bleed  It would be prudent to restart anticoagulation as no active bleeding noted  Antimicrobials reviewed; continue to monitor off antimicrobials  Monitor CRP, LDH, AST/ALT, D-Dimer, Ferritin   Glycemic control appropriate  She has completed 10 days of Decadron  Skin/wound care reviewed with the nursing staff  Physical/occupational therapy    Critical care time of 35 minutes was spent (excluding procedures), in coordination of care during bedside rounds and discussion of patient care in detail, and recommendations of the team were adopted in the plan. Necessity of all invasive devices was also confirmed. Ami Umanzor M.D. Pulmonary and Critical Care Medicine           11/29/2020     This patient was evaluated in the context of the global SARS-CoV-2 (COVID-19) pandemic, which necessitated considerations that the patient either has COVID-19 infection or is at risk of infection with COVID-19. Institutional protocols and algorithms that pertain to the evaluation & management of patients with COVID-19 or those at risk for COVID-19 are in a state of rapid changes based on information released by regulatory bodies including the CDC and federal and state organizations.  These policies and algorithms were followed during the patient's care. Please note that this chart was generated using voice recognition Dragon dictation software. Although every effort was made to ensure the accuracy of this automated transcription, some errors in transcription may have occurred.

## 2020-11-30 NOTE — PLAN OF CARE
Problem: Respiratory:  Goal: Ability to maintain a clear airway will improve  Description: Ability to maintain a clear airway will improve  11/30/2020 0914 by Jian Robles RCP  Outcome: Ongoing     Problem: Respiratory:  Goal: Ability to maintain adequate ventilation will improve  Description: Ability to maintain adequate ventilation will improve  11/30/2020 0914 by Jian Robles RCP  Outcome: Ongoing     Problem: Respiratory:  Goal: Complications related to the disease process, condition or treatment will be avoided or minimized  Description: Complications related to the disease process, condition or treatment will be avoided or minimized  11/30/2020 0914 by Jian Robles RCP  Outcome: Ongoing     Problem: Skin Integrity:  Goal: Risk for impaired skin integrity will decrease  Description: Risk for impaired skin integrity will decrease  11/30/2020 0914 by Jian Robles RCP  Outcome: Ongoing  11/30/2020 0118 by Fercho Mar RN  Outcome: Ongoing     Problem: Skin Integrity:  Goal: Will show no infection signs and symptoms  Description: Will show no infection signs and symptoms  11/30/2020 0914 by Jian Robles RCP  Outcome: Ongoing     Problem: Skin Integrity:  Goal: Absence of new skin breakdown  Description: Absence of new skin breakdown  11/30/2020 0914 by Jian Robles RCP  Outcome: Ongoing     Problem: Airway Clearance - Ineffective  Goal: Achieve or maintain patent airway  11/30/2020 0914 by Jian Robles RCP  Outcome: Ongoing     Problem: Gas Exchange - Impaired  Goal: Absence of hypoxia  11/30/2020 0914 by Jian Robles RCP  Outcome: Ongoing     Problem: Gas Exchange - Impaired  Goal: Absence of hypoxia  Intervention: Respiratory assessment  Note: BRONCHOSPASM/BRONCHOCONSTRICTION     [x]         IMPROVE AERATION/BREATH SOUNDS  [x]   ADMINISTER BRONCHODILATOR THERAPY AS APPROPRIATE  [x]   ASSESS BREATH SOUNDS  [x]   IMPLEMENT AEROSOL/MDI PROTOCOL  [x]   PATIENT EDUCATION AS NEEDED   Ventilator Bronchodilator assessment    Breath sounds: diminished  Inspiratory Pressure: 15  Plateau Pressure: 15    Patient assessed at level 1          [x]    Bronchodilator Assessment    BRONCHODILATOR ASSESSMENT SCORE  Score 0 (Home) 1 2 3 4   Breath Sounds   []  Chronic Ventilator: Patient at baseline [x]  Mild Wheezes/ Clear []  Intermittent wheezes with good air entry []  Bilateral/unilateral wheezing with diminished air entry []  Insp/Exp wheeze and/or poor aeration   Ventilator Pressures   []  Chronic Ventilator [x]  Insp. Pressure less than 25 cm H20 []  Insp. Pressure less than 25 cm H20 []  Insp. Pressure exceeds 25 cm H20 []  Insp.  Pressure exceeds 30 cm H20   Plateau Pressure []  NA   [x]  Plateau Pressure less than 4  []  Plateau Pressure less than or equal to 5 []  Plateau Pressure greater than or equal to 6 []  Plateau Pressure greater than or equal to [address]  9:15 AM      Problem: Gas Exchange - Impaired  Goal: Absence of hypoxia  11/30/2020 0914 by Shanita Guzmán RCP  Outcome: Ongoing     Problem: Breathing Pattern - Ineffective  Goal: Ability to achieve and maintain a regular respiratory rate  11/30/2020 0914 by Shanita Guzmán RCP  Outcome: Ongoing     Problem: OXYGENATION/RESPIRATORY FUNCTION  Goal: Patient will maintain patent airway  11/30/2020 0914 by Shanita Guzmán RCP  Outcome: Ongoing     Problem: OXYGENATION/RESPIRATORY FUNCTION  Goal: Patient will achieve/maintain normal respiratory rate/effort  Description: Respiratory rate and effort will be within normal limits for the patient  11/30/2020 0914 by Shanita Guzmán RCP  Outcome: Ongoing     Problem: MECHANICAL VENTILATION  Goal: Patient will maintain patent airway  11/30/2020 0914 by Shanita Guzmán RCP  Outcome: Ongoing     Problem: MECHANICAL VENTILATION  Goal: Oral health is maintained or improved  11/30/2020 0914 by Shanita Guzmán RCP  Outcome: Ongoing     Problem: MECHANICAL VENTILATION  Goal: ET tube will be managed safely  11/30/2020 0914 by Xiomara Mejia RCP  Outcome: Ongoing     Problem: MECHANICAL VENTILATION  Goal: Ability to express needs and understand communication  11/30/2020 0914 by Xiomara Mejia RCP  Outcome: Ongoing     Problem: SKIN INTEGRITY  Goal: Skin integrity is maintained or improved  11/30/2020 0914 by Xiomara Mejia RCP  Outcome: Ongoing

## 2020-11-30 NOTE — PROGRESS NOTES
2150: Writer called [facility] to give nightly update on patient. During update Mireille stated DNR paperwork was faxed to [facility] today. Writer notified Rick Webb that only POA paperwork was faxed and there is no official DNR paperwork on file. Rick Webb stated that Margaret (spouse/POA), Bebeto Garcia (son/secondary-POA), and herself discussed situation and would like to change code status. 2225: Dr. Thalia Dan notified of family wishes, Dr. Thalia Dan unable to call [facility] at this time d/t other patients decompensating. 2230: Writer called Margaret to discus above, Margaret states he is hearing impaired but would like to discuss code status with a MD. Per Margaret, if he is unable to be reached, Bebeto Radha can make decision on code status. Margaret notified d/t other patients decompensating and Haley stable on ventilator, MD will call when able. 2240: Dr. Thalia Dan notified of above, MD will call when able or will have morning attending call.

## 2020-11-30 NOTE — PLAN OF CARE
[]  NA   [x]  Plateau Pressure less than 4  []  Plateau Pressure less than or equal to 5 []  Plateau Pressure greater than or equal to 6 []  Plateau Pressure greater than or equal to [address] [facility]  9:15 AM   Goal: Promote optimal lung function  11/30/2020 1424 by Yi Murphy RN  Outcome: Ongoing  11/30/2020 0118 by Lacey Bah RN  Outcome: Ongoing     Problem: Breathing Pattern - Ineffective  Goal: Ability to achieve and maintain a regular respiratory rate  11/30/2020 1424 by Yi Murphy RN  Outcome: Ongoing  11/30/2020 0914 by Ree Grigsby RCP  Outcome: Ongoing  11/30/2020 0118 by Lacey Bah RN  Outcome: Ongoing     Problem:  Body Temperature -  Risk of, Imbalanced  Goal: Ability to maintain a body temperature within defined limits  11/30/2020 1424 by Yi Murphy RN  Outcome: Ongoing  11/30/2020 0118 by Lacey Bah RN  Outcome: Ongoing  Goal: Will regain or maintain usual level of consciousness  11/30/2020 1424 by Yi Murphy RN  Outcome: Ongoing  11/30/2020 0118 by Lacey Bah RN  Outcome: Ongoing  Goal: Complications related to the disease process, condition or treatment will be avoided or minimized  11/30/2020 1424 by Yi Murphy RN  Outcome: Ongoing  11/30/2020 0118 by Lacey Bah RN  Outcome: Ongoing     Problem: Isolation Precautions - Risk of Spread of Infection  Goal: Prevent transmission of infection  11/30/2020 1424 by Yi Murphy RN  Outcome: Ongoing  11/30/2020 0118 by Lacey Bah RN  Outcome: Ongoing     Problem: Nutrition Deficits  Goal: Optimize nutrtional status  11/30/2020 1424 by Yi Murphy RN  Outcome: Ongoing  11/30/2020 0118 by Lacey Bah RN  Outcome: Ongoing     Problem: Risk for Fluid Volume Deficit  Goal: Maintain normal heart rhythm  11/30/2020 1424 by Yi Murphy RN  Outcome: Ongoing  11/30/2020 0118 by aLcey Bah RN  Outcome: Ongoing  Goal: Maintain absence of muscle cramping  11/30/2020 1424 by Yi Murphy RN  Outcome: Ongoing  11/30/2020 0118 by Tegan Chandler RN  Outcome: Ongoing  Goal: Maintain normal serum potassium, sodium, calcium, phosphorus, and pH  11/30/2020 1424 by Dayday Bennett RN  Outcome: Ongoing  11/30/2020 0118 by Tegan Chandler RN  Outcome: Ongoing     Problem: Fatigue  Goal: Verbalize increase energy and improved vitality  11/30/2020 1424 by Dayday Bennett RN  Outcome: Ongoing  11/30/2020 0118 by Tegan Chandler RN  Outcome: Ongoing     Problem: Patient Education: Go to Patient Education Activity  Goal: Patient/Family Education  11/30/2020 1424 by Dayday Bennett RN  Outcome: Ongoing  11/30/2020 0118 by Tegan Chandler RN  Outcome: Ongoing     Problem: OXYGENATION/RESPIRATORY FUNCTION  Goal: Patient will maintain patent airway  11/30/2020 1424 by Dayday Bennett RN  Outcome: Ongoing  11/30/2020 0914 by Salo Urrutia RCP  Outcome: Ongoing  11/30/2020 0118 by Tegan Chandler RN  Outcome: Ongoing  Goal: Patient will achieve/maintain normal respiratory rate/effort  Description: Respiratory rate and effort will be within normal limits for the patient  11/30/2020 1424 by Dayday Bennett RN  Outcome: Ongoing  11/30/2020 0914 by Salo Urrutia RCP  Outcome: Ongoing  11/30/2020 0118 by Tegan Chandler RN  Outcome: Ongoing     Problem: MECHANICAL VENTILATION  Goal: Patient will maintain patent airway  11/30/2020 1424 by Dayday Bennett RN  Outcome: Ongoing  11/30/2020 0914 by Salo Urrutia RCP  Outcome: Ongoing  11/30/2020 0118 by Tegan Chandler RN  Outcome: Ongoing  Goal: Oral health is maintained or improved  11/30/2020 1424 by Dayday Bennett RN  Outcome: Ongoing  11/30/2020 0914 by Salo Urrutia RCP  Outcome: Ongoing  11/30/2020 0118 by Tegan Chandler RN  Outcome: Ongoing  Goal: ET tube will be managed safely  11/30/2020 1424 by Dayday Bennett RN  Outcome: Ongoing  11/30/2020 0914 by Salo Urrutia RCP  Outcome: Ongoing  11/30/2020 0118 by Tegan Chandler RN  Outcome: Ongoing  Goal: Ability to express needs and understand communication  11/30/2020 1424 by Pegge Nyhan, RN  Outcome: Ongoing  11/30/2020 0914 by Bola Loja RCP  Outcome: Ongoing  11/30/2020 0118 by Kirk Bartlett RN  Outcome: Ongoing  Goal: Mobility/activity is maintained at optimum level for patient  11/30/2020 1424 by Pegge Nyhan, RN  Outcome: Ongoing  11/30/2020 0914 by Bola Loja RCP  Outcome: Ongoing  11/30/2020 0118 by Kirk Bartlett RN  Outcome: Ongoing     Problem: SKIN INTEGRITY  Goal: Skin integrity is maintained or improved  11/30/2020 1424 by Pegge Nyhan, RN  Outcome: Ongoing  11/30/2020 0914 by Bola Loja RCP  Outcome: Ongoing  11/30/2020 0118 by Kirk Bartlett RN  Outcome: Ongoing     Problem: Nutrition  Goal: Optimal nutrition therapy  Description: Nutrition Problem #1: Inadequate oral intake  Intervention: Food and/or Nutrient Delivery: Continue NPO(Start diet vs nutrition support as able)  Nutritional Goals: Start diet vs nutrition support within 24-72 hrs     11/30/2020 1424 by Pegge Nyhan, RN  Outcome: Ongoing  11/30/2020 0118 by Kirk Bartlett RN  Outcome: Ongoing     Problem: Restraint Use - Nonviolent/Non-Self-Destructive Behavior:  Goal: Absence of restraint indications  Description: Absence of restraint indications  11/30/2020 1424 by Pegge Nyhan, RN  Outcome: Not Met This Shift  11/30/2020 0118 by Kirk Bartlett RN  Outcome: Not Met This Shift  Goal: Absence of restraint-related injury  Description: Absence of restraint-related injury  11/30/2020 1424 by Pegge Nyhan, RN  Outcome: Met This Shift  11/30/2020 0118 by Kirk Bartlett RN  Outcome: Met This Shift

## 2020-11-30 NOTE — ANESTHESIA PROCEDURE NOTES
Arterial Line:    An arterial line was placed using surface landmarks, in the ICU for the following indication(s): continuous blood pressure monitoring and blood sampling needed. A 20 gauge (size), 1 and 3/4 inch (length), (type) catheter was placed, into the right brachial artery, secured by suture, tape and Tegaderm. Anesthesia type: Local  Local infiltration: None    Events:  patient tolerated procedure well with no complications and EBL < 5mL.   Resident/CRNA: MICHAEL Guy - CRNA  Performed: Resident/CRNA   Preanesthetic Checklist  Completed: patient identified, site marked, timeout performed, IV checked, risks and benefits discussed, monitors and equipment checked, anesthesia consent given, oxygen available and patient being monitored

## 2020-11-30 NOTE — PROGRESS NOTES
with a past medical history for MDS, essential hypertension, melanoma, mitral valve prolapse, MRSA, Raynaud's disease presents emergency department for shortness of breath and dyspnea upon exertion for 3 days. Patient was initially seen at [facility] diagnosed with Covid pneumonia. Patient requiring 50% FiO2 on BiPAP, patient also had an elevated D-dimer but CTA was not possible secondary to contrast allergy. [facility] unable to accommodate due to lack of Covid beds, patient was transferred for further care. Spoke with pulmonary medicine at [facility] and was accepted for ICU transfer. Patient was given 1 unit of PRBCs at [facility] for a hemoglobin less than 7, patient was also placed on BiPAP for transfer. Patient arrived, no complaints except for some shortness of breath. Needing high flow oxygen, still critical    Intubated and sedated, endotracheal intubation done on November 23, 2020 due to acute respiratory failure not holding oxygenation even on FiO2 100% BiPAP    Review of Systems:     Sedated and intubated  Medications: Allergies: Allergies   Allergen Reactions    Fish Allergy Anaphylaxis, Hives and Swelling    Hydrocodone-Acetaminophen Anaphylaxis    Tizanidine Anaphylaxis and Hives    Atorvastatin Hives, Other (See Comments) and Swelling    Metoclopramide Hives     Other reaction(s): Unknown, Unknown    Moxifloxacin Hives, Other (See Comments) and Swelling     Other reaction(s): Other (See Comments), Unknown    Oxycodone Other (See Comments)     Other reaction(s): Hallucinations, Mental Status Change    Pregabalin Other (See Comments)     Other reaction(s): Hallucinations, Other (See Comments)  Causes sores in the mouth  Causes sores in the mouth      Tramadol      Other reaction(s): Edema, Other (See Comments)    Zolpidem Other (See Comments)     Other reaction(s):  Other (See Comments)  causes cramps in hands and legs  causes cramps in hands and legs  Other reaction(s): Other (See Comments)  causes cramps in hands and legs  causes cramps in hands and legs      Cephalexin Rash     Other reaction(s): Unknown, Unknown    Iodides Rash and Swelling     ivp and heart cath dye    Sulfa Antibiotics Rash       Current Meds:   Scheduled Meds:    QUEtiapine  50 mg Oral Nightly    enoxaparin  40 mg Subcutaneous Daily    docusate  100 mg Oral BID    pantoprazole  40 mg Intravenous BID    And    sodium chloride (PF)  10 mL Intravenous Daily    potassium chloride  10 mEq Intravenous Daily    LORazepam  0.25 mg Intravenous Once    latanoprost  1 drop Both Eyes Nightly    sodium chloride  20 mL Intravenous Once    sodium chloride  20 mL Intravenous Once     Continuous Infusions:    cisatracurium (NIMBEX) infusion      fentaNYL 175 mcg/hr (11/30/20 1317)    midazolam 8 mg/hr (11/30/20 1619)     PRN Meds: LORazepam, sodium chloride, sodium chloride flush, potassium chloride **OR** potassium alternative oral replacement **OR** potassium chloride, magnesium sulfate, acetaminophen **OR** acetaminophen, polyethylene glycol, promethazine **OR** ondansetron, nicotine    Data:     Past Medical History:   has a past medical history of Cancer ([facility] [address] Deaf, left, Essential hypertension, GERD (gastroesophageal reflux disease), Glaucoma, Hyperlipidemia, Melanoma ([facility] [address] MRSA (methicillin resistant staph aureus) culture positive, MVP (mitral valve prolapse), Pharyngoesophageal dysphagia, Raynaud disease, and Status post four vessel coronary artery bypass. Social History:   reports that she quit smoking about 15 years ago. Her smoking use included cigarettes. She started smoking about 63 years ago. She does not have any smokeless tobacco history on file. She reports that she does not drink alcohol or use drugs.      Family History:   Family History   Problem Relation Age of Onset    Heart Failure Mother     Early Death Mother     Heart Disease Mother     Stroke Father Vitals:  BP (!) 98/44   Pulse 101   Temp 100.4 °F (38 °C) (Core)   Resp 14   Ht 5' 7\" (1.702 m)   Wt 155 lb 10.3 oz (70.6 kg)   SpO2 98%   BMI 24.38 kg/m²   Temp (24hrs), Av.4 °F (37.4 °C), Min:98.6 °F (37 °C), Max:100.4 °F (38 °C)    Recent Labs     20  0510 20  0337   POCGLU 128* 133*       I/O (24Hr): Intake/Output Summary (Last 24 hours) at 2020 1731  Last data filed at 2020 1700  Gross per 24 hour   Intake 2739.33 ml   Output 1047 ml   Net 1692.33 ml       Labs:  Hematology:  Recent Labs     20  0417  20  0318  20  1929 20  0350 20  1227   WBC 8.2  --  8.0  --   --  7.1  --    RBC 2.84*  --  2.52*  --   --  2.42*  --    HGB 8.4*   < > 7.3*   < > 7.8* 7.2* 6.8*   HCT 27.8*   < > 25.2*   < > 25.6* 24.2* 22.9*   MCV 97.9  --  100.0  --   --  100.0  --    MCH 29.6  --  29.0  --   --  29.8  --    MCHC 30.2  --  29.0  --   --  29.8  --    RDW 16.6*  --  16.6*  --   --  16.7*  --    *  --  79*  --   --  75*  --    MPV 12.3  --  12.4  --   --  12.9  --     < > = values in this interval not displayed.      Chemistry:  Recent Labs     20  0818    145*   K 3.9 4.1    108*   CO2 33* 31   GLUCOSE 121* 139*   BUN 44* 38*   CREATININE 0.66 0.57   ANIONGAP 6* 6*   LABGLOM >60 >60   GFRAA >60 >60   CALCIUM 8.6 8.6     Recent Labs     20  0510 20  0337   POCGLU 128* 133*     ABG:  Lab Results   Component Value Date    POCPH 7.448 2020    POCPCO2 57.1 2020    POCPO2 48.6 2020    POCHCO3 39.5 2020    NBEA NOT REPORTED 2020    PBEA 14 2020    NXB7TPV 41 2020    QSJO1GUM 84 2020    FIO2 45.0 2020     Lab Results   Component Value Date/Time    SPECIAL RT HAND 2ML 2020 02:48 AM     Lab Results   Component Value Date/Time    CULTURE NO GROWTH 6 DAYS 2020 02:48 AM       Radiology:  [facility] Chest (single View Frontal)    Result Date: 2020  *Low Probability for Pulmonary Embolus. *Bilateral mid to lower lung field left greater than right opacities with peripheral involvement most pronounced at the bases compatible with  COVID-19 pneumonia. The findings were sent to the Radiology Results [address] at 2:46 pm on 11/17/2020to be communicated to a licensed caregiver. Nm Lung Scan Perfusion Only    Result Date: 11/17/2020  *Low Probability for Pulmonary Embolus. *Bilateral mid to lower lung field left greater than right opacities with peripheral involvement most pronounced at the bases compatible with  COVID-19 pneumonia. The findings were sent to the Radiology Results [address] at 2:46 pm on 11/17/2020to be communicated to a licensed caregiver.        Physical Examination:        General appearance: Sedated and intubated  Lungs: Coarse breath sounds bilateral  Heart:  regular rate and rhythm, no murmur  Abdomen:  soft, nontender, nondistended, normal bowel sounds, no masses, hepatomegaly, splenomegaly  Extremities:  no edema, redness, tenderness in the calves  Skin:  no gross lesions, rashes, induration    Assessment:        Hospital Problems           Last Modified POA    * (Principal) Pneumonia due to COVID-19 virus 11/17/2020 Yes    Acute respiratory failure due to COVID-19 ([address] 11/16/2020 Yes    MDS (myelodysplastic syndrome) ([address] 11/17/2020 Yes    Neutropenic sepsis ([address] 11/17/2020 Yes    Bicytopenia 11/17/2020 Yes    ROSALINO (acute kidney injury) ([address] 11/17/2020 Yes    Acute respiratory failure with hypoxia ([address] 11/17/2020 Yes    Essential hypertension 11/17/2020 Yes    Coronary artery disease involving coronary bypass graft of native heart without angina pectoris 11/17/2020 Yes    History of Raynaud's syndrome 11/17/2020 Yes    Transaminitis 11/17/2020 Yes    Immunosuppressed status ([address] 11/17/2020 Yes    Severe sepsis ([address] 11/17/2020 Yes    Hypokalemia 11/22/2020 Yes    Other pancytopenia ([address] 11/24/2020 Yes          Plan: 1. Covid pneumonia, acute respiratory failure, sedated, placed on mechanical ventilation due to deterioration of respiratory failure. on remdesivir, convalescent plasma, Decadron, infectious disease and pulmonary critical care on board,   2. Myelodysplastic syndrome and pancytopenia, oncology on board and discussed in detail, advised to keep the patient on DVT prophylaxis with Lovenox twice daily, which was stopped due to anemia, Hb 6.0 needing transfusion, hematology to comment about restart of Lovenox. ,  3. Severe anemia, hemoglobin 6.8, 1 unit of transfusion, s/p transfusion 1 unit few days back monitor hemoglobin ,lovenox on hold per oncology and critical care   4. Hypokalemia, replaced ,    5. VQ scan low probability,  6. Keep negative fluid balance, Lasix restarted   7. Neutropenic sepsis, antibiotics as per infectious disease, blood cultures negative, antibiotics stopped by infectious disease at this time,  8. Neutropenia, Neupogen as per oncology  9. Acute kidney injury, creatinine improving,  10. Transaminitis, monitor liver functions,  11. Hypertension, monitor blood pressure,  12. DVT scds only, Lovenox on hold   13. GI prophylaxis,  14. DNR CCA  15.  Critical care time spent 35 minutes    Keli Maddox MD  11/30/2020  5:31 PM

## 2020-12-01 NOTE — PLAN OF CARE
Problem:  Activity:  Goal: Fatigue will decrease  Description: Fatigue will decrease  12/1/2020 1316 by Nadege Eddy RN  Outcome: Ongoing  11/30/2020 2356 by Daly Iqbal RN  Outcome: Ongoing     Problem: Cardiac:  Goal: Hemodynamic stability will improve  Description: Hemodynamic stability will improve  12/1/2020 1316 by Nadege Eddy RN  Outcome: Ongoing  11/30/2020 2356 by Daly Iqbal RN  Outcome: Ongoing     Problem: Coping:  Goal: Level of anxiety will decrease  Description: Level of anxiety will decrease  12/1/2020 1316 by Nadege Eddy RN  Outcome: Ongoing  11/30/2020 2356 by Daly Iqbal RN  Outcome: Ongoing  Goal: Ability to cope will improve  Description: Ability to cope will improve  12/1/2020 1316 by Nadege Eddy RN  Outcome: Ongoing  11/30/2020 2356 by Daly Iqbal RN  Outcome: Ongoing  Goal: Ability to establish a method of communication will improve  Description: Ability to establish a method of communication will improve  12/1/2020 1316 by Nadege Eddy RN  Outcome: Ongoing  11/30/2020 2356 by Daly Iqbal RN  Outcome: Ongoing     Problem: Nutritional:  Goal: Consumption of the prescribed amount of daily calories will improve  Description: Consumption of the prescribed amount of daily calories will improve  12/1/2020 1316 by Nadege Eddy RN  Outcome: Ongoing  11/30/2020 2356 by Daly Iqbal RN  Outcome: Ongoing     Problem: Respiratory:  Goal: Ability to maintain a clear airway will improve  Description: Ability to maintain a clear airway will improve  12/1/2020 1316 by Nadege Eddy RN  Outcome: Ongoing  12/1/2020 0833 by Eran García RCP  Outcome: Ongoing  11/30/2020 2356 by Daly Iqbal RN  Outcome: Ongoing  Goal: Ability to maintain adequate ventilation will improve  Description: Ability to maintain adequate ventilation will improve  12/1/2020 1316 by Nadege Eddy RN  Outcome: Ongoing  12/1/2020 0833 by Eran García RCP  Outcome: Ongoing  11/30/2020 2356 by Sabina Olivier RN  Outcome: Ongoing  Goal: Complications related to the disease process, condition or treatment will be avoided or minimized  Description: Complications related to the disease process, condition or treatment will be avoided or minimized  12/1/2020 1316 by Rickie Rosales RN  Outcome: Ongoing  12/1/2020 0833 by Hollie Lima RCP  Outcome: Ongoing  11/30/2020 2356 by Sabina Olivier RN  Outcome: Ongoing     Problem: Skin Integrity:  Goal: Risk for impaired skin integrity will decrease  Description: Risk for impaired skin integrity will decrease  12/1/2020 1316 by Rickie Rosales RN  Outcome: Ongoing  12/1/2020 0833 by Hollie Lima RCP  Outcome: Ongoing  11/30/2020 2356 by Sabina Olivire RN  Outcome: Ongoing  Goal: Will show no infection signs and symptoms  Description: Will show no infection signs and symptoms  12/1/2020 1316 by Rickie Rosales RN  Outcome: Ongoing  12/1/2020 0833 by Hollie Lima RCP  Outcome: Ongoing  11/30/2020 2356 by Sabina Olivier RN  Outcome: Ongoing  Goal: Absence of new skin breakdown  Description: Absence of new skin breakdown  12/1/2020 1316 by Rickie Rosales RN  Outcome: Ongoing  12/1/2020 0833 by Hollie Lima RCP  Outcome: Ongoing  11/30/2020 2356 by Sabina Olivier RN  Outcome: Ongoing     Problem: Falls - Risk of:  Goal: Will remain free from falls  Description: Will remain free from falls  12/1/2020 1316 by Rickie Rosales RN  Outcome: Ongoing  11/30/2020 2356 by Sabina Olivier RN  Outcome: Ongoing  Goal: Absence of physical injury  Description: Absence of physical injury  12/1/2020 1316 by Rickie Rosales RN  Outcome: Ongoing  11/30/2020 2356 by Sabina Olivier RN  Outcome: Ongoing     Problem: Pain:  Description: Pain management should include both nonpharmacologic and pharmacologic interventions.   Goal: Pain level will decrease  Description: Pain level will decrease  12/1/2020 1316 by Rickie Rosales RN  Outcome: Ongoing  11/30/2020 2356 by Topher Moore Ongoing  11/30/2020 2356 by Liz Hendrix RN  Outcome: Ongoing  Goal: ET tube will be managed safely  12/1/2020 1316 by Francisco Mejia RN  Outcome: Ongoing  12/1/2020 0833 by Wesley Saldivar RCP  Outcome: Ongoing  11/30/2020 2356 by Liz Hendrix RN  Outcome: Ongoing  Goal: Ability to express needs and understand communication  12/1/2020 1316 by Francisco Mejia RN  Outcome: Ongoing  12/1/2020 0833 by Wesley Saldivar RCP  Outcome: Ongoing  11/30/2020 2356 by Liz Hendrix RN  Outcome: Ongoing  Goal: Mobility/activity is maintained at optimum level for patient  12/1/2020 1316 by Francisco Mejia RN  Outcome: Ongoing  12/1/2020 0833 by Wesley Saldivar RCP  Outcome: Ongoing  11/30/2020 2356 by Liz Hendrix RN  Outcome: Ongoing     Problem: SKIN INTEGRITY  Goal: Skin integrity is maintained or improved  12/1/2020 1316 by Francisco Mejia RN  Outcome: Ongoing  12/1/2020 0833 by Wesley Saldivar RCP  Outcome: Ongoing  11/30/2020 2356 by Liz Hendrix RN  Outcome: Ongoing     Problem: Nutrition  Goal: Optimal nutrition therapy  Description: Nutrition Problem #1: Inadequate oral intake  Intervention: Food and/or Nutrient Delivery: Continue NPO(Start diet vs nutrition support as able)  Nutritional Goals: Start diet vs nutrition support within 24-72 hrs     12/1/2020 1316 by Francisco Mejia RN  Outcome: Ongoing  11/30/2020 2356 by Liz Hendrix RN  Outcome: Ongoing     Problem: Restraint Use - Nonviolent/Non-Self-Destructive Behavior:  Goal: Absence of restraint indications  Description: Absence of restraint indications  12/1/2020 1316 by Francisco Mejia RN  Outcome: Completed  11/30/2020 2356 by Liz Hendrix RN  Outcome: Met This Shift  Goal: Absence of restraint-related injury  Description: Absence of restraint-related injury  12/1/2020 1316 by Francisco Mejia RN  Outcome: Completed  11/30/2020 2356 by Liz Hendrix RN  Outcome: Met This Shift

## 2020-12-01 NOTE — PROGRESS NOTES
Today's Date: 11/30/2020  Patient Name: Claressa Hatchet  Date of admission: 11/16/2020 10:51 PM  Patient's age: 70 y.o., 1945  Admission Dx: Acute respiratory failure due to COVID-19 ([facility] [U07.1, J96.00]    Reason for Consult: management recommendations  Requesting Physician: Dione Agarwal MD    CHIEF COMPLAINT: Anemia. MDS. COVID-19 positive. History Obtained From:  patient, electronic medical record    Interval history:    Chart reviewed intervals noted  No significant changes. No active bleeding. No fever. HISTORY OF PRESENT ILLNESS:      The patient is a 70 y.o.  female who is admitted to the hospital with chief complaint of shortness of breath dyspnea for the last 3 days. Further testing revealed patient was COVID-19 positive. Patient was initially seen in outlying hospital and then transferred. Patient is requiring noninvasive positive pressure ventilation support. CTA has not been able to get done due to contrast allergy. Reportedly patient also has history of MDS. CBC from 9/2020 showed a WBC count of 1.6 hemoglobin 9.4 with MCV 95. Patient has been started on full dose anticoagulation due to concerns regarding pulmonary embolism. Patient was also recently started on Vidaza and has received 1 cycle so far. Patient is also transfusion dependent. Patient is supposed to get next cycle of Vidaza in the next week or so. Patient most recent bone marrow biopsy from 9/2020 showed normocellular marrow with erythroid hyperplasia and mild this erythropoiesis and 8% blasts. Recommendations from [address] [place] [address],[address]: Copied from care everywhere. ASSESSMENT AND PALN:  Ms. 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  In this situation, we previously discussed that we often favor management as if the patient is higher-tier risk disease, as outcomes for patients with IPSS-R 4.5 at diagnosis are similar to high risk. But unfortunately, she is not a candidate for her only curative option, i.e., allogeneic hematopoietic cell transplantation. Therefore, when we initially consulted on her, we discussed that all therapy is palliative. Given all therapies would be palliative, we discussed this in the context that she had been transfusion-independent. Thus, while initiation of hypomethylating agent (HMA) would have certainly been justifiable at that juncture, she had cytopenias for several months and still had not required transfusions or developed significant infection. Therefore, we discussed close observation since she was transfusion independent. We discussed the risks/beneftis of such an approach. At this visit, she is now beginning to require transfusions, so we agree with the plan to begin azacitidine locally under the care of Dr. Monica Shah. We again discussed that we would recommend a repeat bone marrow exam prior to initiating therapy for palliation. If she declines treatment, one could consider EPO supplementation to minimize need for transfusion. Given her [address], antiviral prophylaxis with acyclovir (400 mg BID) could be considered. If her [address] were to persistently be lower than 500/uL, antifungal prophylaxis could be considered. She will continue to follow up with Dr. Monica Shah for ongoing management. We will see her back prn. Past Medical History:   has a past medical history of Cancer ([facility] Deaf, left, Essential hypertension, GERD (gastroesophageal reflux disease), Glaucoma, Hyperlipidemia, Melanoma ([facility] MRSA (methicillin resistant staph aureus) culture positive, MVP (mitral valve prolapse), Pharyngoesophageal dysphagia, Raynaud disease, and Status post four vessel coronary artery bypass.     Past Surgical History:   has a past surgical history that includes Cardiac surgery; Tubal ligation; Appendectomy; Cholecystectomy; Hysterectomy, total abdominal; Small intestine surgery; and IR PORT PLACEMENT < 5 YEARS. Medications:    Prior to Admission medications    Medication Sig Start Date End Date Taking? Authorizing Provider   dapsone 100 MG tablet Take 1 tablet by mouth daily (with breakfast) 10/16/20  Yes Historical Provider, MD   aspirin 81 MG EC tablet Take 81 mg by mouth daily   Yes Historical Provider, MD   carvedilol (COREG) 6.25 MG tablet Take 6.25 mg by mouth 2 times daily (with meals)   Yes Historical Provider, MD   Isavuconazonium Sulfate (CRESEMBA) 186 MG CAPS Take by mouth   Yes Historical Provider, MD   diphenhydrAMINE (BENADRYL) 25 MG capsule Take 25 mg by mouth every 6 hours as needed for Itching   Yes Historical Provider, MD   hydroxychloroquine (PLAQUENIL) 200 MG tablet Take 200 mg by mouth daily   Yes Historical Provider, MD   nitroGLYCERIN (NITROSTAT) 0.4 MG SL tablet Place 0.4 mg under the tongue every 5 minutes as needed for Chest pain up to max of 3 total doses. If no relief after 1 dose, call 911.    Yes Historical Provider, MD   omeprazole (PRILOSEC) 20 MG delayed release capsule Take 40 mg by mouth daily   Yes Historical Provider, MD   phenazopyridine (PYRIDIUM) 100 MG tablet Take 100 mg by mouth 3 times daily as needed for Pain   Yes Historical Provider, MD   predniSONE (DELTASONE) 1 MG tablet Take 1 mg by mouth daily   Yes Historical Provider, MD   prochlorperazine (COMPAZINE) 25 MG suppository Place 25 mg rectally every 12 hours as needed for Nausea   Yes Historical Provider, MD   Travoprost (TRAVATAN OP) Apply to eye   Yes Historical Provider, MD     Current Facility-Administered Medications   Medication Dose Route Frequency Provider Last Rate Last Dose    QUEtiapine (SEROQUEL) tablet 50 mg  50 mg Oral Nightly Christiano Pierce DO        enoxaparin (LOVENOX) injection 40 mg  40 mg Subcutaneous Daily Lobo Hinton MD   40 mg at 11/30/20 1013    cisatracurium besylate (NIMBEX) 200 mg in sodium chloride 0.9 % 100 mL infusion  2 mcg/kg/min Intravenous Continuous Chet Saavedra MD 2.1 mL/hr at 11/30/20 1829 1 mcg/kg/min at 11/30/20 1829    ceftaroline fosamil (TEFLARO) 600 mg in dextrose 5 % 50 mL IVPB  600 mg Intravenous Q12H Vilma Salazar MD        docusate (COLACE) 50 MG/5ML liquid 100 mg  100 mg Oral BID Isiah Queen MD   100 mg at 11/30/20 0824    pantoprazole (PROTONIX) injection 40 mg  40 mg Intravenous BID JewMICHAEL Guajardo - CNP   40 mg at 11/30/20 4012    And    sodium chloride (PF) 0.9 % injection 10 mL  10 mL Intravenous Daily MICHAEL Travis - CNP   10 mL at 11/30/20 0824    fentaNYL 20 mcg/mL Infusion  25 mcg/hr Intravenous Continuous Christiano Pierce DO 8.8 mL/hr at 11/30/20 1317 175 mcg/hr at 11/30/20 1317    midazolam (VERSED) 1 mg/mL in D5W infusion  1 mg/hr Intravenous Continuous Renaldo Germain MD 8 mL/hr at 11/30/20 1619 8 mg/hr at 11/30/20 1619    potassium chloride 10 mEq/100 mL IVPB (Peripheral Line)  10 mEq Intravenous Daily Chet Saavedra  mL/hr at 11/29/20 0913 10 mEq at 11/29/20 0913    LORazepam (ATIVAN) injection 0.5 mg  0.5 mg Intravenous Q8H PRN Chet Saavedra MD   0.5 mg at 11/29/20 0051    LORazepam (ATIVAN) injection 0.25 mg  0.25 mg Intravenous Once MICHAEL Owens - CNP   Stopped at 11/21/20 0502    latanoprost (XALATAN) 0.005 % ophthalmic solution 1 drop  1 drop Both Eyes Nightly Chet Saavedra MD   1 drop at 11/29/20 1911    0.9 % sodium chloride bolus  30 mL Intravenous PRN Iain Fengiss, DO        0.9 % sodium chloride bolus  20 mL Intravenous Once Vilinda Bliss, DO        0.9 % sodium chloride bolus  20 mL Intravenous Once Vilma Salazar MD        sodium chloride flush 0.9 % injection 10 mL  10 mL Intravenous PRN Iain Crespo DO   10 mL at 11/30/20 0824    potassium chloride (KLOR-CON M) extended release tablet 40 mEq  40 mEq Oral PRN Deena Bradley, APRN - CNS 40 mEq at 20 1544    Or    potassium bicarb-citric acid (EFFER-K) effervescent tablet 40 mEq  40 mEq Oral PRN Carissa Bumps, APRN - CNS   40 mEq at 20 4296    Or    potassium chloride 10 mEq/100 mL IVPB (Peripheral Line)  10 mEq Intravenous PRN Carissa Bumps, APRN - CNS        magnesium sulfate 1 g in dextrose 5% 100 mL IVPB  1 g Intravenous PRN Carissa Bumps, APRN - CNS        acetaminophen (TYLENOL) tablet 650 mg  650 mg Oral Q6H PRN Carissa Bumps, APRN - CNS   650 mg at 20 1200    Or    acetaminophen (TYLENOL) suppository 650 mg  650 mg Rectal Q6H PRN Carissa Bumps, APRN - CNS        polyethylene glycol (GLYCOLAX) packet 17 g  17 g Oral Daily PRN Carissa Bumps, APRN - CNS   17 g at 20 0824    promethazine (PHENERGAN) tablet 12.5 mg  12.5 mg Oral Q6H PRN Carissa Bumps, APRN - CNS        Or    ondansetron (ZOFRAN) injection 4 mg  4 mg Intravenous Q6H PRN Carissa Bumps, APRN - CNS        nicotine (NICODERM CQ) 21 MG/24HR 1 patch  1 patch Transdermal Daily PRN Carissa Bumps, APRN - CNS           Allergies:  Fish allergy; Hydrocodone-acetaminophen; Tizanidine; Atorvastatin; Metoclopramide; Moxifloxacin; Oxycodone; Pregabalin; Tramadol; Zolpidem; Cephalexin; Iodides; and Sulfa antibiotics    Social History:   reports that she quit smoking about 15 years ago. Her smoking use included cigarettes. She started smoking about 63 years ago. She does not have any smokeless tobacco history on file. She reports that she does not drink alcohol or use drugs. Family History: family history includes Early Death in her mother; Heart Disease in her mother; Heart Failure in her mother; Stroke in her father.     REVIEW OF SYSTEMS:    unobtainable   PHYSICAL EXAM:        BP (!) 98/44   Pulse 101   Temp 100.4 °F (38 °C) (Core)   Resp 17   Ht 5' 7\" (1.702 m)   Wt 155 lb 10.3 oz (70.6 kg)   SpO2 90%   BMI 24.38 kg/m²    Temp (24hrs), Av.3 °F (37.4 °C), Min:98.6 °F (37 °C), Max:100.4 °F (38 °C)    Examination is limited due to the current acute COVID-19 pneumonia due to limited PPE resources and to limit the transmission of the virus. DATA:      Labs:     Results for orders placed or performed during the hospital encounter of 11/16/20   Culture, Blood 1    Specimen: Blood   Result Value Ref Range    Specimen Description . BLOOD     Special Requests LT HAND 20ML     Culture NO GROWTH 6 DAYS    Culture, Blood 2    Specimen: Blood   Result Value Ref Range    Specimen Description . BLOOD     Special Requests RT HAND 2ML     Culture NO GROWTH 6 DAYS    Respiratory Panel, Molecular, with COVID-19    Specimen: Nasopharyngeal Swab   Result Value Ref Range    Specimen Description . NASOPHARYNGEAL SWAB     Adenovirus PCR Not Detected Not Detected    Coronavirus 229E PCR Not Detected Not Detected    Coronavirus HKU1 PCR Not Detected Not Detected    Coronavirus NL63 PCR Not Detected Not Detected    Coronavirus OC43 PCR Not Detected Not Detected    SARS-CoV-2, PCR DETECTED (A) Not Detected    Human Metapneumovirus PCR Not Detected Not Detected    Rhino/Enterovirus PCR Not Detected Not Detected    Influenza A by PCR Not Detected Not Detected    Influenza A H1 PCR NOT REPORTED Not Detected    Influenza A H1 (2009) PCR NOT REPORTED Not Detected    Influenza A H3 PCR NOT REPORTED Not Detected    Influenza B by PCR Not Detected Not Detected    Parainfluenza 1 PCR Not Detected Not Detected    Parainfluenza 2 PCR Not Detected Not Detected    Parainfluenza 3 PCR Not Detected Not Detected    Parainfluenza 4 PCR Not Detected Not Detected    Resp Syncytial Virus PCR Not Detected Not Detected    Bordetella Parapertussis Not Detected Not Detected    B Pertussis by PCR Not Detected Not Detected    Chlamydia pneumoniae By PCR Not Detected Not Detected    Mycoplasma pneumo by PCR Not Detected Not Detected   LEGIONELLA ANTIGEN, URINE    Specimen: Urine, clean catch   Result Value Ref Range    Legionella WBC   Differential   Result Value Ref Range    Differential Type NOT REPORTED     WBC Morphology NOT REPORTED     RBC Morphology NOT REPORTED     Platelet Estimate NOT REPORTED     Immature Granulocytes 0 0 %    Seg Neutrophils 33 (L) 36 - 66 %    Lymphocytes 48 (H) 24 - 44 %    Atypical Lymphocytes 1 %    Monocytes 17 (H) 1 - 7 %    Eosinophils % 1 1 - 4 %    Basophils 0 0 - 2 %    Absolute Immature Granulocyte 0.00 0.00 - 0.30 k/uL    Segs Absolute 0.43 (LL) 1.8 - 7.7 k/uL    Absolute Lymph # 0.63 (L) 1.0 - 4.8 k/uL    Atypical Lymphocytes Absolute 0.01 k/uL    Absolute Mono # 0.22 0.1 - 0.8 k/uL    Absolute Eos # 0.01 0.0 - 0.4 k/uL    Basophils Absolute 0.00 0.0 - 0.2 k/uL    Morphology ANISOCYTOSIS PRESENT    D-Dimer, Quantitative   Result Value Ref Range    D-Dimer, Quant 2.44 mg/L FEU   Ferritin   Result Value Ref Range    Ferritin 3,024 (H) 13 - 150 ug/L   Fibrinogen   Result Value Ref Range    Fibrinogen 720 (H) 140 - 420 mg/dL   Protime-INR   Result Value Ref Range    Protime 9.5 9.0 - 12.0 sec    INR 0.9    Lactate Dehydrogenase   Result Value Ref Range     (H) 135 - 214 U/L   Hepatic Function Panel   Result Value Ref Range    Alb 2.9 (L) 3.5 - 5.2 g/dL    Alkaline Phosphatase 74 35 - 104 U/L    ALT 45 (H) 5 - 33 U/L    AST 38 (H) <32 U/L    Total Bilirubin 0.53 0.3 - 1.2 mg/dL    Bilirubin, Direct 0.22 <0.31 mg/dL    Bilirubin, Indirect 0.31 0.00 - 1.00 mg/dL    Total Protein 5.8 (L) 6.4 - 8.3 g/dL    Globulin NOT REPORTED 1.5 - 3.8 g/dL    Albumin/Globulin Ratio 1.0 1.0 - 2.5   Magnesium   Result Value Ref Range    Magnesium 2.3 1.6 - 2.6 mg/dL   Renal Function Panel   Result Value Ref Range    Glucose 109 (H) 70 - 99 mg/dL    BUN 23 8 - 23 mg/dL    CREATININE 0.81 0.50 - 0.90 mg/dL    Bun/Cre Ratio NOT REPORTED 9 - 20    Calcium 8.0 (L) 8.6 - 10.4 mg/dL    Alb 2.9 (L) 3.5 - 5.2 g/dL    Phosphorus 3.5 2.6 - 4.5 mg/dL    Sodium 133 (L) 135 - 144 mmol/L    Potassium 4.1 3.7 - 5.3 mmol/L    Chloride 100 98 - 107 mmol/L    CO2 21 20 - 31 mmol/L    Anion Gap 12 9 - 17 mmol/L    GFR Non-African American >60 >60 mL/min    GFR African American >60 >60 mL/min    GFR Comment          GFR Staging NOT REPORTED    Procalcitonin   Result Value Ref Range    Procalcitonin 1.00 (H) <0.09 ng/mL   Brain Natriuretic Peptide   Result Value Ref Range    Pro- (H) <300 pg/mL    BNP Interpretation Pro-BNP Reference Range:    COVID-19    Specimen: Nasopharyngeal Swab   Result Value Ref Range    SARS-CoV-2 Positive (A)    MYCOPLASMA PNEUMONIAE ANTIBODY, IGM   Result Value Ref Range    Mycoplasma pneumo IgM 0.17 <0.91   URINALYSIS   Result Value Ref Range    Color, UA DARK YELLOW (A) YELLOW    Turbidity UA CLEAR CLEAR    Glucose, Ur NEGATIVE NEGATIVE    Bilirubin Urine NEGATIVE  Verified by ictotest. (A) NEGATIVE    Ketones, Urine SMALL (A) NEGATIVE    Specific Gravity, UA 1.023 1.005 - 1.030    Urine Hgb NEGATIVE NEGATIVE    pH, UA 5.5 5.0 - 8.0    Protein, UA 1+ (A) NEGATIVE    Urobilinogen, Urine Normal Normal    Nitrite, Urine NEGATIVE NEGATIVE    Leukocyte Esterase, Urine NEGATIVE NEGATIVE    Urinalysis Comments NOT REPORTED    OCCULT BLOOD SCREEN   Result Value Ref Range    Occult Blood, Stool #1 NEGATIVE NEGATIVE    Date, Stool #1 . FECES     Time, Stool #1 . FECES     Occult Blood, Stool #2 NOT REPORTED NEGATIVE    Date, Stool #2 NOT REPORTED     Time, Stool #2 NOT REPORTED     Occult Blood, Stool #3 NOT REPORTED NEGATIVE    Date, Stool #3 NOT REPORTED     Time, Stool #3 NOT REPORTED    Microscopic Urinalysis   Result Value Ref Range    -          WBC, UA 0 TO 2 0 - 5 /HPF    RBC, UA 0 TO 2 0 - 4 /HPF    Casts UA  0 - 8 /LPF     5 TO 10 HYALINE Reference range defined for non-centrifuged specimen.     Crystals, UA NOT REPORTED None /HPF    Epithelial Cells UA 0 TO 2 0 - 5 /HPF    Renal Epithelial, UA NOT REPORTED 0 /HPF    Bacteria, UA NOT REPORTED None    Mucus, UA NOT REPORTED None    Trichomonas, UA NOT REPORTED None REPORTED 9 - 20    Calcium 8.4 (L) 8.6 - 10.4 mg/dL    Sodium 138 135 - 144 mmol/L    Potassium 4.0 3.7 - 5.3 mmol/L    Chloride 102 98 - 107 mmol/L    CO2 21 20 - 31 mmol/L    Anion Gap 15 9 - 17 mmol/L    Alkaline Phosphatase 58 35 - 104 U/L    ALT 35 (H) 5 - 33 U/L    AST 33 (H) <32 U/L    Total Bilirubin 0.47 0.3 - 1.2 mg/dL    Total Protein 5.6 (L) 6.4 - 8.3 g/dL    Alb 2.6 (L) 3.5 - 5.2 g/dL    Albumin/Globulin Ratio 0.9 (L) 1.0 - 2.5    GFR Non-African American >60 >60 mL/min    GFR African American >60 >60 mL/min    GFR Comment          GFR Staging NOT REPORTED    CBC Auto Differential   Result Value Ref Range    WBC 3.3 (L) 3.5 - 11.3 k/uL    RBC 2.61 (L) 3.95 - 5.11 m/uL    Hemoglobin 7.8 (L) 11.9 - 15.1 g/dL    Hematocrit 23.8 (L) 36.3 - 47.1 %    MCV 91.2 82.6 - 102.9 fL    MCH 29.9 25.2 - 33.5 pg    MCHC 32.8 28.4 - 34.8 g/dL    RDW 19.2 (H) 11.8 - 14.4 %    Platelets 076 589 - 243 k/uL    MPV 11.4 8.1 - 13.5 fL    NRBC Automated 0.0 0.0 per 100 WBC    Differential Type NOT REPORTED     WBC Morphology NOT REPORTED     RBC Morphology NOT REPORTED     Platelet Estimate NOT REPORTED     Immature Granulocytes 0 0 %    Seg Neutrophils 56 36 - 66 %    Lymphocytes 36 24 - 44 %    Monocytes 8 (H) 1 - 7 %    Eosinophils % 0 (L) 1 - 4 %    Basophils 0 0 - 2 %    Absolute Immature Granulocyte 0.00 0.00 - 0.30 k/uL    Segs Absolute 1.85 1.8 - 7.7 k/uL    Absolute Lymph # 1.19 1.0 - 4.8 k/uL    Absolute Mono # 0.26 0.1 - 0.8 k/uL    Absolute Eos # 0.00 0.0 - 0.4 k/uL    Basophils Absolute 0.00 0.0 - 0.2 k/uL    Morphology ANISOCYTOSIS PRESENT    Mycoplasma pneumoniae antibody, IgM   Result Value Ref Range    Mycoplasma pneumo IgM 0.20 <0.91   CBC Auto Differential   Result Value Ref Range    WBC 9.0 3.5 - 11.3 k/uL    RBC 2.60 (L) 3.95 - 5.11 m/uL    Hemoglobin 8.0 (L) 11.9 - 15.1 g/dL    Hematocrit 26.0 (L) 36.3 - 47.1 %    .0 82.6 - 102.9 fL    MCH 30.8 25.2 - 33.5 pg    MCHC 30.8 28.4 - 34.8 g/dL    RDW 19.6 (H) 11.8 - 14.4 %    Platelets 356 617 - 585 k/uL    MPV 11.4 8.1 - 13.5 fL    NRBC Automated 0.0 0.0 per 100 WBC    Differential Type NOT REPORTED     WBC Morphology NOT REPORTED     RBC Morphology NOT REPORTED     Platelet Estimate NOT REPORTED     Immature Granulocytes 1 (H) 0 %    Seg Neutrophils 74 (H) 36 - 66 %    Lymphocytes 15 (L) 24 - 44 %    Monocytes 10 (H) 1 - 7 %    Eosinophils % 0 (L) 1 - 4 %    Basophils 0 0 - 2 %    Absolute Immature Granulocyte 0.09 0.00 - 0.30 k/uL    Segs Absolute 6.66 1.8 - 7.7 k/uL    Absolute Lymph # 1.35 1.0 - 4.8 k/uL    Absolute Mono # 0.90 (H) 0.1 - 0.8 k/uL    Absolute Eos # 0.00 0.0 - 0.4 k/uL    Basophils Absolute 0.00 0.0 - 0.2 k/uL    Morphology ANISOCYTOSIS PRESENT    FERRITIN   Result Value Ref Range    Ferritin 3,590 (H) 13 - 150 ug/L   FIBRINOGEN   Result Value Ref Range    Fibrinogen 673 (H) 140 - 420 mg/dL   CBC Auto Differential   Result Value Ref Range    WBC 12.8 (H) 3.5 - 11.3 k/uL    RBC 2.36 (L) 3.95 - 5.11 m/uL    Hemoglobin 7.3 (L) 11.9 - 15.1 g/dL    Hematocrit 23.4 (L) 36.3 - 47.1 %    MCV 99.2 82.6 - 102.9 fL    MCH 30.9 25.2 - 33.5 pg    MCHC 31.2 28.4 - 34.8 g/dL    RDW 19.0 (H) 11.8 - 14.4 %    Platelets 245 224 - 588 k/uL    MPV 11.5 8.1 - 13.5 fL    NRBC Automated 0.0 0.0 per 100 WBC    Differential Type NOT REPORTED     WBC Morphology NOT REPORTED     RBC Morphology NOT REPORTED     Platelet Estimate NOT REPORTED     Immature Granulocytes 1 (H) 0 %    Seg Neutrophils 76 (H) 36 - 66 %    Lymphocytes 16 (L) 24 - 44 %    Monocytes 7 1 - 7 %    Eosinophils % 0 (L) 1 - 4 %    Basophils 0 0 - 2 %    Absolute Immature Granulocyte 0.13 0.00 - 0.30 k/uL    Segs Absolute 9.72 (H) 1.8 - 7.7 k/uL    Absolute Lymph # 2.05 1.0 - 4.8 k/uL    Absolute Mono # 0.90 (H) 0.1 - 0.8 k/uL    Absolute Eos # 0.00 0.0 - 0.4 k/uL    Basophils Absolute 0.00 0.0 - 0.2 k/uL    Morphology ANISOCYTOSIS PRESENT     Morphology INCREASED BANDS PRESENT     Morphology TOXIC GRANULATION PRESENT    Basic Metabolic Panel w/ Reflex to MG   Result Value Ref Range    Glucose 84 70 - 99 mg/dL    BUN 22 8 - 23 mg/dL    CREATININE 0.52 0.50 - 0.90 mg/dL    Bun/Cre Ratio NOT REPORTED 9 - 20    Calcium 8.6 8.6 - 10.4 mg/dL    Sodium 143 135 - 144 mmol/L    Potassium 3.5 (L) 3.7 - 5.3 mmol/L    Chloride 106 98 - 107 mmol/L    CO2 25 20 - 31 mmol/L    Anion Gap 12 9 - 17 mmol/L    GFR Non-African American >60 >60 mL/min    GFR African American >60 >60 mL/min    GFR Comment          GFR Staging NOT REPORTED    Brain Natriuretic Peptide   Result Value Ref Range    Pro- (H) <300 pg/mL    BNP Interpretation Pro-BNP Reference Range:    FIBRINOGEN   Result Value Ref Range    Fibrinogen 506 (H) 140 - 420 mg/dL   C-REACTIVE PROTEIN   Result Value Ref Range    CRP 67.6 (H) 0.0 - 5.0 mg/L   FERRITIN   Result Value Ref Range    Ferritin 2,838 (H) 13 - 150 ug/L   D-DIMER, QUANTITATIVE   Result Value Ref Range    D-Dimer, Quant 1.93 mg/L FEU   Magnesium   Result Value Ref Range    Magnesium 2.0 1.6 - 2.6 mg/dL   CBC Auto Differential   Result Value Ref Range    WBC 10.4 3.5 - 11.3 k/uL    RBC 2.70 (L) 3.95 - 5.11 m/uL    Hemoglobin 8.1 (L) 11.9 - 15.1 g/dL    Hematocrit 27.6 (L) 36.3 - 47.1 %    .2 82.6 - 102.9 fL    MCH 30.0 25.2 - 33.5 pg    MCHC 29.3 28.4 - 34.8 g/dL    RDW 19.2 (H) 11.8 - 14.4 %    Platelets 851 833 - 009 k/uL    MPV 11.2 8.1 - 13.5 fL    NRBC Automated 0.0 0.0 per 100 WBC    Differential Type NOT REPORTED     WBC Morphology NOT REPORTED     RBC Morphology NOT REPORTED     Platelet Estimate NOT REPORTED     Immature Granulocytes 3 (H) 0 %    Seg Neutrophils 68 (H) 36 - 66 %    Lymphocytes 20 (L) 24 - 44 %    Monocytes 9 (H) 1 - 7 %    Eosinophils % 0 (L) 1 - 4 %    Basophils 0 0 - 2 %    Absolute Immature Granulocyte 0.31 (H) 0.00 - 0.30 k/uL    Segs Absolute 7.07 1.8 - 7.7 k/uL    Absolute Lymph # 2.08 1.0 - 4.8 k/uL    Absolute Mono # 0.94 (H) 0.1 - 0.8 k/uL    Absolute Eos # 0. 00 0.0 - 0.4 k/uL    Basophils Absolute 0.00 0.0 - 0.2 k/uL    Morphology ANISOCYTOSIS PRESENT    POTASSIUM   Result Value Ref Range    Potassium 3.1 (L) 3.7 - 5.3 mmol/L   CBC Auto Differential   Result Value Ref Range    WBC 9.3 3.5 - 11.3 k/uL    RBC 2.54 (L) 3.95 - 5.11 m/uL    Hemoglobin 8.0 (L) 11.9 - 15.1 g/dL    Hematocrit 25.9 (L) 36.3 - 47.1 %    .0 82.6 - 102.9 fL    MCH 31.5 25.2 - 33.5 pg    MCHC 30.9 28.4 - 34.8 g/dL    RDW 19.0 (H) 11.8 - 14.4 %    Platelets 305 169 - 771 k/uL    MPV 11.4 8.1 - 13.5 fL    NRBC Automated 0.0 0.0 per 100 WBC    Differential Type NOT REPORTED     WBC Morphology NOT REPORTED     RBC Morphology NOT REPORTED     Platelet Estimate NOT REPORTED     Immature Granulocytes 5 (H) 0 %    Seg Neutrophils 69 (H) 36 - 66 %    Lymphocytes 23 (L) 24 - 44 %    Monocytes 3 1 - 7 %    Eosinophils % 0 (L) 1 - 4 %    Basophils 0 0 - 2 %    Absolute Immature Granulocyte 0.47 (H) 0.00 - 0.30 k/uL    Segs Absolute 6.41 1.8 - 7.7 k/uL    Absolute Lymph # 2.14 1.0 - 4.8 k/uL    Absolute Mono # 0.28 0.1 - 0.8 k/uL    Absolute Eos # 0.00 0.0 - 0.4 k/uL    Basophils Absolute 0.00 0.0 - 0.2 k/uL    Morphology ANISOCYTOSIS PRESENT     Morphology TOXIC GRANULATION PRESENT    Comprehensive Metabolic Panel w/ Reflex to MG   Result Value Ref Range    Glucose 84 70 - 99 mg/dL    BUN 16 8 - 23 mg/dL    CREATININE 0.64 0.50 - 0.90 mg/dL    Bun/Cre Ratio NOT REPORTED 9 - 20    Calcium 9.0 8.6 - 10.4 mg/dL    Sodium 139 135 - 144 mmol/L    Potassium 3.8 3.7 - 5.3 mmol/L    Chloride 103 98 - 107 mmol/L    CO2 24 20 - 31 mmol/L    Anion Gap 12 9 - 17 mmol/L    Alkaline Phosphatase 88 35 - 104 U/L    ALT 23 5 - 33 U/L    AST 22 <32 U/L    Total Bilirubin 0.51 0.3 - 1.2 mg/dL    Total Protein 6.3 (L) 6.4 - 8.3 g/dL    Alb 2.5 (L) 3.5 - 5.2 g/dL    Albumin/Globulin Ratio 0.7 (L) 1.0 - 2.5    GFR Non-African American >60 >60 mL/min    GFR African American >60 >60 mL/min    GFR Comment          GFR Staging NOT REPORTED    Urinalysis Reflex to Culture    Specimen: Urine, clean catch   Result Value Ref Range    Color, UA DARK YELLOW (A) YELLOW    Turbidity UA CLEAR CLEAR    Glucose, Ur NEGATIVE NEGATIVE    Bilirubin Urine NEGATIVE NEGATIVE    Ketones, Urine NEGATIVE NEGATIVE    Specific Gravity, UA 1.022 1.005 - 1.030    Urine Hgb NEGATIVE NEGATIVE    pH, UA 7.0 5.0 - 8.0    Protein, UA 1+ (A) NEGATIVE    Urobilinogen, Urine Normal Normal    Nitrite, Urine NEGATIVE NEGATIVE    Leukocyte Esterase, Urine NEGATIVE NEGATIVE    Urinalysis Comments NOT REPORTED    Microscopic Urinalysis   Result Value Ref Range    -          WBC, UA 2 TO 5 0 - 5 /HPF    RBC, UA 5 TO 10 0 - 4 /HPF    Casts UA  0 - 8 /LPF     10 TO 20 HYALINE Reference range defined for non-centrifuged specimen. Crystals, UA NOT REPORTED None /HPF    Epithelial Cells UA 5 TO 10 0 - 5 /HPF    Renal Epithelial, UA NOT REPORTED 0 /HPF    Bacteria, UA NOT REPORTED None    Mucus, UA NOT REPORTED None    Trichomonas, UA NOT REPORTED None    Amorphous, UA NOT REPORTED None    Other Observations UA NOT REPORTED NOT REQ.     Yeast, UA NOT REPORTED None   CBC Auto Differential   Result Value Ref Range    WBC 6.9 3.5 - 11.3 k/uL    RBC 2.28 (L) 3.95 - 5.11 m/uL    Hemoglobin 6.8 (LL) 11.9 - 15.1 g/dL    Hematocrit 21.5 (L) 36.3 - 47.1 %    MCV 94.3 82.6 - 102.9 fL    MCH 29.8 25.2 - 33.5 pg    MCHC 31.6 28.4 - 34.8 g/dL    RDW 18.2 (H) 11.8 - 14.4 %    Platelets 016 450 - 954 k/uL    MPV 11.0 8.1 - 13.5 fL    NRBC Automated 0.0 0.0 per 100 WBC    Differential Type NOT REPORTED     WBC Morphology NOT REPORTED     RBC Morphology NOT REPORTED     Platelet Estimate NOT REPORTED     Immature Granulocytes 9 (H) 0 %    Seg Neutrophils 66 36 - 66 %    Lymphocytes 18 (L) 24 - 44 %    Monocytes 7 1 - 7 %    Eosinophils % 0 (L) 1 - 4 %    Basophils 0 0 - 2 %    Absolute Immature Granulocyte 0.62 (H) 0.00 - 0.30 k/uL    Segs Absolute 4.56 1.8 - 7.7 k/uL    Absolute Lymph # 1. 24 1.0 - 4.8 k/uL    Absolute Mono # 0.48 0.1 - 0.8 k/uL    Absolute Eos # 0.00 0.0 - 0.4 k/uL    Basophils Absolute 0.00 0.0 - 0.2 k/uL    Morphology ANISOCYTOSIS PRESENT    Comprehensive Metabolic Panel w/ Reflex to MG   Result Value Ref Range    Glucose 111 (H) 70 - 99 mg/dL    BUN 24 (H) 8 - 23 mg/dL    CREATININE 0.61 0.50 - 0.90 mg/dL    Bun/Cre Ratio NOT REPORTED 9 - 20    Calcium 8.2 (L) 8.6 - 10.4 mg/dL    Sodium 143 135 - 144 mmol/L    Potassium 4.2 3.7 - 5.3 mmol/L    Chloride 105 98 - 107 mmol/L    CO2 26 20 - 31 mmol/L    Anion Gap 12 9 - 17 mmol/L    Alkaline Phosphatase 77 35 - 104 U/L    ALT 18 5 - 33 U/L    AST 18 <32 U/L    Total Bilirubin 0.41 0.3 - 1.2 mg/dL    Total Protein 5.9 (L) 6.4 - 8.3 g/dL    Alb 2.2 (L) 3.5 - 5.2 g/dL    Albumin/Globulin Ratio 0.6 (L) 1.0 - 2.5    GFR Non-African American >60 >60 mL/min    GFR African American >60 >60 mL/min    GFR Comment          GFR Staging NOT REPORTED    CBC Auto Differential   Result Value Ref Range    WBC 4.2 3.5 - 11.3 k/uL    RBC 2.05 (L) 3.95 - 5.11 m/uL    Hemoglobin 6.0 (LL) 11.9 - 15.1 g/dL    Hematocrit 19.8 (L) 36.3 - 47.1 %    MCV 96.6 82.6 - 102.9 fL    MCH 29.3 25.2 - 33.5 pg    MCHC 30.3 28.4 - 34.8 g/dL    RDW 18.4 (H) 11.8 - 14.4 %    Platelets 190 501 - 732 k/uL    MPV 11.2 8.1 - 13.5 fL    NRBC Automated 0.0 0.0 per 100 WBC    Differential Type NOT REPORTED     WBC Morphology NOT REPORTED     RBC Morphology NOT REPORTED     Platelet Estimate NOT REPORTED     Immature Granulocytes 2 (H) 0 %    Seg Neutrophils 74 (H) 36 - 66 %    Lymphocytes 16 (L) 24 - 44 %    Monocytes 8 (H) 1 - 7 %    Eosinophils % 0 (L) 1 - 4 %    Basophils 0 0 - 2 %    Absolute Immature Granulocyte 0.08 0.00 - 0.30 k/uL    Segs Absolute 3.11 1.8 - 7.7 k/uL    Absolute Lymph # 0.67 (L) 1.0 - 4.8 k/uL    Absolute Mono # 0.34 0.1 - 0.8 k/uL    Absolute Eos # 0.00 0.0 - 0.4 k/uL    Basophils Absolute 0.00 0.0 - 0.2 k/uL    Morphology ANISOCYTOSIS PRESENT Morphology TOXIC GRANULATION PRESENT    Comprehensive Metabolic Panel w/ Reflex to MG   Result Value Ref Range    Glucose 96 70 - 99 mg/dL    BUN 26 (H) 8 - 23 mg/dL    CREATININE 0.61 0.50 - 0.90 mg/dL    Bun/Cre Ratio NOT REPORTED 9 - 20    Calcium 8.3 (L) 8.6 - 10.4 mg/dL    Sodium 141 135 - 144 mmol/L    Potassium 4.4 3.7 - 5.3 mmol/L    Chloride 104 98 - 107 mmol/L    CO2 31 20 - 31 mmol/L    Anion Gap 6 (L) 9 - 17 mmol/L    Alkaline Phosphatase 62 35 - 104 U/L    ALT 18 5 - 33 U/L    AST 16 <32 U/L    Total Bilirubin 0.30 0.3 - 1.2 mg/dL    Total Protein 5.7 (L) 6.4 - 8.3 g/dL    Alb 2.2 (L) 3.5 - 5.2 g/dL    Albumin/Globulin Ratio 0.6 (L) 1.0 - 2.5    GFR Non-African American >60 >60 mL/min    GFR African American >60 >60 mL/min    GFR Comment          GFR Staging NOT REPORTED    FERRITIN   Result Value Ref Range    Ferritin 4,245 (H) 13 - 150 ug/L   VITAMIN D 25 HYDROXY   Result Value Ref Range    Vit D, 25-Hydroxy 47.6 30.0 - 100.0 ng/mL   HEMOGLOBIN AND HEMATOCRIT, BLOOD   Result Value Ref Range    Hemoglobin 7.7 (L) 11.9 - 15.1 g/dL    Hematocrit 24.0 (L) 36.3 - 47.1 %   CBC Auto Differential   Result Value Ref Range    WBC 5.3 3.5 - 11.3 k/uL    RBC 2.64 (L) 3.95 - 5.11 m/uL    Hemoglobin 8.0 (L) 11.9 - 15.1 g/dL    Hematocrit 25.2 (L) 36.3 - 47.1 %    MCV 95.5 82.6 - 102.9 fL    MCH 30.3 25.2 - 33.5 pg    MCHC 31.7 28.4 - 34.8 g/dL    RDW 17.4 (H) 11.8 - 14.4 %    Platelets 321 102 - 383 k/uL    MPV 11.3 8.1 - 13.5 fL    NRBC Automated 0.0 0.0 per 100 WBC    Differential Type NOT REPORTED     WBC Morphology NOT REPORTED     RBC Morphology NOT REPORTED     Platelet Estimate NOT REPORTED     Immature Granulocytes 5 (H) 0 %    Seg Neutrophils 78 (H) 36 - 66 %    Lymphocytes 11 (L) 24 - 44 %    Monocytes 4 1 - 7 %    Eosinophils % 2 1 - 4 %    Basophils 0 0 - 2 %    Absolute Immature Granulocyte 0.27 0.00 - 0.30 k/uL    Segs Absolute 4.13 1.8 - 7.7 k/uL    Absolute Lymph # 0.58 (L) 1.0 - 4.8 k/uL Absolute Mono # 0.21 0.1 - 0.8 k/uL    Absolute Eos # 0.11 0.0 - 0.4 k/uL    Basophils Absolute 0.00 0.0 - 0.2 k/uL    Morphology ANISOCYTOSIS PRESENT     Morphology TOXIC GRANULATION PRESENT    Comprehensive Metabolic Panel w/ Reflex to MG   Result Value Ref Range    Glucose 91 70 - 99 mg/dL    BUN 32 (H) 8 - 23 mg/dL    CREATININE 0.65 0.50 - 0.90 mg/dL    Bun/Cre Ratio NOT REPORTED 9 - 20    Calcium 7.8 (L) 8.6 - 10.4 mg/dL    Sodium 143 135 - 144 mmol/L    Potassium 4.9 3.7 - 5.3 mmol/L    Chloride 106 98 - 107 mmol/L    CO2 30 20 - 31 mmol/L    Anion Gap 7 (L) 9 - 17 mmol/L    Alkaline Phosphatase 61 35 - 104 U/L    ALT 14 5 - 33 U/L    AST 13 <32 U/L    Total Bilirubin 0.29 (L) 0.3 - 1.2 mg/dL    Total Protein 6.1 (L) 6.4 - 8.3 g/dL    Alb 2.3 (L) 3.5 - 5.2 g/dL    Albumin/Globulin Ratio 0.6 (L) 1.0 - 2.5    GFR Non-African American >60 >60 mL/min    GFR African American >60 >60 mL/min    GFR Comment          GFR Staging NOT REPORTED    HEMOGLOBIN AND HEMATOCRIT, BLOOD   Result Value Ref Range    Hemoglobin 8.1 (L) 11.9 - 15.1 g/dL    Hematocrit 26.1 (L) 36.3 - 47.1 %   HEMOGLOBIN AND HEMATOCRIT, BLOOD   Result Value Ref Range    Hemoglobin 8.0 (L) 11.9 - 15.1 g/dL    Hematocrit 25.7 (L) 36.3 - 47.1 %   CBC Auto Differential   Result Value Ref Range    WBC 6.8 3.5 - 11.3 k/uL    RBC 2.72 (L) 3.95 - 5.11 m/uL    Hemoglobin 8.2 (L) 11.9 - 15.1 g/dL    Hematocrit 26.7 (L) 36.3 - 47.1 %    MCV 98.2 82.6 - 102.9 fL    MCH 30.1 25.2 - 33.5 pg    MCHC 30.7 28.4 - 34.8 g/dL    RDW 17.0 (H) 11.8 - 14.4 %    Platelets 340 920 - 060 k/uL    MPV 11.8 8.1 - 13.5 fL    NRBC Automated 0.0 0.0 per 100 WBC    Differential Type NOT REPORTED     WBC Morphology NOT REPORTED     RBC Morphology NOT REPORTED     Platelet Estimate NOT REPORTED     Immature Granulocytes 3 (H) 0 %    Seg Neutrophils 86 (H) 36 - 66 %    Lymphocytes 7 (L) 24 - 44 %    Monocytes 4 1 - 7 %    Eosinophils % 0 (L) 1 - 4 %    Basophils 0 0 - 2 %    Absolute Immature Granulocyte 0.20 0.00 - 0.30 k/uL    Segs Absolute 5.85 1.8 - 7.7 k/uL    Absolute Lymph # 0.48 (L) 1.0 - 4.8 k/uL    Absolute Mono # 0.27 0.1 - 0.8 k/uL    Absolute Eos # 0.00 0.0 - 0.4 k/uL    Basophils Absolute 0.00 0.0 - 0.2 k/uL    Morphology ANISOCYTOSIS PRESENT     Morphology TOXIC GRANULATION PRESENT    HEMOGLOBIN AND HEMATOCRIT, BLOOD   Result Value Ref Range    Hemoglobin 8.0 (L) 11.9 - 15.1 g/dL    Hematocrit 26.4 (L) 36.3 - 47.1 %   CBC Auto Differential   Result Value Ref Range    WBC 8.2 3.5 - 11.3 k/uL    RBC 2.84 (L) 3.95 - 5.11 m/uL    Hemoglobin 8.4 (L) 11.9 - 15.1 g/dL    Hematocrit 27.8 (L) 36.3 - 47.1 %    MCV 97.9 82.6 - 102.9 fL    MCH 29.6 25.2 - 33.5 pg    MCHC 30.2 28.4 - 34.8 g/dL    RDW 16.6 (H) 11.8 - 14.4 %    Platelets 927 (L) 541 - 453 k/uL    MPV 12.3 8.1 - 13.5 fL    NRBC Automated 0.0 0.0 per 100 WBC    Differential Type NOT REPORTED     WBC Morphology NOT REPORTED     RBC Morphology NOT REPORTED     Platelet Estimate NOT REPORTED     Immature Granulocytes 5 (H) 0 %    Seg Neutrophils 83 (H) 36 - 65 %    Lymphocytes 9 (L) 24 - 43 %    Monocytes 2 (L) 3 - 12 %    Eosinophils % 0 (L) 1 - 4 %    Basophils 1 0 - 2 %    Absolute Immature Granulocyte 0.41 (H) 0.00 - 0.30 k/uL    Segs Absolute 6.81 1.50 - 8.10 k/uL    Absolute Lymph # 0.74 (L) 1.10 - 3.70 k/uL    Absolute Mono # 0.16 0.10 - 1.20 k/uL    Absolute Eos # 0.00 0.00 - 0.44 k/uL    Basophils Absolute 0.08 0.00 - 0.20 k/uL    Morphology ANISOCYTOSIS PRESENT     Morphology TOXIC GRANULATION PRESENT    BASIC METABOLIC PANEL   Result Value Ref Range    Glucose 121 (H) 70 - 99 mg/dL    BUN 44 (H) 8 - 23 mg/dL    CREATININE 0.66 0.50 - 0.90 mg/dL    Bun/Cre Ratio NOT REPORTED 9 - 20    Calcium 8.6 8.6 - 10.4 mg/dL    Sodium 144 135 - 144 mmol/L    Potassium 3.9 3.7 - 5.3 mmol/L    Chloride 105 98 - 107 mmol/L    CO2 33 (H) 20 - 31 mmol/L    Anion Gap 6 (L) 9 - 17 mmol/L    GFR Non-African American >60 >60 mL/min    GFR  >60 >60 mL/min    GFR Comment          GFR Staging NOT REPORTED    HEMOGLOBIN AND HEMATOCRIT, BLOOD   Result Value Ref Range    Hemoglobin 7.8 (L) 11.9 - 15.1 g/dL    Hematocrit 25.6 (L) 36.3 - 47.1 %   CBC Auto Differential   Result Value Ref Range    WBC 8.0 3.5 - 11.3 k/uL    RBC 2.52 (L) 3.95 - 5.11 m/uL    Hemoglobin 7.3 (L) 11.9 - 15.1 g/dL    Hematocrit 25.2 (L) 36.3 - 47.1 %    .0 82.6 - 102.9 fL    MCH 29.0 25.2 - 33.5 pg    MCHC 29.0 28.4 - 34.8 g/dL    RDW 16.6 (H) 11.8 - 14.4 %    Platelets 79 (L) 375 - 453 k/uL    MPV 12.4 8.1 - 13.5 fL    NRBC Automated 0.0 0.0 per 100 WBC    Differential Type NOT REPORTED     WBC Morphology NOT REPORTED     RBC Morphology ANISOCYTOSIS PRESENT     Platelet Estimate NOT REPORTED     Seg Neutrophils 85 (H) 36 - 65 %    Lymphocytes 9 (L) 24 - 43 %    Monocytes 2 (L) 3 - 12 %    Eosinophils % 0 (L) 1 - 4 %    Basophils 0 0 - 2 %    Immature Granulocytes 3 (H) 0 %    Segs Absolute 6.80 1.50 - 8.10 k/uL    Absolute Lymph # 0.71 (L) 1.10 - 3.70 k/uL    Absolute Mono # 0.19 0.10 - 1.20 k/uL    Absolute Eos # <0.03 0.00 - 0.44 k/uL    Basophils Absolute <0.03 0.00 - 0.20 k/uL    Absolute Immature Granulocyte 0.27 0.00 - 0.30 k/uL   HEMOGLOBIN AND HEMATOCRIT, BLOOD   Result Value Ref Range    Hemoglobin 7.3 (L) 11.9 - 15.1 g/dL    Hematocrit 23.9 (L) 36.3 - 47.1 %   HEMOGLOBIN AND HEMATOCRIT, BLOOD   Result Value Ref Range    Hemoglobin 7.8 (L) 11.9 - 15.1 g/dL    Hematocrit 25.6 (L) 36.3 - 47.1 %   CBC Auto Differential   Result Value Ref Range    WBC 7.1 3.5 - 11.3 k/uL    RBC 2.42 (L) 3.95 - 5.11 m/uL    Hemoglobin 7.2 (L) 11.9 - 15.1 g/dL    Hematocrit 24.2 (L) 36.3 - 47.1 %    .0 82.6 - 102.9 fL    MCH 29.8 25.2 - 33.5 pg    MCHC 29.8 28.4 - 34.8 g/dL    RDW 16.7 (H) 11.8 - 14.4 %    Platelets 75 (L) 408 - 453 k/uL    MPV 12.9 8.1 - 13.5 fL    NRBC Automated 0.4 (H) 0.0 per 100 WBC    Differential Type NOT REPORTED     WBC Morphology NOT REPORTED     RBC Morphology ANISOCYTOSIS PRESENT     Platelet Estimate NOT REPORTED     Seg Neutrophils 81 (H) 36 - 65 %    Lymphocytes 13 (L) 24 - 43 %    Monocytes 3 3 - 12 %    Eosinophils % 0 (L) 1 - 4 %    Basophils 0 0 - 2 %    Immature Granulocytes 2 (H) 0 %    Segs Absolute 5.70 1.50 - 8.10 k/uL    Absolute Lymph # 0.94 (L) 1.10 - 3.70 k/uL    Absolute Mono # 0.24 0.10 - 1.20 k/uL    Absolute Eos # <0.03 0.00 - 0.44 k/uL    Basophils Absolute <0.03 0.00 - 0.20 k/uL    Absolute Immature Granulocyte 0.17 0.00 - 0.30 k/uL   HEMOGLOBIN AND HEMATOCRIT, BLOOD   Result Value Ref Range    Hemoglobin 6.8 (LL) 11.9 - 15.1 g/dL    Hematocrit 22.9 (L) 36.3 - 47.1 %   BASIC METABOLIC PANEL   Result Value Ref Range    Glucose 139 (H) 70 - 99 mg/dL    BUN 38 (H) 8 - 23 mg/dL    CREATININE 0.57 0.50 - 0.90 mg/dL    Bun/Cre Ratio NOT REPORTED 9 - 20    Calcium 8.6 8.6 - 10.4 mg/dL    Sodium 145 (H) 135 - 144 mmol/L    Potassium 4.1 3.7 - 5.3 mmol/L    Chloride 108 (H) 98 - 107 mmol/L    CO2 31 20 - 31 mmol/L    Anion Gap 6 (L) 9 - 17 mmol/L    GFR Non-African American >60 >60 mL/min    GFR African American >60 >60 mL/min    GFR Comment          GFR Staging NOT REPORTED    POC Glucose Fingerstick   Result Value Ref Range    POC Glucose 108 (H) 65 - 105 mg/dL   Arterial Blood Gas, POC   Result Value Ref Range    POC pH 7.464 (H) 7.350 - 7.450    POC pCO2 37.1 35.0 - 48.0 mm Hg    POC PO2 87.4 83.0 - 108.0 mm Hg    POC HCO3 26.6 21.0 - 28.0 mmol/L    TCO2 (calc), Art 28 22.0 - 29.0 mmol/L    Negative Base Excess, Art NOT REPORTED 0.0 - 2.0    Positive Base Excess, Art 3 0.0 - 3.0    POC O2 SAT 97 94.0 - 98.0 %    O2 Device/Flow/% Adult Ventilator     Thomas Test NOT APPLICABLE     Sample Site Arterial Line     Mode PRVC     FIO2 100.0     Pt Temp 39.8     POC pH Temp 7.42     POC pCO2 Temp 42 mm Hg    POC pO2 Temp 105 mm Hg   POCT Glucose   Result Value Ref Range    POC Glucose 110 (H) 74 - 100 mg/dL   Arterial Blood Gas, POC Result Value Ref Range    POC pH 7.433 7.350 - 7.450    POC pCO2 47.0 35.0 - 48.0 mm Hg    POC PO2 103.6 83.0 - 108.0 mm Hg    POC HCO3 31.4 (H) 21.0 - 28.0 mmol/L    TCO2 (calc), Art 33 (H) 22.0 - 29.0 mmol/L    Negative Base Excess, Art NOT REPORTED 0.0 - 2.0    Positive Base Excess, Art 6 (H) 0.0 - 3.0    POC O2 SAT 98 94.0 - 98.0 %    O2 Device/Flow/% Adult Ventilator     Thomas Test NOT APPLICABLE     Sample Site Arterial Line     Mode PRVC     FIO2 100.0     Pt Temp NOT REPORTED     POC pH Temp NOT REPORTED     POC pCO2 Temp NOT REPORTED mm Hg    POC pO2 Temp NOT REPORTED mm Hg   Lactic Acid, POC   Result Value Ref Range    POC Lactic Acid 0.91 0.56 - 1.39 mmol/L   POCT Glucose   Result Value Ref Range    POC Glucose 126 (H) 74 - 100 mg/dL   Arterial Blood Gas, POC   Result Value Ref Range    POC pH 7.448 7.350 - 7.450    POC pCO2 52.3 (H) 35.0 - 48.0 mm Hg    POC PO2 67.9 (L) 83.0 - 108.0 mm Hg    POC HCO3 36.2 (H) 21.0 - 28.0 mmol/L    TCO2 (calc), Art 38 (H) 22.0 - 29.0 mmol/L    Negative Base Excess, Art NOT REPORTED 0.0 - 2.0    Positive Base Excess, Art 11 (H) 0.0 - 3.0    POC O2 SAT 94 94.0 - 98.0 %    O2 Device/Flow/% Adult Ventilator     Thomas Test NOT APPLICABLE     Sample Site Arterial Line     Mode PRVC     FIO2 80.0     Pt Temp NOT REPORTED     POC pH Temp NOT REPORTED     POC pCO2 Temp NOT REPORTED mm Hg    POC pO2 Temp NOT REPORTED mm Hg   Lactic Acid, POC   Result Value Ref Range    POC Lactic Acid 0.91 0.56 - 1.39 mmol/L   POCT Glucose   Result Value Ref Range    POC Glucose 98 74 - 100 mg/dL   Arterial Blood Gas, POC   Result Value Ref Range    POC pH 7.429 7.350 - 7.450    POC pCO2 47.6 35.0 - 48.0 mm Hg    POC PO2 56.3 (L) 83.0 - 108.0 mm Hg    POC HCO3 31.5 (H) 21.0 - 28.0 mmol/L    TCO2 (calc), Art 33 (H) 22.0 - 29.0 mmol/L    Negative Base Excess, Art NOT REPORTED 0.0 - 2.0    Positive Base Excess, Art 6 (H) 0.0 - 3.0    POC O2 SAT 89 (L) 94.0 - 98.0 %    O2 Device/Flow/% NOT REPORTED Device/Flow/% Adult Ventilator     Thomas Test NOT REPORTED     Sample Site Arterial Line     Mode PRVC     FIO2 65.0     Pt Temp NOT REPORTED     POC pH Temp NOT REPORTED     POC pCO2 Temp NOT REPORTED mm Hg    POC pO2 Temp NOT REPORTED mm Hg   POCT Glucose   Result Value Ref Range    POC Glucose 128 (H) 74 - 100 mg/dL   Arterial Blood Gas, POC   Result Value Ref Range    POC pH 7.432 7.350 - 7.450    POC pCO2 58.4 (H) 35.0 - 48.0 mm Hg    POC PO2 70.6 (L) 83.0 - 108.0 mm Hg    POC HCO3 38.9 (H) 21.0 - 28.0 mmol/L    TCO2 (calc), Art 41 (H) 22.0 - 29.0 mmol/L    Negative Base Excess, Art NOT REPORTED 0.0 - 2.0    Positive Base Excess, Art 13 (H) 0.0 - 3.0    POC O2 SAT 94 94.0 - 98.0 %    O2 Device/Flow/% NOT REPORTED     Thomas Test NOT REPORTED     Sample Site NOT REPORTED     Mode NOT REPORTED     FIO2 NOT REPORTED     Pt Temp NOT REPORTED     POC pH Temp NOT REPORTED     POC pCO2 Temp NOT REPORTED mm Hg    POC pO2 Temp NOT REPORTED mm Hg   Lactic Acid, POC   Result Value Ref Range    POC Lactic Acid 1.03 0.56 - 1.39 mmol/L   POCT Glucose   Result Value Ref Range    POC Glucose 133 (H) 74 - 100 mg/dL   Arterial Blood Gas, POC   Result Value Ref Range    POC pH 7.448 7.350 - 7.450    POC pCO2 57.1 (H) 35.0 - 48.0 mm Hg    POC PO2 48.6 (LL) 83.0 - 108.0 mm Hg    POC HCO3 39.5 (H) 21.0 - 28.0 mmol/L    TCO2 (calc), Art 41 (H) 22.0 - 29.0 mmol/L    Negative Base Excess, Art NOT REPORTED 0.0 - 2.0    Positive Base Excess, Art 14 (H) 0.0 - 3.0    POC O2 SAT 84 (L) 94.0 - 98.0 %    O2 Device/Flow/% Adult Ventilator     Thomas Test NOT REPORTED     Sample Site Arterial Line     Mode NOT REPORTED     FIO2 45.0     Pt Temp NOT REPORTED     POC pH Temp NOT REPORTED     POC pCO2 Temp NOT REPORTED mm Hg    POC pO2 Temp NOT REPORTED mm Hg   EKG 12 Lead   Result Value Ref Range    Ventricular Rate 96 BPM    Atrial Rate 96 BPM    P-R Interval 134 ms    QRS Duration 88 ms    Q-T Interval 336 ms    QTc Calculation (Bazett) 424 ms Divison 00     Dispense Status TRANSFUSED     Transfusion Status OK TO TRANSFUSE     Crossmatch Result COMPATIBLE     Unit Number [id number]     Product Code Leukocyte Reduced Red Cell     Unit Divison 00     Dispense Status REL FROM [facility] [place]     Transfusion Status OK TO TRANSFUSE     Crossmatch Result COMPATIBLE    TYPE AND SCREEN   Result Value Ref Range    Expiration Date 12/03/2020,2359     Arm Band Number [id number]     ABO/Rh A POSITIVE     Antibody Screen NOT TESTED     SILVIA IgG NEGATIVE     Antibody ID Anti-Heaven Present     Unit Number [id number]     Product Code Leukocyte Reduced Red Cell     Unit Divison 00     Dispense Status ALLOCATED     Transfusion Status OK TO TRANSFUSE     Crossmatch Result COMPATIBLE     Unit Number [id number]     Product Code Leukocyte Reduced Red Cell     Unit Divison 00     Dispense Status ISSUED     Transfusion Status OK TO TRANSFUSE     Crossmatch Result COMPATIBLE      *Note: Due to a large number of results and/or encounters for the requested time period, some results have not been displayed. A complete set of results can be found in Results Review. IMAGING DATA:    Xr Chest (single View Frontal)    Result Date: 11/17/2020  EXAMINATION: NUCLEAR MEDICINE PERFUSION SCAN. PORTABLE CHEST RADIOGRAPH 11/17/2020 TECHNIQUE: 5 millicuries of Tc 31B MAA was administered intravenously prior to planar imaging of the lungs in multiple projections. HISTORY: ORDERING SYSTEM PROVIDED HISTORY: elevated D-Dimer outside hospital Reason for Exam: Covid-19 positive pt. and elevated D-Dimer 2.44 FINDINGS: PERFUSION: Distribution of radiotracer is slightly heterogeneous. No segmental defects identified. CHEST RADIOGRAPH:  Bilateral mid to lower lung field left greater than right opacities with peripheral involvement most pronounced at the bases. No effusion or pneumothorax. Normal heart size. Postsurgical changes of median sternotomy.   Right-sided Port-A-Cath is in place     *Low Probability for Pulmonary Embolus. *Bilateral mid to lower lung field left greater than right opacities with peripheral involvement most pronounced at the bases compatible with  COVID-19 pneumonia. The findings were sent to the Radiology Results [id number] at 2:46 pm on 11/17/2020to be communicated to a licensed caregiver. Nm Lung Scan Perfusion Only    Result Date: 11/17/2020  EXAMINATION: NUCLEAR MEDICINE PERFUSION SCAN. PORTABLE CHEST RADIOGRAPH 11/17/2020 TECHNIQUE: 5 millicuries of Tc 07I MAA was administered intravenously prior to planar imaging of the lungs in multiple projections. HISTORY: ORDERING SYSTEM PROVIDED HISTORY: elevated D-Dimer outside hospital Reason for Exam: Covid-19 positive pt. and elevated D-Dimer 2.44 FINDINGS: PERFUSION: Distribution of radiotracer is slightly heterogeneous. No segmental defects identified. CHEST RADIOGRAPH:  Bilateral mid to lower lung field left greater than right opacities with peripheral involvement most pronounced at the bases. No effusion or pneumothorax. Normal heart size. Postsurgical changes of median sternotomy. Right-sided Port-A-Cath is in place     *Low Probability for Pulmonary Embolus. *Bilateral mid to lower lung field left greater than right opacities with peripheral involvement most pronounced at the bases compatible with  COVID-19 pneumonia. The findings were sent to the Radiology Results [id number] at 2:46 pm on 11/17/2020to be communicated to a licensed caregiver.          IMPRESSION:   Primary Problem  Pneumonia due to COVID-19 virus    Active Hospital Problems    Diagnosis Date Noted    Other pancytopenia ([address] [D61.818] 11/24/2020    Hypokalemia [E87.6] 11/22/2020    MDS (myelodysplastic syndrome) ([address] [D46.9] 11/17/2020    Pneumonia due to COVID-19 virus [U07.1, J12.89] 11/17/2020    Neutropenic sepsis ([address] [A41.9, D70.9] 11/17/2020    Bicytopenia [D75.89] 11/17/2020    ROSALINO (acute kidney injury) ([address] [N17.9] 11/17/2020    Acute respiratory failure with hypoxia ([facility] [facility] [J96.01] 11/17/2020    Essential hypertension [I10] 11/17/2020    Coronary artery disease involving coronary bypass graft of native heart without angina pectoris [I25.810] 11/17/2020    History of Raynaud's syndrome [Z86.79] 11/17/2020    Transaminitis [R74.01] 11/17/2020    Severe sepsis ([facility] [address] [A41.9, R65.20] 11/17/2020    Immunosuppressed status ([facility] [address] [D84.9]     Acute respiratory failure due to COVID-19 ([facility] [address] [U07.1, J96.00] 11/16/2020       MDS  Anemia, transfusion dependent  COVID-19 infection    RECOMMENDATIONS:  Condition remains poor but stable  Counts remain stable   Continue to follow                             6 [facility]                              This note is created with the assistance of a speech recognition program.  While intending to generate a document that actually reflects the content of the visit, the document can still have some errors including those of syntax and sound a like substitutions which may escape proof reading. It such instances, actual meaning can be extrapolated by contextual diversion.

## 2020-12-01 NOTE — PROGRESS NOTES
Progress Note    Patient - Cassidy Cartwright  Date of Admission -  2020 10:51 PM  Date of Evaluation -  2020  Room and Bed Number -  3023/3023-01   Hospital Day - 15    CHIEF COMPLAINT : ACUTE HYPOXIC RESPIRATORY FAILURE DUE TO COVID -19 PNEUMONIA   SUBJECTIVE:     OVERNIGHT EVENTS:         paralysed for breath stacking   She is on 8ml/kg pbw   Plateau pressure 25   Peep is 10   fio2 40 %   On 4 mcg levophed         SECRETIONS  -Amount:  [] Small [x] Moderate  [] Large    [] None  Color:     [x] White [] Colored  [] Bloody    SEDATION:    [] Propofol gtt  [x] Versed gtt  [x] FENTANYL  gtt  gtt   [] No Sedation    PARALYZED:  [] No    [x] Yes    VASOPRESSORS:  [] No    [] Yes  [] Levophed [] Dopamine [] Vasopressin  [] Dobutamine [] Phenylephrine [] Epinephrine    INHALED NITRIC OXIDE : [x] No    [] Yes    PRONE :       [x] No    [] Yes    REMDESIVIR:             [] No    [x] Yes  Completed     DEXAMETHASONE : [] No    [x] Yes completed     CONVALESCENT PLASMA : [] No    [x] Yes    Ros unable to perform due to intubation and sedation    OBJECTIVE:     VITAL SIGNS:  BP (!) 125/54   Pulse 89   Temp 99 °F (37.2 °C) (Core)   Resp 30   Ht 5' 7\" (1.702 m)   Wt 156 lb 12 oz (71.1 kg)   SpO2 92%   BMI 24.55 kg/m²   Tmax over 24 hours:  Temp (24hrs), Av.3 °F (37.4 °C), Min:98.6 °F (37 °C), Max:100.4 °F (38 °C)      Patient Vitals for the past 8 hrs:   BP Temp Temp src Pulse Resp SpO2 Weight   20 1315 -- -- -- 89 30 92 % --   20 1300 -- -- -- 84 30 92 % --   20 1245 -- -- -- 84 30 91 % --   20 1230 -- -- -- 84 28 90 % --   20 1215 -- -- -- 82 25 (!) 89 % --   20 1200 (!) 125/54 99 °F (37.2 °C) CORE 81 29 94 % --   20 1145 -- -- -- 83 22 93 % --   20 1130 -- -- -- 78 25 94 % --   20 1115 -- -- -- 76 23 95 % --   20 1100 -- -- -- 78 25 96 % --   20 1045 -- -- -- 83 30 96 % --   20 1030 -- -- -- 86 30 96 % --   20 1015 -- -- -- 87 30 95 % --   12/01/20 1000 -- -- -- 86 30 96 % --   12/01/20 0945 -- -- -- 84 30 96 % --   12/01/20 0930 -- -- -- 86 30 95 % --   12/01/20 0915 -- -- -- 83 30 94 % --   12/01/20 0900 -- -- -- 82 29 93 % --   12/01/20 0845 -- -- -- 83 30 93 % --   12/01/20 0830 -- -- -- 88 30 94 % --   12/01/20 0815 -- -- -- 83 22 94 % --   12/01/20 0800 (!) 96/41 99.1 °F (37.3 °C) CORE 80 18 95 % --   12/01/20 0745 -- -- -- 80 23 94 % --   12/01/20 0730 -- -- -- 82 23 94 % --   12/01/20 0715 -- -- -- 86 24 93 % --   12/01/20 0700 -- -- -- 83 24 92 % --   12/01/20 0645 -- -- -- 81 22 92 % --   12/01/20 0630 -- -- -- 83 30 96 % --   12/01/20 0615 -- -- -- 83 29 95 % --   12/01/20 0607 -- -- -- -- -- -- 156 lb 12 oz (71.1 kg)   12/01/20 0600 -- -- -- 87 30 97 % --   12/01/20 0545 -- -- -- 88 30 97 % --   12/01/20 0530 -- -- -- 89 30 97 % --         Intake/Output Summary (Last 24 hours) at 12/1/2020 1315  Last data filed at 12/1/2020 1300  Gross per 24 hour   Intake 3796.86 ml   Output 939 ml   Net 2857.86 ml     Date 12/01/20 0000 - 12/01/20 2359   Shift 6916-2493 4362-9564 9557-8652 24 Hour Total   INTAKE   I.V.(mL/kg) 971. 1(13.7) 746. 4(10.5)  1717. 6(24.2)   NG/GT(mL/kg) 302(4.2) 520(7.3)  822(11.6)   IV Piggyback(mL/kg) 50(0.7)   50(0.7)   Shift Total(mL/kg) 1323. 1(18.6) 1770.2(60.1)  2589. 6(36.4)   OUTPUT   Urine(mL/kg/hr) 140(0.2) 348  488   Shift Total(mL/kg) 140(2) 348(4.9)  488(6.9)   Weight (kg) 71.1 71.1 71.1 71.1     Wt Readings from Last 3 Encounters:   12/01/20 156 lb 12 oz (71.1 kg)     Body mass index is 24.55 kg/m².         PHYSICAL EXAM:  Abdomen soft   Rt port   Sedated paralysed   Mild anasarca     MEDICATIONS:  Scheduled Meds:   midodrine  10 mg Oral TID WC    QUEtiapine  50 mg Oral Nightly    enoxaparin  40 mg Subcutaneous Daily    ceftaroline fosamil (TEFLARO) IVPB  600 mg Intravenous Q12H    docusate  100 mg Oral BID    pantoprazole  40 mg Intravenous BID    And    sodium chloride (PF)  10 mL Intravenous Daily  LORazepam  0.25 mg Intravenous Once    latanoprost  1 drop Both Eyes Nightly    sodium chloride  20 mL Intravenous Once    sodium chloride  20 mL Intravenous Once     Continuous Infusions:   norepinephrine 4 mcg/min (12/01/20 1210)    cisatracurium (NIMBEX) infusion 2 mcg/kg/min (12/01/20 1252)    fentaNYL 175 mcg/hr (12/01/20 1252)    midazolam 8 mg/hr (12/01/20 0401)     PRN Meds:   artificial tears, , PRN  LORazepam, 0.5 mg, Q8H PRN  sodium chloride, 30 mL, PRN  sodium chloride flush, 10 mL, PRN  potassium chloride, 40 mEq, PRN    Or  potassium alternative oral replacement, 40 mEq, PRN    Or  potassium chloride, 10 mEq, PRN  magnesium sulfate, 1 g, PRN  acetaminophen, 650 mg, Q6H PRN    Or  acetaminophen, 650 mg, Q6H PRN  polyethylene glycol, 17 g, Daily PRN  promethazine, 12.5 mg, Q6H PRN    Or  ondansetron, 4 mg, Q6H PRN  nicotine, 1 patch, Daily PRN        SUPPORT DEVICES: [x] Ventilator [] BIPAP  [] Nasal Cannula [] Room Air      ABGs:     Lab Results   Component Value Date    KPJ0IMJ 38 12/01/2020    FIO2 70.0 12/01/2020       DATA:  Complete Blood Count:   Recent Labs     11/29/20  0318  11/30/20  0350  11/30/20 2017 12/01/20  0444 12/01/20  1145   WBC 8.0  --  7.1  --   --  4.1  --    RBC 2.52*  --  2.42*  --   --  2.53*  --    HGB 7.3*   < > 7.2*   < > 9.0* 7.5* 8.0*   HCT 25.2*   < > 24.2*   < > 29.3* 25.3* 25.9*   .0  --  100.0  --   --  100.0  --    MCH 29.0  --  29.8  --   --  29.6  --    MCHC 29.0  --  29.8  --   --  29.6  --    RDW 16.6*  --  16.7*  --   --  17.5*  --    PLT 79*  --  75*  --   --  81*  --    MPV 12.4  --  12.9  --   --  12.8  --     < > = values in this interval not displayed.         Last 3 Blood Glucose:   Recent Labs     11/30/20  0818 12/01/20  0444   GLUCOSE 139* 107*        PT/INR:    Lab Results   Component Value Date    PROTIME 9.5 11/17/2020    INR 0.9 11/17/2020     PTT:  No results found for: APTT, PTT    Comprehensive Metabolic Profile:   Recent Labs 11/30/20  0818 12/01/20  0444   * 146*   K 4.1 4.1   * 111*   CO2 31 30   BUN 38* 34*   CREATININE 0.57 0.51   GLUCOSE 139* 107*   CALCIUM 8.6 8.0*      Magnesium:   Lab Results   Component Value Date    MG 2.0 11/21/2020    MG 2.4 11/19/2020    MG 2.5 11/18/2020     Phosphorus:   Lab Results   Component Value Date    PHOS 3.3 11/18/2020    PHOS 3.5 11/17/2020     Ionized Calcium: No results found for: CAION     Urinalysis:   Lab Results   Component Value Date    NITRU NEGATIVE 11/23/2020    COLORU DARK YELLOW 11/23/2020    PHUR 7.0 11/23/2020    WBCUA 2 TO 5 11/23/2020    RBCUA 5 TO 10 11/23/2020    MUCUS NOT REPORTED 11/23/2020    TRICHOMONAS NOT REPORTED 11/23/2020    YEAST NOT REPORTED 11/23/2020    BACTERIA NOT REPORTED 11/23/2020    SPECGRAV 1.022 11/23/2020    LEUKOCYTESUR NEGATIVE 11/23/2020    UROBILINOGEN Normal 11/23/2020    BILIRUBINUR NEGATIVE 11/23/2020    GLUCOSEU NEGATIVE 11/23/2020    KETUA NEGATIVE 11/23/2020    AMORPHOUS NOT REPORTED 11/23/2020               Xr Chest (single View Frontal)    Result Date: 11/29/2020  Stable interstitial infiltrates. ET tube terminates 35 mm above the nghia.             VENTILATOR SETTINGS:  Vent Information  $Ventilation: $Subsequent Day  Skin Assessment: Clean, dry, & intact  Suction Catheter Diameter: 14  Equipment ID: [id number]  Equipment Changed: HME  Vent Type: Servo i  Vent Mode: PRVC  Vt Ordered: 400 mL  Rate Set: 30 bmp  FiO2 : (S) 45 %  SpO2: 92 %  SpO2/FiO2 ratio: 208.89  Sensitivity: 3  PEEP/CPAP: 10  I Time/ I Time %: 0.7 s  Humidification Source: HME  Nitric Oxide/Epoprostenol In Use?: No  Mask Type: Full face mask  Mask Size: Small     PaO2/FiO2 RATIO:  Recent Labs     12/01/20  0030   POCPO2 81.1*      FiO2 : (S) 45 %       LABS:  ABGs:   Recent Labs     11/29/20  0337 11/30/20  0536 11/30/20  2318 12/01/20  0030   POCPH 7.432 7.448 7.218* 7.368   POCPCO2 58.4* 57.1* 101.6* 62.6*   POCPO2 70.6* 48.6* 64.3* 81.1*   POCHCO3 38.9* 39.5* 41.4* 36.0*   RNPS0NVQ 94 84* 85* 95            ASSESSMENT:     Principal Problem:    Pneumonia due to COVID-19 virus  Active Problems:    Acute respiratory failure due to COVID-19 (HCC)    MDS (myelodysplastic syndrome) (HCC)    Neutropenic sepsis (HCC)    Bicytopenia    ROSALINO (acute kidney injury) ([facility] [address]    Acute respiratory failure with hypoxia (HCC)    Essential hypertension    Coronary artery disease involving coronary bypass graft of native heart without angina pectoris    History of Raynaud's syndrome    Transaminitis    Immunosuppressed status (HCC)    Severe sepsis (HCC)    Hypokalemia    Other pancytopenia (HCC)  Resolved Problems:    * No resolved hospital problems. *              Acute hypoxic respiratory failure secondary to COVID 19   Acute respiratory distress syndrome   Bilateral multifocal pneumonia due to COVID 19 infection   Covid -19 pandemic emergency    Mild hypernatremia       LOS: 15  PLAN:    D/w RT  D/w RN   Vent setting reviewed   Sedation reviewed   STILL ON LEVOPHED 4 MCG - WILL ADD MIDODRINE - KEEP MAP >65   Airborne isolation and droplet precautions to be continued  Continue supportive care   Cont treatment with medications for COVID  Start free water for hypernatremia 200 ml q 6 hrs   Cont tube feeding  Cont vent support  - wean peep and fio2 - keep sats >92 %   Monitor endotracheal secretions   Will obtain xray chest   Antibiotics per id     WEAN PER PROTOCOL:  [x] No   [] Yes  [] N/A      ICU PROPHYLAXIS:  Stress ulcer:  [x] PPI Agent  [] R7Mrxkg [] Sucralfate  [] Other:  VTE:   [x] Enoxaparin  [] Unfract. Heparin Subcut  [] EPC Cuffs    NUTRITION:  [] NPO [x] Tube Feeding  [] TPN  [] PO    INSULIN DRIP:   [x] No   [] Yes        TRANSFER OUT OF ICU:   [x] No   [] Yes    Treatment plan Discussed with nursing staff in detail , all questions answered .      Total critical care time caring for this patient with life threatening, unstable organ failure, including direct patient contact, management of life support systems, review of data including imaging and labs, discussions with other team members and physicians at least 27  Min so far today, excluding procedures. Electronically signed by Herman Marc MD on 12/1/2020 at 1:15 PM       This patient was evaluated in the context of the global SARS-CoV-2 (COVID-19) pandemic, which necessitated considerations that the patient either has COVID-19 infection or is at risk of infection with COVID-19. Institutional protocols and algorithms that pertain to the evaluation & management of patients with COVID-19 or those at risk for COVID-19 are in a state of rapid changes based on information released by regulatory bodies including the CDC and federal and state organizations. These policies and algorithms were followed during the patient's care. Please note that this chart was generated using voice recognition Dragon dictation software. Although every effort was made to ensure the accuracy of this automated transcription, some errors in transcription may have occurred.

## 2020-12-01 NOTE — PLAN OF CARE
Problem: Respiratory:  Goal: Ability to maintain a clear airway will improve  Description: Ability to maintain a clear airway will improve  12/1/2020 0833 by Ankita Ramirez RCP  Outcome: Ongoing     Problem: Respiratory:  Goal: Ability to maintain adequate ventilation will improve  Description: Ability to maintain adequate ventilation will improve  12/1/2020 0833 by Ankita Ramirez RCP  Outcome: Ongoing     Problem: Respiratory:  Goal: Complications related to the disease process, condition or treatment will be avoided or minimized  Description: Complications related to the disease process, condition or treatment will be avoided or minimized  12/1/2020 0833 by Ankita Ramirez RCP  Outcome: Ongoing     Problem: Skin Integrity:  Goal: Risk for impaired skin integrity will decrease  Description: Risk for impaired skin integrity will decrease  12/1/2020 0833 by Ankita Ramirez RCP  Outcome: Ongoing     Problem: Skin Integrity:  Goal: Will show no infection signs and symptoms  Description: Will show no infection signs and symptoms  12/1/2020 0833 by Ankita Ramirez RCP  Outcome: Ongoing     Problem: Skin Integrity:  Goal: Absence of new skin breakdown  Description: Absence of new skin breakdown  12/1/2020 0833 by Ankita Ramirez RCP  Outcome: Ongoing     Problem: Airway Clearance - Ineffective  Goal: Achieve or maintain patent airway  12/1/2020 0833 by Ankita Ramirez RCP  Outcome: Ongoing     Problem: Gas Exchange - Impaired  Goal: Absence of hypoxia  12/1/2020 0833 by Ankita Ramirez RCP  Outcome: Ongoing     Problem: Gas Exchange - Impaired  Goal: Promote optimal lung function  12/1/2020 0833 by Ankita Ramirez RCP  Outcome: Ongoing     Problem: Breathing Pattern - Ineffective  Goal: Ability to achieve and maintain a regular respiratory rate  12/1/2020 0833 by Ankita Ramirez RCP  Outcome: Ongoing     Problem: OXYGENATION/RESPIRATORY FUNCTION  Goal: Patient will maintain patent airway  12/1/2020 0833 by Augustine Davis RCP  Outcome: Ongoing     Problem: OXYGENATION/RESPIRATORY FUNCTION  Goal: Patient will achieve/maintain normal respiratory rate/effort  Description: Respiratory rate and effort will be within normal limits for the patient  12/1/2020 7085 by Augustine Davis RCP  Outcome: Ongoing     Problem: MECHANICAL VENTILATION  Goal: Patient will maintain patent airway  12/1/2020 0833 by Augustine Davis RCP  Outcome: Ongoing     Problem: MECHANICAL VENTILATION  Goal: Oral health is maintained or improved  12/1/2020 0833 by Augustine Davis RCP  Outcome: Ongoing     Problem: MECHANICAL VENTILATION  Goal: ET tube will be managed safely  12/1/2020 0833 by Augustine Davis RCP  Outcome: Ongoing     Problem: MECHANICAL VENTILATION  Goal: Ability to express needs and understand communication  12/1/2020 0833 by Augustine Davis RCP  Outcome: Ongoing     Problem: MECHANICAL VENTILATION  Goal: Mobility/activity is maintained at optimum level for patient  12/1/2020 1240 by Augustine Davis RCP  Outcome: Ongoing     Problem: SKIN INTEGRITY  Goal: Skin integrity is maintained or improved  12/1/2020 0833 by Augustine Davis RCP  Outcome: Ongoing

## 2020-12-01 NOTE — PROGRESS NOTES
[facility] Wound Ostomy Continence Nurse  Consult Note       NAME:  Lizzeth Mittal  MEDICAL RECORD NUMBER:  [id number]  AGE: 70 y.o. GENDER: female  : 1945  TODAY'S DATE:  2020    Subjective:     Reason for WOCN Evaluation and Assessment: \"Clarke score 10, pressure wound Stage 2\"      Lizzeth Mittal is a 70 y.o. female referred by:   [x] Physician  [] Nursing  [] Other:     Wound Identification:  Wound Type: pressure  Contributing Factors: chronic pressure, decreased mobility, shear force, decreased tissue oxygenation, immunosuppression, incontinence of stool and pressors in use                     Objective:      BP (!) 125/54   Pulse 89   Temp 99 °F (37.2 °C) (Core)   Resp 21   Ht 5' 7\" (1.702 m)   Wt 156 lb 12 oz (71.1 kg)   SpO2 94%   BMI 24.55 kg/m²   Clarke Risk Score: Clarke Scale Score: 10    LABS    CBC:   Lab Results   Component Value Date    WBC 4.1 2020    RBC 2.53 2020    HGB 8.0 2020     CMP:  Albumin:    Lab Results   Component Value Date    LABALBU 2.3 2020     PT/INR:    Lab Results   Component Value Date    PROTIME 9.5 2020    INR 0.9 2020     HgBA1c:  No results found for: LABA1C  PTT: No components found for: LABPTT      Assessment:       Measurements:     20 1458   Wound 20 Buttocks Right   Date First Assessed/Time First Assessed: 20 0958   Present on Hospital Admission: No  Primary Wound Type: Pressure Injury  Location: Buttocks  Wound Location Orientation: Right   Wound Image    Wound Etiology Pressure Stage  2   Dressing/Treatment Zinc paste   Wound Length (cm) 1.8 cm   Wound Width (cm) 1.8 cm   Wound Depth (cm) 0.1 cm   Wound Surface Area (cm^2) 3.24 cm^2   Wound Volume (cm^3) 0.32 cm^3   Wound Assessment Pink/red;Ruptured blister   Drainage Amount Scant   Drainage Description Serous   Odor None   Blanca-wound Assessment Intact; Blanchable erythema; Hyperpigmented      Patient is supine in bed positioned left side down using 30 degree wedges. Lift sheet, wedges, and bilateral offloading boots are in use. HOB at 30 degrees elevation. ET tube securement device in good repair. ET tube not in significant contact with labium. Arms are elevated and padded with pillows. Scattered hyperpigmented skin across both buttocks. Small, superficial ruptured blister right buttock. Small amount of liquid fecal incontinence noted on exam. Bilateral heels are clear. Sacrococcygeal area is clear. Response to treatment:  Well tolerated by patient. Plan:     Plan of Care: Turn every 2 hours side to side using wedges  Float heels off of bed with pillows under calves  HOB at 30 degrees unless medically contraindicated. Use lift sling to reposition patient to minimize potential for shear injury. Foam sacrum dressing to sacrococcygeal area. Peel back dressing, inspect skin beneath, re-secure. Change every 72 hours and prn wrinkles, soilage. Discontinue Sacral dressing if repeatedly soiled by incontinence. Routine incontinence care with incontinence barrier cloths and zinc oxide cream.   Apply zinc oxide cream BID and prn incontinence to the buttocks and imani-anal skin including the pressure injury on the right buttock. .   Moisture wicking under pads       Specialty Bed Required : Yes: Isoflex gel surface in use   [] Low Air Loss   [x] Pressure Redistribution  [] Fluid Immersion  [] Bariatric  [] Total Pressure Relief  [] Other:     Discharge Plan:  TBD    Patient Teaching:     [] Indicates understanding       [] Needs reinforcement  [] Unsuccessful      [] Verbal Understanding  [] Demonstrated understanding       [x] No evidence of learning  [] Refused teaching         [] N/A       Electronically signed by Trever Mandel RN, CWON on 12/1/2020 at 4:24 PM

## 2020-12-01 NOTE — PLAN OF CARE
Problem: Restraint Use - Nonviolent/Non-Self-Destructive Behavior:  Goal: Absence of restraint indications  Description: Absence of restraint indications  11/30/2020 2356 by Mckayla Aramndo RN  Outcome: Met This Shift  11/30/2020 1424 by Avtar Cote RN  Outcome: Not Met This Shift  Goal: Absence of restraint-related injury  Description: Absence of restraint-related injury  11/30/2020 2356 by Mckayla Armando RN  Outcome: Met This Shift  11/30/2020 1424 by Avtar Cote RN  Outcome: Met This Shift     Problem:  Activity:  Goal: Fatigue will decrease  Description: Fatigue will decrease  11/30/2020 2356 by Mckayla Armando RN  Outcome: Ongoing  11/30/2020 1424 by Avtar Cote RN  Outcome: Ongoing     Problem: Cardiac:  Goal: Hemodynamic stability will improve  Description: Hemodynamic stability will improve  11/30/2020 2356 by Mckayla Armando RN  Outcome: Ongoing  11/30/2020 1424 by Avtar Cote RN  Outcome: Ongoing     Problem: Coping:  Goal: Level of anxiety will decrease  Description: Level of anxiety will decrease  11/30/2020 2356 by Mckayla Armando RN  Outcome: Ongoing  11/30/2020 1424 by Avtar Cote RN  Outcome: Ongoing  Goal: Ability to cope will improve  Description: Ability to cope will improve  11/30/2020 2356 by Mckayla Armando RN  Outcome: Ongoing  11/30/2020 1424 by Avtar Cote RN  Outcome: Ongoing  Goal: Ability to establish a method of communication will improve  Description: Ability to establish a method of communication will improve  11/30/2020 2356 by Mckayla Armando RN  Outcome: Ongoing  11/30/2020 1424 by Avtar Cote RN  Outcome: Ongoing     Problem: Nutritional:  Goal: Consumption of the prescribed amount of daily calories will improve  Description: Consumption of the prescribed amount of daily calories will improve  11/30/2020 2356 by Mckayla Armando RN  Outcome: Ongoing  11/30/2020 1424 by Avtar Cote RN  Outcome: Ongoing     Problem: Respiratory:  Goal: Ability to maintain a clear airway will improve  Description: Ability to maintain a clear airway will improve  11/30/2020 2356 by Mei Salas RN  Outcome: Ongoing  11/30/2020 1424 by Rossi Phoenix RN  Outcome: Ongoing  Goal: Ability to maintain adequate ventilation will improve  Description: Ability to maintain adequate ventilation will improve  11/30/2020 2356 by Mei Salas RN  Outcome: Ongoing  11/30/2020 1424 by Rossi Phoenix RN  Outcome: Ongoing  Goal: Complications related to the disease process, condition or treatment will be avoided or minimized  Description: Complications related to the disease process, condition or treatment will be avoided or minimized  11/30/2020 2356 by Mei Salas RN  Outcome: Ongoing  11/30/2020 1424 by Rossi Phoenix RN  Outcome: Ongoing     Problem: Skin Integrity:  Goal: Risk for impaired skin integrity will decrease  Description: Risk for impaired skin integrity will decrease  11/30/2020 2356 by Mei Salas RN  Outcome: Ongoing  11/30/2020 1424 by Rossi Phoenix RN  Outcome: Ongoing  Goal: Will show no infection signs and symptoms  Description: Will show no infection signs and symptoms  11/30/2020 2356 by Mei Salas RN  Outcome: Ongoing  11/30/2020 1424 by Rossi Phoenix RN  Outcome: Ongoing  Goal: Absence of new skin breakdown  Description: Absence of new skin breakdown  11/30/2020 2356 by Mei Salas RN  Outcome: Ongoing  11/30/2020 1424 by Rossi Phoenix RN  Outcome: Ongoing     Problem: Falls - Risk of:  Goal: Will remain free from falls  Description: Will remain free from falls  11/30/2020 2356 by Mei Salas RN  Outcome: Ongoing  11/30/2020 1424 by Rosis Phoenix RN  Outcome: Ongoing  Goal: Absence of physical injury  Description: Absence of physical injury  11/30/2020 2356 by Mei Salas RN  Outcome: Ongoing  11/30/2020 1424 by Rossi Phoenix RN  Outcome: Ongoing     Problem: Pain:  Goal: Pain level will decrease  Description: Pain level will decrease  11/30/2020 2356 by Kyra Martinez RN  Outcome: Ongoing  11/30/2020 1424 by Katja Valdivia RN  Outcome: Ongoing  Goal: Control of acute pain  Description: Control of acute pain  11/30/2020 2356 by Kyra Martinez RN  Outcome: Ongoing  11/30/2020 1424 by Katja Valdivia RN  Outcome: Ongoing  Goal: Control of chronic pain  Description: Control of chronic pain  11/30/2020 2356 by Kyra Martinez RN  Outcome: Ongoing  11/30/2020 1424 by Katja Valdivia RN  Outcome: Ongoing     Problem: Airway Clearance - Ineffective  Goal: Achieve or maintain patent airway  11/30/2020 2356 by Kyra Martinez RN  Outcome: Ongoing  11/30/2020 1424 by Katja Valdivia RN  Outcome: Ongoing     Problem: Gas Exchange - Impaired  Goal: Absence of hypoxia  11/30/2020 2356 by Kyra Martinez RN  Outcome: Ongoing  11/30/2020 1424 by Katja Valdivia RN  Outcome: Ongoing  Goal: Promote optimal lung function  11/30/2020 2356 by Kyra Martinez RN  Outcome: Ongoing  11/30/2020 1424 by Katja Valdivia RN  Outcome: Ongoing     Problem: Breathing Pattern - Ineffective  Goal: Ability to achieve and maintain a regular respiratory rate  11/30/2020 2356 by Kyra Martinez RN  Outcome: Ongoing  11/30/2020 1424 by Katja Valdivia RN  Outcome: Ongoing     Problem:  Body Temperature -  Risk of, Imbalanced  Goal: Ability to maintain a body temperature within defined limits  11/30/2020 2356 by Kyra Martinez RN  Outcome: Ongoing  11/30/2020 1424 by Katja Valdivia RN  Outcome: Ongoing  Goal: Will regain or maintain usual level of consciousness  11/30/2020 2356 by Kyra Martinez RN  Outcome: Ongoing  11/30/2020 1424 by Katja Valdivia RN  Outcome: Ongoing  Goal: Complications related to the disease process, condition or treatment will be avoided or minimized  11/30/2020 2356 by Kyra Martinez RN  Outcome: Ongoing  11/30/2020 1424 by Katja Valdivia RN  Outcome: Ongoing     Problem: Isolation Precautions - Risk of Spread of Infection  Goal: Prevent transmission of infection  11/30/2020 2356 by Mei Salas RN  Outcome: Ongoing  11/30/2020 1424 by Rossi Phoenix RN  Outcome: Ongoing     Problem: Nutrition Deficits  Goal: Optimize nutrtional status  11/30/2020 2356 by Mei Salas RN  Outcome: Ongoing  11/30/2020 1424 by Rossi Phoenix RN  Outcome: Ongoing     Problem: Risk for Fluid Volume Deficit  Goal: Maintain normal heart rhythm  11/30/2020 2356 by Mei Salas RN  Outcome: Ongoing  11/30/2020 1424 by Rossi Phoenix RN  Outcome: Ongoing  Goal: Maintain absence of muscle cramping  11/30/2020 2356 by Mei Salas RN  Outcome: Ongoing  11/30/2020 1424 by Rossi Phoenix RN  Outcome: Ongoing  Goal: Maintain normal serum potassium, sodium, calcium, phosphorus, and pH  11/30/2020 2356 by Mei Salas RN  Outcome: Ongoing  11/30/2020 1424 by Rossi Phoenix RN  Outcome: Ongoing     Problem: Loneliness or Risk for Loneliness  Goal: Demonstrate positive use of time alone when socialization is not possible  11/30/2020 2356 by Mei Salas RN  Outcome: Ongoing  11/30/2020 1424 by Rossi Phoenix RN  Outcome: Ongoing     Problem: Fatigue  Goal: Verbalize increase energy and improved vitality  11/30/2020 2356 by Mei Salas RN  Outcome: Ongoing  11/30/2020 1424 by Rossi Phoenix RN  Outcome: Ongoing     Problem: Patient Education: Go to Patient Education Activity  Goal: Patient/Family Education  11/30/2020 2356 by Mei Salas RN  Outcome: Ongoing  11/30/2020 1424 by Rossi Phoenix RN  Outcome: Ongoing     Problem: OXYGENATION/RESPIRATORY FUNCTION  Goal: Patient will maintain patent airway  11/30/2020 2356 by Mei Salas RN  Outcome: Ongoing  11/30/2020 1424 by Rossi Phoenix RN  Outcome: Ongoing  Goal: Patient will achieve/maintain normal respiratory rate/effort  Description: Respiratory rate and effort will be within normal limits for the patient  11/30/2020 2356 by Mei Salas RN  Outcome: Ongoing  11/30/2020 1424 by Rossi Phoenix RN  Outcome: Ongoing     Problem: Nutrition  Goal: Optimal nutrition therapy  Description: Nutrition Problem #1: Inadequate oral intake  Intervention: Food and/or Nutrient Delivery: Continue NPO(Start diet vs nutrition support as able)  Nutritional Goals: Start diet vs nutrition support within 24-72 hrs     11/30/2020 2356 by Amber Barajas RN  Outcome: Ongoing  11/30/2020 1424 by Jazmin Gann RN  Outcome: Ongoing     Problem: MECHANICAL VENTILATION  Goal: Patient will maintain patent airway  11/30/2020 2356 by Amber Barajas RN  Outcome: Ongoing  11/30/2020 1424 by Jazmin Gann RN  Outcome: Ongoing  Goal: Oral health is maintained or improved  11/30/2020 2356 by Amber Barajas RN  Outcome: Ongoing  11/30/2020 1424 by Jazmin Gann RN  Outcome: Ongoing  Goal: ET tube will be managed safely  11/30/2020 2356 by Amber Barajas RN  Outcome: Ongoing  11/30/2020 1424 by Jazmin Gann RN  Outcome: Ongoing  Goal: Ability to express needs and understand communication  11/30/2020 2356 by Amber Barajas RN  Outcome: Ongoing  11/30/2020 1424 by Jazmin Gann RN  Outcome: Ongoing  Goal: Mobility/activity is maintained at optimum level for patient  11/30/2020 2356 by Amber Barajas RN  Outcome: Ongoing  11/30/2020 1424 by Jazmin Gann RN  Outcome: Ongoing     Problem: SKIN INTEGRITY  Goal: Skin integrity is maintained or improved  11/30/2020 2356 by Amber Barajas RN  Outcome: Ongoing  11/30/2020 1424 by Jazmin Gann RN  Outcome: Ongoing

## 2020-12-01 NOTE — PROGRESS NOTES
[facility]  Office: [address], DO, Tamara Louis DO, Stephanie Jennings DO, Hardy Elaine, DO, Jluis Larios MD, Sarahi Boogie MD, Paty Hawkins MD, Rox Zelaya MD, Dodie Hays MD, Dena Saleh MD, Shad De La Cruz MD, Anna Rincon MD, Ren Maher MD, Gemini Hamm DO, Mita Nair MD, Keli Maddox MD, Bridger Mason DO, Prabha Martinez MD,  Pedro Johnson DO, Cleveland Sood MD, Heriberto Hernandez MD, Adam Johnston, [facility], [facility], CNP, Donny Song, CNP, Ace Dominguez, CNS, Jf Talley, CNP, Claude Leep, CNP, Mignon Guillen, CNP, Silvana Eason, CNP, Navya Dean, CNP, Magdaleno Christopher PA-C, Rock Mccullough, [facility], Alcira Martinez, CNP, Bety Raymundo, CNP, Alejandro Brooks, CNP, Asiya Caba, CNP, Julio Shaw, [facility]   [address]    Progress Note    12/1/2020    3:30 PM    Name:   Esau Robertson  MRN:     [id number]     [place]:      [de-identified]   Room:   [address] [place] [address] Day:  15  Admit Date:  11/16/2020 10:51 PM    PCP:   Kenya Mcfarlane MD  Code Status:  DNR-CCA    Subjective:     C/C: No chief complaint on file. Interval History Status: not changed. Patient seen and examined at bedside. Remains intubated, on Versed and fentanyl for sedation. ParalyzedNo fevers reported. Still requiring paralytics due to asynchrony with ventilator. However has been improving. Slight hypernatremia, hyperchloremia. Creatinine remains stable. Glucose overall well controlled. Hemoglobin fluctuating but around 8. Brief History:   Per EMR:  Charity Landau a 70 y. o. female with a past medical history for MDS, essential hypertension, melanoma, mitral valve prolapse, MRSA, Raynaud's disease presents emergency department for shortness of breath and dyspnea upon exertion for 3 days.  Patient was initially seen at [facility] diagnosed with Covid pneumonia.  Patient requiring 50% FiO2 on BiPAP, patient mg Oral TID     QUEtiapine  50 mg Oral Nightly    enoxaparin  40 mg Subcutaneous Daily    ceftaroline fosamil (TEFLARO) IVPB  600 mg Intravenous Q12H    docusate  100 mg Oral BID    pantoprazole  40 mg Intravenous BID    And    sodium chloride (PF)  10 mL Intravenous Daily    LORazepam  0.25 mg Intravenous Once    latanoprost  1 drop Both Eyes Nightly    sodium chloride  20 mL Intravenous Once    sodium chloride  20 mL Intravenous Once     Continuous Infusions:    norepinephrine 4 mcg/min (12/01/20 1210)    cisatracurium (NIMBEX) infusion 2 mcg/kg/min (12/01/20 1252)    fentaNYL 175 mcg/hr (12/01/20 1252)    midazolam 8 mg/hr (12/01/20 0401)     PRN Meds: artificial tears, LORazepam, sodium chloride, sodium chloride flush, potassium chloride **OR** potassium alternative oral replacement **OR** potassium chloride, magnesium sulfate, acetaminophen **OR** acetaminophen, polyethylene glycol, promethazine **OR** ondansetron, nicotine    Data:     Past Medical History:   has a past medical history of Cancer ([facility] [address] Deaf, left, Essential hypertension, GERD (gastroesophageal reflux disease), Glaucoma, Hyperlipidemia, Melanoma ([facility] [address] MRSA (methicillin resistant staph aureus) culture positive, MVP (mitral valve prolapse), Pharyngoesophageal dysphagia, Raynaud disease, and Status post four vessel coronary artery bypass. Social History:   reports that she quit smoking about 15 years ago. Her smoking use included cigarettes. She started smoking about 63 years ago. She does not have any smokeless tobacco history on file. She reports that she does not drink alcohol or use drugs.      Family History:   Family History   Problem Relation Age of Onset    Heart Failure Mother     Early Death Mother     Heart Disease Mother     Stroke Father        Vitals:  BP (!) 125/54   Pulse 89   Temp 99 °F (37.2 °C) (Core)   Resp 21   Ht 5' 7\" (1.702 m)   Wt 156 lb 12 oz (71.1 kg)   SpO2 94%   BMI 24.55 kg/m²   Temp (24hrs), Av.8 °F (37.7 °C), Min:99 °F (37.2 °C), Max:100.4 °F (38 °C)    Recent Labs     208 20  0030   POCGLU 133* 110* 131*       I/O (24Hr): Intake/Output Summary (Last 24 hours) at 2020 1530  Last data filed at 2020 1500  Gross per 24 hour   Intake 3796.86 ml   Output 1014 ml   Net 2782.86 ml       Labs:  Hematology:  Recent Labs     20  0318  20  0350  20  0444 20  1145   WBC 8.0  --  7.1  --   --  4.1  --    RBC 2.52*  --  2.42*  --   --  2.53*  --    HGB 7.3*   < > 7.2*   < > 9.0* 7.5* 8.0*   HCT 25.2*   < > 24.2*   < > 29.3* 25.3* 25.9*   .0  --  100.0  --   --  100.0  --    MCH 29.0  --  29.8  --   --  29.6  --    MCHC 29.0  --  29.8  --   --  29.6  --    RDW 16.6*  --  16.7*  --   --  17.5*  --    PLT 79*  --  75*  --   --  81*  --    MPV 12.4  --  12.9  --   --  12.8  --     < > = values in this interval not displayed. Chemistry:  Recent Labs     2018 20   * 146*   K 4.1 4.1   * 111*   CO2 31 30   GLUCOSE 139* 107*   BUN 38* 34*   CREATININE 0.57 0.51   ANIONGAP 6* 5*   LABGLOM >60 >60   GFRAA >60 >60   CALCIUM 8.6 8.0*     Recent Labs     20  0030   POCGLU 133* 110* 131*     ABG:  Lab Results   Component Value Date    POCPH 7.368 2020    POCPCO2 62.6 2020    POCPO2 81.1 2020    POCHCO3 36.0 2020    NBEA NOT REPORTED 2020    PBEA 9 2020    YKA2EIW 38 2020    EPBN3ISN 95 2020    FIO2 70.0 2020     Lab Results   Component Value Date/Time    SPECIAL NOT REPORTED 2020 10:17 PM     Lab Results   Component Value Date/Time    CULTURE NORMAL RESPIRATORY MORIS MODERATE GROWTH 2020 10:17 PM       Radiology:  Xr Chest (single View Frontal)    Result Date: 2020  Stable interstitial infiltrates. ET tube terminates 35 mm above the nghia.      Xr Chest Portable    Result Date: 12/1/2020  No significant change in appearance of pneumonia       Physical Examination:        General appearance: Intubated, sedated, chronically ill-appearing  Mental Status: Intubated, sedated  Lungs: Ventilated breath sounds  Heart:  regular rate and rhythm, no murmur  Abdomen:  soft, nontender, nondistended, normal bowel sounds  Extremities:  no edema, redness, tenderness in the calves  Skin:  no gross lesions, rashes, induration    Assessment:        Hospital Problems           Last Modified POA    * (Principal) Pneumonia due to COVID-19 virus 11/17/2020 Yes    Acute respiratory failure due to COVID-19 ([address] 11/16/2020 Yes    MDS (myelodysplastic syndrome) ([address] 11/17/2020 Yes    Neutropenic sepsis ([address] 11/17/2020 Yes    Bicytopenia 11/17/2020 Yes    ROSALINO (acute kidney injury) ([address] 11/17/2020 Yes    Acute respiratory failure with hypoxia ([address] 11/17/2020 Yes    Essential hypertension 11/17/2020 Yes    Coronary artery disease involving coronary bypass graft of native heart without angina pectoris 11/17/2020 Yes    History of Raynaud's syndrome 11/17/2020 Yes    Transaminitis 11/17/2020 Yes    Immunosuppressed status ([address] 11/17/2020 Yes    Severe sepsis ([address] 11/17/2020 Yes    Hypokalemia 11/22/2020 Yes    Other pancytopenia ([address] 11/24/2020 Yes          Plan:        1. Wean off Levophed, add midodrine  2. Free water fluid boluses 200 mL every 6 hours, monitor sodium and creatinine and chloride. 3. Continue tube feeding  4. Continue ventilator support  5. Patient is completed remdesivir 11/20/2020. Received plasma on 11/17. 6. Currently on Teflaro due to fever on 11/30/2020. Follow-up cultures. 7. Palliative care recommendations appreciated.     Abby Gee MD  12/1/2020  3:30 PM

## 2020-12-02 NOTE — PROGRESS NOTES
Progress Note    Patient - Andrea Willson  Date of Admission -  2020 10:51 PM  Date of Evaluation -  2020  Room and Bed Number -  3023/3023-01   Hospital Day - 16    CHIEF COMPLAINT : ACUTE HYPOXIC RESPIRATORY FAILURE DUE TO COVID -19 PNEUMONIA   SUBJECTIVE:     OVERNIGHT EVENTS:         Still on levophed   Cont midodrine   Dc paralytic  FiO2 and peep stable   TV 8 ml/kg pbw         SECRETIONS  -Amount:  [] Small [x] Moderate  [] Large    [] None  Color:     [x] White [] Colored  [] Bloody    SEDATION:    [] Propofol gtt  [x] Versed gtt  [x] FENTANYL  gtt  gtt   [] No Sedation    PARALYZED:  [] No    [x] Yes    VASOPRESSORS:  [] No    [] Yes  [] Levophed [] Dopamine [] Vasopressin  [] Dobutamine [] Phenylephrine [] Epinephrine    INHALED NITRIC OXIDE : [x] No    [] Yes    PRONE :       [x] No    [] Yes    REMDESIVIR:             [] No    [x] Yes  Completed     DEXAMETHASONE : [] No    [x] Yes completed     CONVALESCENT PLASMA : [] No    [x] Yes    Ros unable to perform due to intubation and sedation    OBJECTIVE:     VITAL SIGNS:  BP (!) 104/49   Pulse 85   Temp 100.4 °F (38 °C) (Oral)   Resp 28   Ht 5' 7\" (1.702 m)   Wt 156 lb 15.5 oz (71.2 kg)   SpO2 91%   BMI 24.58 kg/m²   Tmax over 24 hours:  Temp (24hrs), Av.6 °F (37.6 °C), Min:99.1 °F (37.3 °C), Max:100.4 °F (38 °C)      Patient Vitals for the past 8 hrs:   Temp Temp src Pulse Resp SpO2 Height   20 1515 -- -- 85 28 91 % --   20 1500 -- -- 86 29 92 % --   20 1445 -- -- 85 29 90 % --   20 1430 -- -- 86 30 95 % --   20 1415 -- -- 86 30 95 % --   20 1400 -- -- 84 30 95 % --   20 1345 -- -- 84 30 95 % --   20 1330 -- -- 84 30 95 % --   20 1315 -- -- 86 30 95 % --   20 1300 -- -- 88 25 94 % --   20 1245 -- -- 90 26 94 % --   20 1243 -- -- 96 30 -- --   20 1242 -- -- 98 30 -- --   20 1241 -- -- 98 30 -- --   20 1230 -- -- 88 30 94 % --   20 1215 -- -- 88 30 94 % --   12/02/20 1200 -- -- 89 30 94 % --   12/02/20 1145 -- -- 90 30 95 % --   12/02/20 1142 -- -- -- -- -- 5' 7\" (1.702 m)   12/02/20 1130 -- -- 91 30 94 % --   12/02/20 1115 -- -- 92 30 94 % --   12/02/20 1104 -- -- 99 30 92 % --   12/02/20 1050 -- -- 97 30 -- --   12/02/20 1030 100.4 °F (38 °C) Oral 102 30 92 % --   12/02/20 1000 -- -- 106 29 -- --   12/02/20 0900 -- -- 106 28 -- --         Intake/Output Summary (Last 24 hours) at 12/2/2020 1646  Last data filed at 12/2/2020 1400  Gross per 24 hour   Intake 2653.86 ml   Output 777 ml   Net 1876.86 ml     Date 12/02/20 0000 - 12/02/20 2359   Shift 1809-0929 [id number] 0044-7996 24 Hour Total   INTAKE   I.V.(mL/kg) 284. 6(4) 559(7.9)  843. 6(11.8)   NG/GT(mL/kg) 771(10.8) 552(7.8)  1323(18.6)   Shift Total(mL/kg) 1055. 6(14.8) 1111(15.6)  3106.9(00.0)   OUTPUT   Urine(mL/kg/hr) 205(0.4) 350(0.6)  555   Shift Total(mL/kg) 205(2.9) 350(4.9)  555(7.8)   Weight (kg) 71.2 71.2 71.2 71.2     Wt Readings from Last 3 Encounters:   12/02/20 156 lb 15.5 oz (71.2 kg)     Body mass index is 24.58 kg/m².         PHYSICAL EXAM:  Abdomen soft   Rt port   Sedated paralysed   Mild anasarca     MEDICATIONS:  Scheduled Meds:   midodrine  10 mg Oral TID WC    QUEtiapine  50 mg Oral Nightly    enoxaparin  40 mg Subcutaneous Daily    ceftaroline fosamil (TEFLARO) IVPB  600 mg Intravenous Q12H    docusate  100 mg Oral BID    pantoprazole  40 mg Intravenous BID    And    sodium chloride (PF)  10 mL Intravenous Daily    LORazepam  0.25 mg Intravenous Once    latanoprost  1 drop Both Eyes Nightly    sodium chloride  20 mL Intravenous Once    sodium chloride  20 mL Intravenous Once     Continuous Infusions:   norepinephrine 4 mcg/min (12/02/20 1629)    cisatracurium (NIMBEX) infusion Stopped (12/02/20 1359)    fentaNYL 150 mcg/hr (12/02/20 1413)    midazolam 8 mg/hr (12/02/20 0745)     PRN Meds:   artificial tears, , PRN  LORazepam, 0.5 mg, Q8H PRN  sodium chloride, 30 mL, PRN  sodium chloride flush, 10 mL, PRN  potassium chloride, 40 mEq, PRN    Or  potassium alternative oral replacement, 40 mEq, PRN    Or  potassium chloride, 10 mEq, PRN  magnesium sulfate, 1 g, PRN  acetaminophen, 650 mg, Q6H PRN    Or  acetaminophen, 650 mg, Q6H PRN  polyethylene glycol, 17 g, Daily PRN  promethazine, 12.5 mg, Q6H PRN    Or  ondansetron, 4 mg, Q6H PRN  nicotine, 1 patch, Daily PRN        SUPPORT DEVICES: [x] Ventilator [] BIPAP  [] Nasal Cannula [] Room Air      ABGs:     Lab Results   Component Value Date    ETP0PXE 33 12/02/2020    FIO2 40.0 12/02/2020       DATA:  Complete Blood Count:   Recent Labs     11/30/20 0350 12/01/20 0444 12/01/20 1956 12/02/20 0430 12/02/20  1257   WBC 7.1  --  4.1  --   --  3.0*  --    RBC 2.42*  --  2.53*  --   --  2.45*  --    HGB 7.2*   < > 7.5*   < > 7.7* 7.2* 8.0*   HCT 24.2*   < > 25.3*   < > 25.4* 23.8* 26.0*   .0  --  100.0  --   --  97.1  --    MCH 29.8  --  29.6  --   --  29.4  --    MCHC 29.8  --  29.6  --   --  30.3  --    RDW 16.7*  --  17.5*  --   --  16.8*  --    PLT 75*  --  81*  --   --  90*  --    MPV 12.9  --  12.8  --   --  13.0  --     < > = values in this interval not displayed.         Last 3 Blood Glucose:   Recent Labs     11/30/20 0818 12/01/20 0444 12/02/20 0430   GLUCOSE 139* 107* 108*        PT/INR:    Lab Results   Component Value Date    PROTIME 9.5 11/17/2020    INR 0.9 11/17/2020     PTT:  No results found for: APTT, PTT    Comprehensive Metabolic Profile:   Recent Labs     11/30/20 0818 12/01/20 0444 12/02/20  0430   * 146* 144   K 4.1 4.1 3.9   * 111* 107   CO2 31 30 30   BUN 38* 34* 32*   CREATININE 0.57 0.51 0.48*   GLUCOSE 139* 107* 108*   CALCIUM 8.6 8.0* 8.1*      Magnesium:   Lab Results   Component Value Date    MG 2.0 11/21/2020    MG 2.4 11/19/2020    MG 2.5 11/18/2020     Phosphorus:   Lab Results   Component Value Date    PHOS 3.3 11/18/2020    PHOS 3.5 11/17/2020     Ionized Calcium: No respiratory failure with hypoxia (HCC)    Essential hypertension    Coronary artery disease involving coronary bypass graft of native heart without angina pectoris    History of Raynaud's syndrome    Transaminitis    Immunosuppressed status (HCC)    Severe sepsis (HCC)    Hypokalemia    Other pancytopenia (HCC)  Resolved Problems:    * No resolved hospital problems. *              Acute hypoxic respiratory failure secondary to COVID 19   Acute respiratory distress syndrome   Bilateral multifocal pneumonia due to COVID 19 infection   Covid -19 pandemic emergency    Mild hypernatremia better       LOS: 16  PLAN:    D/w RT  D/w RN   Vent setting reviewed   Sedation reviewed - dc nimbex   Dec sedation   Will try PC ventilation if vent asynchrony   Airborne isolation and droplet precautions to be continued  Continue supportive care   Cont treatment with medications for COVID   free water for hypernatremia 200 ml q 6 hrs - continue monitor sodium   Cont tube feeding  Cont vent support  - wean peep and fio2 - keep sats >92 %   Monitor endotracheal secretions   Antibiotics per id     WEAN PER PROTOCOL:  [x] No   [] Yes  [] N/A      ICU PROPHYLAXIS:  Stress ulcer:  [x] PPI Agent  [] Q2Padmi [] Sucralfate  [] Other:  VTE:   [x] Enoxaparin  [] Unfract. Heparin Subcut  [] EPC Cuffs    NUTRITION:  [] NPO [x] Tube Feeding  [] TPN  [] PO    INSULIN DRIP:   [x] No   [] Yes        TRANSFER OUT OF ICU:   [x] No   [] Yes    Treatment plan Discussed with nursing staff in detail , all questions answered . Total critical care time caring for this patient with life threatening, unstable organ failure, including direct patient contact, management of life support systems, review of data including imaging and labs, discussions with other team members and physicians at least 27  Min so far today, excluding procedures.   Electronically signed by Willam Rojo MD on 12/2/2020 at 4:46 PM       This patient was evaluated in the context of the global SARS-CoV-2 (COVID-19) pandemic, which necessitated considerations that the patient either has COVID-19 infection or is at risk of infection with COVID-19. Institutional protocols and algorithms that pertain to the evaluation & management of patients with COVID-19 or those at risk for COVID-19 are in a state of rapid changes based on information released by regulatory bodies including the CDC and federal and state organizations. These policies and algorithms were followed during the patient's care. Please note that this chart was generated using voice recognition Dragon dictation software. Although every effort was made to ensure the accuracy of this automated transcription, some errors in transcription may have occurred.

## 2020-12-02 NOTE — PROGRESS NOTES
Today's Date: 12/1/2020  Patient Name: Andra Azevedo  Date of admission: 11/16/2020 10:51 PM  Patient's age: 70 y.o., 1945  Admission Dx: Acute respiratory failure due to [facility]-19 ([facility] [U07.1, J96.00]    Reason for Consult: management recommendations  Requesting Physician: Parminder Bae MD    CHIEF COMPLAINT: Anemia. MDS. COVID-19 positive. History Obtained From:  patient, electronic medical record    Interval history:    Chart reviewed intervals noted  No significant changes. No active bleeding. No fever. HISTORY OF PRESENT ILLNESS:      The patient is a 70 y.o.  female who is admitted to the hospital with chief complaint of shortness of breath dyspnea for the last 3 days. Further testing revealed patient was COVID-19 positive. Patient was initially seen in outlying hospital and then transferred. Patient is requiring noninvasive positive pressure ventilation support. CTA has not been able to get done due to contrast allergy. Reportedly patient also has history of MDS. CBC from 9/2020 showed a WBC count of 1.6 hemoglobin 9.4 with MCV 95. Patient has been started on full dose anticoagulation due to concerns regarding pulmonary embolism. Patient was also recently started on Vidaza and has received 1 cycle so far. Patient is also transfusion dependent. Patient is supposed to get next cycle of Vidaza in the next week or so. Patient most recent bone marrow biopsy from 9/2020 showed normocellular marrow with erythroid hyperplasia and mild this erythropoiesis and 8% blasts. Recommendations from [address] [place] [address],[address]: Copied from care everywhere. ASSESSMENT AND PALN:  Ms. 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  In this situation, we previously discussed that we often favor management as if the patient is higher-tier risk disease, as outcomes for patients with IPSS-R 4.5 at diagnosis are similar to high risk. But unfortunately, she is not a candidate for her only curative option, i.e., allogeneic hematopoietic cell transplantation. Therefore, when we initially consulted on her, we discussed that all therapy is palliative. Given all therapies would be palliative, we discussed this in the context that she had been transfusion-independent. Thus, while initiation of hypomethylating agent (HMA) would have certainly been justifiable at that juncture, she had cytopenias for several months and still had not required transfusions or developed significant infection. Therefore, we discussed close observation since she was transfusion independent. We discussed the risks/beneftis of such an approach. At this visit, she is now beginning to require transfusions, so we agree with the plan to begin azacitidine locally under the care of Dr. Pat Marie. We again discussed that we would recommend a repeat bone marrow exam prior to initiating therapy for palliation. If she declines treatment, one could consider EPO supplementation to minimize need for transfusion. Given her [address], antiviral prophylaxis with acyclovir (400 mg BID) could be considered. If her [address] were to persistently be lower than 500/uL, antifungal prophylaxis could be considered. She will continue to follow up with Dr. Pat Marie for ongoing management. We will see her back prn. Past Medical History:   has a past medical history of Cancer ([facility] Deaf, left, Essential hypertension, GERD (gastroesophageal reflux disease), Glaucoma, Hyperlipidemia, Melanoma ([address] MRSA (methicillin resistant staph aureus) culture positive, MVP (mitral valve prolapse), Pharyngoesophageal dysphagia, Raynaud disease, and Status post four vessel coronary artery bypass.     Past Surgical History:   has a past surgical history that includes chloride bolus  20 mL Intravenous Once Vilma Parekh MD        sodium chloride flush 0.9 % injection 10 mL  10 mL Intravenous PRN Lesa Chan DO   10 mL at 11/30/20 0824    potassium chloride (KLOR-CON M) extended release tablet 40 mEq  40 mEq Oral PRN J Carlos Dunne, APRN - CNS   40 mEq at 11/22/20 1544    Or    potassium bicarb-citric acid (EFFER-K) effervescent tablet 40 mEq  40 mEq Oral PRN J Carlos Dunne, APRN - CNS   40 mEq at 11/21/20 3973    Or    potassium chloride 10 mEq/100 mL IVPB (Peripheral Line)  10 mEq Intravenous PRN J Carlos Dunne, APRN - CNS        magnesium sulfate 1 g in dextrose 5% 100 mL IVPB  1 g Intravenous PRN J Carlos Dunne, APRN - CNS        acetaminophen (TYLENOL) tablet 650 mg  650 mg Oral Q6H PRN J Carlos Dunne, APRN - CNS   650 mg at 11/29/20 1200    Or    acetaminophen (TYLENOL) suppository 650 mg  650 mg Rectal Q6H PRN J Carlos Dunne, APRN - CNS        polyethylene glycol (GLYCOLAX) packet 17 g  17 g Oral Daily PRN J Carlos Dunne, APRN - CNS   17 g at 12/01/20 0901    promethazine (PHENERGAN) tablet 12.5 mg  12.5 mg Oral Q6H PRN J Carlos Dunne, APRN - CNS        Or    ondansetron (ZOFRAN) injection 4 mg  4 mg Intravenous Q6H PRN J Carlos Dunne, APRN - CNS        nicotine (NICODERM CQ) 21 MG/24HR 1 patch  1 patch Transdermal Daily PRN J Carlos Dunne, APRN - CNS           Allergies:  Fish allergy; Hydrocodone-acetaminophen; Tizanidine; Atorvastatin; Metoclopramide; Moxifloxacin; Oxycodone; Pregabalin; Tramadol; Zolpidem; Cephalexin; Iodides; and Sulfa antibiotics    Social History:   reports that she quit smoking about 15 years ago. Her smoking use included cigarettes. She started smoking about 63 years ago. She does not have any smokeless tobacco history on file. She reports that she does not drink alcohol or use drugs.      Family History: family history includes Early Death in her mother; Heart Disease in her mother; Heart Failure in her mother; Stroke in her father. REVIEW OF SYSTEMS:    unobtainable   PHYSICAL EXAM:        BP (!) 104/50   Pulse 90   Temp 99.5 °F (37.5 °C) (Core)   Resp 30   Ht 5' 7\" (1.702 m)   Wt 156 lb 12 oz (71.1 kg)   SpO2 93%   BMI 24.55 kg/m²    Temp (24hrs), Av.4 °F (37.4 °C), Min:99 °F (37.2 °C), Max:99.9 °F (37.7 °C)    Examination is limited due to the current acute COVID-19 pneumonia due to limited PPE resources and to limit the transmission of the virus. DATA:      Labs:     Results for orders placed or performed during the hospital encounter of 20   Culture, Blood 1    Specimen: Blood   Result Value Ref Range    Specimen Description . BLOOD     Special Requests LT HAND 20ML     Culture NO GROWTH 6 DAYS    Culture, Blood 2    Specimen: Blood   Result Value Ref Range    Specimen Description . BLOOD     Special Requests RT HAND 2ML     Culture NO GROWTH 6 DAYS    Respiratory Panel, Molecular, with COVID-19    Specimen: Nasopharyngeal Swab   Result Value Ref Range    Specimen Description . NASOPHARYNGEAL SWAB     Adenovirus PCR Not Detected Not Detected    Coronavirus 229E PCR Not Detected Not Detected    Coronavirus HKU1 PCR Not Detected Not Detected    Coronavirus NL63 PCR Not Detected Not Detected    Coronavirus OC43 PCR Not Detected Not Detected    SARS-CoV-2, PCR DETECTED (A) Not Detected    Human Metapneumovirus PCR Not Detected Not Detected    Rhino/Enterovirus PCR Not Detected Not Detected    Influenza A by PCR Not Detected Not Detected    Influenza A H1 PCR NOT REPORTED Not Detected    Influenza A H1 (2009) PCR NOT REPORTED Not Detected    Influenza A H3 PCR NOT REPORTED Not Detected    Influenza B by PCR Not Detected Not Detected    Parainfluenza 1 PCR Not Detected Not Detected    Parainfluenza 2 PCR Not Detected Not Detected    Parainfluenza 3 PCR Not Detected Not Detected    Parainfluenza 4 PCR Not Detected Not Detected    Resp Syncytial Virus PCR Not Detected Not Detected    Bordetella Parapertussis Not Detected Not Detected    B Pertussis by PCR Not Detected Not Detected    Chlamydia pneumoniae By PCR Not Detected Not Detected    Mycoplasma pneumo by PCR Not Detected Not Detected   LEGIONELLA ANTIGEN, URINE    Specimen: Urine, clean catch   Result Value Ref Range    Legionella Pneumophilia Ag, Urine NEGATIVE    Culture, Blood 1    Specimen: Blood   Result Value Ref Range    Specimen Description . BLOOD     Special Requests R HAND 9 ML     Culture NO GROWTH 22 HOURS    Culture, Blood 1    Specimen: Blood   Result Value Ref Range    Specimen Description . BLOOD     Special Requests L HAND 11 ML     Culture NO GROWTH 22 HOURS    Culture, Respiratory    Specimen: Sputum Aspirated   Result Value Ref Range    Specimen Description . ASPIRATED SPUTUM     Special Requests NOT REPORTED     Direct Exam >25 NEUTROPHILS/LPF     Direct Exam < 10 EPITHELIAL CELLS/LPF     Direct Exam NO SIGNIFICANT PATHOGENS SEEN     Culture NORMAL RESPIRATORY MORIS MODERATE GROWTH    CBC   Result Value Ref Range    WBC 1.3 (LL) 3.5 - 11.3 k/uL    RBC 2.53 (L) 3.95 - 5.11 m/uL    Hemoglobin 7.4 (L) 11.9 - 15.1 g/dL    Hematocrit 23.6 (L) 36.3 - 47.1 %    MCV 93.3 82.6 - 102.9 fL    MCH 29.2 25.2 - 33.5 pg    MCHC 31.4 28.4 - 34.8 g/dL    RDW 18.5 (H) 11.8 - 14.4 %    Platelets 980 478 - 688 k/uL    MPV 10.6 8.1 - 13.5 fL    NRBC Automated 0.0 0.0 per 100 WBC   Protime-INR   Result Value Ref Range    Protime 9.3 9.0 - 12.0 sec    INR 0.9    Comprehensive Metabolic Panel w/ Reflex to MG   Result Value Ref Range    Glucose 112 (H) 70 - 99 mg/dL    BUN 24 (H) 8 - 23 mg/dL    CREATININE 0.89 0.50 - 0.90 mg/dL    Bun/Cre Ratio NOT REPORTED 9 - 20    Calcium 8.0 (L) 8.6 - 10.4 mg/dL    Sodium 133 (L) 135 - 144 mmol/L    Potassium 4.1 3.7 - 5.3 mmol/L    Chloride 100 98 - 107 mmol/L    CO2 21 20 - 31 mmol/L    Anion Gap 12 9 - 17 mmol/L    Alkaline Phosphatase 76 35 - 104 U/L    ALT 49 (H) 5 - 33 U/L    AST 41 (H) <32 U/L    Total Bilirubin 0.64 0.3 - 1.2 mg/dL    Total Protein 5.9 (L) 6.4 - 8.3 g/dL    Alb 3.0 (L) 3.5 - 5.2 g/dL    Albumin/Globulin Ratio 1.0 1.0 - 2.5    GFR Non-African American >60 >60 mL/min    GFR African American >60 >60 mL/min    GFR Comment          GFR Staging NOT REPORTED    C-Reactive Protein   Result Value Ref Range    .8 (H) 0.0 - 5.0 mg/L   Ferritin   Result Value Ref Range    Ferritin 3,046 (H) 13 - 150 ug/L   Fibrinogen   Result Value Ref Range    Fibrinogen 721 (H) 140 - 420 mg/dL   Lactate Dehydrogenase   Result Value Ref Range     (H) 135 - 214 U/L   CBC   Result Value Ref Range    WBC 1.2 (LL) 3.5 - 11.3 k/uL    RBC 2.37 (L) 3.95 - 5.11 m/uL    Hemoglobin 7.0 (LL) 11.9 - 15.1 g/dL    Hematocrit 22.4 (L) 36.3 - 47.1 %    MCV 94.5 82.6 - 102.9 fL    MCH 29.5 25.2 - 33.5 pg    MCHC 31.3 28.4 - 34.8 g/dL    RDW 19.0 (H) 11.8 - 14.4 %    Platelets 845 233 - 390 k/uL    MPV 10.7 8.1 - 13.5 fL    NRBC Automated 0.0 0.0 per 100 WBC   Differential   Result Value Ref Range    Differential Type NOT REPORTED     WBC Morphology NOT REPORTED     RBC Morphology NOT REPORTED     Platelet Estimate NOT REPORTED     Immature Granulocytes 0 0 %    Seg Neutrophils 33 (L) 36 - 66 %    Lymphocytes 48 (H) 24 - 44 %    Atypical Lymphocytes 1 %    Monocytes 17 (H) 1 - 7 %    Eosinophils % 1 1 - 4 %    Basophils 0 0 - 2 %    Absolute Immature Granulocyte 0.00 0.00 - 0.30 k/uL    Segs Absolute 0.43 (LL) 1.8 - 7.7 k/uL    Absolute Lymph # 0.63 (L) 1.0 - 4.8 k/uL    Atypical Lymphocytes Absolute 0.01 k/uL    Absolute Mono # 0.22 0.1 - 0.8 k/uL    Absolute Eos # 0.01 0.0 - 0.4 k/uL    Basophils Absolute 0.00 0.0 - 0.2 k/uL    Morphology ANISOCYTOSIS PRESENT    D-Dimer, Quantitative   Result Value Ref Range    D-Dimer, Quant 2.44 mg/L FEU   Ferritin   Result Value Ref Range    Ferritin 3,024 (H) 13 - 150 ug/L   Fibrinogen   Result Value Ref Range    Fibrinogen 720 (H) 140 - 420 mg/dL   Protime-INR   Result Value Ref Range    Protime 9.5 9.0 - 12.0 sec    INR 0.9    Lactate Dehydrogenase   Result Value Ref Range     (H) 135 - 214 U/L   Hepatic Function Panel   Result Value Ref Range    Alb 2.9 (L) 3.5 - 5.2 g/dL    Alkaline Phosphatase 74 35 - 104 U/L    ALT 45 (H) 5 - 33 U/L    AST 38 (H) <32 U/L    Total Bilirubin 0.53 0.3 - 1.2 mg/dL    Bilirubin, Direct 0.22 <0.31 mg/dL    Bilirubin, Indirect 0.31 0.00 - 1.00 mg/dL    Total Protein 5.8 (L) 6.4 - 8.3 g/dL    Globulin NOT REPORTED 1.5 - 3.8 g/dL    Albumin/Globulin Ratio 1.0 1.0 - 2.5   Magnesium   Result Value Ref Range    Magnesium 2.3 1.6 - 2.6 mg/dL   Renal Function Panel   Result Value Ref Range    Glucose 109 (H) 70 - 99 mg/dL    BUN 23 8 - 23 mg/dL    CREATININE 0.81 0.50 - 0.90 mg/dL    Bun/Cre Ratio NOT REPORTED 9 - 20    Calcium 8.0 (L) 8.6 - 10.4 mg/dL    Alb 2.9 (L) 3.5 - 5.2 g/dL    Phosphorus 3.5 2.6 - 4.5 mg/dL    Sodium 133 (L) 135 - 144 mmol/L    Potassium 4.1 3.7 - 5.3 mmol/L    Chloride 100 98 - 107 mmol/L    CO2 21 20 - 31 mmol/L    Anion Gap 12 9 - 17 mmol/L    GFR Non-African American >60 >60 mL/min    GFR African American >60 >60 mL/min    GFR Comment          GFR Staging NOT REPORTED    Procalcitonin   Result Value Ref Range    Procalcitonin 1.00 (H) <0.09 ng/mL   Brain Natriuretic Peptide   Result Value Ref Range    Pro- (H) <300 pg/mL    BNP Interpretation Pro-BNP Reference Range:    COVID-19    Specimen: Nasopharyngeal Swab   Result Value Ref Range    SARS-CoV-2 Positive (A)    MYCOPLASMA PNEUMONIAE ANTIBODY, IGM   Result Value Ref Range    Mycoplasma pneumo IgM 0.17 <0.91   URINALYSIS   Result Value Ref Range    Color, UA DARK YELLOW (A) YELLOW    Turbidity UA CLEAR CLEAR    Glucose, Ur NEGATIVE NEGATIVE    Bilirubin Urine NEGATIVE  Verified by ictotest. (A) NEGATIVE    Ketones, Urine SMALL (A) NEGATIVE    Specific Gravity, UA 1.023 1.005 - 1.030    Urine Hgb NEGATIVE NEGATIVE    pH, UA 5.5 5.0 - 8.0    Protein, UA 1+ (A) NEGATIVE    Urobilinogen, Urine Normal Normal Nitrite, Urine NEGATIVE NEGATIVE    Leukocyte Esterase, Urine NEGATIVE NEGATIVE    Urinalysis Comments NOT REPORTED    OCCULT BLOOD SCREEN   Result Value Ref Range    Occult Blood, Stool #1 NEGATIVE NEGATIVE    Date, Stool #1 . FECES     Time, Stool #1 . FECES     Occult Blood, Stool #2 NOT REPORTED NEGATIVE    Date, Stool #2 NOT REPORTED     Time, Stool #2 NOT REPORTED     Occult Blood, Stool #3 NOT REPORTED NEGATIVE    Date, Stool #3 NOT REPORTED     Time, Stool #3 NOT REPORTED    Microscopic Urinalysis   Result Value Ref Range    -          WBC, UA 0 TO 2 0 - 5 /HPF    RBC, UA 0 TO 2 0 - 4 /HPF    Casts UA  0 - 8 /LPF     5 TO 10 HYALINE Reference range defined for non-centrifuged specimen. Crystals, UA NOT REPORTED None /HPF    Epithelial Cells UA 0 TO 2 0 - 5 /HPF    Renal Epithelial, UA NOT REPORTED 0 /HPF    Bacteria, UA NOT REPORTED None    Mucus, UA NOT REPORTED None    Trichomonas, UA NOT REPORTED None    Amorphous, UA NOT REPORTED None    Other Observations UA NOT REPORTED NOT REQ.     Yeast, UA NOT REPORTED None   Vitamin B12 & Folate   Result Value Ref Range    Vitamin B-12 >2000 (H) 232 - 1245 pg/mL    Folate 11.5 >4.8 ng/mL   Iron and TIBC   Result Value Ref Range    Iron 92 37 - 145 ug/dL    TIBC 136 (L) 250 - 450 ug/dL    Iron Saturation 68 (H) 20 - 55 %    UIBC 44 (L) 112 - 347 ug/dL   CBC   Result Value Ref Range    WBC 2.0 (L) 3.5 - 11.3 k/uL    RBC 2.62 (L) 3.95 - 5.11 m/uL    Hemoglobin 7.7 (L) 11.9 - 15.1 g/dL    Hematocrit 25.6 (L) 36.3 - 47.1 %    MCV 97.7 82.6 - 102.9 fL    MCH 29.4 25.2 - 33.5 pg    MCHC 30.1 28.4 - 34.8 g/dL    RDW 19.6 (H) 11.8 - 14.4 %    Platelets 653 890 - 901 k/uL    MPV 11.1 8.1 - 13.5 fL    NRBC Automated 0.0 0.0 per 100 WBC   Renal Function Panel   Result Value Ref Range    Glucose 93 70 - 99 mg/dL    BUN 23 8 - 23 mg/dL    CREATININE 0.81 0.50 - 0.90 mg/dL    Bun/Cre Ratio NOT REPORTED 9 - 20    Calcium 8.4 (L) 8.6 - 10.4 mg/dL    Alb 2.8 (L) 3.5 - 5.2 g/dL Phosphorus 3.3 2.6 - 4.5 mg/dL    Sodium 138 135 - 144 mmol/L    Potassium 3.6 (L) 3.7 - 5.3 mmol/L    Chloride 100 98 - 107 mmol/L    CO2 22 20 - 31 mmol/L    Anion Gap 16 9 - 17 mmol/L    GFR Non-African American >60 >60 mL/min    GFR African American >60 >60 mL/min    GFR Comment          GFR Staging NOT REPORTED    Magnesium   Result Value Ref Range    Magnesium 2.5 1.6 - 2.6 mg/dL   FIBRINOGEN   Result Value Ref Range    Fibrinogen 1002 (H) 140 - 420 mg/dL   C-Reactive Protein   Result Value Ref Range    .4 (H) 0.0 - 5.0 mg/L   Ferritin   Result Value Ref Range    Ferritin 3,453 (H) 13 - 150 ug/L   Lactate Dehydrogenase   Result Value Ref Range     (H) 135 - 214 U/L   Magnesium   Result Value Ref Range    Magnesium 2.4 1.6 - 2.6 mg/dL   Comprehensive Metabolic Panel   Result Value Ref Range    Glucose 94 70 - 99 mg/dL    BUN 19 8 - 23 mg/dL    CREATININE 0.53 0.50 - 0.90 mg/dL    Bun/Cre Ratio NOT REPORTED 9 - 20    Calcium 8.4 (L) 8.6 - 10.4 mg/dL    Sodium 138 135 - 144 mmol/L    Potassium 4.0 3.7 - 5.3 mmol/L    Chloride 102 98 - 107 mmol/L    CO2 21 20 - 31 mmol/L    Anion Gap 15 9 - 17 mmol/L    Alkaline Phosphatase 58 35 - 104 U/L    ALT 35 (H) 5 - 33 U/L    AST 33 (H) <32 U/L    Total Bilirubin 0.47 0.3 - 1.2 mg/dL    Total Protein 5.6 (L) 6.4 - 8.3 g/dL    Alb 2.6 (L) 3.5 - 5.2 g/dL    Albumin/Globulin Ratio 0.9 (L) 1.0 - 2.5    GFR Non-African American >60 >60 mL/min    GFR African American >60 >60 mL/min    GFR Comment          GFR Staging NOT REPORTED    CBC Auto Differential   Result Value Ref Range    WBC 3.3 (L) 3.5 - 11.3 k/uL    RBC 2.61 (L) 3.95 - 5.11 m/uL    Hemoglobin 7.8 (L) 11.9 - 15.1 g/dL    Hematocrit 23.8 (L) 36.3 - 47.1 %    MCV 91.2 82.6 - 102.9 fL    MCH 29.9 25.2 - 33.5 pg    MCHC 32.8 28.4 - 34.8 g/dL    RDW 19.2 (H) 11.8 - 14.4 %    Platelets 590 238 - 756 k/uL    MPV 11.4 8.1 - 13.5 fL    NRBC Automated 0.0 0.0 per 100 WBC    Differential Type NOT REPORTED     WBC Morphology NOT REPORTED     RBC Morphology NOT REPORTED     Platelet Estimate NOT REPORTED     Immature Granulocytes 0 0 %    Seg Neutrophils 56 36 - 66 %    Lymphocytes 36 24 - 44 %    Monocytes 8 (H) 1 - 7 %    Eosinophils % 0 (L) 1 - 4 %    Basophils 0 0 - 2 %    Absolute Immature Granulocyte 0.00 0.00 - 0.30 k/uL    Segs Absolute 1.85 1.8 - 7.7 k/uL    Absolute Lymph # 1.19 1.0 - 4.8 k/uL    Absolute Mono # 0.26 0.1 - 0.8 k/uL    Absolute Eos # 0.00 0.0 - 0.4 k/uL    Basophils Absolute 0.00 0.0 - 0.2 k/uL    Morphology ANISOCYTOSIS PRESENT    Mycoplasma pneumoniae antibody, IgM   Result Value Ref Range    Mycoplasma pneumo IgM 0.20 <0.91   CBC Auto Differential   Result Value Ref Range    WBC 9.0 3.5 - 11.3 k/uL    RBC 2.60 (L) 3.95 - 5.11 m/uL    Hemoglobin 8.0 (L) 11.9 - 15.1 g/dL    Hematocrit 26.0 (L) 36.3 - 47.1 %    .0 82.6 - 102.9 fL    MCH 30.8 25.2 - 33.5 pg    MCHC 30.8 28.4 - 34.8 g/dL    RDW 19.6 (H) 11.8 - 14.4 %    Platelets 529 735 - 978 k/uL    MPV 11.4 8.1 - 13.5 fL    NRBC Automated 0.0 0.0 per 100 WBC    Differential Type NOT REPORTED     WBC Morphology NOT REPORTED     RBC Morphology NOT REPORTED     Platelet Estimate NOT REPORTED     Immature Granulocytes 1 (H) 0 %    Seg Neutrophils 74 (H) 36 - 66 %    Lymphocytes 15 (L) 24 - 44 %    Monocytes 10 (H) 1 - 7 %    Eosinophils % 0 (L) 1 - 4 %    Basophils 0 0 - 2 %    Absolute Immature Granulocyte 0.09 0.00 - 0.30 k/uL    Segs Absolute 6.66 1.8 - 7.7 k/uL    Absolute Lymph # 1.35 1.0 - 4.8 k/uL    Absolute Mono # 0.90 (H) 0.1 - 0.8 k/uL    Absolute Eos # 0.00 0.0 - 0.4 k/uL    Basophils Absolute 0.00 0.0 - 0.2 k/uL    Morphology ANISOCYTOSIS PRESENT    FERRITIN   Result Value Ref Range    Ferritin 3,590 (H) 13 - 150 ug/L   FIBRINOGEN   Result Value Ref Range    Fibrinogen 673 (H) 140 - 420 mg/dL   CBC Auto Differential   Result Value Ref Range    WBC 12.8 (H) 3.5 - 11.3 k/uL    RBC 2.36 (L) 3.95 - 5.11 m/uL    Hemoglobin 7.3 (L) 11.9 - 15.1 g/dL    Hematocrit 23.4 (L) 36.3 - 47.1 %    MCV 99.2 82.6 - 102.9 fL    MCH 30.9 25.2 - 33.5 pg    MCHC 31.2 28.4 - 34.8 g/dL    RDW 19.0 (H) 11.8 - 14.4 %    Platelets 147 565 - 791 k/uL    MPV 11.5 8.1 - 13.5 fL    NRBC Automated 0.0 0.0 per 100 WBC    Differential Type NOT REPORTED     WBC Morphology NOT REPORTED     RBC Morphology NOT REPORTED     Platelet Estimate NOT REPORTED     Immature Granulocytes 1 (H) 0 %    Seg Neutrophils 76 (H) 36 - 66 %    Lymphocytes 16 (L) 24 - 44 %    Monocytes 7 1 - 7 %    Eosinophils % 0 (L) 1 - 4 %    Basophils 0 0 - 2 %    Absolute Immature Granulocyte 0.13 0.00 - 0.30 k/uL    Segs Absolute 9.72 (H) 1.8 - 7.7 k/uL    Absolute Lymph # 2.05 1.0 - 4.8 k/uL    Absolute Mono # 0.90 (H) 0.1 - 0.8 k/uL    Absolute Eos # 0.00 0.0 - 0.4 k/uL    Basophils Absolute 0.00 0.0 - 0.2 k/uL    Morphology ANISOCYTOSIS PRESENT     Morphology INCREASED BANDS PRESENT     Morphology TOXIC GRANULATION PRESENT    Basic Metabolic Panel w/ Reflex to MG   Result Value Ref Range    Glucose 84 70 - 99 mg/dL    BUN 22 8 - 23 mg/dL    CREATININE 0.52 0.50 - 0.90 mg/dL    Bun/Cre Ratio NOT REPORTED 9 - 20    Calcium 8.6 8.6 - 10.4 mg/dL    Sodium 143 135 - 144 mmol/L    Potassium 3.5 (L) 3.7 - 5.3 mmol/L    Chloride 106 98 - 107 mmol/L    CO2 25 20 - 31 mmol/L    Anion Gap 12 9 - 17 mmol/L    GFR Non-African American >60 >60 mL/min    GFR African American >60 >60 mL/min    GFR Comment          GFR Staging NOT REPORTED    Brain Natriuretic Peptide   Result Value Ref Range    Pro- (H) <300 pg/mL    BNP Interpretation Pro-BNP Reference Range:    FIBRINOGEN   Result Value Ref Range    Fibrinogen 506 (H) 140 - 420 mg/dL   C-REACTIVE PROTEIN   Result Value Ref Range    CRP 67.6 (H) 0.0 - 5.0 mg/L   FERRITIN   Result Value Ref Range    Ferritin 2,838 (H) 13 - 150 ug/L   D-DIMER, QUANTITATIVE   Result Value Ref Range    D-Dimer, Quant 1.93 mg/L FEU   Magnesium   Result Value Ref Range    Magnesium 2.0 1.6 - 2.6 mg/dL   CBC Auto Differential   Result Value Ref Range    WBC 10.4 3.5 - 11.3 k/uL    RBC 2.70 (L) 3.95 - 5.11 m/uL    Hemoglobin 8.1 (L) 11.9 - 15.1 g/dL    Hematocrit 27.6 (L) 36.3 - 47.1 %    .2 82.6 - 102.9 fL    MCH 30.0 25.2 - 33.5 pg    MCHC 29.3 28.4 - 34.8 g/dL    RDW 19.2 (H) 11.8 - 14.4 %    Platelets 001 407 - 895 k/uL    MPV 11.2 8.1 - 13.5 fL    NRBC Automated 0.0 0.0 per 100 WBC    Differential Type NOT REPORTED     WBC Morphology NOT REPORTED     RBC Morphology NOT REPORTED     Platelet Estimate NOT REPORTED     Immature Granulocytes 3 (H) 0 %    Seg Neutrophils 68 (H) 36 - 66 %    Lymphocytes 20 (L) 24 - 44 %    Monocytes 9 (H) 1 - 7 %    Eosinophils % 0 (L) 1 - 4 %    Basophils 0 0 - 2 %    Absolute Immature Granulocyte 0.31 (H) 0.00 - 0.30 k/uL    Segs Absolute 7.07 1.8 - 7.7 k/uL    Absolute Lymph # 2.08 1.0 - 4.8 k/uL    Absolute Mono # 0.94 (H) 0.1 - 0.8 k/uL    Absolute Eos # 0.00 0.0 - 0.4 k/uL    Basophils Absolute 0.00 0.0 - 0.2 k/uL    Morphology ANISOCYTOSIS PRESENT    POTASSIUM   Result Value Ref Range    Potassium 3.1 (L) 3.7 - 5.3 mmol/L   CBC Auto Differential   Result Value Ref Range    WBC 9.3 3.5 - 11.3 k/uL    RBC 2.54 (L) 3.95 - 5.11 m/uL    Hemoglobin 8.0 (L) 11.9 - 15.1 g/dL    Hematocrit 25.9 (L) 36.3 - 47.1 %    .0 82.6 - 102.9 fL    MCH 31.5 25.2 - 33.5 pg    MCHC 30.9 28.4 - 34.8 g/dL    RDW 19.0 (H) 11.8 - 14.4 %    Platelets 931 632 - 375 k/uL    MPV 11.4 8.1 - 13.5 fL    NRBC Automated 0.0 0.0 per 100 WBC    Differential Type NOT REPORTED     WBC Morphology NOT REPORTED     RBC Morphology NOT REPORTED     Platelet Estimate NOT REPORTED     Immature Granulocytes 5 (H) 0 %    Seg Neutrophils 69 (H) 36 - 66 %    Lymphocytes 23 (L) 24 - 44 %    Monocytes 3 1 - 7 %    Eosinophils % 0 (L) 1 - 4 %    Basophils 0 0 - 2 %    Absolute Immature Granulocyte 0.47 (H) 0.00 - 0.30 k/uL    Segs Absolute 6.41 1.8 - 7.7 k/uL    Absolute Lymph # 2.14 1.0 - 4.8 k/uL Absolute Mono # 0.28 0.1 - 0.8 k/uL    Absolute Eos # 0.00 0.0 - 0.4 k/uL    Basophils Absolute 0.00 0.0 - 0.2 k/uL    Morphology ANISOCYTOSIS PRESENT     Morphology TOXIC GRANULATION PRESENT    Comprehensive Metabolic Panel w/ Reflex to MG   Result Value Ref Range    Glucose 84 70 - 99 mg/dL    BUN 16 8 - 23 mg/dL    CREATININE 0.64 0.50 - 0.90 mg/dL    Bun/Cre Ratio NOT REPORTED 9 - 20    Calcium 9.0 8.6 - 10.4 mg/dL    Sodium 139 135 - 144 mmol/L    Potassium 3.8 3.7 - 5.3 mmol/L    Chloride 103 98 - 107 mmol/L    CO2 24 20 - 31 mmol/L    Anion Gap 12 9 - 17 mmol/L    Alkaline Phosphatase 88 35 - 104 U/L    ALT 23 5 - 33 U/L    AST 22 <32 U/L    Total Bilirubin 0.51 0.3 - 1.2 mg/dL    Total Protein 6.3 (L) 6.4 - 8.3 g/dL    Alb 2.5 (L) 3.5 - 5.2 g/dL    Albumin/Globulin Ratio 0.7 (L) 1.0 - 2.5    GFR Non-African American >60 >60 mL/min    GFR African American >60 >60 mL/min    GFR Comment          GFR Staging NOT REPORTED    Urinalysis Reflex to Culture    Specimen: Urine, clean catch   Result Value Ref Range    Color, UA DARK YELLOW (A) YELLOW    Turbidity UA CLEAR CLEAR    Glucose, Ur NEGATIVE NEGATIVE    Bilirubin Urine NEGATIVE NEGATIVE    Ketones, Urine NEGATIVE NEGATIVE    Specific Gravity, UA 1.022 1.005 - 1.030    Urine Hgb NEGATIVE NEGATIVE    pH, UA 7.0 5.0 - 8.0    Protein, UA 1+ (A) NEGATIVE    Urobilinogen, Urine Normal Normal    Nitrite, Urine NEGATIVE NEGATIVE    Leukocyte Esterase, Urine NEGATIVE NEGATIVE    Urinalysis Comments NOT REPORTED    Microscopic Urinalysis   Result Value Ref Range    -          WBC, UA 2 TO 5 0 - 5 /HPF    RBC, UA 5 TO 10 0 - 4 /HPF    Casts UA  0 - 8 /LPF     10 TO 20 HYALINE Reference range defined for non-centrifuged specimen.     Crystals, UA NOT REPORTED None /HPF    Epithelial Cells UA 5 TO 10 0 - 5 /HPF    Renal Epithelial, UA NOT REPORTED 0 /HPF    Bacteria, UA NOT REPORTED None    Mucus, UA NOT REPORTED None    Trichomonas, UA NOT REPORTED None    Amorphous, UA NOT REPORTED None    Other Observations UA NOT REPORTED NOT REQ.     Yeast, UA NOT REPORTED None   CBC Auto Differential   Result Value Ref Range    WBC 6.9 3.5 - 11.3 k/uL    RBC 2.28 (L) 3.95 - 5.11 m/uL    Hemoglobin 6.8 (LL) 11.9 - 15.1 g/dL    Hematocrit 21.5 (L) 36.3 - 47.1 %    MCV 94.3 82.6 - 102.9 fL    MCH 29.8 25.2 - 33.5 pg    MCHC 31.6 28.4 - 34.8 g/dL    RDW 18.2 (H) 11.8 - 14.4 %    Platelets 978 691 - 954 k/uL    MPV 11.0 8.1 - 13.5 fL    NRBC Automated 0.0 0.0 per 100 WBC    Differential Type NOT REPORTED     WBC Morphology NOT REPORTED     RBC Morphology NOT REPORTED     Platelet Estimate NOT REPORTED     Immature Granulocytes 9 (H) 0 %    Seg Neutrophils 66 36 - 66 %    Lymphocytes 18 (L) 24 - 44 %    Monocytes 7 1 - 7 %    Eosinophils % 0 (L) 1 - 4 %    Basophils 0 0 - 2 %    Absolute Immature Granulocyte 0.62 (H) 0.00 - 0.30 k/uL    Segs Absolute 4.56 1.8 - 7.7 k/uL    Absolute Lymph # 1.24 1.0 - 4.8 k/uL    Absolute Mono # 0.48 0.1 - 0.8 k/uL    Absolute Eos # 0.00 0.0 - 0.4 k/uL    Basophils Absolute 0.00 0.0 - 0.2 k/uL    Morphology ANISOCYTOSIS PRESENT    Comprehensive Metabolic Panel w/ Reflex to MG   Result Value Ref Range    Glucose 111 (H) 70 - 99 mg/dL    BUN 24 (H) 8 - 23 mg/dL    CREATININE 0.61 0.50 - 0.90 mg/dL    Bun/Cre Ratio NOT REPORTED 9 - 20    Calcium 8.2 (L) 8.6 - 10.4 mg/dL    Sodium 143 135 - 144 mmol/L    Potassium 4.2 3.7 - 5.3 mmol/L    Chloride 105 98 - 107 mmol/L    CO2 26 20 - 31 mmol/L    Anion Gap 12 9 - 17 mmol/L    Alkaline Phosphatase 77 35 - 104 U/L    ALT 18 5 - 33 U/L    AST 18 <32 U/L    Total Bilirubin 0.41 0.3 - 1.2 mg/dL    Total Protein 5.9 (L) 6.4 - 8.3 g/dL    Alb 2.2 (L) 3.5 - 5.2 g/dL    Albumin/Globulin Ratio 0.6 (L) 1.0 - 2.5    GFR Non-African American >60 >60 mL/min    GFR African American >60 >60 mL/min    GFR Comment          GFR Staging NOT REPORTED    CBC Auto Differential   Result Value Ref Range    WBC 4.2 3.5 - 11.3 k/uL    RBC 2.05 (L) 3.95 - 5.11 m/uL    Hemoglobin 6.0 (LL) 11.9 - 15.1 g/dL    Hematocrit 19.8 (L) 36.3 - 47.1 %    MCV 96.6 82.6 - 102.9 fL    MCH 29.3 25.2 - 33.5 pg    MCHC 30.3 28.4 - 34.8 g/dL    RDW 18.4 (H) 11.8 - 14.4 %    Platelets 917 809 - 889 k/uL    MPV 11.2 8.1 - 13.5 fL    NRBC Automated 0.0 0.0 per 100 WBC    Differential Type NOT REPORTED     WBC Morphology NOT REPORTED     RBC Morphology NOT REPORTED     Platelet Estimate NOT REPORTED     Immature Granulocytes 2 (H) 0 %    Seg Neutrophils 74 (H) 36 - 66 %    Lymphocytes 16 (L) 24 - 44 %    Monocytes 8 (H) 1 - 7 %    Eosinophils % 0 (L) 1 - 4 %    Basophils 0 0 - 2 %    Absolute Immature Granulocyte 0.08 0.00 - 0.30 k/uL    Segs Absolute 3.11 1.8 - 7.7 k/uL    Absolute Lymph # 0.67 (L) 1.0 - 4.8 k/uL    Absolute Mono # 0.34 0.1 - 0.8 k/uL    Absolute Eos # 0.00 0.0 - 0.4 k/uL    Basophils Absolute 0.00 0.0 - 0.2 k/uL    Morphology ANISOCYTOSIS PRESENT     Morphology TOXIC GRANULATION PRESENT    Comprehensive Metabolic Panel w/ Reflex to MG   Result Value Ref Range    Glucose 96 70 - 99 mg/dL    BUN 26 (H) 8 - 23 mg/dL    CREATININE 0.61 0.50 - 0.90 mg/dL    Bun/Cre Ratio NOT REPORTED 9 - 20    Calcium 8.3 (L) 8.6 - 10.4 mg/dL    Sodium 141 135 - 144 mmol/L    Potassium 4.4 3.7 - 5.3 mmol/L    Chloride 104 98 - 107 mmol/L    CO2 31 20 - 31 mmol/L    Anion Gap 6 (L) 9 - 17 mmol/L    Alkaline Phosphatase 62 35 - 104 U/L    ALT 18 5 - 33 U/L    AST 16 <32 U/L    Total Bilirubin 0.30 0.3 - 1.2 mg/dL    Total Protein 5.7 (L) 6.4 - 8.3 g/dL    Alb 2.2 (L) 3.5 - 5.2 g/dL    Albumin/Globulin Ratio 0.6 (L) 1.0 - 2.5    GFR Non-African American >60 >60 mL/min    GFR African American >60 >60 mL/min    GFR Comment          GFR Staging NOT REPORTED    FERRITIN   Result Value Ref Range    Ferritin 4,245 (H) 13 - 150 ug/L   VITAMIN D 25 HYDROXY   Result Value Ref Range    Vit D, 25-Hydroxy 47.6 30.0 - 100.0 ng/mL   HEMOGLOBIN AND HEMATOCRIT, BLOOD   Result Value Ref Range Hemoglobin 7.7 (L) 11.9 - 15.1 g/dL    Hematocrit 24.0 (L) 36.3 - 47.1 %   CBC Auto Differential   Result Value Ref Range    WBC 5.3 3.5 - 11.3 k/uL    RBC 2.64 (L) 3.95 - 5.11 m/uL    Hemoglobin 8.0 (L) 11.9 - 15.1 g/dL    Hematocrit 25.2 (L) 36.3 - 47.1 %    MCV 95.5 82.6 - 102.9 fL    MCH 30.3 25.2 - 33.5 pg    MCHC 31.7 28.4 - 34.8 g/dL    RDW 17.4 (H) 11.8 - 14.4 %    Platelets 643 229 - 593 k/uL    MPV 11.3 8.1 - 13.5 fL    NRBC Automated 0.0 0.0 per 100 WBC    Differential Type NOT REPORTED     WBC Morphology NOT REPORTED     RBC Morphology NOT REPORTED     Platelet Estimate NOT REPORTED     Immature Granulocytes 5 (H) 0 %    Seg Neutrophils 78 (H) 36 - 66 %    Lymphocytes 11 (L) 24 - 44 %    Monocytes 4 1 - 7 %    Eosinophils % 2 1 - 4 %    Basophils 0 0 - 2 %    Absolute Immature Granulocyte 0.27 0.00 - 0.30 k/uL    Segs Absolute 4.13 1.8 - 7.7 k/uL    Absolute Lymph # 0.58 (L) 1.0 - 4.8 k/uL    Absolute Mono # 0.21 0.1 - 0.8 k/uL    Absolute Eos # 0.11 0.0 - 0.4 k/uL    Basophils Absolute 0.00 0.0 - 0.2 k/uL    Morphology ANISOCYTOSIS PRESENT     Morphology TOXIC GRANULATION PRESENT    Comprehensive Metabolic Panel w/ Reflex to MG   Result Value Ref Range    Glucose 91 70 - 99 mg/dL    BUN 32 (H) 8 - 23 mg/dL    CREATININE 0.65 0.50 - 0.90 mg/dL    Bun/Cre Ratio NOT REPORTED 9 - 20    Calcium 7.8 (L) 8.6 - 10.4 mg/dL    Sodium 143 135 - 144 mmol/L    Potassium 4.9 3.7 - 5.3 mmol/L    Chloride 106 98 - 107 mmol/L    CO2 30 20 - 31 mmol/L    Anion Gap 7 (L) 9 - 17 mmol/L    Alkaline Phosphatase 61 35 - 104 U/L    ALT 14 5 - 33 U/L    AST 13 <32 U/L    Total Bilirubin 0.29 (L) 0.3 - 1.2 mg/dL    Total Protein 6.1 (L) 6.4 - 8.3 g/dL    Alb 2.3 (L) 3.5 - 5.2 g/dL    Albumin/Globulin Ratio 0.6 (L) 1.0 - 2.5    GFR Non-African American >60 >60 mL/min    GFR African American >60 >60 mL/min    GFR Comment          GFR Staging NOT REPORTED    HEMOGLOBIN AND HEMATOCRIT, BLOOD   Result Value Ref Range    Hemoglobin 8.1 (L) 11.9 - 15.1 g/dL    Hematocrit 26.1 (L) 36.3 - 47.1 %   HEMOGLOBIN AND HEMATOCRIT, BLOOD   Result Value Ref Range    Hemoglobin 8.0 (L) 11.9 - 15.1 g/dL    Hematocrit 25.7 (L) 36.3 - 47.1 %   CBC Auto Differential   Result Value Ref Range    WBC 6.8 3.5 - 11.3 k/uL    RBC 2.72 (L) 3.95 - 5.11 m/uL    Hemoglobin 8.2 (L) 11.9 - 15.1 g/dL    Hematocrit 26.7 (L) 36.3 - 47.1 %    MCV 98.2 82.6 - 102.9 fL    MCH 30.1 25.2 - 33.5 pg    MCHC 30.7 28.4 - 34.8 g/dL    RDW 17.0 (H) 11.8 - 14.4 %    Platelets 332 197 - 391 k/uL    MPV 11.8 8.1 - 13.5 fL    NRBC Automated 0.0 0.0 per 100 WBC    Differential Type NOT REPORTED     WBC Morphology NOT REPORTED     RBC Morphology NOT REPORTED     Platelet Estimate NOT REPORTED     Immature Granulocytes 3 (H) 0 %    Seg Neutrophils 86 (H) 36 - 66 %    Lymphocytes 7 (L) 24 - 44 %    Monocytes 4 1 - 7 %    Eosinophils % 0 (L) 1 - 4 %    Basophils 0 0 - 2 %    Absolute Immature Granulocyte 0.20 0.00 - 0.30 k/uL    Segs Absolute 5.85 1.8 - 7.7 k/uL    Absolute Lymph # 0.48 (L) 1.0 - 4.8 k/uL    Absolute Mono # 0.27 0.1 - 0.8 k/uL    Absolute Eos # 0.00 0.0 - 0.4 k/uL    Basophils Absolute 0.00 0.0 - 0.2 k/uL    Morphology ANISOCYTOSIS PRESENT     Morphology TOXIC GRANULATION PRESENT    HEMOGLOBIN AND HEMATOCRIT, BLOOD   Result Value Ref Range    Hemoglobin 8.0 (L) 11.9 - 15.1 g/dL    Hematocrit 26.4 (L) 36.3 - 47.1 %   CBC Auto Differential   Result Value Ref Range    WBC 8.2 3.5 - 11.3 k/uL    RBC 2.84 (L) 3.95 - 5.11 m/uL    Hemoglobin 8.4 (L) 11.9 - 15.1 g/dL    Hematocrit 27.8 (L) 36.3 - 47.1 %    MCV 97.9 82.6 - 102.9 fL    MCH 29.6 25.2 - 33.5 pg    MCHC 30.2 28.4 - 34.8 g/dL    RDW 16.6 (H) 11.8 - 14.4 %    Platelets 387 (L) 085 - 453 k/uL    MPV 12.3 8.1 - 13.5 fL    NRBC Automated 0.0 0.0 per 100 WBC    Differential Type NOT REPORTED     WBC Morphology NOT REPORTED     RBC Morphology NOT REPORTED     Platelet Estimate NOT REPORTED     Immature Granulocytes 5 (H) 0 % Seg Neutrophils 83 (H) 36 - 65 %    Lymphocytes 9 (L) 24 - 43 %    Monocytes 2 (L) 3 - 12 %    Eosinophils % 0 (L) 1 - 4 %    Basophils 1 0 - 2 %    Absolute Immature Granulocyte 0.41 (H) 0.00 - 0.30 k/uL    Segs Absolute 6.81 1.50 - 8.10 k/uL    Absolute Lymph # 0.74 (L) 1.10 - 3.70 k/uL    Absolute Mono # 0.16 0.10 - 1.20 k/uL    Absolute Eos # 0.00 0.00 - 0.44 k/uL    Basophils Absolute 0.08 0.00 - 0.20 k/uL    Morphology ANISOCYTOSIS PRESENT     Morphology TOXIC GRANULATION PRESENT    BASIC METABOLIC PANEL   Result Value Ref Range    Glucose 121 (H) 70 - 99 mg/dL    BUN 44 (H) 8 - 23 mg/dL    CREATININE 0.66 0.50 - 0.90 mg/dL    Bun/Cre Ratio NOT REPORTED 9 - 20    Calcium 8.6 8.6 - 10.4 mg/dL    Sodium 144 135 - 144 mmol/L    Potassium 3.9 3.7 - 5.3 mmol/L    Chloride 105 98 - 107 mmol/L    CO2 33 (H) 20 - 31 mmol/L    Anion Gap 6 (L) 9 - 17 mmol/L    GFR Non-African American >60 >60 mL/min    GFR African American >60 >60 mL/min    GFR Comment          GFR Staging NOT REPORTED    HEMOGLOBIN AND HEMATOCRIT, BLOOD   Result Value Ref Range    Hemoglobin 7.8 (L) 11.9 - 15.1 g/dL    Hematocrit 25.6 (L) 36.3 - 47.1 %   CBC Auto Differential   Result Value Ref Range    WBC 8.0 3.5 - 11.3 k/uL    RBC 2.52 (L) 3.95 - 5.11 m/uL    Hemoglobin 7.3 (L) 11.9 - 15.1 g/dL    Hematocrit 25.2 (L) 36.3 - 47.1 %    .0 82.6 - 102.9 fL    MCH 29.0 25.2 - 33.5 pg    MCHC 29.0 28.4 - 34.8 g/dL    RDW 16.6 (H) 11.8 - 14.4 %    Platelets 79 (L) 658 - 453 k/uL    MPV 12.4 8.1 - 13.5 fL    NRBC Automated 0.0 0.0 per 100 WBC    Differential Type NOT REPORTED     WBC Morphology NOT REPORTED     RBC Morphology ANISOCYTOSIS PRESENT     Platelet Estimate NOT REPORTED     Seg Neutrophils 85 (H) 36 - 65 %    Lymphocytes 9 (L) 24 - 43 %    Monocytes 2 (L) 3 - 12 %    Eosinophils % 0 (L) 1 - 4 %    Basophils 0 0 - 2 %    Immature Granulocytes 3 (H) 0 %    Segs Absolute 6.80 1.50 - 8.10 k/uL    Absolute Lymph # 0.71 (L) 1.10 - 3.70 k/uL Absolute Mono # 0.19 0.10 - 1.20 k/uL    Absolute Eos # <0.03 0.00 - 0.44 k/uL    Basophils Absolute <0.03 0.00 - 0.20 k/uL    Absolute Immature Granulocyte 0.27 0.00 - 0.30 k/uL   HEMOGLOBIN AND HEMATOCRIT, BLOOD   Result Value Ref Range    Hemoglobin 7.3 (L) 11.9 - 15.1 g/dL    Hematocrit 23.9 (L) 36.3 - 47.1 %   HEMOGLOBIN AND HEMATOCRIT, BLOOD   Result Value Ref Range    Hemoglobin 7.8 (L) 11.9 - 15.1 g/dL    Hematocrit 25.6 (L) 36.3 - 47.1 %   CBC Auto Differential   Result Value Ref Range    WBC 7.1 3.5 - 11.3 k/uL    RBC 2.42 (L) 3.95 - 5.11 m/uL    Hemoglobin 7.2 (L) 11.9 - 15.1 g/dL    Hematocrit 24.2 (L) 36.3 - 47.1 %    .0 82.6 - 102.9 fL    MCH 29.8 25.2 - 33.5 pg    MCHC 29.8 28.4 - 34.8 g/dL    RDW 16.7 (H) 11.8 - 14.4 %    Platelets 75 (L) 473 - 453 k/uL    MPV 12.9 8.1 - 13.5 fL    NRBC Automated 0.4 (H) 0.0 per 100 WBC    Differential Type NOT REPORTED     WBC Morphology NOT REPORTED     RBC Morphology ANISOCYTOSIS PRESENT     Platelet Estimate NOT REPORTED     Seg Neutrophils 81 (H) 36 - 65 %    Lymphocytes 13 (L) 24 - 43 %    Monocytes 3 3 - 12 %    Eosinophils % 0 (L) 1 - 4 %    Basophils 0 0 - 2 %    Immature Granulocytes 2 (H) 0 %    Segs Absolute 5.70 1.50 - 8.10 k/uL    Absolute Lymph # 0.94 (L) 1.10 - 3.70 k/uL    Absolute Mono # 0.24 0.10 - 1.20 k/uL    Absolute Eos # <0.03 0.00 - 0.44 k/uL    Basophils Absolute <0.03 0.00 - 0.20 k/uL    Absolute Immature Granulocyte 0.17 0.00 - 0.30 k/uL   HEMOGLOBIN AND HEMATOCRIT, BLOOD   Result Value Ref Range    Hemoglobin 6.8 (LL) 11.9 - 15.1 g/dL    Hematocrit 22.9 (L) 36.3 - 47.1 %   HEMOGLOBIN AND HEMATOCRIT, BLOOD   Result Value Ref Range    Hemoglobin 9.0 (L) 11.9 - 15.1 g/dL    Hematocrit 29.3 (L) 36.3 - 47.1 %   BASIC METABOLIC PANEL   Result Value Ref Range    Glucose 139 (H) 70 - 99 mg/dL    BUN 38 (H) 8 - 23 mg/dL    CREATININE 0.57 0.50 - 0.90 mg/dL    Bun/Cre Ratio NOT REPORTED 9 - 20    Calcium 8.6 8.6 - 10.4 mg/dL    Sodium 145 (H) 135 - 144 mmol/L    Potassium 4.1 3.7 - 5.3 mmol/L    Chloride 108 (H) 98 - 107 mmol/L    CO2 31 20 - 31 mmol/L    Anion Gap 6 (L) 9 - 17 mmol/L    GFR Non-African American >60 >60 mL/min    GFR African American >60 >60 mL/min    GFR Comment          GFR Staging NOT REPORTED    CBC Auto Differential   Result Value Ref Range    WBC 4.1 3.5 - 11.3 k/uL    RBC 2.53 (L) 3.95 - 5.11 m/uL    Hemoglobin 7.5 (L) 11.9 - 15.1 g/dL    Hematocrit 25.3 (L) 36.3 - 47.1 %    .0 82.6 - 102.9 fL    MCH 29.6 25.2 - 33.5 pg    MCHC 29.6 28.4 - 34.8 g/dL    RDW 17.5 (H) 11.8 - 14.4 %    Platelets 81 (L) 039 - 453 k/uL    MPV 12.8 8.1 - 13.5 fL    NRBC Automated 1.2 (H) 0.0 per 100 WBC    Differential Type NOT REPORTED     WBC Morphology NOT REPORTED     RBC Morphology ANISOCYTOSIS PRESENT     Platelet Estimate NOT REPORTED     Seg Neutrophils 73 (H) 36 - 65 %    Lymphocytes 19 (L) 24 - 43 %    Monocytes 6 3 - 12 %    Eosinophils % 0 (L) 1 - 4 %    Basophils 0 0 - 2 %    Immature Granulocytes 3 (H) 0 %    Segs Absolute 2.94 1.50 - 8.10 k/uL    Absolute Lymph # 0.76 (L) 1.10 - 3.70 k/uL    Absolute Mono # 0.23 0.10 - 1.20 k/uL    Absolute Eos # <0.03 0.00 - 0.44 k/uL    Basophils Absolute <0.03 0.00 - 0.20 k/uL    Absolute Immature Granulocyte 0.11 0.00 - 0.30 k/uL   Basic Metabolic Panel w/ Reflex to MG   Result Value Ref Range    Glucose 107 (H) 70 - 99 mg/dL    BUN 34 (H) 8 - 23 mg/dL    CREATININE 0.51 0.50 - 0.90 mg/dL    Bun/Cre Ratio NOT REPORTED 9 - 20    Calcium 8.0 (L) 8.6 - 10.4 mg/dL    Sodium 146 (H) 135 - 144 mmol/L    Potassium 4.1 3.7 - 5.3 mmol/L    Chloride 111 (H) 98 - 107 mmol/L    CO2 30 20 - 31 mmol/L    Anion Gap 5 (L) 9 - 17 mmol/L    GFR Non-African American >60 >60 mL/min    GFR African American >60 >60 mL/min    GFR Comment          GFR Staging NOT REPORTED    HEMOGLOBIN AND HEMATOCRIT, BLOOD   Result Value Ref Range    Hemoglobin 8.0 (L) 11.9 - 15.1 g/dL    Hematocrit 25.9 (L) 36.3 - 47.1 % Cortisol Total   Result Value Ref Range    Cortisol 12.2 2.7 - 18.4 ug/dL    Cortisol Collection Info NOT REPORTED    POC Glucose Fingerstick   Result Value Ref Range    POC Glucose 108 (H) 65 - 105 mg/dL   Arterial Blood Gas, POC   Result Value Ref Range    POC pH 7.464 (H) 7.350 - 7.450    POC pCO2 37.1 35.0 - 48.0 mm Hg    POC PO2 87.4 83.0 - 108.0 mm Hg    POC HCO3 26.6 21.0 - 28.0 mmol/L    TCO2 (calc), Art 28 22.0 - 29.0 mmol/L    Negative Base Excess, Art NOT REPORTED 0.0 - 2.0    Positive Base Excess, Art 3 0.0 - 3.0    POC O2 SAT 97 94.0 - 98.0 %    O2 Device/Flow/% Adult Ventilator     Thomas Test NOT APPLICABLE     Sample Site Arterial Line     Mode PRVC     FIO2 100.0     Pt Temp 39.8     POC pH Temp 7.42     POC pCO2 Temp 42 mm Hg    POC pO2 Temp 105 mm Hg   POCT Glucose   Result Value Ref Range    POC Glucose 110 (H) 74 - 100 mg/dL   Arterial Blood Gas, POC   Result Value Ref Range    POC pH 7.433 7.350 - 7.450    POC pCO2 47.0 35.0 - 48.0 mm Hg    POC PO2 103.6 83.0 - 108.0 mm Hg    POC HCO3 31.4 (H) 21.0 - 28.0 mmol/L    TCO2 (calc), Art 33 (H) 22.0 - 29.0 mmol/L    Negative Base Excess, Art NOT REPORTED 0.0 - 2.0    Positive Base Excess, Art 6 (H) 0.0 - 3.0    POC O2 SAT 98 94.0 - 98.0 %    O2 Device/Flow/% Adult Ventilator     Thomas Test NOT APPLICABLE     Sample Site Arterial Line     Mode PRVC     FIO2 100.0     Pt Temp NOT REPORTED     POC pH Temp NOT REPORTED     POC pCO2 Temp NOT REPORTED mm Hg    POC pO2 Temp NOT REPORTED mm Hg   Lactic Acid, POC   Result Value Ref Range    POC Lactic Acid 0.91 0.56 - 1.39 mmol/L   POCT Glucose   Result Value Ref Range    POC Glucose 126 (H) 74 - 100 mg/dL   Arterial Blood Gas, POC   Result Value Ref Range    POC pH 7.448 7.350 - 7.450    POC pCO2 52.3 (H) 35.0 - 48.0 mm Hg    POC PO2 67.9 (L) 83.0 - 108.0 mm Hg    POC HCO3 36.2 (H) 21.0 - 28.0 mmol/L    TCO2 (calc), Art 38 (H) 22.0 - 29.0 mmol/L    Negative Base Excess, Art NOT REPORTED 0.0 - 2.0 Positive Base Excess, Art 11 (H) 0.0 - 3.0    POC O2 SAT 94 94.0 - 98.0 %    O2 Device/Flow/% Adult Ventilator     Thomas Test NOT APPLICABLE     Sample Site Arterial Line     [facility]     FIO2 80.0     Pt Temp NOT REPORTED     POC pH Temp NOT REPORTED     POC pCO2 Temp NOT REPORTED mm Hg    POC pO2 Temp NOT REPORTED mm Hg   Lactic Acid, POC   Result Value Ref Range    POC Lactic Acid 0.91 0.56 - 1.39 mmol/L   POCT Glucose   Result Value Ref Range    POC Glucose 98 74 - 100 mg/dL   Arterial Blood Gas, POC   Result Value Ref Range    POC pH 7.429 7.350 - 7.450    POC pCO2 47.6 35.0 - 48.0 mm Hg    POC PO2 56.3 (L) 83.0 - 108.0 mm Hg    POC HCO3 31.5 (H) 21.0 - 28.0 mmol/L    TCO2 (calc), Art 33 (H) 22.0 - 29.0 mmol/L    Negative Base Excess, Art NOT REPORTED 0.0 - 2.0    Positive Base Excess, Art 6 (H) 0.0 - 3.0    POC O2 SAT 89 (L) 94.0 - 98.0 %    O2 Device/Flow/% NOT REPORTED     Thomas Test NOT APPLICABLE     Sample Site Arterial Line     Mode NOT REPORTED     FIO2 65.0     Pt Temp NOT REPORTED     POC pH Temp NOT REPORTED     POC pCO2 Temp NOT REPORTED mm Hg    POC pO2 Temp NOT REPORTED mm Hg   Arterial Blood Gas, POC   Result Value Ref Range    POC pH 7.412 7.350 - 7.450    POC pCO2 52.4 (H) 35.0 - 48.0 mm Hg    POC PO2 56.9 (L) 83.0 - 108.0 mm Hg    POC HCO3 33.3 (H) 21.0 - 28.0 mmol/L    TCO2 (calc), Art 35 (H) 22.0 - 29.0 mmol/L    Negative Base Excess, Art NOT REPORTED 0.0 - 2.0    Positive Base Excess, Art 8 (H) 0.0 - 3.0    POC O2 SAT 89 (L) 94.0 - 98.0 %    O2 Device/Flow/% NOT REPORTED     Thomas Test NOT APPLICABLE     Sample Site Arterial Line     Mode NOT REPORTED     FIO2 65.0     Pt Temp NOT REPORTED     POC pH Temp NOT REPORTED     POC pCO2 Temp NOT REPORTED mm Hg    POC pO2 Temp NOT REPORTED mm Hg   Arterial Blood Gas, POC   Result Value Ref Range    POC pH 7.387 7.350 - 7.450    POC pCO2 56.3 (H) 35.0 - 48.0 mm Hg    POC PO2 60.3 (L) 83.0 - 108.0 mm Hg    POC HCO3 33.9 (H) 21.0 - 28.0 mmol/L TCO2 (calc), Art 36 (H) 22.0 - 29.0 mmol/L    Negative Base Excess, Art NOT REPORTED 0.0 - 2.0    Positive Base Excess, Art 8 (H) 0.0 - 3.0    POC O2 SAT 90 (L) 94.0 - 98.0 %    O2 Device/Flow/% Adult Ventilator     Thomas Test NOT APPLICABLE     Sample Site Arterial Line     Mode PRVC     FIO2 65.0     Pt Temp NOT REPORTED     POC pH Temp NOT REPORTED     POC pCO2 Temp NOT REPORTED mm Hg    POC pO2 Temp NOT REPORTED mm Hg   POCT Glucose   Result Value Ref Range    POC Glucose 127 (H) 74 - 100 mg/dL   Arterial Blood Gas, POC   Result Value Ref Range    POC pH 7.409 7.350 - 7.450    POC pCO2 58.2 (H) 35.0 - 48.0 mm Hg    POC PO2 53.7 (L) 83.0 - 108.0 mm Hg    POC HCO3 36.8 (H) 21.0 - 28.0 mmol/L    TCO2 (calc), Art 39 (H) 22.0 - 29.0 mmol/L    Negative Base Excess, Art NOT REPORTED 0.0 - 2.0    Positive Base Excess, Art 11 (H) 0.0 - 3.0    POC O2 SAT 87 (L) 94.0 - 98.0 %    O2 Device/Flow/% Adult Ventilator     Thomas Test NOT REPORTED     Sample Site Arterial Line     Mode PRVC     FIO2 65.0     Pt Temp NOT REPORTED     POC pH Temp NOT REPORTED     POC pCO2 Temp NOT REPORTED mm Hg    POC pO2 Temp NOT REPORTED mm Hg   POCT Glucose   Result Value Ref Range    POC Glucose 128 (H) 74 - 100 mg/dL   Arterial Blood Gas, POC   Result Value Ref Range    POC pH 7.432 7.350 - 7.450    POC pCO2 58.4 (H) 35.0 - 48.0 mm Hg    POC PO2 70.6 (L) 83.0 - 108.0 mm Hg    POC HCO3 38.9 (H) 21.0 - 28.0 mmol/L    TCO2 (calc), Art 41 (H) 22.0 - 29.0 mmol/L    Negative Base Excess, Art NOT REPORTED 0.0 - 2.0    Positive Base Excess, Art 13 (H) 0.0 - 3.0    POC O2 SAT 94 94.0 - 98.0 %    O2 Device/Flow/% NOT REPORTED     Thomas Test NOT REPORTED     Sample Site NOT REPORTED     Mode NOT REPORTED     FIO2 NOT REPORTED     Pt Temp NOT REPORTED     POC pH Temp NOT REPORTED     POC pCO2 Temp NOT REPORTED mm Hg    POC pO2 Temp NOT REPORTED mm Hg   Lactic Acid, POC   Result Value Ref Range    POC Lactic Acid 1.03 0.56 - 1.39 mmol/L   POCT Glucose Result Value Ref Range    POC Glucose 133 (H) 74 - 100 mg/dL   Arterial Blood Gas, POC   Result Value Ref Range    POC pH 7.448 7.350 - 7.450    POC pCO2 57.1 (H) 35.0 - 48.0 mm Hg    POC PO2 48.6 (LL) 83.0 - 108.0 mm Hg    POC HCO3 39.5 (H) 21.0 - 28.0 mmol/L    TCO2 (calc), Art 41 (H) 22.0 - 29.0 mmol/L    Negative Base Excess, Art NOT REPORTED 0.0 - 2.0    Positive Base Excess, Art 14 (H) 0.0 - 3.0    POC O2 SAT 84 (L) 94.0 - 98.0 %    O2 Device/Flow/% Adult Ventilator     Thomas Test NOT REPORTED     Sample Site Arterial Line     Mode NOT REPORTED     FIO2 45.0     Pt Temp NOT REPORTED     POC pH Temp NOT REPORTED     POC pCO2 Temp NOT REPORTED mm Hg    POC pO2 Temp NOT REPORTED mm Hg   Arterial Blood Gas, POC   Result Value Ref Range    POC pH 7.218 (L) 7.350 - 7.450    POC pCO2 101.6 (HH) 35.0 - 48.0 mm Hg    POC PO2 64.3 (L) 83.0 - 108.0 mm Hg    POC HCO3 41.4 (HH) 21.0 - 28.0 mmol/L    TCO2 (calc), Art 45 (H) 22.0 - 29.0 mmol/L    Negative Base Excess, Art NOT REPORTED 0.0 - 2.0    Positive Base Excess, Art 11 (H) 0.0 - 3.0    POC O2 SAT 85 (L) 94.0 - 98.0 %    O2 Device/Flow/% Adult Ventilator     Thomas Test NOT APPLICABLE     Sample Site Arterial Line     Mode PRVC     FIO2 55.0     Pt Temp NOT REPORTED     POC pH Temp NOT REPORTED     POC pCO2 Temp NOT REPORTED mm Hg    POC pO2 Temp NOT REPORTED mm Hg   Lactic Acid, POC   Result Value Ref Range    POC Lactic Acid 0.60 0.56 - 1.39 mmol/L   POCT Glucose   Result Value Ref Range    POC Glucose 110 (H) 74 - 100 mg/dL   Notification Panel, POC   Result Value Ref Range    Action NotifyRN     NOTIFY CHASTITY     READ BACK Yes     Date/Time 11/30/202023:22:00    Arterial Blood Gas, POC   Result Value Ref Range    POC pH 7.368 7.350 - 7.450    POC pCO2 62.6 (H) 35.0 - 48.0 mm Hg    POC PO2 81.1 (L) 83.0 - 108.0 mm Hg    POC HCO3 36.0 (H) 21.0 - 28.0 mmol/L    TCO2 (calc), Art 38 (H) 22.0 - 29.0 mmol/L    Negative Base Excess, Art NOT REPORTED 0.0 - 2.0    Positive Base Excess, Art 9 (H) 0.0 - 3.0    POC O2 SAT 95 94.0 - 98.0 %    O2 Device/Flow/% Adult Ventilator     Thomas Test NOT APPLICABLE     Sample Site Arterial Line     Mode PRVC     FIO2 70.0     Pt Temp NOT REPORTED     POC pH Temp NOT REPORTED     POC pCO2 Temp NOT REPORTED mm Hg    POC pO2 Temp NOT REPORTED mm Hg     *Note: Due to a large number of results and/or encounters for the requested time period, some results have not been displayed. A complete set of results can be found in Results Review. IMAGING DATA:    Xr Chest (single View Frontal)    Result Date: 11/17/2020  EXAMINATION: NUCLEAR MEDICINE PERFUSION SCAN. PORTABLE CHEST RADIOGRAPH 11/17/2020 TECHNIQUE: 5 millicuries of Tc 89V MAA was administered intravenously prior to planar imaging of the lungs in multiple projections. HISTORY: ORDERING SYSTEM PROVIDED HISTORY: elevated D-Dimer outside hospital Reason for Exam: Covid-19 positive pt. and elevated D-Dimer 2.44 FINDINGS: PERFUSION: Distribution of radiotracer is slightly heterogeneous. No segmental defects identified. CHEST RADIOGRAPH:  Bilateral mid to lower lung field left greater than right opacities with peripheral involvement most pronounced at the bases. No effusion or pneumothorax. Normal heart size. Postsurgical changes of median sternotomy. Right-sided Port-A-Cath is in place     *Low Probability for Pulmonary Embolus. *Bilateral mid to lower lung field left greater than right opacities with peripheral involvement most pronounced at the bases compatible with  COVID-19 pneumonia. The findings were sent to the Radiology Results [address] at 2:46 pm on 11/17/2020to be communicated to a licensed caregiver. Nm Lung Scan Perfusion Only    Result Date: 11/17/2020  EXAMINATION: NUCLEAR MEDICINE PERFUSION SCAN. PORTABLE CHEST RADIOGRAPH 11/17/2020 TECHNIQUE: 5 millicuries of Tc 07R MAA was administered intravenously prior to planar imaging of the lungs in multiple projections. HISTORY: ORDERING SYSTEM PROVIDED HISTORY: elevated D-Dimer outside hospital Reason for Exam: Covid-19 positive pt. and elevated D-Dimer 2.44 FINDINGS: PERFUSION: Distribution of radiotracer is slightly heterogeneous. No segmental defects identified. CHEST RADIOGRAPH:  Bilateral mid to lower lung field left greater than right opacities with peripheral involvement most pronounced at the bases. No effusion or pneumothorax. Normal heart size. Postsurgical changes of median sternotomy. Right-sided Port-A-Cath is in place     *Low Probability for Pulmonary Embolus. *Bilateral mid to lower lung field left greater than right opacities with peripheral involvement most pronounced at the bases compatible with  COVID-19 pneumonia. The findings were sent to the Radiology Results [id number] at 2:46 pm on 11/17/2020to be communicated to a licensed caregiver.          IMPRESSION:   Primary Problem  Pneumonia due to COVID-19 virus    Active Hospital Problems    Diagnosis Date Noted    Other pancytopenia ([address] [D61.818] 11/24/2020    Hypokalemia [E87.6] 11/22/2020    MDS (myelodysplastic syndrome) ([address] [D46.9] 11/17/2020    Pneumonia due to COVID-19 virus [U07.1, J12.89] 11/17/2020    Neutropenic sepsis ([address] [A41.9, D70.9] 11/17/2020    Bicytopenia [D75.89] 11/17/2020    ROSALINO (acute kidney injury) ([address] [N17.9] 11/17/2020    Acute respiratory failure with hypoxia ([address] [J96.01] 11/17/2020    Essential hypertension [I10] 11/17/2020    Coronary artery disease involving coronary bypass graft of native heart without angina pectoris [I25.810] 11/17/2020    History of Raynaud's syndrome [Z86.79] 11/17/2020    Transaminitis [R74.01] 11/17/2020    Severe sepsis ([address] [A41.9, R65.20] 11/17/2020    Immunosuppressed status ([address] [D84.9]     Acute respiratory failure due to COVID-19 ([address] [U07.1, J96.00] 11/16/2020       MDS  Anemia, transfusion dependent  COVID-19 infection    RECOMMENDATIONS:  Condition remains

## 2020-12-02 NOTE — PROGRESS NOTES
[facility]  Office: Susy Rowland, DO, Aby Jean, DO, Joni Rondon, DO, Lizet Elaine, DO, Valiant Sicard, MD, Naima Patrick MD, Theodore Hernandez MD, Rosita Story MD, Silvia Burkett MD, Sonja Zarate MD, Riley Phoenix MD, Law Foy MD, Ren Landin MD, Prabha Woods DO, Rosalee Fuentes MD, Jacob Brown MD, Roxy Polanco DO, Rosalee Watts MD,  Gisell Ricks DO, Mila Strickland MD, Marielena Dumont MD, Bob Chu, [facility], [facility], [facility], Kelli Page, [facility], Anaid Garcia, CNS, Milvia Sebastian, CNP, Lazara Johnson, CNP, Yaa Hurt, CNP, Jimmie Jones, CNP, Yoly Hein, CNP, Nuvia Escobedo PA-C, Gisell Haro, [facility], Mark Tamayo, CNP, Barney Cole, CNP, Rebecca Jolly, CNP, Nola Servin, CNP, Ham Del Valle, [facility]   [address] [facility]    Progress Note    12/2/2020    4:24 PM    Name:   Bety Mixon  MRN:     [id number]     Acct:      [de-identified]   Room:   [facility]/3023-01   Day:  16  Admit Date:  11/16/2020 10:51 PM    PCP:   Jose Barksdale MD  Code Status:  DNR-CCA    Subjective:     C/C: No chief complaint on file. Interval History Status: not changed. Patient seen and examined at bedside. No longer on paralytics. Still spiking fevers 100.8. Still requiring Levophed 4 mcg, sedated on Versed and fentanyl. Brief History:     Per EMR:  Arthur Ramirez a 70 y. o. female with a past medical history for MDS, essential hypertension, melanoma, mitral valve prolapse, MRSA, Raynaud's disease presents emergency department for shortness of breath and dyspnea upon exertion for 3 days.  Patient was initially seen at [facility] diagnosed with Covid pneumonia.  Patient requiring 50% FiO2 on BiPAP, patient also had an elevated D-dimer but CTA was not possible secondary to contrast allergy.  [facility] unable to accommodate due to lack of Covid beds, patient was transferred for further care.  Spoke with pulmonary medicine at [facility] and was accepted for ICU transfer. Chelo Victor was given 1 unit of PRBCs [facility] for a hemoglobin less than 7, patient was also placed on BiPAP for transfer.  Patient arrived, no complaints except for some shortness of breath. Needing high flow oxygen, still critical     Intubated and sedated, endotracheal intubation done on November 23, 2020 due to acute respiratory failure not holding oxygenation even on FiO2 100% BiPAP    Review of Systems:     Unable to obtain due to intubation, sedation    Medications: Allergies: Allergies   Allergen Reactions    Fish Allergy Anaphylaxis, Hives and Swelling    Hydrocodone-Acetaminophen Anaphylaxis    Tizanidine Anaphylaxis and Hives    Atorvastatin Hives, Other (See Comments) and Swelling    Metoclopramide Hives     Other reaction(s): Unknown, Unknown    Moxifloxacin Hives, Other (See Comments) and Swelling     Other reaction(s): Other (See Comments), Unknown    Oxycodone Other (See Comments)     Other reaction(s): Hallucinations, Mental Status Change    Pregabalin Other (See Comments)     Other reaction(s): Hallucinations, Other (See Comments)  Causes sores in the mouth  Causes sores in the mouth      Tramadol      Other reaction(s): Edema, Other (See Comments)    Zolpidem Other (See Comments)     Other reaction(s): Other (See Comments)  causes cramps in hands and legs  causes cramps in hands and legs  Other reaction(s):  Other (See Comments)  causes cramps in hands and legs  causes cramps in hands and legs      Cephalexin Rash     Other reaction(s): Unknown, Unknown    Iodides Rash and Swelling     ivp and heart cath dye    Sulfa Antibiotics Rash       Current Meds:   Scheduled Meds:    midodrine  10 mg Oral TID WC    QUEtiapine  50 mg Oral Nightly    enoxaparin  40 mg Subcutaneous Daily    ceftaroline fosamil (TEFLARO) IVPB  600 mg Intravenous Q12H    docusate  100 mg Oral BID    pantoprazole  40 mg Intravenous BID    And    sodium chloride (PF)  10 mL Intravenous Daily    LORazepam  0.25 mg Intravenous Once    latanoprost  1 drop Both Eyes Nightly    sodium chloride  20 mL Intravenous Once    sodium chloride  20 mL Intravenous Once     Continuous Infusions:    norepinephrine 5 mcg/min (20 1416)    cisatracurium (NIMBEX) infusion Stopped (20 1359)    fentaNYL 150 mcg/hr (20 1413)    midazolam 8 mg/hr (20 0745)     PRN Meds: artificial tears, LORazepam, sodium chloride, sodium chloride flush, potassium chloride **OR** potassium alternative oral replacement **OR** potassium chloride, magnesium sulfate, acetaminophen **OR** acetaminophen, polyethylene glycol, promethazine **OR** ondansetron, nicotine    Data:     Past Medical History:   has a past medical history of Cancer ([facility] Deaf, left, Essential hypertension, GERD (gastroesophageal reflux disease), Glaucoma, Hyperlipidemia, [facility]an[facility] ([facility] MRSA (methicillin resistant staph aureus) culture positive, MVP (mitral valve prolapse), Pharyngoesophageal dysphagia, Raynaud disease, and Status post four vessel coronary artery bypass. Social History:   reports that she quit smoking about 15 years ago. Her smoking use included cigarettes. She started smoking about 63 years ago. She does not have any smokeless tobacco history on file. She reports that she does not drink alcohol or use drugs. Family History:   Family History   Problem Relation Age of Onset    Heart Failure Mother     Early Death Mother     Heart Disease Mother     Stroke Father        Vitals:  BP (!) 104/49   Pulse 85   Temp 100.4 °F (38 °C) (Oral)   Resp 28   Ht 5' 7\" (1.702 m)   Wt 156 lb 15.5 oz (71.2 kg)   SpO2 91%   BMI 24.58 kg/m²   Temp (24hrs), Av.6 °F (37.6 °C), Min:99.1 °F (37.3 °C), Max:100.4 °F (38 °C)    Recent Labs     20  2318 20  0030   POCGLU 110* 131*       I/O (24Hr):     Intake/Output Summary (Last 24 hours) at 12/2/2020 1624  Last data filed at 12/2/2020 1400  Gross per 24 hour   Intake 2653.86 ml   Output 777 ml   Net 1876.86 ml       Labs:  Hematology:  Recent Labs     11/30/20  0350  12/01/20 0444  12/01/20 1956 12/02/20  0430 12/02/20  1257   WBC 7.1  --  4.1  --   --  3.0*  --    RBC 2.42*  --  2.53*  --   --  2.45*  --    HGB 7.2*   < > 7.5*   < > 7.7* 7.2* 8.0*   HCT 24.2*   < > 25.3*   < > 25.4* 23.8* 26.0*   .0  --  100.0  --   --  97.1  --    MCH 29.8  --  29.6  --   --  29.4  --    MCHC 29.8  --  29.6  --   --  30.3  --    RDW 16.7*  --  17.5*  --   --  16.8*  --    PLT 75*  --  81*  --   --  90*  --    MPV 12.9  --  12.8  --   --  13.0  --     < > = values in this interval not displayed. Chemistry:  Recent Labs     11/30/20  0818 12/01/20 0444 12/02/20 0430   * 146* 144   K 4.1 4.1 3.9   * 111* 107   CO2 31 30 30   GLUCOSE 139* 107* 108*   BUN 38* 34* 32*   CREATININE 0.57 0.51 0.48*   ANIONGAP 6* 5* 7*   LABGLOM >60 >60 >60   GFRAA >60 >60 >60   CALCIUM 8.6 8.0* 8.1*     Recent Labs     11/30/20  2318 12/01/20  0030   POCGLU 110* 131*     ABG:  Lab Results   Component Value Date    POCPH 7.410 12/02/2020    POCPCO2 49.7 12/02/2020    POCPO2 54.1 12/02/2020    POCHCO3 31.5 12/02/2020    NBEA NOT REPORTED 12/02/2020    PBEA 6 12/02/2020    EON4UEQ 33 12/02/2020    HWGI1YZT 87 12/02/2020    FIO2 40.0 12/02/2020     Lab Results   Component Value Date/Time    SPECIAL NOT REPORTED 11/30/2020 10:17 PM     Lab Results   Component Value Date/Time    CULTURE NORMAL RESPIRATORY MORIS MODERATE GROWTH 11/30/2020 10:17 PM       Radiology:  Xr Chest (single View Frontal)    Result Date: 11/29/2020  Stable interstitial infiltrates. ET tube terminates 35 mm above the nghia.      Xr Chest Portable    Result Date: 12/1/2020  No significant change in appearance of pneumonia       Physical Examination:           General appearance: Intubated, sedated, chronically ill-appearing  Mental Status: Intubated, sedated  Lungs: Ventilated breath sounds  Heart:  regular rate and rhythm, no murmur  Abdomen:  soft, nontender, nondistended, normal bowel sounds  Extremities:  no edema, redness, tenderness in the calves  Skin:  no gross lesions, rashes, induration    Assessment:        Hospital Problems           Last Modified POA    * (Principal) Pneumonia due to COVID-19 virus 11/17/2020 Yes    Acute respiratory failure due to COVID-19 ([address] 11/16/2020 Yes    MDS (myelodysplastic syndrome) ([address] 11/17/2020 Yes    Neutropenic sepsis ([address] 11/17/2020 Yes    Bicytopenia 11/17/2020 Yes    ROSALINO (acute kidney injury) ([address] 11/17/2020 Yes    Acute respiratory failure with hypoxia ([address] 11/17/2020 Yes    Essential hypertension 11/17/2020 Yes    Coronary artery disease involving coronary bypass graft of native heart without angina pectoris 11/17/2020 Yes    History of Raynaud's syndrome 11/17/2020 Yes    Transaminitis 11/17/2020 Yes    Immunosuppressed status ([address] 11/17/2020 Yes    Severe sepsis ([address] 11/17/2020 Yes    Hypokalemia 11/22/2020 Yes    Other pancytopenia ([address] 11/24/2020 Yes          Plan:        1. Appreciate critical care recommendations. We will try to wean down fentanyl and Versed and try to monitor patient's mentation when off of sedation. We will try to wean down fentanyl first than Versed. 2. Try to wean off Levophed, midodrine was added  3. Continue to monitor sodium, has been improving with free water flushes  4. Continue tube feeding,  5. Patient completed remdesivir 11/20/2020, received plasma on 11/17, currently on Teflaro due to fevers and currently remains having fevers. Cultures pending  6. Palliative care recommendations appreciated. 7. We will transfuse as necessary. Appreciate hematology oncology recommendations. Has a history of myelodysplastic syndrome and may require intermittent transfusions, monitor hemoglobin hematocrit. Transfuse if less than 7.   Will decrease the frequency of daily checks.     Eitan Du MD  12/2/2020  4:24 PM

## 2020-12-02 NOTE — CARE COORDINATION
TRANSITIONAL CARE PLANNING/ 2 [facility] Day: 16    Reason for Admission: Acute respiratory failure due to COVID-19 ([facility] [U07.1, J96.00]     Treatment Plan of Care: intubated fio2 50%, teflaro till 12/5, nimbex, versed, fentanyl, palliative following         Readmission Risk              Risk of Unplanned Readmission:        27            Potential needs: ltach vs snf

## 2020-12-02 NOTE — PROGRESS NOTES
[facility]  Progress Note    Bety Mixon  MRN: [id number]  Date: 12/2/2020  LOS: 12     Reason for F/U :   COVID-19 virus infection    Impression :   1. Acute toxic respiratory failure with hypoxia secondary to COVID-19 virus pneumonia  2. Myelodysplastic syndrome, neutropenic  3. Immunocompromise host  4. Anemia secondary to MDS, status post RBC transfusion  5. Shock secondary to sepsis, resolved    Recommendations:   · Patient received a course of remdesivir, 11/20/2020  · Patient received 1 unit of convalescent plasma, 11/17/2020  · Patient is status post course of dexamethasone  · Patient did receive a course of meropenem and vancomycin  · Plan for patient to receive ceftaroline, initiated 11/30/2020, through 12/5/2020 per Dr. Linh Ching  · Continue supportive care on ARDS protocol and vasopressors as needed    Infection Control Recommendations:   Droplet plus precautions    Discharge Planning:     Patient will need Midline Catheter Insertion/ PICC line Insertion: No  Patient will need: Home IV , [place],  SNF,  LTAC: Undetermined  Patient willneed outpatient wound care: No    Medical Decision making / Summary of Stay:   Arthur James a 70y.o.-year-old female w resp distress transferred from [id number] [facility] pneumonia and underlying MDS.   Neutropenia  On bipap and 50% FIO2, elevated D-Dimers and went for a VQ scan  Past cig smoking - HTN - Raynaud-     Started on decadron remdesevir  started on meropenem and vanco pend BC due to concern for ongoing sepsis  Intubated 11/23 due to respiratory failure and hypotension  Remains sedated on the ventilator, FiO2 90% on 10 of PEEP, slightly better than yesterday     ferritin has been improving     Has had Neupogen to improve her white count  tolerated 1 unit of plasma well 11/17     No positive cultures-  Mycoplasma IGM neg    Current evaluation:12/2/2020    BP (!) 104/49   Pulse 90   Temp 100.4 °F (38 °C) (Oral)   Resp 26   Ht 5' 7\" (1.702 m)   Wt 156 lb 15.5 oz (71.2 kg)   SpO2 94%   BMI 24.58 kg/m²     Temperature Range: Temp: 100.4 °F (38 °C) Temp  Av.6 °F (37.6 °C)  Min: 99.1 °F (37.3 °C)  Max: 100.4 °F (38 °C)    Patient seen and evaluated at bedside  She remains intubated and sedated. The patient remains ventilator dependent, FiO2 down to 40%  Patient with continued intermittent low-grade temperature  Patient continues on Levophed    Review of Systems   Unable to perform ROS: Intubated       Physical Examination :     Physical Exam  Constitutional:       Interventions: She is sedated and intubated. HENT:      Head: Normocephalic and atraumatic. Mouth/Throat:      Comments: Orally intubated  Cardiovascular:      Rate and Rhythm: Regular rhythm. Tachycardia present. Pulmonary:      Effort: Pulmonary effort is normal. No respiratory distress. She is intubated. Musculoskeletal:      Right lower leg: Edema present. Left lower leg: Edema present. Laboratory data:   I have independently reviewed the followinglabs:  CBC with Differential:   Recent Labs     204  20  0430 20  1257   WBC 4.1  --  3.0*  --    HGB 7.5*   < > 7.2* 8.0*   HCT 25.3*   < > 23.8* 26.0*   PLT 81*  --  90*  --    LYMPHOPCT 19*  --  24  --    MONOPCT 6  --  6  --     < > = values in this interval not displayed. BMP:   Recent Labs     20  0430   * 144   K 4.1 3.9   * 107   CO2 30 30   BUN 34* 32*   CREATININE 0.51 0.48*     Hepatic Function Panel:   No results for input(s): PROT, LABALBU, BILIDIR, IBILI, BILITOT, ALKPHOS, ALT, AST in the last 72 hours.       Lab Results   Component Value Date    PROCAL 1.00 2020     Lab Results   Component Value Date    CRP 67.6 2020    .4 2020    .8 2020     No results found for: [address]      Lab Results   Component Value Date    DDIMER 1.93 2020    DDIMER 2.44 2020     Lab Results   Component Value Date    FERRITIN 4,245 11/25/2020    FERRITIN 2,838 11/21/2020    FERRITIN 3,590 11/20/2020    FERRITIN 3,453 11/18/2020    FERRITIN 3,024 11/17/2020     Lab Results   Component Value Date     11/18/2020     11/17/2020     11/17/2020     Lab Results   Component Value Date    FIBRINOGEN 506 11/21/2020    FIBRINOGEN 673 11/20/2020    FIBRINOGEN 1002 11/18/2020    FIBRINOGEN 720 11/17/2020    FIBRINOGEN 721 11/17/2020       Results in Past 30 Days  Result Component Current Result Ref Range Previous Result Ref Range   SARS-CoV-2, PCR DETECTED (A) (11/17/2020) Not Detected Positive (A) (11/16/2020)      Lab Results   Component Value Date    COVID19 DETECTED 11/17/2020    COVID19 Positive 11/16/2020       No results for input(s): VANCOTROUGH in the last 72 hours. Imaging Studies:   Chest x-ray 11/1/2020  Impression:          No significant change in appearance of pneumonia        Cultures:     Culture, Respiratory [[id number]]   Collected: 11/30/20 2217    Order Status: Completed  Specimen: Sputum Aspirated  Updated: 12/02/20 7576     Specimen Description  . ASPIRATED SPUTUM     Special Requests  NOT REPORTED     Direct Exam  >25 NEUTROPHILS/LPF      < 10 EPITHELIAL CELLS/LPF      NO SIGNIFICANT PATHOGENS SEEN     Culture  NORMAL RESPIRATORY MORIS MODERATE GROWTH    Culture, Blood 1 [[id number]]   Collected: 11/30/20 2017    Order Status: Completed  Specimen: Blood  Updated: 12/02/20 0728     Specimen Description  . BLOOD     Special Requests  R HAND 9 ML     Culture  NO GROWTH 2 DAYS    Culture, Blood 1 [[id number]]   Collected: 11/30/20 2009    Order Status: Completed  Specimen: Blood  Updated: 12/02/20 0728     Specimen Description  . BLOOD     Special Requests  L HAND 11 ML     Culture  NO GROWTH 2 DAYS          Medications:      midodrine  10 mg Oral TID WC    QUEtiapine  50 mg Oral Nightly    enoxaparin  40 mg Subcutaneous Daily    ceftaroline fosamil (TEFLARO) IVPB  600 mg Intravenous Q12H  docusate  100 mg Oral BID    pantoprazole  40 mg Intravenous BID    And    sodium chloride (PF)  10 mL Intravenous Daily    LORazepam  0.25 mg Intravenous Once    latanoprost  1 drop Both Eyes Nightly    sodium chloride  20 mL Intravenous Once    sodium chloride  20 mL Intravenous Once           [facility]  [address] [facility] [employer] messaging  OFFICE: [phone]      Electronically signed by MICHAEL Javed CNP on 12/2/2020 at 2:03 PM  Thank you for allowing us to participate in the care of this patient. Please call with questions. This note iscreated with the assistance of a speech recognition program.  While intending to generate a document that actually reflects the content of the visit, the document can still have some errors including those of syntax andsound a like substitutions which may escape proof reading. In such instances, actual meaning can be extrapolated by contextual diversion.

## 2020-12-02 NOTE — PLAN OF CARE
Will show no infection signs and symptoms  12/2/2020 4108 by Dre Duran RN  Outcome: Ongoing  12/2/2020 0916 by Wesley Underwood RCP  Outcome: Ongoing  Goal: Absence of new skin breakdown  Description: Absence of new skin breakdown  12/2/2020 5273 by Dre Duran RN  Outcome: Ongoing  12/2/2020 0916 by Wesley Underwood RCP  Outcome: Ongoing     Problem: Falls - Risk of:  Goal: Will remain free from falls  Description: Will remain free from falls  Outcome: Ongoing  Goal: Absence of physical injury  Description: Absence of physical injury  Outcome: Ongoing     Problem: Pain:  Goal: Pain level will decrease  Description: Pain level will decrease  Outcome: Ongoing  Goal: Control of acute pain  Description: Control of acute pain  Outcome: Ongoing  Goal: Control of chronic pain  Description: Control of chronic pain  Outcome: Ongoing     Problem: Airway Clearance - Ineffective  Goal: Achieve or maintain patent airway  12/2/2020 1154 by Dre Duran RN  Outcome: Ongoing  12/2/2020 0916 by Wesley Underwood RCP  Outcome: Ongoing     Problem: Gas Exchange - Impaired  Goal: Absence of hypoxia  12/2/2020 1589 by Dre Duran RN  Outcome: Ongoing  12/2/2020 0916 by Wesley Underwood RCP  Outcome: Ongoing  Goal: Promote optimal lung function  12/2/2020 5569 by Dre Duran RN  Outcome: Ongoing  12/2/2020 0916 by Wesley Underwood RCP  Outcome: Ongoing     Problem: Breathing Pattern - Ineffective  Goal: Ability to achieve and maintain a regular respiratory rate  12/2/2020 7119 by Dre Duran RN  Outcome: Ongoing  12/2/2020 0916 by Wesley Underwood RCP  Outcome: Ongoing     Problem:  Body Temperature -  Risk of, Imbalanced  Goal: Ability to maintain a body temperature within defined limits  Outcome: Ongoing  Goal: Will regain or maintain usual level of consciousness  Outcome: Ongoing  Goal: Complications related to the disease process, condition or treatment will be avoided or minimized  Outcome: Ongoing     Problem: Isolation Precautions - Risk of Spread of Infection  Goal: Prevent transmission of infection  Outcome: Ongoing     Problem: Nutrition Deficits  Goal: Optimize nutrtional status  Outcome: Ongoing     Problem: Risk for Fluid Volume Deficit  Goal: Maintain normal heart rhythm  Outcome: Ongoing  Goal: Maintain absence of muscle cramping  Outcome: Ongoing  Goal: Maintain normal serum potassium, sodium, calcium, phosphorus, and pH  Outcome: Ongoing     Problem: Loneliness or Risk for Loneliness  Goal: Demonstrate positive use of time alone when socialization is not possible  Outcome: Ongoing     Problem: Fatigue  Goal: Verbalize increase energy and improved vitality  Outcome: Ongoing     Problem: Patient Education: Go to Patient Education Activity  Goal: Patient/Family Education  Outcome: Ongoing     Problem: OXYGENATION/RESPIRATORY FUNCTION  Goal: Patient will maintain patent airway  12/2/2020 8558 by Mallorie Langston RN  Outcome: Ongoing  12/2/2020 0916 by Oscar Zurita RCP  Outcome: Ongoing  Goal: Patient will achieve/maintain normal respiratory rate/effort  Description: Respiratory rate and effort will be within normal limits for the patient  12/2/2020 0685 by Mallorie Langston RN  Outcome: Ongoing  12/2/2020 0916 by Oscar Zurita RCP  Outcome: Ongoing     Problem: Nutrition  Goal: Optimal nutrition therapy  Description: Nutrition Problem #1: Inadequate oral intake  Intervention: Food and/or Nutrient Delivery: Continue NPO(Start diet vs nutrition support as able)  Nutritional Goals: Start diet vs nutrition support within 24-72 hrs     12/2/2020 4374 by Mallorie Langston RN  Outcome: Ongoing  12/2/2020 1153 by Boo Hunt RD, LD  Outcome: Ongoing  Note: Nutrition Problem #1: Inadequate oral intake  Intervention: Food and/or Nutrient Delivery: Continue NPO, Continue Current Tube Feeding  Nutritional Goals: meet % of estimated nutrient needs      Problem: MECHANICAL VENTILATION  Goal: Patient will maintain patent airway  12/2/2020 1528 by Meghan CID CITLALY Morris  Outcome: Ongoing  12/2/2020 0916 by Ama Saunders RCP  Outcome: Ongoing  Goal: Oral health is maintained or improved  12/2/2020 5862 by Anastasiya Mary RN  Outcome: Ongoing  12/2/2020 0916 by Ama Saunders RCP  Outcome: Ongoing  Goal: ET tube will be managed safely  12/2/2020 5833 by Anastasiya Mary RN  Outcome: Ongoing  12/2/2020 0916 by Ama Saunders RCP  Outcome: Ongoing  Goal: Ability to express needs and understand communication  12/2/2020 4612 by Anastasiya Mary RN  Outcome: Ongoing  12/2/2020 0916 by Ama Saunders RCP  Outcome: Ongoing  Goal: Mobility/activity is maintained at optimum level for patient  12/2/2020 0977 by Anastasiya Mary RN  Outcome: Ongoing  12/2/2020 0916 by Ama Saunders RCP  Outcome: Ongoing     Problem: SKIN INTEGRITY  Goal: Skin integrity is maintained or improved  12/2/2020 9293 by Anastasiya Mary RN  Outcome: Ongoing  12/2/2020 0916 by Ama Saunders RCP  Outcome: Ongoing

## 2020-12-02 NOTE — PLAN OF CARE
Problem:  Activity:  Goal: Fatigue will decrease  Description: Fatigue will decrease  12/2/2020 0038 by Keysha Hanna RN  Outcome: Ongoing  12/1/2020 1316 by Bladimir Cee RN  Outcome: Ongoing     Problem: Cardiac:  Goal: Hemodynamic stability will improve  Description: Hemodynamic stability will improve  12/2/2020 0038 by Keysha Hanna RN  Outcome: Ongoing  12/1/2020 1316 by Bladimir Cee RN  Outcome: Ongoing     Problem: Coping:  Goal: Level of anxiety will decrease  Description: Level of anxiety will decrease  12/2/2020 0038 by Keysha Hanna RN  Outcome: Ongoing  12/1/2020 1316 by Bladimir Cee RN  Outcome: Ongoing  Goal: Ability to cope will improve  Description: Ability to cope will improve  12/2/2020 0038 by Keysha Hanna RN  Outcome: Ongoing  12/1/2020 1316 by Bladimir Cee RN  Outcome: Ongoing  Goal: Ability to establish a method of communication will improve  Description: Ability to establish a method of communication will improve  12/2/2020 0038 by Keysha Hanna RN  Outcome: Ongoing  12/1/2020 1316 by Bladimir Cee RN  Outcome: Ongoing     Problem: Nutritional:  Goal: Consumption of the prescribed amount of daily calories will improve  Description: Consumption of the prescribed amount of daily calories will improve  12/2/2020 0038 by Keysha Hanna RN  Outcome: Ongoing  12/1/2020 1316 by Bladimir Cee RN  Outcome: Ongoing     Problem: Respiratory:  Goal: Ability to maintain a clear airway will improve  Description: Ability to maintain a clear airway will improve  12/2/2020 0038 by Keysha Hanna RN  Outcome: Ongoing  12/1/2020 1316 by Bladimir Cee RN  Outcome: Ongoing  Goal: Ability to maintain adequate ventilation will improve  Description: Ability to maintain adequate ventilation will improve  12/2/2020 0038 by Keysha Hanna RN  Outcome: Ongoing  12/1/2020 1316 by Bladimir Cee RN  Outcome: Ongoing  Goal: Complications related to the disease process, condition or treatment will be airway  12/2/2020 0038 by Mckayla Armando RN  Outcome: Ongoing  12/1/2020 1316 by Avtar Cote RN  Outcome: Ongoing     Problem: Gas Exchange - Impaired  Goal: Absence of hypoxia  12/2/2020 0038 by Mckayla Armando RN  Outcome: Ongoing  12/1/2020 1316 by Avtar Cote RN  Outcome: Ongoing  Goal: Promote optimal lung function  12/2/2020 0038 by Mckayla Armando RN  Outcome: Ongoing  12/1/2020 1316 by Avtar Cote RN  Outcome: Ongoing     Problem: Breathing Pattern - Ineffective  Goal: Ability to achieve and maintain a regular respiratory rate  12/2/2020 0038 by Mckayla Armando RN  Outcome: Ongoing  12/1/2020 1316 by Avtar Cote RN  Outcome: Ongoing     Problem:  Body Temperature -  Risk of, Imbalanced  Goal: Ability to maintain a body temperature within defined limits  12/2/2020 0038 by Mckayla Armando RN  Outcome: Ongoing  12/1/2020 1316 by Avtar Cote RN  Outcome: Ongoing  Goal: Will regain or maintain usual level of consciousness  12/2/2020 0038 by Mckayla Armando RN  Outcome: Ongoing  12/1/2020 1316 by Avtar Cote RN  Outcome: Ongoing  Goal: Complications related to the disease process, condition or treatment will be avoided or minimized  12/2/2020 0038 by Mckayla Armando RN  Outcome: Ongoing  12/1/2020 1316 by Avtar Cote RN  Outcome: Ongoing     Problem: Isolation Precautions - Risk of Spread of Infection  Goal: Prevent transmission of infection  12/2/2020 0038 by Mckayla Armando RN  Outcome: Ongoing  12/1/2020 1316 by Avtar Cote RN  Outcome: Ongoing     Problem: Nutrition Deficits  Goal: Optimize nutrtional status  12/2/2020 0038 by Mckayla Armando RN  Outcome: Ongoing  12/1/2020 1316 by Avtar Cote RN  Outcome: Ongoing     Problem: Risk for Fluid Volume Deficit  Goal: Maintain normal heart rhythm  12/2/2020 0038 by Mckayla Armando RN  Outcome: Ongoing  12/1/2020 1316 by Avtar Cote RN  Outcome: Ongoing  Goal: Maintain absence of muscle cramping  12/2/2020 0038 by Mckayla Armando RN  Outcome: Ongoing  12/1/2020 1316 by Madison Dickson RN  Outcome: Ongoing  Goal: Maintain normal serum potassium, sodium, calcium, phosphorus, and pH  12/2/2020 0038 by Jillian Unger RN  Outcome: Ongoing  12/1/2020 1316 by Madison Dickson RN  Outcome: Ongoing     Problem: Loneliness or Risk for Loneliness  Goal: Demonstrate positive use of time alone when socialization is not possible  12/2/2020 0038 by Jillian Unger RN  Outcome: Ongoing  12/1/2020 1316 by Madison Dickson RN  Outcome: Ongoing     Problem: Fatigue  Goal: Verbalize increase energy and improved vitality  12/2/2020 0038 by Jillian Unger RN  Outcome: Ongoing  12/1/2020 1316 by Madison Dickson RN  Outcome: Ongoing     Problem: Patient Education: Go to Patient Education Activity  Goal: Patient/Family Education  12/2/2020 0038 by Jillian Unger RN  Outcome: Ongoing  12/1/2020 1316 by Madison Dickson RN  Outcome: Ongoing     Problem: OXYGENATION/RESPIRATORY FUNCTION  Goal: Patient will maintain patent airway  12/2/2020 0038 by Jillian Unger RN  Outcome: Ongoing  12/1/2020 2119 by Di Leal RCP  Outcome: Ongoing  12/1/2020 1316 by Madison Dickson RN  Outcome: Ongoing  Goal: Patient will achieve/maintain normal respiratory rate/effort  Description: Respiratory rate and effort will be within normal limits for the patient  12/2/2020 0038 by Jillian Unger RN  Outcome: Ongoing  12/1/2020 2119 by Di Leal RCP  Outcome: Ongoing  12/1/2020 1316 by Madison Dickson RN  Outcome: Ongoing     Problem: Nutrition  Goal: Optimal nutrition therapy  Description: Nutrition Problem #1: Inadequate oral intake  Intervention: Food and/or Nutrient Delivery: Continue NPO(Start diet vs nutrition support as able)  Nutritional Goals: Start diet vs nutrition support within 24-72 hrs     12/2/2020 0038 by Jillian Unger RN  Outcome: Ongoing  12/1/2020 1316 by Madison Dickson RN  Outcome: Ongoing     Problem: MECHANICAL VENTILATION  Goal: Patient will maintain patent airway  12/2/2020 0038 by Yenny Fernandez RN  Outcome: Ongoing  12/1/2020 2119 by Paco Beavers RCP  Outcome: Ongoing  12/1/2020 1316 by Oswald Melendez RN  Outcome: Ongoing  Goal: Oral health is maintained or improved  12/2/2020 0038 by Yenny Fernandez RN  Outcome: Ongoing  12/1/2020 2119 by Paco Beavers RCP  Outcome: Ongoing  12/1/2020 1316 by Oswald Melendez RN  Outcome: Ongoing  Goal: ET tube will be managed safely  12/2/2020 0038 by Yenny Fernandez RN  Outcome: Ongoing  12/1/2020 2119 by Paco Beavers RCP  Outcome: Ongoing  12/1/2020 1316 by Oswald Melendez RN  Outcome: Ongoing  Goal: Ability to express needs and understand communication  12/2/2020 0038 by Yenny Fernandez RN  Outcome: Ongoing  12/1/2020 2119 by Paco Beavers RCP  Outcome: Ongoing  12/1/2020 1316 by Oswald Melendez RN  Outcome: Ongoing  Goal: Mobility/activity is maintained at optimum level for patient  12/2/2020 0038 by Yenny Fernandez RN  Outcome: Ongoing  12/1/2020 2119 by Paco Beavers RCP  Outcome: Ongoing  12/1/2020 1316 by Oswald Melendez RN  Outcome: Ongoing     Problem: SKIN INTEGRITY  Goal: Skin integrity is maintained or improved  12/2/2020 0038 by Yenny Fernandez RN  Outcome: Ongoing  12/1/2020 2119 by Paco Beavers RCP  Outcome: Ongoing  12/1/2020 1316 by Oswald Melendez RN  Outcome: Ongoing

## 2020-12-02 NOTE — PLAN OF CARE
Problem: OXYGENATION/RESPIRATORY FUNCTION  Goal: Patient will maintain patent airway  12/1/2020 2119 by Lowella Area, RCP  Outcome: Ongoing     Problem: OXYGENATION/RESPIRATORY FUNCTION  Goal: Patient will achieve/maintain normal respiratory rate/effort  Description: Respiratory rate and effort will be within normal limits for the patient  12/1/2020 2119 by Lowella Area, RCP  Outcome: Ongoing     Problem: MECHANICAL VENTILATION  Goal: Patient will maintain patent airway  12/1/2020 2119 by Lowella Area, RCP  Outcome: Ongoing     Problem: MECHANICAL VENTILATION  Goal: Oral health is maintained or improved  12/1/2020 2119 by Lowella Area, RCP  Outcome: Ongoing     Problem: MECHANICAL VENTILATION  Goal: ET tube will be managed safely  12/1/2020 2119 by Lowella Area, RCP  Outcome: Ongoing     Problem: MECHANICAL VENTILATION  Goal: Ability to express needs and understand communication  12/1/2020 2119 by Lowella Area, RCP  Outcome: Ongoing     Problem: MECHANICAL VENTILATION  Goal: Mobility/activity is maintained at optimum level for patient  12/1/2020 2119 by Lowella Area, RCP  Outcome: Ongoing     Problem: SKIN INTEGRITY  Goal: Skin integrity is maintained or improved  12/1/2020 2119 by Lowella Area, RCP  Outcome: Ongoing

## 2020-12-02 NOTE — PROGRESS NOTES
Today's Date: 12/2/2020  Patient Name: Lila Mays  Date of admission: 11/16/2020 10:51 PM  Patient's age: 70 y.o., 1945  Admission Dx: Acute respiratory failure due to COVID-19 ([facility] [U07.1, J96.00]    Reason for Consult: management recommendations  Requesting Physician: Orestes Caro MD    CHIEF COMPLAINT: Anemia. MDS. COVID-19 positive. History Obtained From:  patient, electronic medical record    Interval history:    Chart reviewed intervals noted  No significant changes. No active bleeding. No fever. HISTORY OF PRESENT ILLNESS:      The patient is a 70 y.o.  female who is admitted to the hospital with chief complaint of shortness of breath dyspnea for the last 3 days. Further testing revealed patient was COVID-19 positive. Patient was initially seen in outlying hospital and then transferred. Patient is requiring noninvasive positive pressure ventilation support. CTA has not been able to get done due to contrast allergy. Reportedly patient also has history of MDS. CBC from 9/2020 showed a WBC count of 1.6 hemoglobin 9.4 with MCV 95. Patient has been started on full dose anticoagulation due to concerns regarding pulmonary embolism. Patient was also recently started on Vidaza and has received 1 cycle so far. Patient is also transfusion dependent. Patient is supposed to get next cycle of Vidaza in the next week or so. Patient most recent bone marrow biopsy from 9/2020 showed normocellular marrow with erythroid hyperplasia and mild this erythropoiesis and 8% blasts. Recommendations from [address] [place] [address],[address]: Copied from care everywhere. ASSESSMENT AND PALN:  Ms. 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  In this situation, we previously discussed that we often Cardiac surgery; Tubal ligation; Appendectomy; Cholecystectomy; Hysterectomy, total abdominal; Small intestine surgery; and IR PORT PLACEMENT < 5 YEARS. Medications:    Prior to Admission medications    Medication Sig Start Date End Date Taking? Authorizing Provider   dapsone 100 MG tablet Take 1 tablet by mouth daily (with breakfast) 10/16/20  Yes Historical Provider, MD   aspirin 81 MG EC tablet Take 81 mg by mouth daily   Yes Historical Provider, MD   carvedilol (COREG) 6.25 MG tablet Take 6.25 mg by mouth 2 times daily (with meals)   Yes Historical Provider, MD   Isavuconazonium Sulfate (CRESEMBA) 186 MG CAPS Take by mouth   Yes Historical Provider, MD   diphenhydrAMINE (BENADRYL) 25 MG capsule Take 25 mg by mouth every 6 hours as needed for Itching   Yes Historical Provider, MD   hydroxychloroquine (PLAQUENIL) 200 MG tablet Take 200 mg by mouth daily   Yes Historical Provider, MD   nitroGLYCERIN (NITROSTAT) 0.4 MG SL tablet Place 0.4 mg under the tongue every 5 minutes as needed for Chest pain up to max of 3 total doses. If no relief after 1 dose, call 911.    Yes Historical Provider, MD   omeprazole (PRILOSEC) 20 MG delayed release capsule Take 40 mg by mouth daily   Yes Historical Provider, MD   phenazopyridine (PYRIDIUM) 100 MG tablet Take 100 mg by mouth 3 times daily as needed for Pain   Yes Historical Provider, MD   predniSONE (DELTASONE) 1 MG tablet Take 1 mg by mouth daily   Yes Historical Provider, MD   prochlorperazine (COMPAZINE) 25 MG suppository Place 25 mg rectally every 12 hours as needed for Nausea   Yes Historical Provider, MD   Travoprost (TRAVATAN OP) Apply to eye   Yes Historical Provider, MD     Current Facility-Administered Medications   Medication Dose Route Frequency Provider Last Rate Last Dose    norepinephrine (LEVOPHED) 16 mg in sodium chloride 0.9 % 250 mL infusion  2 mcg/min Intravenous Continuous Jimenez Pierce DO 3.8 mL/hr at 12/02/20 1629 4 mcg/min at 12/02/20 1629  lubrifresh P.M. (artificial tears) ophthalmic ointment   Both Eyes PRN John Justin MD        midodrine (PROAMATINE) tablet 10 mg  10 mg Oral TID WC Naeem Logan MD   10 mg at 12/02/20 1629    QUEtiapine (SEROQUEL) tablet 50 mg  50 mg Oral Nightly Reid Pierce DO   50 mg at 12/01/20 1939    enoxaparin (LOVENOX) injection 40 mg  40 mg Subcutaneous Daily Debby Coulter MD   40 mg at 12/02/20 0944    cisatracurium besylate (NIMBEX) 200 mg in sodium chloride 0.9 % 100 mL infusion  2 mcg/kg/min Intravenous Continuous Dione Agarwal MD   Stopped at 12/02/20 1359    ceftaroline fosamil (TEFLARO) 600 mg in dextrose 5 % 50 mL IVPB  600 mg Intravenous Q12H Sixto Bradley MD   Stopped at 12/02/20 0723    docusate (COLACE) 50 MG/5ML liquid 100 mg  100 mg Oral BID Debby Coulter MD   100 mg at 12/01/20 1938    pantoprazole (PROTONIX) injection 40 mg  40 mg Intravenous BID MICHAEL Galvez - CNP   40 mg at 12/02/20 0944    And    sodium chloride (PF) 0.9 % injection 10 mL  10 mL Intravenous Daily Samuel Chakraborty APRN - CNP   10 mL at 12/02/20 0944    fentaNYL 20 mcg/mL Infusion  25 mcg/hr Intravenous Continuous Christiano Pierce DO 7.5 mL/hr at 12/02/20 1413 150 mcg/hr at 12/02/20 1413    midazolam (VERSED) 1 mg/mL in D5W infusion  1 mg/hr Intravenous Continuous Samir Williamson MD 8 mL/hr at 12/02/20 0745 8 mg/hr at 12/02/20 0745    LORazepam (ATIVAN) injection 0.5 mg  0.5 mg Intravenous Q8H PRN Dione Agarwal MD   0.5 mg at 11/29/20 0051    LORazepam (ATIVAN) injection 0.25 mg  0.25 mg Intravenous Once MICHAEL Hoffman CNP   Stopped at 11/21/20 0502    latanoprost (XALATAN) 0.005 % ophthalmic solution 1 drop  1 drop Both Eyes Nightly Dione Agarwal MD   1 drop at 12/01/20 7930    0.9 % sodium chloride bolus  30 mL Intravenous PRN Nadyne Clock, DO        0.9 % sodium chloride bolus  20 mL Intravenous Once Nadyne Clock, DO        0.9 % sodium chloride bolus  20 mL Intravenous Once Vilma Briceño MD        sodium chloride flush 0.9 % injection 10 mL  10 mL Intravenous PRN Nadyne Clock, DO   10 mL at 11/30/20 0824    potassium chloride (KLOR-CON M) extended release tablet 40 mEq  40 mEq Oral PRN Roslynn Drought, APRN - CNS   40 mEq at 11/22/20 1544    Or    potassium bicarb-citric acid (EFFER-K) effervescent tablet 40 mEq  40 mEq Oral PRN Roslynn Drought, APRN - CNS   40 mEq at 11/21/20 6931    Or    potassium chloride 10 mEq/100 mL IVPB (Peripheral Line)  10 mEq Intravenous PRN Roslynn Drought, APRN - CNS        magnesium sulfate 1 g in dextrose 5% 100 mL IVPB  1 g Intravenous PRN Roslynn Drought, APRN - CNS        acetaminophen (TYLENOL) tablet 650 mg  650 mg Oral Q6H PRN Roslynn Drought, APRN - CNS   650 mg at 12/02/20 0330    Or    acetaminophen (TYLENOL) suppository 650 mg  650 mg Rectal Q6H PRN Roslynn Drought, APRN - CNS        polyethylene glycol (GLYCOLAX) packet 17 g  17 g Oral Daily PRN Roslynn Drought, APRN - CNS   17 g at 12/01/20 0901    promethazine (PHENERGAN) tablet 12.5 mg  12.5 mg Oral Q6H PRN Roslynn Drought, APRN - CNS        Or    ondansetron (ZOFRAN) injection 4 mg  4 mg Intravenous Q6H PRN Roslynn Drought, APRN - CNS        nicotine (NICODERM CQ) 21 MG/24HR 1 patch  1 patch Transdermal Daily PRN Roslynn Drought, APRN - CNS           Allergies:  Fish allergy; Hydrocodone-acetaminophen; Tizanidine; Atorvastatin; Metoclopramide; Moxifloxacin; Oxycodone; Pregabalin; Tramadol; Zolpidem; Cephalexin; Iodides; and Sulfa antibiotics    Social History:   reports that she quit smoking about 15 years ago. Her smoking use included cigarettes. She started smoking about 63 years ago. She does not have any smokeless tobacco history on file. She reports that she does not drink alcohol or use drugs. Family History: family history includes Early Death in her mother; Heart Disease in her mother; Heart Failure in her mother; Stroke in her father.     REVIEW OF 6.4 - 8.3 g/dL    Alb 3.0 (L) 3.5 - 5.2 g/dL    Albumin/Globulin Ratio 1.0 1.0 - 2.5    GFR Non-African American >60 >60 mL/min    GFR African American >60 >60 mL/min    GFR Comment          GFR Staging NOT REPORTED    C-Reactive Protein   Result Value Ref Range    .8 (H) 0.0 - 5.0 mg/L   Ferritin   Result Value Ref Range    Ferritin 3,046 (H) 13 - 150 ug/L   Fibrinogen   Result Value Ref Range    Fibrinogen 721 (H) 140 - 420 mg/dL   Lactate Dehydrogenase   Result Value Ref Range     (H) 135 - 214 U/L   CBC   Result Value Ref Range    WBC 1.2 (LL) 3.5 - 11.3 k/uL    RBC 2.37 (L) 3.95 - 5.11 m/uL    Hemoglobin 7.0 (LL) 11.9 - 15.1 g/dL    Hematocrit 22.4 (L) 36.3 - 47.1 %    MCV 94.5 82.6 - 102.9 fL    MCH 29.5 25.2 - 33.5 pg    MCHC 31.3 28.4 - 34.8 g/dL    RDW 19.0 (H) 11.8 - 14.4 %    Platelets 378 575 - 688 k/uL    MPV 10.7 8.1 - 13.5 fL    NRBC Automated 0.0 0.0 per 100 WBC   Differential   Result Value Ref Range    Differential Type NOT REPORTED     WBC Morphology NOT REPORTED     RBC Morphology NOT REPORTED     Platelet Estimate NOT REPORTED     Immature Granulocytes 0 0 %    Seg Neutrophils 33 (L) 36 - 66 %    Lymphocytes 48 (H) 24 - 44 %    Atypical Lymphocytes 1 %    Monocytes 17 (H) 1 - 7 %    Eosinophils % 1 1 - 4 %    Basophils 0 0 - 2 %    Absolute Immature Granulocyte 0.00 0.00 - 0.30 k/uL    Segs Absolute 0.43 (LL) 1.8 - 7.7 k/uL    Absolute Lymph # 0.63 (L) 1.0 - 4.8 k/uL    Atypical Lymphocytes Absolute 0.01 k/uL    Absolute Mono # 0.22 0.1 - 0.8 k/uL    Absolute Eos # 0.01 0.0 - 0.4 k/uL    Basophils Absolute 0.00 0.0 - 0.2 k/uL    Morphology ANISOCYTOSIS PRESENT    D-Dimer, Quantitative   Result Value Ref Range    D-Dimer, Quant 2.44 mg/L FEU   Ferritin   Result Value Ref Range    Ferritin 3,024 (H) 13 - 150 ug/L   Fibrinogen   Result Value Ref Range    Fibrinogen 720 (H) 140 - 420 mg/dL   Protime-INR   Result Value Ref Range    Protime 9.5 9.0 - 12.0 sec    INR 0.9    Lactate NEGATIVE    Leukocyte Esterase, Urine NEGATIVE NEGATIVE    Urinalysis Comments NOT REPORTED    OCCULT BLOOD SCREEN   Result Value Ref Range    Occult Blood, Stool #1 NEGATIVE NEGATIVE    Date, Stool #1 . FECES     Time, Stool #1 . FECES     Occult Blood, Stool #2 NOT REPORTED NEGATIVE    Date, Stool #2 NOT REPORTED     Time, Stool #2 NOT REPORTED     Occult Blood, Stool #3 NOT REPORTED NEGATIVE    Date, Stool #3 NOT REPORTED     Time, Stool #3 NOT REPORTED    Microscopic Urinalysis   Result Value Ref Range    -          WBC, UA 0 TO 2 0 - 5 /HPF    RBC, UA 0 TO 2 0 - 4 /HPF    Casts UA  0 - 8 /LPF     5 TO 10 HYALINE Reference range defined for non-centrifuged specimen. Crystals, UA NOT REPORTED None /HPF    Epithelial Cells UA 0 TO 2 0 - 5 /HPF    Renal Epithelial, UA NOT REPORTED 0 /HPF    Bacteria, UA NOT REPORTED None    Mucus, UA NOT REPORTED None    Trichomonas, UA NOT REPORTED None    Amorphous, UA NOT REPORTED None    Other Observations UA NOT REPORTED NOT REQ.     Yeast, UA NOT REPORTED None   Vitamin B12 & Folate   Result Value Ref Range    Vitamin B-12 >2000 (H) 232 - 1245 pg/mL    Folate 11.5 >4.8 ng/mL   Iron and TIBC   Result Value Ref Range    Iron 92 37 - 145 ug/dL    TIBC 136 (L) 250 - 450 ug/dL    Iron Saturation 68 (H) 20 - 55 %    UIBC 44 (L) 112 - 347 ug/dL   CBC   Result Value Ref Range    WBC 2.0 (L) 3.5 - 11.3 k/uL    RBC 2.62 (L) 3.95 - 5.11 m/uL    Hemoglobin 7.7 (L) 11.9 - 15.1 g/dL    Hematocrit 25.6 (L) 36.3 - 47.1 %    MCV 97.7 82.6 - 102.9 fL    MCH 29.4 25.2 - 33.5 pg    MCHC 30.1 28.4 - 34.8 g/dL    RDW 19.6 (H) 11.8 - 14.4 %    Platelets 734 363 - 741 k/uL    MPV 11.1 8.1 - 13.5 fL    NRBC Automated 0.0 0.0 per 100 WBC   Renal Function Panel   Result Value Ref Range    Glucose 93 70 - 99 mg/dL    BUN 23 8 - 23 mg/dL    CREATININE 0.81 0.50 - 0.90 mg/dL    Bun/Cre Ratio NOT REPORTED 9 - 20    Calcium 8.4 (L) 8.6 - 10.4 mg/dL    Alb 2.8 (L) 3.5 - 5.2 g/dL    Phosphorus 3.3 2.6 - 4.5 mg/dL    Sodium 138 135 - 144 mmol/L    Potassium 3.6 (L) 3.7 - 5.3 mmol/L    Chloride 100 98 - 107 mmol/L    CO2 22 20 - 31 mmol/L    Anion Gap 16 9 - 17 mmol/L    GFR Non-African American >60 >60 mL/min    GFR African American >60 >60 mL/min    GFR Comment          GFR Staging NOT REPORTED    Magnesium   Result Value Ref Range    Magnesium 2.5 1.6 - 2.6 mg/dL   FIBRINOGEN   Result Value Ref Range    Fibrinogen 1002 (H) 140 - 420 mg/dL   C-Reactive Protein   Result Value Ref Range    .4 (H) 0.0 - 5.0 mg/L   Ferritin   Result Value Ref Range    Ferritin 3,453 (H) 13 - 150 ug/L   Lactate Dehydrogenase   Result Value Ref Range     (H) 135 - 214 U/L   Magnesium   Result Value Ref Range    Magnesium 2.4 1.6 - 2.6 mg/dL   Comprehensive Metabolic Panel   Result Value Ref Range    Glucose 94 70 - 99 mg/dL    BUN 19 8 - 23 mg/dL    CREATININE 0.53 0.50 - 0.90 mg/dL    Bun/Cre Ratio NOT REPORTED 9 - 20    Calcium 8.4 (L) 8.6 - 10.4 mg/dL    Sodium 138 135 - 144 mmol/L    Potassium 4.0 3.7 - 5.3 mmol/L    Chloride 102 98 - 107 mmol/L    CO2 21 20 - 31 mmol/L    Anion Gap 15 9 - 17 mmol/L    Alkaline Phosphatase 58 35 - 104 U/L    ALT 35 (H) 5 - 33 U/L    AST 33 (H) <32 U/L    Total Bilirubin 0.47 0.3 - 1.2 mg/dL    Total Protein 5.6 (L) 6.4 - 8.3 g/dL    Alb 2.6 (L) 3.5 - 5.2 g/dL    Albumin/Globulin Ratio 0.9 (L) 1.0 - 2.5    GFR Non-African American >60 >60 mL/min    GFR African American >60 >60 mL/min    GFR Comment          GFR Staging NOT REPORTED    CBC Auto Differential   Result Value Ref Range    WBC 3.3 (L) 3.5 - 11.3 k/uL    RBC 2.61 (L) 3.95 - 5.11 m/uL    Hemoglobin 7.8 (L) 11.9 - 15.1 g/dL    Hematocrit 23.8 (L) 36.3 - 47.1 %    MCV 91.2 82.6 - 102.9 fL    MCH 29.9 25.2 - 33.5 pg    MCHC 32.8 28.4 - 34.8 g/dL    RDW 19.2 (H) 11.8 - 14.4 %    Platelets 145 091 - 751 k/uL    MPV 11.4 8.1 - 13.5 fL    NRBC Automated 0.0 0.0 per 100 WBC    Differential Type NOT REPORTED     WBC Morphology NOT REPORTED RBC Morphology NOT REPORTED     Platelet Estimate NOT REPORTED     Immature Granulocytes 0 0 %    Seg Neutrophils 56 36 - 66 %    Lymphocytes 36 24 - 44 %    Monocytes 8 (H) 1 - 7 %    Eosinophils % 0 (L) 1 - 4 %    Basophils 0 0 - 2 %    Absolute Immature Granulocyte 0.00 0.00 - 0.30 k/uL    Segs Absolute 1.85 1.8 - 7.7 k/uL    Absolute Lymph # 1.19 1.0 - 4.8 k/uL    Absolute Mono # 0.26 0.1 - 0.8 k/uL    Absolute Eos # 0.00 0.0 - 0.4 k/uL    Basophils Absolute 0.00 0.0 - 0.2 k/uL    Morphology ANISOCYTOSIS PRESENT    Mycoplasma pneumoniae antibody, IgM   Result Value Ref Range    Mycoplasma pneumo IgM 0.20 <0.91   CBC Auto Differential   Result Value Ref Range    WBC 9.0 3.5 - 11.3 k/uL    RBC 2.60 (L) 3.95 - 5.11 m/uL    Hemoglobin 8.0 (L) 11.9 - 15.1 g/dL    Hematocrit 26.0 (L) 36.3 - 47.1 %    .0 82.6 - 102.9 fL    MCH 30.8 25.2 - 33.5 pg    MCHC 30.8 28.4 - 34.8 g/dL    RDW 19.6 (H) 11.8 - 14.4 %    Platelets 150 820 - 474 k/uL    MPV 11.4 8.1 - 13.5 fL    NRBC Automated 0.0 0.0 per 100 WBC    Differential Type NOT REPORTED     WBC Morphology NOT REPORTED     RBC Morphology NOT REPORTED     Platelet Estimate NOT REPORTED     Immature Granulocytes 1 (H) 0 %    Seg Neutrophils 74 (H) 36 - 66 %    Lymphocytes 15 (L) 24 - 44 %    Monocytes 10 (H) 1 - 7 %    Eosinophils % 0 (L) 1 - 4 %    Basophils 0 0 - 2 %    Absolute Immature Granulocyte 0.09 0.00 - 0.30 k/uL    Segs Absolute 6.66 1.8 - 7.7 k/uL    Absolute Lymph # 1.35 1.0 - 4.8 k/uL    Absolute Mono # 0.90 (H) 0.1 - 0.8 k/uL    Absolute Eos # 0.00 0.0 - 0.4 k/uL    Basophils Absolute 0.00 0.0 - 0.2 k/uL    Morphology ANISOCYTOSIS PRESENT    FERRITIN   Result Value Ref Range    Ferritin 3,590 (H) 13 - 150 ug/L   FIBRINOGEN   Result Value Ref Range    Fibrinogen 673 (H) 140 - 420 mg/dL   CBC Auto Differential   Result Value Ref Range    WBC 12.8 (H) 3.5 - 11.3 k/uL    RBC 2.36 (L) 3.95 - 5.11 m/uL    Hemoglobin 7.3 (L) 11.9 - 15.1 g/dL    Hematocrit 23.4 (L) 36.3 - 47.1 %    MCV 99.2 82.6 - 102.9 fL    MCH 30.9 25.2 - 33.5 pg    MCHC 31.2 28.4 - 34.8 g/dL    RDW 19.0 (H) 11.8 - 14.4 %    Platelets 847 388 - 087 k/uL    MPV 11.5 8.1 - 13.5 fL    NRBC Automated 0.0 0.0 per 100 WBC    Differential Type NOT REPORTED     WBC Morphology NOT REPORTED     RBC Morphology NOT REPORTED     Platelet Estimate NOT REPORTED     Immature Granulocytes 1 (H) 0 %    Seg Neutrophils 76 (H) 36 - 66 %    Lymphocytes 16 (L) 24 - 44 %    Monocytes 7 1 - 7 %    Eosinophils % 0 (L) 1 - 4 %    Basophils 0 0 - 2 %    Absolute Immature Granulocyte 0.13 0.00 - 0.30 k/uL    Segs Absolute 9.72 (H) 1.8 - 7.7 k/uL    Absolute Lymph # 2.05 1.0 - 4.8 k/uL    Absolute Mono # 0.90 (H) 0.1 - 0.8 k/uL    Absolute Eos # 0.00 0.0 - 0.4 k/uL    Basophils Absolute 0.00 0.0 - 0.2 k/uL    Morphology ANISOCYTOSIS PRESENT     Morphology INCREASED BANDS PRESENT     Morphology TOXIC GRANULATION PRESENT    Basic Metabolic Panel w/ Reflex to MG   Result Value Ref Range    Glucose 84 70 - 99 mg/dL    BUN 22 8 - 23 mg/dL    CREATININE 0.52 0.50 - 0.90 mg/dL    Bun/Cre Ratio NOT REPORTED 9 - 20    Calcium 8.6 8.6 - 10.4 mg/dL    Sodium 143 135 - 144 mmol/L    Potassium 3.5 (L) 3.7 - 5.3 mmol/L    Chloride 106 98 - 107 mmol/L    CO2 25 20 - 31 mmol/L    Anion Gap 12 9 - 17 mmol/L    GFR Non-African American >60 >60 mL/min    GFR African American >60 >60 mL/min    GFR Comment          GFR Staging NOT REPORTED    Brain Natriuretic Peptide   Result Value Ref Range    Pro- (H) <300 pg/mL    BNP Interpretation Pro-BNP Reference Range:    FIBRINOGEN   Result Value Ref Range    Fibrinogen 506 (H) 140 - 420 mg/dL   C-REACTIVE PROTEIN   Result Value Ref Range    CRP 67.6 (H) 0.0 - 5.0 mg/L   FERRITIN   Result Value Ref Range    Ferritin 2,838 (H) 13 - 150 ug/L   D-DIMER, QUANTITATIVE   Result Value Ref Range    D-Dimer, Quant 1.93 mg/L FEU   Magnesium   Result Value Ref Range    Magnesium 2.0 1.6 - 2.6 mg/dL   CBC Auto Differential   Result Value Ref Range    WBC 10.4 3.5 - 11.3 k/uL    RBC 2.70 (L) 3.95 - 5.11 m/uL    Hemoglobin 8.1 (L) 11.9 - 15.1 g/dL    Hematocrit 27.6 (L) 36.3 - 47.1 %    .2 82.6 - 102.9 fL    MCH 30.0 25.2 - 33.5 pg    MCHC 29.3 28.4 - 34.8 g/dL    RDW 19.2 (H) 11.8 - 14.4 %    Platelets 682 957 - 124 k/uL    MPV 11.2 8.1 - 13.5 fL    NRBC Automated 0.0 0.0 per 100 WBC    Differential Type NOT REPORTED     WBC Morphology NOT REPORTED     RBC Morphology NOT REPORTED     Platelet Estimate NOT REPORTED     Immature Granulocytes 3 (H) 0 %    Seg Neutrophils 68 (H) 36 - 66 %    Lymphocytes 20 (L) 24 - 44 %    Monocytes 9 (H) 1 - 7 %    Eosinophils % 0 (L) 1 - 4 %    Basophils 0 0 - 2 %    Absolute Immature Granulocyte 0.31 (H) 0.00 - 0.30 k/uL    Segs Absolute 7.07 1.8 - 7.7 k/uL    Absolute Lymph # 2.08 1.0 - 4.8 k/uL    Absolute Mono # 0.94 (H) 0.1 - 0.8 k/uL    Absolute Eos # 0.00 0.0 - 0.4 k/uL    Basophils Absolute 0.00 0.0 - 0.2 k/uL    Morphology ANISOCYTOSIS PRESENT    POTASSIUM   Result Value Ref Range    Potassium 3.1 (L) 3.7 - 5.3 mmol/L   CBC Auto Differential   Result Value Ref Range    WBC 9.3 3.5 - 11.3 k/uL    RBC 2.54 (L) 3.95 - 5.11 m/uL    Hemoglobin 8.0 (L) 11.9 - 15.1 g/dL    Hematocrit 25.9 (L) 36.3 - 47.1 %    .0 82.6 - 102.9 fL    MCH 31.5 25.2 - 33.5 pg    MCHC 30.9 28.4 - 34.8 g/dL    RDW 19.0 (H) 11.8 - 14.4 %    Platelets 375 391 - 021 k/uL    MPV 11.4 8.1 - 13.5 fL    NRBC Automated 0.0 0.0 per 100 WBC    Differential Type NOT REPORTED     WBC Morphology NOT REPORTED     RBC Morphology NOT REPORTED     Platelet Estimate NOT REPORTED     Immature Granulocytes 5 (H) 0 %    Seg Neutrophils 69 (H) 36 - 66 %    Lymphocytes 23 (L) 24 - 44 %    Monocytes 3 1 - 7 %    Eosinophils % 0 (L) 1 - 4 %    Basophils 0 0 - 2 %    Absolute Immature Granulocyte 0.47 (H) 0.00 - 0.30 k/uL    Segs Absolute 6.41 1.8 - 7.7 k/uL    Absolute Lymph # 2.14 1.0 - 4.8 k/uL    Absolute Mono # 0. 28 0.1 - 0.8 k/uL    Absolute Eos # 0.00 0.0 - 0.4 k/uL    Basophils Absolute 0.00 0.0 - 0.2 k/uL    Morphology ANISOCYTOSIS PRESENT     Morphology TOXIC GRANULATION PRESENT    Comprehensive Metabolic Panel w/ Reflex to MG   Result Value Ref Range    Glucose 84 70 - 99 mg/dL    BUN 16 8 - 23 mg/dL    CREATININE 0.64 0.50 - 0.90 mg/dL    Bun/Cre Ratio NOT REPORTED 9 - 20    Calcium 9.0 8.6 - 10.4 mg/dL    Sodium 139 135 - 144 mmol/L    Potassium 3.8 3.7 - 5.3 mmol/L    Chloride 103 98 - 107 mmol/L    CO2 24 20 - 31 mmol/L    Anion Gap 12 9 - 17 mmol/L    Alkaline Phosphatase 88 35 - 104 U/L    ALT 23 5 - 33 U/L    AST 22 <32 U/L    Total Bilirubin 0.51 0.3 - 1.2 mg/dL    Total Protein 6.3 (L) 6.4 - 8.3 g/dL    Alb 2.5 (L) 3.5 - 5.2 g/dL    Albumin/Globulin Ratio 0.7 (L) 1.0 - 2.5    GFR Non-African American >60 >60 mL/min    GFR African American >60 >60 mL/min    GFR Comment          GFR Staging NOT REPORTED    Urinalysis Reflex to Culture    Specimen: Urine, clean catch   Result Value Ref Range    Color, UA DARK YELLOW (A) YELLOW    Turbidity UA CLEAR CLEAR    Glucose, Ur NEGATIVE NEGATIVE    Bilirubin Urine NEGATIVE NEGATIVE    Ketones, Urine NEGATIVE NEGATIVE    Specific Gravity, UA 1.022 1.005 - 1.030    Urine Hgb NEGATIVE NEGATIVE    pH, UA 7.0 5.0 - 8.0    Protein, UA 1+ (A) NEGATIVE    Urobilinogen, Urine Normal Normal    Nitrite, Urine NEGATIVE NEGATIVE    Leukocyte Esterase, Urine NEGATIVE NEGATIVE    Urinalysis Comments NOT REPORTED    Microscopic Urinalysis   Result Value Ref Range    -          WBC, UA 2 TO 5 0 - 5 /HPF    RBC, UA 5 TO 10 0 - 4 /HPF    Casts UA  0 - 8 /LPF     10 TO 20 HYALINE Reference range defined for non-centrifuged specimen.     Crystals, UA NOT REPORTED None /HPF    Epithelial Cells UA 5 TO 10 0 - 5 /HPF    Renal Epithelial, UA NOT REPORTED 0 /HPF    Bacteria, UA NOT REPORTED None    Mucus, UA NOT REPORTED None    Trichomonas, UA NOT REPORTED None    Amorphous, UA NOT REPORTED None Other Observations UA NOT REPORTED NOT REQ.     Yeast, UA NOT REPORTED None   CBC Auto Differential   Result Value Ref Range    WBC 6.9 3.5 - 11.3 k/uL    RBC 2.28 (L) 3.95 - 5.11 m/uL    Hemoglobin 6.8 (LL) 11.9 - 15.1 g/dL    Hematocrit 21.5 (L) 36.3 - 47.1 %    MCV 94.3 82.6 - 102.9 fL    MCH 29.8 25.2 - 33.5 pg    MCHC 31.6 28.4 - 34.8 g/dL    RDW 18.2 (H) 11.8 - 14.4 %    Platelets 122 085 - 144 k/uL    MPV 11.0 8.1 - 13.5 fL    NRBC Automated 0.0 0.0 per 100 WBC    Differential Type NOT REPORTED     WBC Morphology NOT REPORTED     RBC Morphology NOT REPORTED     Platelet Estimate NOT REPORTED     Immature Granulocytes 9 (H) 0 %    Seg Neutrophils 66 36 - 66 %    Lymphocytes 18 (L) 24 - 44 %    Monocytes 7 1 - 7 %    Eosinophils % 0 (L) 1 - 4 %    Basophils 0 0 - 2 %    Absolute Immature Granulocyte 0.62 (H) 0.00 - 0.30 k/uL    Segs Absolute 4.56 1.8 - 7.7 k/uL    Absolute Lymph # 1.24 1.0 - 4.8 k/uL    Absolute Mono # 0.48 0.1 - 0.8 k/uL    Absolute Eos # 0.00 0.0 - 0.4 k/uL    Basophils Absolute 0.00 0.0 - 0.2 k/uL    Morphology ANISOCYTOSIS PRESENT    Comprehensive Metabolic Panel w/ Reflex to MG   Result Value Ref Range    Glucose 111 (H) 70 - 99 mg/dL    BUN 24 (H) 8 - 23 mg/dL    CREATININE 0.61 0.50 - 0.90 mg/dL    Bun/Cre Ratio NOT REPORTED 9 - 20    Calcium 8.2 (L) 8.6 - 10.4 mg/dL    Sodium 143 135 - 144 mmol/L    Potassium 4.2 3.7 - 5.3 mmol/L    Chloride 105 98 - 107 mmol/L    CO2 26 20 - 31 mmol/L    Anion Gap 12 9 - 17 mmol/L    Alkaline Phosphatase 77 35 - 104 U/L    ALT 18 5 - 33 U/L    AST 18 <32 U/L    Total Bilirubin 0.41 0.3 - 1.2 mg/dL    Total Protein 5.9 (L) 6.4 - 8.3 g/dL    Alb 2.2 (L) 3.5 - 5.2 g/dL    Albumin/Globulin Ratio 0.6 (L) 1.0 - 2.5    GFR Non-African American >60 >60 mL/min    GFR African American >60 >60 mL/min    GFR Comment          GFR Staging NOT REPORTED    CBC Auto Differential   Result Value Ref Range    WBC 4.2 3.5 - 11.3 k/uL    RBC 2.05 (L) 3.95 - 5.11 m/uL Hemoglobin 6.0 (LL) 11.9 - 15.1 g/dL    Hematocrit 19.8 (L) 36.3 - 47.1 %    MCV 96.6 82.6 - 102.9 fL    MCH 29.3 25.2 - 33.5 pg    MCHC 30.3 28.4 - 34.8 g/dL    RDW 18.4 (H) 11.8 - 14.4 %    Platelets 295 814 - 466 k/uL    MPV 11.2 8.1 - 13.5 fL    NRBC Automated 0.0 0.0 per 100 WBC    Differential Type NOT REPORTED     WBC Morphology NOT REPORTED     RBC Morphology NOT REPORTED     Platelet Estimate NOT REPORTED     Immature Granulocytes 2 (H) 0 %    Seg Neutrophils 74 (H) 36 - 66 %    Lymphocytes 16 (L) 24 - 44 %    Monocytes 8 (H) 1 - 7 %    Eosinophils % 0 (L) 1 - 4 %    Basophils 0 0 - 2 %    Absolute Immature Granulocyte 0.08 0.00 - 0.30 k/uL    Segs Absolute 3.11 1.8 - 7.7 k/uL    Absolute Lymph # 0.67 (L) 1.0 - 4.8 k/uL    Absolute Mono # 0.34 0.1 - 0.8 k/uL    Absolute Eos # 0.00 0.0 - 0.4 k/uL    Basophils Absolute 0.00 0.0 - 0.2 k/uL    Morphology ANISOCYTOSIS PRESENT     Morphology TOXIC GRANULATION PRESENT    Comprehensive Metabolic Panel w/ Reflex to MG   Result Value Ref Range    Glucose 96 70 - 99 mg/dL    BUN 26 (H) 8 - 23 mg/dL    CREATININE 0.61 0.50 - 0.90 mg/dL    Bun/Cre Ratio NOT REPORTED 9 - 20    Calcium 8.3 (L) 8.6 - 10.4 mg/dL    Sodium 141 135 - 144 mmol/L    Potassium 4.4 3.7 - 5.3 mmol/L    Chloride 104 98 - 107 mmol/L    CO2 31 20 - 31 mmol/L    Anion Gap 6 (L) 9 - 17 mmol/L    Alkaline Phosphatase 62 35 - 104 U/L    ALT 18 5 - 33 U/L    AST 16 <32 U/L    Total Bilirubin 0.30 0.3 - 1.2 mg/dL    Total Protein 5.7 (L) 6.4 - 8.3 g/dL    Alb 2.2 (L) 3.5 - 5.2 g/dL    Albumin/Globulin Ratio 0.6 (L) 1.0 - 2.5    GFR Non-African American >60 >60 mL/min    GFR African American >60 >60 mL/min    GFR Comment          GFR Staging NOT REPORTED    FERRITIN   Result Value Ref Range    Ferritin 4,245 (H) 13 - 150 ug/L   VITAMIN D 25 HYDROXY   Result Value Ref Range    Vit D, 25-Hydroxy 47.6 30.0 - 100.0 ng/mL   OCCULT BLOOD SCREEN   Result Value Ref Range    Occult Blood, Stool #1 POSITIVE (A) NEGATIVE    Date, Stool #1 [id number]     Time, Stool #1 UNK     Occult Blood, Stool #2 NOT REPORTED NEGATIVE    Date, Stool #2 NOT REPORTED     Time, Stool #2 NOT REPORTED     Occult Blood, Stool #3 NOT REPORTED NEGATIVE    Date, Stool #3 NOT REPORTED     Time, Stool #3 NOT REPORTED    HEMOGLOBIN AND HEMATOCRIT, BLOOD   Result Value Ref Range    Hemoglobin 7.7 (L) 11.9 - 15.1 g/dL    Hematocrit 24.0 (L) 36.3 - 47.1 %   CBC Auto Differential   Result Value Ref Range    WBC 5.3 3.5 - 11.3 k/uL    RBC 2.64 (L) 3.95 - 5.11 m/uL    Hemoglobin 8.0 (L) 11.9 - 15.1 g/dL    Hematocrit 25.2 (L) 36.3 - 47.1 %    MCV 95.5 82.6 - 102.9 fL    MCH 30.3 25.2 - 33.5 pg    MCHC 31.7 28.4 - 34.8 g/dL    RDW 17.4 (H) 11.8 - 14.4 %    Platelets 891 075 - 923 k/uL    MPV 11.3 8.1 - 13.5 fL    NRBC Automated 0.0 0.0 per 100 WBC    Differential Type NOT REPORTED     WBC Morphology NOT REPORTED     RBC Morphology NOT REPORTED     Platelet Estimate NOT REPORTED     Immature Granulocytes 5 (H) 0 %    Seg Neutrophils 78 (H) 36 - 66 %    Lymphocytes 11 (L) 24 - 44 %    Monocytes 4 1 - 7 %    Eosinophils % 2 1 - 4 %    Basophils 0 0 - 2 %    Absolute Immature Granulocyte 0.27 0.00 - 0.30 k/uL    Segs Absolute 4.13 1.8 - 7.7 k/uL    Absolute Lymph # 0.58 (L) 1.0 - 4.8 k/uL    Absolute Mono # 0.21 0.1 - 0.8 k/uL    Absolute Eos # 0.11 0.0 - 0.4 k/uL    Basophils Absolute 0.00 0.0 - 0.2 k/uL    Morphology ANISOCYTOSIS PRESENT     Morphology TOXIC GRANULATION PRESENT    Comprehensive Metabolic Panel w/ Reflex to MG   Result Value Ref Range    Glucose 91 70 - 99 mg/dL    BUN 32 (H) 8 - 23 mg/dL    CREATININE 0.65 0.50 - 0.90 mg/dL    Bun/Cre Ratio NOT REPORTED 9 - 20    Calcium 7.8 (L) 8.6 - 10.4 mg/dL    Sodium 143 135 - 144 mmol/L    Potassium 4.9 3.7 - 5.3 mmol/L    Chloride 106 98 - 107 mmol/L    CO2 30 20 - 31 mmol/L    Anion Gap 7 (L) 9 - 17 mmol/L    Alkaline Phosphatase 61 35 - 104 U/L    ALT 14 5 - 33 U/L    AST 13 <32 U/L    Total Bilirubin 0.29 (L) 0.3 - 1.2 mg/dL    Total Protein 6.1 (L) 6.4 - 8.3 g/dL    Alb 2.3 (L) 3.5 - 5.2 g/dL    Albumin/Globulin Ratio 0.6 (L) 1.0 - 2.5    GFR Non-African American >60 >60 mL/min    GFR African American >60 >60 mL/min    GFR Comment          GFR Staging NOT REPORTED    HEMOGLOBIN AND HEMATOCRIT, BLOOD   Result Value Ref Range    Hemoglobin 8.1 (L) 11.9 - 15.1 g/dL    Hematocrit 26.1 (L) 36.3 - 47.1 %   HEMOGLOBIN AND HEMATOCRIT, BLOOD   Result Value Ref Range    Hemoglobin 8.0 (L) 11.9 - 15.1 g/dL    Hematocrit 25.7 (L) 36.3 - 47.1 %   CBC Auto Differential   Result Value Ref Range    WBC 6.8 3.5 - 11.3 k/uL    RBC 2.72 (L) 3.95 - 5.11 m/uL    Hemoglobin 8.2 (L) 11.9 - 15.1 g/dL    Hematocrit 26.7 (L) 36.3 - 47.1 %    MCV 98.2 82.6 - 102.9 fL    MCH 30.1 25.2 - 33.5 pg    MCHC 30.7 28.4 - 34.8 g/dL    RDW 17.0 (H) 11.8 - 14.4 %    Platelets 385 533 - 116 k/uL    MPV 11.8 8.1 - 13.5 fL    NRBC Automated 0.0 0.0 per 100 WBC    Differential Type NOT REPORTED     WBC Morphology NOT REPORTED     RBC Morphology NOT REPORTED     Platelet Estimate NOT REPORTED     Immature Granulocytes 3 (H) 0 %    Seg Neutrophils 86 (H) 36 - 66 %    Lymphocytes 7 (L) 24 - 44 %    Monocytes 4 1 - 7 %    Eosinophils % 0 (L) 1 - 4 %    Basophils 0 0 - 2 %    Absolute Immature Granulocyte 0.20 0.00 - 0.30 k/uL    Segs Absolute 5.85 1.8 - 7.7 k/uL    Absolute Lymph # 0.48 (L) 1.0 - 4.8 k/uL    Absolute Mono # 0.27 0.1 - 0.8 k/uL    Absolute Eos # 0.00 0.0 - 0.4 k/uL    Basophils Absolute 0.00 0.0 - 0.2 k/uL    Morphology ANISOCYTOSIS PRESENT     Morphology TOXIC GRANULATION PRESENT    HEMOGLOBIN AND HEMATOCRIT, BLOOD   Result Value Ref Range    Hemoglobin 8.0 (L) 11.9 - 15.1 g/dL    Hematocrit 26.4 (L) 36.3 - 47.1 %   CBC Auto Differential   Result Value Ref Range    WBC 8.2 3.5 - 11.3 k/uL    RBC 2.84 (L) 3.95 - 5.11 m/uL    Hemoglobin 8.4 (L) 11.9 - 15.1 g/dL    Hematocrit 27.8 (L) 36.3 - 47.1 %    MCV 97.9 82.6 - 102.9 fL    MCH 29.6 25.2 - 33.5 pg    MCHC 30.2 28.4 - 34.8 g/dL    RDW 16.6 (H) 11.8 - 14.4 %    Platelets 205 (L) 939 - 453 k/uL    MPV 12.3 8.1 - 13.5 fL    NRBC Automated 0.0 0.0 per 100 WBC    Differential Type NOT REPORTED     WBC Morphology NOT REPORTED     RBC Morphology NOT REPORTED     Platelet Estimate NOT REPORTED     Immature Granulocytes 5 (H) 0 %    Seg Neutrophils 83 (H) 36 - 65 %    Lymphocytes 9 (L) 24 - 43 %    Monocytes 2 (L) 3 - 12 %    Eosinophils % 0 (L) 1 - 4 %    Basophils 1 0 - 2 %    Absolute Immature Granulocyte 0.41 (H) 0.00 - 0.30 k/uL    Segs Absolute 6.81 1.50 - 8.10 k/uL    Absolute Lymph # 0.74 (L) 1.10 - 3.70 k/uL    Absolute Mono # 0.16 0.10 - 1.20 k/uL    Absolute Eos # 0.00 0.00 - 0.44 k/uL    Basophils Absolute 0.08 0.00 - 0.20 k/uL    Morphology ANISOCYTOSIS PRESENT     Morphology TOXIC GRANULATION PRESENT    BASIC METABOLIC PANEL   Result Value Ref Range    Glucose 121 (H) 70 - 99 mg/dL    BUN 44 (H) 8 - 23 mg/dL    CREATININE 0.66 0.50 - 0.90 mg/dL    Bun/Cre Ratio NOT REPORTED 9 - 20    Calcium 8.6 8.6 - 10.4 mg/dL    Sodium 144 135 - 144 mmol/L    Potassium 3.9 3.7 - 5.3 mmol/L    Chloride 105 98 - 107 mmol/L    CO2 33 (H) 20 - 31 mmol/L    Anion Gap 6 (L) 9 - 17 mmol/L    GFR Non-African American >60 >60 mL/min    GFR African American >60 >60 mL/min    GFR Comment          GFR Staging NOT REPORTED    HEMOGLOBIN AND HEMATOCRIT, BLOOD   Result Value Ref Range    Hemoglobin 7.8 (L) 11.9 - 15.1 g/dL    Hematocrit 25.6 (L) 36.3 - 47.1 %   CBC Auto Differential   Result Value Ref Range    WBC 8.0 3.5 - 11.3 k/uL    RBC 2.52 (L) 3.95 - 5.11 m/uL    Hemoglobin 7.3 (L) 11.9 - 15.1 g/dL    Hematocrit 25.2 (L) 36.3 - 47.1 %    .0 82.6 - 102.9 fL    MCH 29.0 25.2 - 33.5 pg    MCHC 29.0 28.4 - 34.8 g/dL    RDW 16.6 (H) 11.8 - 14.4 %    Platelets 79 (L) 833 - 453 k/uL    MPV 12.4 8.1 - 13.5 fL    NRBC Automated 0.0 0.0 per 100 WBC    Differential Type NOT REPORTED     WBC Morphology NOT REPORTED     RBC Morphology ANISOCYTOSIS PRESENT     Platelet Estimate NOT REPORTED     Seg Neutrophils 85 (H) 36 - 65 %    Lymphocytes 9 (L) 24 - 43 %    Monocytes 2 (L) 3 - 12 %    Eosinophils % 0 (L) 1 - 4 %    Basophils 0 0 - 2 %    Immature Granulocytes 3 (H) 0 %    Segs Absolute 6.80 1.50 - 8.10 k/uL    Absolute Lymph # 0.71 (L) 1.10 - 3.70 k/uL    Absolute Mono # 0.19 0.10 - 1.20 k/uL    Absolute Eos # <0.03 0.00 - 0.44 k/uL    Basophils Absolute <0.03 0.00 - 0.20 k/uL    Absolute Immature Granulocyte 0.27 0.00 - 0.30 k/uL   HEMOGLOBIN AND HEMATOCRIT, BLOOD   Result Value Ref Range    Hemoglobin 7.3 (L) 11.9 - 15.1 g/dL    Hematocrit 23.9 (L) 36.3 - 47.1 %   HEMOGLOBIN AND HEMATOCRIT, BLOOD   Result Value Ref Range    Hemoglobin 7.8 (L) 11.9 - 15.1 g/dL    Hematocrit 25.6 (L) 36.3 - 47.1 %   CBC Auto Differential   Result Value Ref Range    WBC 7.1 3.5 - 11.3 k/uL    RBC 2.42 (L) 3.95 - 5.11 m/uL    Hemoglobin 7.2 (L) 11.9 - 15.1 g/dL    Hematocrit 24.2 (L) 36.3 - 47.1 %    .0 82.6 - 102.9 fL    MCH 29.8 25.2 - 33.5 pg    MCHC 29.8 28.4 - 34.8 g/dL    RDW 16.7 (H) 11.8 - 14.4 %    Platelets 75 (L) 801 - 453 k/uL    MPV 12.9 8.1 - 13.5 fL    NRBC Automated 0.4 (H) 0.0 per 100 WBC    Differential Type NOT REPORTED     WBC Morphology NOT REPORTED     RBC Morphology ANISOCYTOSIS PRESENT     Platelet Estimate NOT REPORTED     Seg Neutrophils 81 (H) 36 - 65 %    Lymphocytes 13 (L) 24 - 43 %    Monocytes 3 3 - 12 %    Eosinophils % 0 (L) 1 - 4 %    Basophils 0 0 - 2 %    Immature Granulocytes 2 (H) 0 %    Segs Absolute 5.70 1.50 - 8.10 k/uL    Absolute Lymph # 0.94 (L) 1.10 - 3.70 k/uL    Absolute Mono # 0.24 0.10 - 1.20 k/uL    Absolute Eos # <0.03 0.00 - 0.44 k/uL    Basophils Absolute <0.03 0.00 - 0.20 k/uL    Absolute Immature Granulocyte 0.17 0.00 - 0.30 k/uL   HEMOGLOBIN AND HEMATOCRIT, BLOOD   Result Value Ref Range    Hemoglobin 6.8 (LL) 11.9 - 15.1 g/dL    Hematocrit 22.9 (L) 36.3 - 47.1 %   HEMOGLOBIN AND HEMATOCRIT, BLOOD   Result Value Ref Range    Hemoglobin 9.0 (L) 11.9 - 15.1 g/dL    Hematocrit 29.3 (L) 36.3 - 47.1 %   BASIC METABOLIC PANEL   Result Value Ref Range    Glucose 139 (H) 70 - 99 mg/dL    BUN 38 (H) 8 - 23 mg/dL    CREATININE 0.57 0.50 - 0.90 mg/dL    Bun/Cre Ratio NOT REPORTED 9 - 20    Calcium 8.6 8.6 - 10.4 mg/dL    Sodium 145 (H) 135 - 144 mmol/L    Potassium 4.1 3.7 - 5.3 mmol/L    Chloride 108 (H) 98 - 107 mmol/L    CO2 31 20 - 31 mmol/L    Anion Gap 6 (L) 9 - 17 mmol/L    GFR Non-African American >60 >60 mL/min    GFR African American >60 >60 mL/min    GFR Comment          GFR Staging NOT REPORTED    CBC Auto Differential   Result Value Ref Range    WBC 4.1 3.5 - 11.3 k/uL    RBC 2.53 (L) 3.95 - 5.11 m/uL    Hemoglobin 7.5 (L) 11.9 - 15.1 g/dL    Hematocrit 25.3 (L) 36.3 - 47.1 %    .0 82.6 - 102.9 fL    MCH 29.6 25.2 - 33.5 pg    MCHC 29.6 28.4 - 34.8 g/dL    RDW 17.5 (H) 11.8 - 14.4 %    Platelets 81 (L) 314 - 453 k/uL    MPV 12.8 8.1 - 13.5 fL    NRBC Automated 1.2 (H) 0.0 per 100 WBC    Differential Type NOT REPORTED     WBC Morphology NOT REPORTED     RBC Morphology ANISOCYTOSIS PRESENT     Platelet Estimate NOT REPORTED     Seg Neutrophils 73 (H) 36 - 65 %    Lymphocytes 19 (L) 24 - 43 %    Monocytes 6 3 - 12 %    Eosinophils % 0 (L) 1 - 4 %    Basophils 0 0 - 2 %    Immature Granulocytes 3 (H) 0 %    Segs Absolute 2.94 1.50 - 8.10 k/uL    Absolute Lymph # 0.76 (L) 1.10 - 3.70 k/uL    Absolute Mono # 0.23 0.10 - 1.20 k/uL    Absolute Eos # <0.03 0.00 - 0.44 k/uL    Basophils Absolute <0.03 0.00 - 0.20 k/uL    Absolute Immature Granulocyte 0.11 0.00 - 0.30 k/uL   Basic Metabolic Panel w/ Reflex to MG   Result Value Ref Range    Glucose 107 (H) 70 - 99 mg/dL    BUN 34 (H) 8 - 23 mg/dL    CREATININE 0.51 0.50 - 0.90 mg/dL    Bun/Cre Ratio NOT REPORTED 9 - 20    Calcium 8.0 (L) 8.6 - 10.4 mg/dL    Sodium 146 (H) 135 - 144 mmol/L    Potassium 4.1 3.7 - 5.3 mmol/L    Chloride 111 (H) 98 - 107 mmol/L    CO2 30 20 - 31 mmol/L    Anion Gap 5 (L) 9 - 17 mmol/L    GFR Non-African American >60 >60 mL/min    GFR African American >60 >60 mL/min    GFR Comment          GFR Staging NOT REPORTED    HEMOGLOBIN AND HEMATOCRIT, BLOOD   Result Value Ref Range    Hemoglobin 8.0 (L) 11.9 - 15.1 g/dL    Hematocrit 25.9 (L) 36.3 - 47.1 %   Cortisol Total   Result Value Ref Range    Cortisol 12.2 2.7 - 18.4 ug/dL    Cortisol Collection Info NOT REPORTED    HEMOGLOBIN AND HEMATOCRIT, BLOOD   Result Value Ref Range    Hemoglobin 7.7 (L) 11.9 - 15.1 g/dL    Hematocrit 25.4 (L) 36.3 - 47.1 %   CBC Auto Differential   Result Value Ref Range    WBC 3.0 (L) 3.5 - 11.3 k/uL    RBC 2.45 (L) 3.95 - 5.11 m/uL    Hemoglobin 7.2 (L) 11.9 - 15.1 g/dL    Hematocrit 23.8 (L) 36.3 - 47.1 %    MCV 97.1 82.6 - 102.9 fL    MCH 29.4 25.2 - 33.5 pg    MCHC 30.3 28.4 - 34.8 g/dL    RDW 16.8 (H) 11.8 - 14.4 %    Platelets 90 (L) 304 - 453 k/uL    MPV 13.0 8.1 - 13.5 fL    NRBC Automated 0.7 (H) 0.0 per 100 WBC    Differential Type NOT REPORTED     WBC Morphology NOT REPORTED     RBC Morphology ANISOCYTOSIS PRESENT     Platelet Estimate NOT REPORTED     Seg Neutrophils 66 (H) 36 - 65 %    Lymphocytes 24 24 - 43 %    Monocytes 6 3 - 12 %    Eosinophils % 0 (L) 1 - 4 %    Basophils 0 0 - 2 %    Immature Granulocytes 4 (H) 0 %    Segs Absolute 1.99 1.50 - 8.10 k/uL    Absolute Lymph # 0.71 (L) 1.10 - 3.70 k/uL    Absolute Mono # 0.19 0.10 - 1.20 k/uL    Absolute Eos # <0.03 0.00 - 0.44 k/uL    Basophils Absolute <0.03 0.00 - 0.20 k/uL    Absolute Immature Granulocyte 0.11 0.00 - 0.30 k/uL   Basic Metabolic Panel w/ Reflex to MG   Result Value Ref Range    Glucose 108 (H) 70 - 99 mg/dL    BUN 32 (H) 8 - 23 mg/dL    CREATININE 0.48 (L) 0.50 - 0.90 mg/dL    Bun/Cre Ratio NOT REPORTED 9 - 20    Calcium 8.1 (L) 8.6 - 10.4 mg/dL    Sodium 144 135 - 144 mmol/L    Potassium 3.9 3.7 - 5.3 mmol/L    Chloride 107 98 - 107 mmol/L    CO2 30 20 - 31 mmol/L Anion Gap 7 (L) 9 - 17 mmol/L    GFR Non-African American >60 >60 mL/min    GFR African American >60 >60 mL/min    GFR Comment          GFR Staging NOT REPORTED    HEMOGLOBIN AND HEMATOCRIT, BLOOD   Result Value Ref Range    Hemoglobin 8.0 (L) 11.9 - 15.1 g/dL    Hematocrit 26.0 (L) 36.3 - 47.1 %   POC Glucose Fingerstick   Result Value Ref Range    POC Glucose 108 (H) 65 - 105 mg/dL   Arterial Blood Gas, POC   Result Value Ref Range    POC pH 7.464 (H) 7.350 - 7.450    POC pCO2 37.1 35.0 - 48.0 mm Hg    POC PO2 87.4 83.0 - 108.0 mm Hg    POC HCO3 26.6 21.0 - 28.0 mmol/L    TCO2 (calc), Art 28 22.0 - 29.0 mmol/L    Negative Base Excess, Art NOT REPORTED 0.0 - 2.0    Positive Base Excess, Art 3 0.0 - 3.0    POC O2 SAT 97 94.0 - 98.0 %    O2 Device/Flow/% Adult Ventilator     Thomas Test NOT APPLICABLE     Sample Site Arterial Line     Mode PRVC     FIO2 100.0     Pt Temp 39.8     POC pH Temp 7.42     POC pCO2 Temp 42 mm Hg    POC pO2 Temp 105 mm Hg   POCT Glucose   Result Value Ref Range    POC Glucose 110 (H) 74 - 100 mg/dL   Arterial Blood Gas, POC   Result Value Ref Range    POC pH 7.433 7.350 - 7.450    POC pCO2 47.0 35.0 - 48.0 mm Hg    POC PO2 103.6 83.0 - 108.0 mm Hg    POC HCO3 31.4 (H) 21.0 - 28.0 mmol/L    TCO2 (calc), Art 33 (H) 22.0 - 29.0 mmol/L    Negative Base Excess, Art NOT REPORTED 0.0 - 2.0    Positive Base Excess, Art 6 (H) 0.0 - 3.0    POC O2 SAT 98 94.0 - 98.0 %    O2 Device/Flow/% Adult Ventilator     Thomas Test NOT APPLICABLE     Sample Site Arterial Line     Mode PRVC     FIO2 100.0     Pt Temp NOT REPORTED     POC pH Temp NOT REPORTED     POC pCO2 Temp NOT REPORTED mm Hg    POC pO2 Temp NOT REPORTED mm Hg   Lactic Acid, POC   Result Value Ref Range    POC Lactic Acid 0.91 0.56 - 1.39 mmol/L   POCT Glucose   Result Value Ref Range    POC Glucose 126 (H) 74 - 100 mg/dL   Arterial Blood Gas, POC   Result Value Ref Range    POC pH 7.448 7.350 - 7.450    POC pCO2 52.3 (H) 35.0 - 48.0 mm Hg    POC PO2 67.9 (L) 83.0 - 108.0 mm Hg    POC HCO3 36.2 (H) 21.0 - 28.0 mmol/L    TCO2 (calc), Art 38 (H) 22.0 - 29.0 mmol/L    Negative Base Excess, Art NOT REPORTED 0.0 - 2.0    Positive Base Excess, Art 11 (H) 0.0 - 3.0    POC O2 SAT 94 94.0 - 98.0 %    O2 Device/Flow/% Adult Ventilator     Thomas Test NOT APPLICABLE     Sample Site Arterial Line     Mode PRVC     FIO2 80.0     Pt Temp NOT REPORTED     POC pH Temp NOT REPORTED     POC pCO2 Temp NOT REPORTED mm Hg    POC pO2 Temp NOT REPORTED mm Hg   Lactic Acid, POC   Result Value Ref Range    POC Lactic Acid 0.91 0.56 - 1.39 mmol/L   POCT Glucose   Result Value Ref Range    POC Glucose 98 74 - 100 mg/dL   Arterial Blood Gas, POC   Result Value Ref Range    POC pH 7.429 7.350 - 7.450    POC pCO2 47.6 35.0 - 48.0 mm Hg    POC PO2 56.3 (L) 83.0 - 108.0 mm Hg    POC HCO3 31.5 (H) 21.0 - 28.0 mmol/L    TCO2 (calc), Art 33 (H) 22.0 - 29.0 mmol/L    Negative Base Excess, Art NOT REPORTED 0.0 - 2.0    Positive Base Excess, Art 6 (H) 0.0 - 3.0    POC O2 SAT 89 (L) 94.0 - 98.0 %    O2 Device/Flow/% NOT REPORTED     Thomas Test NOT APPLICABLE     Sample Site Arterial Line     Mode NOT REPORTED     FIO2 65.0     Pt Temp NOT REPORTED     POC pH Temp NOT REPORTED     POC pCO2 Temp NOT REPORTED mm Hg    POC pO2 Temp NOT REPORTED mm Hg   Arterial Blood Gas, POC   Result Value Ref Range    POC pH 7.412 7.350 - 7.450    POC pCO2 52.4 (H) 35.0 - 48.0 mm Hg    POC PO2 56.9 (L) 83.0 - 108.0 mm Hg    POC HCO3 33.3 (H) 21.0 - 28.0 mmol/L    TCO2 (calc), Art 35 (H) 22.0 - 29.0 mmol/L    Negative Base Excess, Art NOT REPORTED 0.0 - 2.0    Positive Base Excess, Art 8 (H) 0.0 - 3.0    POC O2 SAT 89 (L) 94.0 - 98.0 %    O2 Device/Flow/% NOT REPORTED     Thomas Test NOT APPLICABLE     Sample Site Arterial Line     Mode NOT REPORTED     FIO2 65.0     Pt Temp NOT REPORTED     POC pH Temp NOT REPORTED     POC pCO2 Temp NOT REPORTED mm Hg    POC pO2 Temp NOT REPORTED mm Hg   Arterial Blood Gas, POC Result Value Ref Range    POC pH 7.387 7.350 - 7.450    POC pCO2 56.3 (H) 35.0 - 48.0 mm Hg    POC PO2 60.3 (L) 83.0 - 108.0 mm Hg    POC HCO3 33.9 (H) 21.0 - 28.0 mmol/L    TCO2 (calc), Art 36 (H) 22.0 - 29.0 mmol/L    Negative Base Excess, Art NOT REPORTED 0.0 - 2.0    Positive Base Excess, Art 8 (H) 0.0 - 3.0    POC O2 SAT 90 (L) 94.0 - 98.0 %    O2 Device/Flow/% Adult Ventilator     Thomas Test NOT APPLICABLE     Sample Site Arterial Line     Mode PRVC     FIO2 65.0     Pt Temp NOT REPORTED     POC pH Temp NOT REPORTED     POC pCO2 Temp NOT REPORTED mm Hg    POC pO2 Temp NOT REPORTED mm Hg   POCT Glucose   Result Value Ref Range    POC Glucose 127 (H) 74 - 100 mg/dL   Arterial Blood Gas, POC   Result Value Ref Range    POC pH 7.409 7.350 - 7.450    POC pCO2 58.2 (H) 35.0 - 48.0 mm Hg    POC PO2 53.7 (L) 83.0 - 108.0 mm Hg    POC HCO3 36.8 (H) 21.0 - 28.0 mmol/L    TCO2 (calc), Art 39 (H) 22.0 - 29.0 mmol/L    Negative Base Excess, Art NOT REPORTED 0.0 - 2.0    Positive Base Excess, Art 11 (H) 0.0 - 3.0    POC O2 SAT 87 (L) 94.0 - 98.0 %    O2 Device/Flow/% Adult Ventilator     Thomas Test NOT REPORTED     Sample Site Arterial Line     Mode PRVC     FIO2 65.0     Pt Temp NOT REPORTED     POC pH Temp NOT REPORTED     POC pCO2 Temp NOT REPORTED mm Hg    POC pO2 Temp NOT REPORTED mm Hg   POCT Glucose   Result Value Ref Range    POC Glucose 128 (H) 74 - 100 mg/dL   Arterial Blood Gas, POC   Result Value Ref Range    POC pH 7.432 7.350 - 7.450    POC pCO2 58.4 (H) 35.0 - 48.0 mm Hg    POC PO2 70.6 (L) 83.0 - 108.0 mm Hg    POC HCO3 38.9 (H) 21.0 - 28.0 mmol/L    TCO2 (calc), Art 41 (H) 22.0 - 29.0 mmol/L    Negative Base Excess, Art NOT REPORTED 0.0 - 2.0    Positive Base Excess, Art 13 (H) 0.0 - 3.0    POC O2 SAT 94 94.0 - 98.0 %    O2 Device/Flow/% NOT REPORTED     Thomas Test NOT REPORTED     Sample Site NOT REPORTED     Mode NOT REPORTED     FIO2 NOT REPORTED     Pt Temp NOT REPORTED     POC pH Temp NOT REPORTED POC pCO2 Temp NOT REPORTED mm Hg    POC pO2 Temp NOT REPORTED mm Hg     *Note: Due to a large number of results and/or encounters for the requested time period, some results have not been displayed. A complete set of results can be found in Results Review. IMAGING DATA:    Xr Chest (single View Frontal)    Result Date: 11/17/2020  EXAMINATION: NUCLEAR MEDICINE PERFUSION SCAN. PORTABLE CHEST RADIOGRAPH 11/17/2020 TECHNIQUE: 5 millicuries of Tc 76J MAA was administered intravenously prior to planar imaging of the lungs in multiple projections. HISTORY: ORDERING SYSTEM PROVIDED HISTORY: elevated D-Dimer outside hospital Reason for Exam: Covid-19 positive pt. and elevated D-Dimer 2.44 FINDINGS: PERFUSION: Distribution of radiotracer is slightly heterogeneous. No segmental defects identified. CHEST RADIOGRAPH:  Bilateral mid to lower lung field left greater than right opacities with peripheral involvement most pronounced at the bases. No effusion or pneumothorax. Normal heart size. Postsurgical changes of median sternotomy. Right-sided Port-A-Cath is in place     *Low Probability for Pulmonary Embolus. *Bilateral mid to lower lung field left greater than right opacities with peripheral involvement most pronounced at the bases compatible with  COVID-19 pneumonia. The findings were sent to the Radiology Results [id number] at 2:46 pm on 11/17/2020to be communicated to a licensed caregiver. Nm Lung Scan Perfusion Only    Result Date: 11/17/2020  EXAMINATION: NUCLEAR MEDICINE PERFUSION SCAN. PORTABLE CHEST RADIOGRAPH 11/17/2020 TECHNIQUE: 5 millicuries of Tc 33L MAA was administered intravenously prior to planar imaging of the lungs in multiple projections. HISTORY: ORDERING SYSTEM PROVIDED HISTORY: elevated D-Dimer outside hospital Reason for Exam: Covid-19 positive pt. and elevated D-Dimer 2.44 FINDINGS: PERFUSION: Distribution of radiotracer is slightly heterogeneous.   No segmental defects identified. CHEST RADIOGRAPH:  Bilateral mid to lower lung field left greater than right opacities with peripheral involvement most pronounced at the bases. No effusion or pneumothorax. Normal heart size. Postsurgical changes of median sternotomy. Right-sided Port-A-Cath is in place     *Low Probability for Pulmonary Embolus. *Bilateral mid to lower lung field left greater than right opacities with peripheral involvement most pronounced at the bases compatible with  COVID-19 pneumonia. The findings were sent to the Radiology Results [address] at 2:46 pm on 11/17/2020to be communicated to a licensed caregiver. IMPRESSION:   Primary Problem  Pneumonia due to COVID-19 virus    Active Hospital Problems    Diagnosis Date Noted    Other pancytopenia ([address] [D61.818] 11/24/2020    Hypokalemia [E87.6] 11/22/2020    MDS (myelodysplastic syndrome) ([address] [D46.9] 11/17/2020    Pneumonia due to COVID-19 virus [U07.1, J12.89] 11/17/2020    Neutropenic sepsis ([address] [A41.9, D70.9] 11/17/2020    Bicytopenia [D75.89] 11/17/2020    ROSALINO (acute kidney injury) ([address] [N17.9] 11/17/2020    Acute respiratory failure with hypoxia ([address] [J96.01] 11/17/2020    Essential hypertension [I10] 11/17/2020    Coronary artery disease involving coronary bypass graft of native heart without angina pectoris [I25.810] 11/17/2020    History of Raynaud's syndrome [Z86.79] 11/17/2020    Transaminitis [R74.01] 11/17/2020    Severe sepsis ([address] [A41.9, R65.20] 11/17/2020    Immunosuppressed status ([address] [D84.9]     Acute respiratory failure due to COVID-19 ([address] [U07.1, J96.00] 11/16/2020       MDS  Anemia, transfusion dependent  COVID-19 infection    RECOMMENDATIONS:  Condition remains poor but stable  Counts remain stable. No transfusion needed.   Continue to follow                             [address] [place] [address] [facility]                              This note is created with the assistance of a speech recognition program.  While intending to generate a document that actually reflects the content of the visit, the document can still have some errors including those of syntax and sound a like substitutions which may escape proof reading. It such instances, actual meaning can be extrapolated by contextual diversion.

## 2020-12-02 NOTE — PLAN OF CARE
Problem: Respiratory:  Goal: Ability to maintain a clear airway will improve  Description: Ability to maintain a clear airway will improve  12/2/2020 0916 by Judi Shrestha RCP  Outcome: Ongoing  12/2/2020 0038 by Mckenzie Shannon RN  Outcome: Ongoing  Goal: Ability to maintain adequate ventilation will improve  Description: Ability to maintain adequate ventilation will improve  12/2/2020 0916 by Judi Shrestha RCP  Outcome: Ongoing  12/2/2020 0038 by Mckenzie Shannon RN  Outcome: Ongoing  Goal: Complications related to the disease process, condition or treatment will be avoided or minimized  Description: Complications related to the disease process, condition or treatment will be avoided or minimized  12/2/2020 0916 by Judi Shrestha RCP  Outcome: Ongoing  12/2/2020 0038 by Mckenzie Shannon RN  Outcome: Ongoing     Problem: Skin Integrity:  Goal: Risk for impaired skin integrity will decrease  Description: Risk for impaired skin integrity will decrease  12/2/2020 0916 by Judi Shrestha RCP  Outcome: Ongoing  12/2/2020 0038 by Mckenzie Shannon RN  Outcome: Ongoing  Goal: Will show no infection signs and symptoms  Description: Will show no infection signs and symptoms  12/2/2020 0916 by Judi Shrestha RCP  Outcome: Ongoing  12/2/2020 0038 by Mckenzie Shannon RN  Outcome: Ongoing  Goal: Absence of new skin breakdown  Description: Absence of new skin breakdown  12/2/2020 0916 by Judi Shrestha RCP  Outcome: Ongoing  12/2/2020 0038 by Mckenzie Shannon RN  Outcome: Ongoing     Problem: Gas Exchange - Impaired  Goal: Absence of hypoxia  12/2/2020 0916 by Judi Shrestha RCP  Outcome: Ongoing  12/2/2020 0038 by Mckenzie Shannon RN  Outcome: Ongoing  Goal: Promote optimal lung function  12/2/2020 0916 by Judi Shrestha RCP  Outcome: Ongoing  12/2/2020 0038 by Mckenzie Shannon RN  Outcome: Ongoing     Problem: Breathing Pattern - Ineffective  Goal: Ability to achieve and maintain a regular respiratory rate  12/2/2020 0916 by Judi Shrestha RCP  Outcome: Ongoing  12/2/2020 0038 by Valentin Tyler RN  Outcome: Ongoing     Problem: OXYGENATION/RESPIRATORY FUNCTION  Goal: Patient will maintain patent airway  12/2/2020 0916 by Sung Snyder RCP  Outcome: Ongoing  12/2/2020 0038 by Valentin Tyler RN  Outcome: Ongoing  12/1/2020 2119 by Chayo Jacobo RCP  Outcome: Ongoing  Goal: Patient will achieve/maintain normal respiratory rate/effort  Description: Respiratory rate and effort will be within normal limits for the patient  12/2/2020 0916 by Sung Snyder RCP  Outcome: Ongoing  12/2/2020 0038 by Valentin Tyler RN  Outcome: Ongoing  12/1/2020 2119 by Chayo Jacobo RCP  Outcome: Ongoing     Problem: MECHANICAL VENTILATION  Goal: Patient will maintain patent airway  12/2/2020 0916 by Sung Snyder RCP  Outcome: Ongoing  12/2/2020 0038 by Valentin Tyler RN  Outcome: Ongoing  12/1/2020 2119 by Chayo Jacobo RCP  Outcome: Ongoing  Goal: Oral health is maintained or improved  12/2/2020 0916 by Sung Snyder RCP  Outcome: Ongoing  12/2/2020 0038 by Valentin yTler RN  Outcome: Ongoing  12/1/2020 2119 by Chayo Jacobo RCP  Outcome: Ongoing  Goal: ET tube will be managed safely  12/2/2020 0916 by Sung Snyder RCP  Outcome: Ongoing  12/2/2020 0038 by Valentin Tyler RN  Outcome: Ongoing  12/1/2020 2119 by Chayo Jacobo RCP  Outcome: Ongoing  Goal: Ability to express needs and understand communication  12/2/2020 0916 by Sung Snyder RCP  Outcome: Ongoing  12/2/2020 0038 by Valentin Tyler RN  Outcome: Ongoing  12/1/2020 2119 by Chayo Jacobo RCP  Outcome: Ongoing  Goal: Mobility/activity is maintained at optimum level for patient  12/2/2020 0916 by Sung Snyder RCP  Outcome: Ongoing  12/2/2020 0038 by Valentin Tyler RN  Outcome: Ongoing  12/1/2020 2119 by Chayo Jacobo RCP  Outcome: Ongoing

## 2020-12-02 NOTE — PROGRESS NOTES
Orders Provides: 1584 kcals, 99 gm pro/day  · Goal TF & Flush Orders Provides: At 55 mL/hr = 1584 kcals, 99 gm pro/day    Additional Calorie Sources:   none    Anthropometric Measures:  · Height: 5' 7\" (170.2 cm)  · Current Body Weight: 156 lb 4.8 oz (70.9 kg)   · Admission Body Weight: 182 lb (82.6 kg)    · Ideal Body Weight: 135 lbs; % Ideal Body Weight 120 %   · BMI: 24.5  · BMI Categories: Normal Weight (BMI 22.0 to 24.9) age over 72       Nutrition Diagnosis:   · Inadequate oral intake related to impaired respiratory function as evidenced by NPO or clear liquid status due to medical condition, intubation, nutrition support - enteral nutrition      Nutrition Interventions:   Food and/or Nutrient Delivery:  Continue NPO, Continue Current Tube Feeding  Nutrition Education/Counseling:  Education not indicated   Coordination of Nutrition Care:  Continue to monitor while inpatient    Goals: Achieved   meet % of estimated nutrient needs       Nutrition Monitoring and Evaluation:   Food/Nutrient Intake Outcomes:  Enteral Nutrition Intake/Tolerance  Physical Signs/Symptoms Outcomes:  Biochemical Data, GI Status, Fluid Status or Edema, Skin, Weight, Nutrition Focused Physical Findings     Discharge Planning:     Too soon to determine     Electronically signed by Brendan Foote RD, LD on 12/2/20 at 11:47 AM EST    Contact: [phone]

## 2020-12-02 NOTE — PROGRESS NOTES
[facility] -   Infectious diseases evaluation  admission date 11/16/2020    reason for consultation:   covid    Impression :   Current:  · covid pneumonia - severe w resp distress  · MDS  · neutropenia  ·   · Allergy to Keflex-rash  Discussion / summary of stay / plan of care   ·   Recommendations     consented Immune plasma - ordered 11/17,  1 U - tolerated 11/17  Post Meropenem and vanco - stopped 11/18 due to cx neg  remdesevir till 11/20/20, completed  Did not fit criteria for research medicine due to her MDS  Steroids course over  Inflammatory markers improved    · Fever 38 on 11/30  · Get 2 blood cultures, and sputum culture  · Start the patient on ceftaroline 11/30/20 - 12/5/20, watch for rash    Infection Control Recommendations   · [place] Precautions  · Contact Isolation   · Airborne isolation  · Droplet Isolation      Antimicrobial Stewardship Recommendations   · Simplification of therapy  · Targeted therapy  · Per Kg dosing    Coordination ofOutpatient Care:   · Estimated Length of IV antimicrobials:  · Patient will need Midline / picc Catheter Insertion:   · Patient will need SNF:  · Patient will need outpatient wound care:     History of Present Illness:   Initial history:  Chaparro Garcia is a 70y.o.-year-old female w resp distress transferred from [address] pneumonia and underlying MDS.   Neutropenia  On bipap and 50% FIO2, elevated D-Dimers and went for a VQ scan  Past cig smoking - HTN - Raynaud-    Started on decadron remdesevir  started on meropenem and vanco pend BC due to concern for ongoing sepsis      Interval changes     Patient Vitals for the past 8 hrs:   BP Temp Temp src Pulse Resp SpO2   12/01/20 1845 -- -- -- 90 30 93 %   12/01/20 1830 -- -- -- 88 30 94 %   12/01/20 1815 -- -- -- 87 30 95 %   12/01/20 1800 -- -- -- 87 30 95 %   12/01/20 1745 -- -- -- 90 30 94 %   12/01/20 1730 -- -- -- 90 30 94 %   12/01/20 1715 -- -- claudia      I have personally reviewed the past medical history, past surgical history, medications, social history, and family history, and I haveupdated the database accordingly. Allergies:   Fish allergy; Hydrocodone-acetaminophen; Tizanidine; Atorvastatin; Metoclopramide; Moxifloxacin; Oxycodone; Pregabalin; Tramadol; Zolpidem; Cephalexin; Iodides; and Sulfa antibiotics     Review of Systems:     Review of Systems   Unable to perform ROS: Intubated       Physical Examination :       Physical Exam  Constitutional:       General: She is not in acute distress. Appearance: Normal appearance. She is ill-appearing. She is not toxic-appearing or diaphoretic. HENT:      Head: Normocephalic and atraumatic. Nose: Nose normal. No congestion or rhinorrhea. Eyes:      General: No scleral icterus. Conjunctiva/sclera: Conjunctivae normal.      Pupils: Pupils are equal, round, and reactive to light. Neck:      Musculoskeletal: Neck supple. No neck rigidity or muscular tenderness. Cardiovascular:      Rate and Rhythm: Normal rate and regular rhythm. Heart sounds: Normal heart sounds. No murmur. No friction rub. Pulmonary:      Effort: No respiratory distress. Breath sounds: No stridor. No wheezing, rhonchi or rales. Chest:      Chest wall: No tenderness. Abdominal:      General: There is no distension. Palpations: Abdomen is soft. There is no mass. Tenderness: There is no abdominal tenderness. Genitourinary:     Comments: Urine bhavin  Musculoskeletal:         General: No swelling or deformity. Skin:     General: Skin is dry. Coloration: Skin is not jaundiced or pale. Findings: Bruising present. No erythema, lesion or rash. Neurological:      Mental Status: She is alert. Mental status is at baseline.       Comments: Intubated   Psychiatric:      Comments: Intubated         Past Medical History:     Past Medical History:   Diagnosis Date    Cancer ([facility] [address] myelodysplastic syndrome    Deaf, left     Essential hypertension     GERD (gastroesophageal reflux disease)     Glaucoma     Hyperlipidemia     Melanoma (HCC)     MRSA (methicillin resistant staph aureus) culture positive     MVP (mitral valve prolapse)     Pharyngoesophageal dysphagia     Raynaud disease     Status post four vessel coronary artery bypass        Past Surgical  History:     Past Surgical History:   Procedure Laterality Date    APPENDECTOMY      CARDIAC SURGERY      CABG x3    CHOLECYSTECTOMY      HYSTERECTOMY, TOTAL ABDOMINAL      IR PORT PLACEMENT < 5 YEARS      SMALL INTESTINE SURGERY      TUBAL LIGATION         Medications:      midodrine  10 mg Oral TID WC    QUEtiapine  50 mg Oral Nightly    enoxaparin  40 mg Subcutaneous Daily    ceftaroline fosamil (TEFLARO) IVPB  600 mg Intravenous Q12H    docusate  100 mg Oral BID    pantoprazole  40 mg Intravenous BID    And    sodium chloride (PF)  10 mL Intravenous Daily    LORazepam  0.25 mg Intravenous Once    latanoprost  1 drop Both Eyes Nightly    sodium chloride  20 mL Intravenous Once    sodium chloride  20 mL Intravenous Once       Social History:     Social History     Socioeconomic History    Marital status:      Spouse name: Not on file    Number of children: Not on file    Years of education: Not on file    Highest education level: Not on file   Occupational History    Not on file   Social Needs    Financial resource strain: Not on file    Food insecurity     Worry: Not on file     Inability: Not on file    Transportation needs     Medical: Not on file     Non-medical: Not on file   Tobacco Use    Smoking status: Former Smoker     Types: Cigarettes     Start date: 1/1/1957     Last attempt to quit: 8/22/2005     Years since quitting: 15.2   Substance and Sexual Activity    Alcohol use: Never     Frequency: Never    Drug use: Never    Sexual activity: Not on file   Lifestyle    Physical activity Days per week: Not on file     Minutes per session: Not on file    Stress: Not on file   Relationships    Social connections     Talks on phone: Not on file     Gets together: Not on file     Attends Judaism service: Not on file     Active member of club or organization: Not on file     Attends meetings of clubs or organizations: Not on file     Relationship status: Not on file    Intimate partner violence     Fear of current or ex partner: Not on file     Emotionally abused: Not on file     Physically abused: Not on file     Forced sexual activity: Not on file   Other Topics Concern    Not on file   Social History Narrative    Not on file       Family History:     Family History   Problem Relation Age of Onset    Heart Failure Mother     Early Death Mother     Heart Disease Mother     Stroke Father       Medical Decision Making:   I have independently reviewed/ordered the following labs:    CBC with Differential:   Recent Labs     11/30/20  0350  12/01/20  0444 12/01/20  1145   WBC 7.1  --  4.1  --    HGB 7.2*   < > 7.5* 8.0*   HCT 24.2*   < > 25.3* 25.9*   PLT 75*  --  81*  --    LYMPHOPCT 13*  --  19*  --    MONOPCT 3  --  6  --     < > = values in this interval not displayed. BMP:  Recent Labs     11/30/20  0818 12/01/20  0444   * 146*   K 4.1 4.1   * 111*   CO2 31 30   BUN 38* 34*   CREATININE 0.57 0.51     Hepatic Function Panel:   No results for input(s): PROT, LABALBU, BILIDIR, IBILI, BILITOT, ALKPHOS, ALT, AST in the last 72 hours. No results for input(s): RPR in the last 72 hours. No results for input(s): HIV in the last 72 hours. No results for input(s): BC in the last 72 hours. Lab Results   Component Value Date    CREATININE 0.51 12/01/2020    GLUCOSE 107 12/01/2020       Detailed results: Thank you for allowing us to participate in the care of this patient. Please call with questions.     This note is created with the assistance of a speech recognition program. While intending to generate adocument that actually reflects the content of the visit, the document can still have some errors including those of syntax and sound a like substitutions which may escape proof reading. It such instances, actual meaningcan be extrapolated by contextual diversion.     Michael Castillo MD  Office: [phone]  Perfect serve / office [phone]

## 2020-12-02 NOTE — PROGRESS NOTES
Palliative Care Progress Note    NAME:  Humberto Ren  MEDICAL RECORD NUMBER:  [id number]  AGE: 70 y.o. GENDER: female  : 1945  TODAY'S DATE:  2020    Reason for Consult:  goals of care and family support   History of Present Illness     The patient is a 70 y.o. Non-/non  female who presents with Covid pneumonia     Referred to Palliative Care by  [x] Physician   [] Nursing  [] Family Request   [] Other:       She was admitted to the ICU service for Acute respiratory failure due to COVID-19 ([facility] [address] [U07.1, J96.00]. Her hospital course has been associated with Covid pneumonia . The patient has a complicated medical history and has been hospitalized since 2020 10:51 PM. Balbir Torrez remains on ventilator with FIO2 at 40% and peep of 10. She continues on Nimbex, Versed, and Fentanyl for comfort and sedation. She continues on Teflaro. She is running temperature today. Her labs from today Gluc 108, bun 32, cr 0.48, k 3.9, wbc 3.0, rbc 2.45, hgb 7.2, hct 23.8, plt 90. Patient had cxr yesterday and results below. Palliative care will continue to follow patient and provide emotional support to family as needed.      Chest Xray  No significant change in appearance of pneumonia     OVERNIGHT EVENTS:  Patient is a DNRCC-A   Patient on ventilator 40% FIO2 and 10 Peep  Continues on Nimbex, Versed, and fentanyl      BP (!) 104/49   Pulse 99   Temp 100.4 °F (38 °C) (Oral)   Resp 30   Ht 5' 7\" (1.702 m)   Wt 156 lb 15.5 oz (71.2 kg)   SpO2 92%   BMI 24.58 kg/m²     Assessment        REVIEW OF SYSTEMS    [x]   UNABLE TO OBTAIN:   Unable to assess ROS due to covid isolation protocols and preservation of PPE     PHYSICAL ASSESSMENT:     General: []  Oriented x3      [] well appearing      [x] Intubated      [x] ill appearing      [x] Other:ventilaor with sedation   Mental Status: [] normal mental status exam      [] drowsy      [] Confused      [x] Other: not following commands   Cardiovascular: [] meaning/purpose   Caregiver support/education  Continue with current plan of care  Code status clarified: DNRCCA  Principle Problem/Diagnosis:  Pneumonia due to COVID-19 virus    Additional Assessments:  Principal Problem:    Pneumonia due to COVID-19 virus  Active Problems:    Acute respiratory failure due to COVID-19 ([facility] [address]    MDS (myelodysplastic syndrome) ([facility] [address]    Neutropenic sepsis ([facility] [address]    Bicytopenia    ROSALINO (acute kidney injury) ([facility] [address]    Acute respiratory failure with hypoxia ([facility] [address]    Essential hypertension    Coronary artery disease involving coronary bypass graft of native heart without angina pectoris    History of Raynaud's syndrome    Transaminitis    Immunosuppressed status (HCC)    Severe sepsis (HCC)    Hypokalemia    Other pancytopenia (HCC)    1- Symptom management/ pain control     Pain Assessment:  Pain is controlled with current analgesics. Medication(s) being used: tylenol and fentanyl . Anxiety:  Patient is on ventilator and sedation                           Dyspnea:  Patient is on ventilator and sedation                           Fatigue:  generalized weakness     Other:      2- Goals of care evaluation   The patient goals of care are improve or maintain function/quality of life   Goals of care discussed with:    [] Patient independently    [] Patient and Family    [x] Family or Healthcare DPOA independently    [] Unable to discuss with patient, family/DPOA not present    3- Code Status  DNR-CCA    4- Other recommendations  - We will continue to provide comfort and support to the patient and the family        Palliative Care will continue to follow Ms. Jaimes's care as needed. The note has been dictated by dragon, typing errors may be a possibility     Thank you for allowing Palliative Care to participate in the care of Ms. Jalen Castorena .        Electronically signed by   Brant Bernheim, APRN - NP  Palliative Care Team  on 12/2/2020 at 12:02 PM    Please call with any palliative

## 2020-12-03 NOTE — PROGRESS NOTES
Palliative Care Progress Note    NAME:  Lizzeth Mittal  MEDICAL RECORD NUMBER:  [id number]  AGE: 70 y.o. GENDER: female  : 1945  TODAY'S DATE:  12/3/2020    Reason for Consult:  goals of care and family support   History of Present Illness     The patient is a 70 y.o. Non-/non  female who presents with Covid pneumonia     Referred to Palliative Care by  [x] Physician   [] Nursing  [] Family Request   [] Other:       She was admitted to the ICU service for Acute respiratory failure due to COVID-19 ([facility] [address] [U07.1, J96.00]. Her hospital course has been associated with COVID pneumonia . The patient has a complicated medical history and has been hospitalized since 2020 10:51 PM. Roberto Carlos Holden is a 70 female that was on CPAP for awhile today and tolerated well. Nimbex was stopped and patient remains on Versed and Fentanyl for sedation. Patient is awake nodding head appropriately and following commands.  Palliative care will continue to follow and provide emotional support to patient and family    OVERNIGHT EVENTS:  Patient is a DNRCC-A  Patient on ventilator with sedation   CPAP tolerated today     BP (!) 106/49   Pulse 97   Temp 101.5 °F (38.6 °C) (Core) Comment: tylenol given  Resp 28   Ht 5' 7\" (1.702 m)   Wt 169 lb 1.5 oz (76.7 kg)   SpO2 90%   BMI 26.48 kg/m²     Assessment        REVIEW OF SYSTEMS    [x]   UNABLE TO OBTAIN:     Unable to assess ROS due to covid isolation protocol and PPE preservation     PHYSICAL ASSESSMENT:     General: []  Oriented x3      [] well appearing      [x] Intubated      [x] ill appearing      [x] Other:ventilator and sedation   Mental Status: [] normal mental status exam      [] drowsy      [] Confused      [x] Other: nodding head and follow commands   Cardiovascular: [x]  Regular rate/rhythm      [] Arrhythmia      [x] Other: HR at 97 per monitor   Chest: [] Effort normal      [] lungs clear      [] respiratory distress      [] Tachypnea      [x] Other:Patient on ventilator with sedation did have CPAP today  Abdomen: [x] Soft/non-tender      []  Normal appearance      [] Distended      [] Ascites      [] Other:    Neurological: [] Normal Speech      [] Normal Sensation      [x]  Deficits present: Nodding head appropriately and following some commands     Extremity:  [] normal skin color/temp      [] clubbing/cyanosis      []  No edema      [x] Other: BLE edema   Palliative Performance Scale:  ___60%  Ambulation reduced; Significant disease; Can't do hobbies/housework; intake normal or reduced; occasional assist; LOC full/confusion  ___50%  Mainly sit/lie; Extensive disease; Can't do any work; Considerable assist; intake normal or reduced; LOC full/confusion  ___40%  Mainly in bed; Extensive disease; Mainly assist; intake normal or reduced; LOC full/confusion   __x_30%  Bed Bound; Extensive disease; Total care; intake reduced; LOCfull/confusion  ___20%  Bed Bound; Extensive disease; Total care; intake minimal; Drowsy/coma  ___10%  Bed Bound; Extensive disease; Total care; Mouth care only; Drowsy/coma  ___0       Death      Plan      Palliative Interaction:  Called patient  Margaret and provided him with updates on his wife. I explained to him that his wife was off paralytic and tolerated CPAP today. I also informed him that she was nodding her head appropriately and following some commands. Patient  thanked me for my call and emotional support provided to him. Palliative care will continue to follow patient.        Education/support to family  Discharge planning/helping to coordinate care  Communications with primary service  Pharmacologic pain management  Providing support for coping/adaptation/distress of family  Discussing meaning/purpose   Caregiver support/education  Continue with current plan of care  Code status clarified: DNRCCA  Principle Problem/Diagnosis:  Pneumonia due to COVID-19 virus    Additional Assessments:  Principal Problem: Pneumonia due to COVID-19 virus  Active Problems:    Acute respiratory failure due to COVID-19 [facility])    MDS (myelodysplastic syndrome) (HCC)    Neutropenic sepsis ([facility] [address]    Bicytopenia    ROSALINO (acute kidney injury) ([facility] [address]    Acute respiratory failure with hypoxia (HCC)    Essential hypertension    Coronary artery disease involving coronary bypass graft of native heart without angina pectoris    History of Raynaud's syndrome    Transaminitis    Immunosuppressed status (HCC)    Severe sepsis (HCC)    Hypokalemia    Other pancytopenia ([facility])    1- Symptom management/ pain control     Pain Assessment:  Pain is controlled with current analgesics. Medication(s) being used: acetaminophen, Fentanyl . Anxiety:  Patient remains on ventilator and sedation                           Dyspnea:  Patient remains on ventilator and sedation                           Fatigue:  generalized weakness     Other:      2- Goals of care evaluation   The patient goals of care are improve or maintain function/quality of life   Goals of care discussed with:    [] Patient independently    [] Patient and Family    [x] Family or Healthcare DPOA independently    [] Unable to discuss with patient, family/DPOA not present    3- Code Status  DNR-CCA    4- Other recommendations  - We will continue to provide comfort and support to the patient and the family        Palliative Care will continue to follow Ms. Jaimes's care as needed. The note has been dictated by dragon, typing errors may be a possibility     Thank you for allowing Palliative Care to participate in the care of Ms. Yuan Choudhury . Electronically signed by   MICHAEL Wayne NP  Palliative Care Team  on 12/3/2020 at 3:15 PM    Please call with any palliative questions or concerns. Palliative Care Team is available via perfect serve or via phone.

## 2020-12-03 NOTE — PLAN OF CARE
Problem: Respiratory:  Goal: Ability to maintain a clear airway will improve  Description: Ability to maintain a clear airway will improve  Outcome: Ongoing  Goal: Ability to maintain adequate ventilation will improve  Description: Ability to maintain adequate ventilation will improve  Outcome: Ongoing  Goal: Complications related to the disease process, condition or treatment will be avoided or minimized  Description: Complications related to the disease process, condition or treatment will be avoided or minimized  Outcome: Ongoing     Problem: Gas Exchange - Impaired  Goal: Absence of hypoxia  Outcome: Ongoing  Goal: Promote optimal lung function  Outcome: Ongoing     Problem: Breathing Pattern - Ineffective  Goal: Ability to achieve and maintain a regular respiratory rate  Outcome: Ongoing     Problem: OXYGENATION/RESPIRATORY FUNCTION  Goal: Patient will maintain patent airway  Outcome: Ongoing  Goal: Patient will achieve/maintain normal respiratory rate/effort  Description: Respiratory rate and effort will be within normal limits for the patient  Outcome: Ongoing     Problem: MECHANICAL VENTILATION  Goal: Patient will maintain patent airway  Outcome: Ongoing  Goal: Oral health is maintained or improved  Outcome: Ongoing  Goal: ET tube will be managed safely  Outcome: Ongoing  Goal: Ability to express needs and understand communication  Outcome: Ongoing  Goal: Mobility/activity is maintained at optimum level for patient  Outcome: Ongoing     Problem: SKIN INTEGRITY  Goal: Skin integrity is maintained or improved  Outcome: Ongoing

## 2020-12-03 NOTE — PROGRESS NOTES
Progress Note    Patient - Kinjal Mason  Date of Admission -  2020 10:51 PM  Date of Evaluation -  12/3/2020  Room and Bed Number -  3023/3023-01   Hospital Day - 17    CHIEF COMPLAINT : ACUTE HYPOXIC RESPIRATORY FAILURE DUE TO COVID -19 PNEUMONIA   SUBJECTIVE:     OVERNIGHT EVENTS:         Low dose levophed   Nimbex off   Febrile  But culture negative   On ceftaroline   Continue midodrine   Started on cpap trials     SECRETIONS  -Amount:  [] Small [x] Moderate  [] Large    [] None  Color:     [x] White [] Colored  [] Bloody    SEDATION:    [] Propofol gtt  [x] Versed gtt  [x] FENTANYL  gtt  gtt   [] No Sedation    PARALYZED:  [x] No    [] Yes    VASOPRESSORS:  [] No    [x] Yes  [x] Levophed [] Dopamine [] Vasopressin  [] Dobutamine [] Phenylephrine [] Epinephrine    INHALED NITRIC OXIDE : [x] No    [] Yes    PRONE :       [x] No    [] Yes    REMDESIVIR:             [] No    [x] Yes  Completed     DEXAMETHASONE : [] No    [x] Yes completed     CONVALESCENT PLASMA : [] No    [x] Yes    Ros unable to perform due to intubation and sedation    OBJECTIVE:     VITAL SIGNS:  BP (!) 106/49   Pulse 97   Temp 101.5 °F (38.6 °C) (Core) Comment: tylenol given  Resp 28   Ht 5' 7\" (1.702 m)   Wt 169 lb 1.5 oz (76.7 kg)   SpO2 90%   BMI 26.48 kg/m²   Tmax over 24 hours:  Temp (24hrs), Av.4 °F (38 °C), Min:99.3 °F (37.4 °C), Max:101.5 °F (38.6 °C)      Patient Vitals for the past 8 hrs:   Temp Temp src Pulse Resp SpO2   20 1254 101.5 °F (38.6 °C) CORE -- -- --   20 1252 -- -- 97 28 90 %         Intake/Output Summary (Last 24 hours) at 12/3/2020 1506  Last data filed at 12/3/2020 1254  Gross per 24 hour   Intake 2417.7 ml   Output 1050 ml   Net 1367.7 ml     Date 20 0000 - 20 2359   Shift 9968-2064 4402-8338 1668-5082 24 Hour Total   INTAKE   I.V.(mL/kg) 259.4(3.4) 153(2)  412.4(5.4)   NG/GT(mL/kg) 695(9.1) 552(7.2)  1247(16.3)   Shift Total(mL/kg) 954. 4(12.4) 705(9.2)  1659. 4(21.6) OUTPUT   Urine(mL/kg/hr) 365(0.6) 375  740   Shift Total(mL/kg) 365(4.8) 375(4.9)  740(9.6)   Weight (kg) 76.7 76.7 76.7 76.7     Wt Readings from Last 3 Encounters:   12/03/20 169 lb 1.5 oz (76.7 kg)     Body mass index is 26.48 kg/m².         PHYSICAL EXAM:  Abdomen soft   Rt port   Sedated   Mild anasarca   On cpap , abdominal breathing   MEDICATIONS:  Scheduled Meds:   midodrine  10 mg Oral TID WC    QUEtiapine  50 mg Oral Nightly    enoxaparin  40 mg Subcutaneous Daily    ceftaroline fosamil (TEFLARO) IVPB  600 mg Intravenous Q12H    docusate  100 mg Oral BID    pantoprazole  40 mg Intravenous BID    And    sodium chloride (PF)  10 mL Intravenous Daily    latanoprost  1 drop Both Eyes Nightly    sodium chloride  20 mL Intravenous Once    sodium chloride  20 mL Intravenous Once     Continuous Infusions:   norepinephrine 1 mcg/min (12/03/20 1239)    cisatracurium (NIMBEX) infusion Stopped (12/02/20 1359)    fentaNYL 75 mcg/hr (12/03/20 0611)    midazolam 5 mg/hr (12/03/20 1353)     PRN Meds:   artificial tears, , PRN  LORazepam, 0.5 mg, Q8H PRN  sodium chloride, 30 mL, PRN  sodium chloride flush, 10 mL, PRN  potassium chloride, 40 mEq, PRN    Or  potassium alternative oral replacement, 40 mEq, PRN    Or  potassium chloride, 10 mEq, PRN  magnesium sulfate, 1 g, PRN  acetaminophen, 650 mg, Q6H PRN    Or  acetaminophen, 650 mg, Q6H PRN  polyethylene glycol, 17 g, Daily PRN  promethazine, 12.5 mg, Q6H PRN    Or  ondansetron, 4 mg, Q6H PRN  nicotine, 1 patch, Daily PRN        SUPPORT DEVICES: [x] Ventilator [] BIPAP  [] Nasal Cannula [] Room Air      ABGs:     Lab Results   Component Value Date    LGI5SBV 32 12/03/2020    FIO2 40.0 12/03/2020       DATA:  Complete Blood Count:   Recent Labs     12/01/20  0444  12/02/20  0430 12/02/20  1257 12/03/20  0059 12/03/20  0422   WBC 4.1  --  3.0*  --   --  3.5   RBC 2.53*  --  2.45*  --   --  2.41*   HGB 7.5*   < > 7.2* 8.0* 7.4* 7.3*  7.2*   HCT 25.3*   < > SETTINGS:  Vent Information  $Ventilation: $Subsequent Day  Skin Assessment: Clean, dry, & intact  Suction Catheter Diameter: 14  Equipment ID: [id number]  Equipment Changed: HME  Vent Type: Servo i  Vent Mode: PRVC  Vt Ordered: 400 mL  Rate Set: 30 bmp  Pressure Support: (S) 6 cmH20  FiO2 : 45 %  SpO2: 90 %  SpO2/FiO2 ratio: 242.5  Sensitivity: 3  PEEP/CPAP: 8  I Time/ I Time %: 0.7 s  Humidification Source: HME  Nitric Oxide/Epoprostenol In Use?: No  Mask Type: Full face mask  Mask Size: Small     PaO2/FiO2 RATIO:  Recent Labs     12/03/20  0410   POCPO2 61.1*      FiO2 : 45 %       LABS:  ABGs:   Recent Labs     11/30/20 2318 12/01/20  0030 12/02/20  0420 12/03/20  0410   POCPH 7.218* 7.368 7.410 7.414   POCPCO2 101.6* 62.6* 49.7* 47.8   POCPO2 64.3* 81.1* 54.1* 61.1*   POCHCO3 41.4* 36.0* 31.5* 30.5*   AXQX8RCB 85* 95 87* 91*            ASSESSMENT:     Principal Problem:    Pneumonia due to COVID-19 virus  Active Problems:    Acute respiratory failure due to COVID-19 (HCC)    MDS (myelodysplastic syndrome) (HCC)    Neutropenic sepsis (HCC)    Bicytopenia    ROSALINO (acute kidney injury) ([facility])    Acute respiratory failure with hypoxia (HCC)    Essential hypertension    Coronary artery disease involving coronary bypass graft of native heart without angina pectoris    History of Raynaud's syndrome    Transaminitis    Immunosuppressed status (HCC)    Severe sepsis (HCC)    Hypokalemia    Other pancytopenia (HCC)  Resolved Problems:    * No resolved hospital problems.  *              Acute hypoxic respiratory failure secondary to COVID 19   Acute respiratory distress syndrome   Bilateral multifocal pneumonia due to COVID 19 infection   Covid -19 pandemic emergency    Mild hypernatremia better       LOS: 17  PLAN:    D/w RT  D/w RN   Vent setting reviewed   Sedation reviewed -   Dec sedation   Airborne isolation and droplet precautions to be continued  Continue supportive care    dec free water  100 ml q 6 hrs - continue monitor sodium   Cont tube feeding  Cont vent support  - did cpap today but sats 91 % and rr in 30s   Will cont cpap trial bid   Keep off paralytic   Monitor endotracheal secretions   Antibiotics per id     WEAN PER PROTOCOL:  [x] No   [] Yes  [] N/A      ICU PROPHYLAXIS:  Stress ulcer:  [x] PPI Agent  [] N1Daxgs [] Sucralfate  [] Other:  VTE:   [x] Enoxaparin  [] Unfract. Heparin Subcut  [] EPC Cuffs    NUTRITION:  [] NPO [x] Tube Feeding  [] TPN  [] PO    INSULIN DRIP:   [x] No   [] Yes        TRANSFER OUT OF ICU:   [x] No   [] Yes    Treatment plan Discussed with nursing staff in detail , all questions answered . Total critical care time caring for this patient with life threatening, unstable organ failure, including direct patient contact, management of life support systems, review of data including imaging and labs, discussions with other team members and physicians at least 27  Min so far today, excluding procedures. Electronically signed by Laura Quinn MD on 12/3/2020 at 3:06 PM       This patient was evaluated in the context of the global SARS-CoV-2 (COVID-19) pandemic, which necessitated considerations that the patient either has COVID-19 infection or is at risk of infection with COVID-19. Institutional protocols and algorithms that pertain to the evaluation & management of patients with COVID-19 or those at risk for COVID-19 are in a state of rapid changes based on information released by regulatory bodies including the CDC and federal and state organizations. These policies and algorithms were followed during the patient's care. Please note that this chart was generated using voice recognition Dragon dictation software. Although every effort was made to ensure the accuracy of this automated transcription, some errors in transcription may have occurred.

## 2020-12-03 NOTE — PROGRESS NOTES
°C) (Core) Comment: tylenol given  Resp 28   Ht 5' 7\" (1.702 m)   Wt 169 lb 1.5 oz (76.7 kg)   SpO2 90%   BMI 26.48 kg/m²     Temperature Range: Temp: 101.5 °F (38.6 °C)(tylenol given) Temp  Av.4 °F (38 °C)  Min: 99.3 °F (37.4 °C)  Max: 101.5 °F (38.6 °C)    Patient seen and evaluated at bedside  She remains intubated and sedated. The patient remains ventilator dependent, FiO2 down to 40%  Patient with continued intermittent elevated temperatures, t max in 24 hours 38.6  Patient continues on Levophed    Review of Systems   Unable to perform ROS: Intubated       Physical Examination :     Physical Exam  Constitutional:       Interventions: She is sedated and intubated. HENT:      Head: Normocephalic and atraumatic. Mouth/Throat:      Comments: Orally intubated  Cardiovascular:      Rate and Rhythm: Regular rhythm. Tachycardia present. Pulmonary:      Effort: Pulmonary effort is normal. No respiratory distress. She is intubated. Musculoskeletal:      Right lower leg: Edema present. Left lower leg: Edema present. Laboratory data:   I have independently reviewed the followinglabs:  CBC with Differential:   Recent Labs     20  0430  20  0059 20   WBC 3.0*  --   --  3.5   HGB 7.2*   < > 7.4* 7.3*  7.2*   HCT 23.8*   < > 24.1* 23.8*  23.8*   PLT 90*  --   --  See Reflexed IPF Result   LYMPHOPCT 24  --   --  30   MONOPCT 6  --   --  7    < > = values in this interval not displayed. BMP:   Recent Labs     20  0430 20  0422    139   K 3.9 3.9    102   CO2 30 28   BUN 32* 28*   CREATININE 0.48* 0.56     Hepatic Function Panel:   No results for input(s): PROT, LABALBU, BILIDIR, IBILI, BILITOT, ALKPHOS, ALT, AST in the last 72 hours.       Lab Results   Component Value Date    PROCAL 1.00 2020     Lab Results   Component Value Date    CRP 67.6 2020    .4 2020    .8 2020     No results found for: SEDRATE      Lab Results   Component Value Date    DDIMER 1.93 11/21/2020    DDIMER 2.44 11/17/2020     Lab Results   Component Value Date    FERRITIN 4,245 11/25/2020    FERRITIN 2,838 11/21/2020    FERRITIN 3,590 11/20/2020    FERRITIN 3,453 11/18/2020    FERRITIN 3,024 11/17/2020     Lab Results   Component Value Date     11/18/2020     11/17/2020     11/17/2020     Lab Results   Component Value Date    FIBRINOGEN 506 11/21/2020    FIBRINOGEN 673 11/20/2020    FIBRINOGEN 1002 11/18/2020    FIBRINOGEN 720 11/17/2020    FIBRINOGEN 721 11/17/2020       Results in Past 30 Days  Result Component Current Result Ref Range Previous Result Ref Range   SARS-CoV-2, PCR DETECTED (A) (11/17/2020) Not Detected Positive (A) (11/16/2020)      Lab Results   Component Value Date    COVID19 DETECTED 11/17/2020    COVID19 Positive 11/16/2020       No results for input(s): [facility] in the last 72 hours. Imaging Studies:   Chest x-ray 12/1/2020  Impression:          No significant change in appearance of pneumonia        Cultures:     Culture, Blood 1 [[id number]]   Collected: 11/30/20 2009    Order Status: Completed  Specimen: Blood  Updated: 12/03/20 0025     Specimen Description  . BLOOD     Special Requests  L HAND 11 ML     Culture  NO GROWTH 3 DAYS    Culture, Blood 1 [[id number]]   Collected: 11/30/20 2017    Order Status: Completed  Specimen: Blood  Updated: 12/03/20 0025     Specimen Description  . BLOOD     Special Requests  R HAND 9 ML     Culture  NO GROWTH 3 DAYS    Culture, Respiratory [[id number]]   Collected: 11/30/20 2217    Order Status: Completed  Specimen: Sputum Aspirated  Updated: 12/02/20 3431     Specimen Description  . ASPIRATED SPUTUM     Special Requests  NOT REPORTED     Direct Exam  >25 NEUTROPHILS/LPF      < 10 EPITHELIAL CELLS/LPF      NO SIGNIFICANT PATHOGENS SEEN     Culture  NORMAL RESPIRATORY MORIS MODERATE GROWTH          Medications:      midodrine  10 mg Oral TID WC    QUEtiapine  50 mg Oral Nightly    enoxaparin  40 mg Subcutaneous Daily    ceftaroline fosamil (TEFLARO) IVPB  600 mg Intravenous Q12H    docusate  100 mg Oral BID    pantoprazole  40 mg Intravenous BID    And    sodium chloride (PF)  10 mL Intravenous Daily    latanoprost  1 drop Both Eyes Nightly    sodium chloride  20 mL Intravenous Once    sodium chloride  20 mL Intravenous Once           [facility]  [id number] [facility]  OFFICE: [phone]      Electronically signed by MICHAEL Jensen CNP on 12/3/2020 at 1:52 PM  Thank you for allowing us to participate in the care of this patient. Please call with questions. This note iscreated with the assistance of a speech recognition program.  While intending to generate a document that actually reflects the content of the visit, the document can still have some errors including those of syntax andsound a like substitutions which may escape proof reading. In such instances, actual meaning can be extrapolated by contextual diversion.

## 2020-12-04 NOTE — PROGRESS NOTES
[facility]  Office: [address], DO, Martha Garcia, DO, Fredy Rivera, DO, Lennox Mars Blood, DO, Radha Macario MD, Eliazar Gutierrez MD, Gary Gunter MD, Jeromy Medina MD, Tori Elizabeth MD, Sofy Gallego MD, Yaquelin Valiente MD, Saul George MD, eRn Enciso MD, Jacqueline Ignacio DO, Indio Walker MD, Alexis Beavers MD, Duane Dc DO, Irina Rubi MD,  Ewa Perdomo DO, Burna Cockayne, MD, Brittny Fields MD, Sharif Vogel CNP, [facility] Hearn, CNP, Thalia Fatima CNP, Paddy Duane, CNS, Rosita Armando, CNP, iLzeth Sellers, CNP, Kathleen Richardson, CNP, Jhoana East, CNP, Mabel Munguia CNP, Lior Perez PA-C, Daisey Meigs, [facility], Shanel Hernandez, CNP, Jenn Neal, CNP, Luana Ruvalcaba, CNP, Tai Tucker, CNP, Katiana Jacobo, [facility]   [address] [facility]    Progress Note    12/3/2020    8:09 PM    Name:   Latha Thakur  MRN:     [id number]     Acct:      [de-identified]   Room:   3023/3023-01   Day:  16  Admit Date:  11/16/2020 10:51 PM    PCP:   Lise Coronado MD  Code Status:  DNR-CCA    Subjective:     C/C: No chief complaint on file. Interval History Status: not changed. Patient seen and examined at bedside. No acute issues overnight. Still requiring intermittent amounts of Levophed. Sedated with Versed and fentanyl. FiO2 overall similar to previous day. Brief History:        Per EMR:  Veronika Euceda a 70 y. o. female with a past medical history for MDS, essential hypertension, melanoma, mitral valve prolapse, MRSA, Raynaud's disease presents emergency department for shortness of breath and dyspnea upon exertion for 3 days.  Patient was initially seen at [facility] diagnosed with Covid pneumonia.  Patient requiring 50% FiO2 on BiPAP, patient also had an elevated D-dimer but CTA was not possible secondary to contrast allergy.  [facility] unable to accommodate due to lack of Covid beds, patient was transferred for further care.  Spoke with pulmonary medicine at [facility] and was accepted for ICU transfer. Norma Swain was given 1 unit of PRBCs at [facility] for a hemoglobin less than 7, patient was also placed on BiPAP for transfer.  Patient arrived, no complaints except for some shortness of breath. Needing high flow oxygen, still critical     Intubated and sedated, endotracheal intubation done on November 23, 2020 due to acute respiratory failure not holding oxygenation even on FiO2 100% BiPAP    Review of Systems:        Unable to obtain due to intubation, sedation    Medications: Allergies: Allergies   Allergen Reactions    Fish Allergy Anaphylaxis, Hives and Swelling    Hydrocodone-Acetaminophen Anaphylaxis    Tizanidine Anaphylaxis and Hives    Atorvastatin Hives, Other (See Comments) and Swelling    Metoclopramide Hives     Other reaction(s): Unknown, Unknown    Moxifloxacin Hives, Other (See Comments) and Swelling     Other reaction(s): Other (See Comments), Unknown    Oxycodone Other (See Comments)     Other reaction(s): Hallucinations, Mental Status Change    Pregabalin Other (See Comments)     Other reaction(s): Hallucinations, Other (See Comments)  Causes sores in the mouth  Causes sores in the mouth      Tramadol      Other reaction(s): Edema, Other (See Comments)    Zolpidem Other (See Comments)     Other reaction(s): Other (See Comments)  causes cramps in hands and legs  causes cramps in hands and legs  Other reaction(s):  Other (See Comments)  causes cramps in hands and legs  causes cramps in hands and legs      Cephalexin Rash     Other reaction(s): Unknown, Unknown    Iodides Rash and Swelling     ivp and heart cath dye    Sulfa Antibiotics Rash       Current Meds:   Scheduled Meds:    midodrine  10 mg Oral TID WC    QUEtiapine  50 mg Oral Nightly    enoxaparin  40 mg Subcutaneous Daily    ceftaroline fosamil (TEFLARO) IVPB  600 mg Intravenous Q12H  docusate  100 mg Oral BID    pantoprazole  40 mg Intravenous BID    And    sodium chloride (PF)  10 mL Intravenous Daily    latanoprost  1 drop Both Eyes Nightly    sodium chloride  20 mL Intravenous Once    sodium chloride  20 mL Intravenous Once     Continuous Infusions:    norepinephrine Stopped (20 1700)    cisatracurium (NIMBEX) infusion Stopped (20 1359)    fentaNYL 75 mcg/hr (20 1858)    midazolam 4 mg/hr (20 1830)     PRN Meds: artificial tears, LORazepam, sodium chloride, sodium chloride flush, potassium chloride **OR** potassium alternative oral replacement **OR** potassium chloride, magnesium sulfate, acetaminophen **OR** acetaminophen, polyethylene glycol, promethazine **OR** ondansetron, nicotine    Data:     Past Medical History:   has a past medical history of Cancer ([facility] [address] Deaf, left, Essential hypertension, GERD (gastroesophageal reflux disease), Glaucoma, Hyperlipidemia, Melanoma ([facility] [address] MRSA (methicillin resistant staph aureus) culture positive, MVP (mitral valve prolapse), Pharyngoesophageal dysphagia, Raynaud disease, and Status post four vessel coronary artery bypass. Social History:   reports that she quit smoking about 15 years ago. Her smoking use included cigarettes. She started smoking about 63 years ago. She does not have any smokeless tobacco history on file. She reports that she does not drink alcohol or use drugs. Family History:   Family History   Problem Relation Age of Onset    Heart Failure Mother     Early Death Mother     Heart Disease Mother     Stroke Father        Vitals:  BP (!) 108/53   Pulse 85   Temp 99.7 °F (37.6 °C) (Core)   Resp 27   Ht 5' 7\" (1.702 m)   Wt 169 lb 1.5 oz (76.7 kg)   SpO2 95%   BMI 26.48 kg/m²   Temp (24hrs), Av.2 °F (37.9 °C), Min:99.3 °F (37.4 °C), Max:101.5 °F (38.6 °C)    Recent Labs     20  2318 20  0030 20  0410   POCGLU 110* 131* 142*       I/O (24Hr):     Intake/Output Summary (Last 24 hours) at 12/3/2020 2009  Last data filed at 12/3/2020 1700  Gross per 24 hour   Intake 2472.7 ml   Output 1075 ml   Net 1397.7 ml       Labs:  Hematology:  Recent Labs     12/01/20 0444 12/02/20 0430 12/03/20  0059 12/03/20  0422 12/03/20  1727   WBC 4.1  --  3.0*  --   --  3.5  --    RBC 2.53*  --  2.45*  --   --  2.41*  --    HGB 7.5*   < > 7.2*   < > 7.4* 7.3*  7.2* 7.1*   HCT 25.3*   < > 23.8*   < > 24.1* 23.8*  23.8* 23.2*   .0  --  97.1  --   --  98.8  --    MCH 29.6  --  29.4  --   --  30.3  --    MCHC 29.6  --  30.3  --   --  30.7  --    RDW 17.5*  --  16.8*  --   --  16.2*  --    PLT 81*  --  90*  --   --  See Reflexed IPF Result  --    MPV 12.8  --  13.0  --   --  NOT REPORTED  --     < > = values in this interval not displayed. Chemistry:  Recent Labs     12/01/20 0444 12/02/20 0430 12/03/20 0422   * 144 139   K 4.1 3.9 3.9   * 107 102   CO2 30 30 28   GLUCOSE 107* 108* 133*   BUN 34* 32* 28*   CREATININE 0.51 0.48* 0.56   ANIONGAP 5* 7* 9   LABGLOM >60 >60 >60   GFRAA >60 >60 >60   CALCIUM 8.0* 8.1* 8.3*     Recent Labs     11/30/20  2318 12/01/20  0030 12/03/20  0410   POCGLU 110* 131* 142*     ABG:  Lab Results   Component Value Date    POCPH 7.414 12/03/2020    POCPCO2 47.8 12/03/2020    POCPO2 61.1 12/03/2020    POCHCO3 30.5 12/03/2020    NBEA NOT REPORTED 12/03/2020    PBEA 5 12/03/2020    GYP6EEP 32 12/03/2020    SGSI4KWV 91 12/03/2020    FIO2 40.0 12/03/2020     Lab Results   Component Value Date/Time    SPECIAL NOT REPORTED 11/30/2020 10:17 PM     Lab Results   Component Value Date/Time    CULTURE NORMAL RESPIRATORY MORIS MODERATE GROWTH 11/30/2020 10:17 PM       Radiology:  Xr Chest (single View Frontal)    Result Date: 11/29/2020  Stable interstitial infiltrates. ET tube terminates 35 mm above the nghia.      Xr Chest Portable    Result Date: 12/1/2020  No significant change in appearance of pneumonia       Physical Examination:        General appearance: Intubated, sedated, chronically ill-appearing  Mental Status: Intubated, sedated  Lungs: Ventilated breath sounds  Heart:  regular rate and rhythm, no murmur  Abdomen:  soft, nontender, nondistended, normal bowel sounds  Extremities:  no edema, redness, tenderness in the calves  Skin:  no gross lesions, rashes, induration    Assessment:        Hospital Problems           Last Modified POA    * (Principal) Pneumonia due to COVID-19 virus 11/17/2020 Yes    Acute respiratory failure due to COVID-19 ([address] 11/16/2020 Yes    MDS (myelodysplastic syndrome) ([address] 11/17/2020 Yes    Neutropenic sepsis ([address] 11/17/2020 Yes    Bicytopenia 11/17/2020 Yes    ROSALINO (acute kidney injury) ([address] 11/17/2020 Yes    Acute respiratory failure with hypoxia ([address] 11/17/2020 Yes    Essential hypertension 11/17/2020 Yes    Coronary artery disease involving coronary bypass graft of native heart without angina pectoris 11/17/2020 Yes    History of Raynaud's syndrome 11/17/2020 Yes    Transaminitis 11/17/2020 Yes    Immunosuppressed status ([address] 11/17/2020 Yes    Severe sepsis ([address] 11/17/2020 Yes    Hypokalemia 11/22/2020 Yes    Other pancytopenia ([address] 11/24/2020 Yes          Plan:        1. COVID-19 with acute respiratory failure. Appreciate critical care recommendations. Continue to wean off fentanyl and Versed if tolerating, continue to try spontaneous breathing trials  2. Try to wean off limit Levophed, continue midodrine  3. Monitor sodium, continue free water flushes  4. Continue tube feeds  5. Patient currently on Teflaro, no growth 3 days on blood cultures, normal respiratory growth seen on respiratory culture. 6. Continue to monitor hemoglobin. Will transfuse if less than 7. Has a history of myelodysplastic syndrome. Appreciate hematology oncology recommendations.     Aron Ewing MD  12/3/2020  8:09 PM

## 2020-12-04 NOTE — PROGRESS NOTES
Today's Date: 12/4/2020  Patient Name: Chaparro Garcia  Date of admission: 11/16/2020 10:51 PM  Patient's age: 70 y.o., 1945  Admission Dx: Acute respiratory failure due to COVID-19 ([facility] [U07.1, J96.00]    Reason for Consult: management recommendations  Requesting Physician: Dewey Soto MD    CHIEF COMPLAINT: Anemia. MDS. COVID-19 positive. History Obtained From:  patient, electronic medical record    Interval history:    Chart reviewed intervals noted  No significant changes. No active bleeding. No fever. HISTORY OF PRESENT ILLNESS:      The patient is a 70 y.o.  female who is admitted to the hospital with chief complaint of shortness of breath dyspnea for the last 3 days. Further testing revealed patient was COVID-19 positive. Patient was initially seen in outlying hospital and then transferred. Patient is requiring noninvasive positive pressure ventilation support. CTA has not been able to get done due to contrast allergy. Reportedly patient also has history of MDS. CBC from 9/2020 showed a WBC count of 1.6 hemoglobin 9.4 with MCV 95. Patient has been started on full dose anticoagulation due to concerns regarding pulmonary embolism. Patient was also recently started on Vidaza and has received 1 cycle so far. Patient is also transfusion dependent. Patient is supposed to get next cycle of Vidaza in the next week or so. Patient most recent bone marrow biopsy from 9/2020 showed normocellular marrow with erythroid hyperplasia and mild this erythropoiesis and 8% blasts. Recommendations from [address] [place] [address],[address]: Copied from care everywhere. ASSESSMENT AND PALN:  Ms. 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  In this situation, we previously discussed that we often favor management as if the patient is higher-tier risk disease, as outcomes for patients with IPSS-R 4.5 at diagnosis are similar to high risk. But unfortunately, she is not a candidate for her only curative option, i.e., allogeneic hematopoietic cell transplantation. Therefore, when we initially consulted on her, we discussed that all therapy is palliative. Given all therapies would be palliative, we discussed this in the context that she had been transfusion-independent. Thus, while initiation of hypomethylating agent (HMA) would have certainly been justifiable at that juncture, she had cytopenias for several months and still had not required transfusions or developed significant infection. Therefore, we discussed close observation since she was transfusion independent. We discussed the risks/beneftis of such an approach. At this visit, she is now beginning to require transfusions, so we agree with the plan to begin azacitidine locally under the care of Dr. Kyra Escamilla. We again discussed that we would recommend a repeat bone marrow exam prior to initiating therapy for palliation. If she declines treatment, one could consider EPO supplementation to minimize need for transfusion. Given her [address], antiviral prophylaxis with acyclovir (400 mg BID) could be considered. If her [address] were to persistently be lower than 500/uL, antifungal prophylaxis could be considered. She will continue to follow up with Dr. Kyra Escamilla for ongoing management. We will see her back prn. Past Medical History:   has a past medical history of Cancer ([facility] Deaf, left, Essential hypertension, GERD (gastroesophageal reflux disease), Glaucoma, Hyperlipidemia, Melanoma ([facility] MRSA (methicillin resistant staph aureus) culture positive, MVP (mitral valve prolapse), Pharyngoesophageal dysphagia, Raynaud disease, and Status post four vessel coronary artery bypass.     Past Surgical History:   has a past surgical history that includes Cardiac surgery; Tubal ligation; Appendectomy; Cholecystectomy; Hysterectomy, total abdominal; Small intestine surgery; and IR PORT PLACEMENT < 5 YEARS. Medications:    Prior to Admission medications    Medication Sig Start Date End Date Taking? Authorizing Provider   dapsone 100 MG tablet Take 1 tablet by mouth daily (with breakfast) 10/16/20  Yes Historical Provider, MD   aspirin 81 MG EC tablet Take 81 mg by mouth daily   Yes Historical Provider, MD   carvedilol (COREG) 6.25 MG tablet Take 6.25 mg by mouth 2 times daily (with meals)   Yes Historical Provider, MD   Isavuconazonium Sulfate (CRESEMBA) 186 MG CAPS Take by mouth   Yes Historical Provider, MD   diphenhydrAMINE (BENADRYL) 25 MG capsule Take 25 mg by mouth every 6 hours as needed for Itching   Yes Historical Provider, MD   hydroxychloroquine (PLAQUENIL) 200 MG tablet Take 200 mg by mouth daily   Yes Historical Provider, MD   nitroGLYCERIN (NITROSTAT) 0.4 MG SL tablet Place 0.4 mg under the tongue every 5 minutes as needed for Chest pain up to max of 3 total doses. If no relief after 1 dose, call 911.    Yes Historical Provider, MD   omeprazole (PRILOSEC) 20 MG delayed release capsule Take 40 mg by mouth daily   Yes Historical Provider, MD   phenazopyridine (PYRIDIUM) 100 MG tablet Take 100 mg by mouth 3 times daily as needed for Pain   Yes Historical Provider, MD   predniSONE (DELTASONE) 1 MG tablet Take 1 mg by mouth daily   Yes Historical Provider, MD   prochlorperazine (COMPAZINE) 25 MG suppository Place 25 mg rectally every 12 hours as needed for Nausea   Yes Historical Provider, MD   Travoprost (TRAVATAN OP) Apply to eye   Yes Historical Provider, MD     Current Facility-Administered Medications   Medication Dose Route Frequency Provider Last Rate Last Dose    dexmedetomidine (PRECEDEX) 400 mcg in sodium chloride 0.9 % 100 mL infusion  0.2 mcg/kg/hr Intravenous Continuous Aracelis Pendleton MD 13.4 mL/hr at 12/04/20 1626 0.7 mcg/kg/hr at 12/04/20 1626    fentaNYL (SUBLIMAZE) injection 50 mcg  50 mcg Intravenous Q2H PRN Felix Espinoza MD        Or    fentaNYL (SUBLIMAZE) injection 100 mcg  100 mcg Intravenous Q2H PRN Felix Espinoza MD        norepinephrine (LEVOPHED) 16 mg in sodium chloride 0.9 % 250 mL infusion  2 mcg/min Intravenous Continuous Roxy Pierce, DO 2.8 mL/hr at 12/04/20 1811 3 mcg/min at 12/04/20 1811    lubrifresh P.M. (artificial tears) ophthalmic ointment   Both Eyes PRN Dilia Burton MD        midodrine (PROAMATINE) tablet 10 mg  10 mg Oral TID WC Felix Espinoza MD   10 mg at 12/04/20 1604    QUEtiapine (SEROQUEL) tablet 50 mg  50 mg Oral Nightly Roxy Zuleyma Pierce, DO   50 mg at 12/03/20 2043    enoxaparin (LOVENOX) injection 40 mg  40 mg Subcutaneous Daily Selam Zambrano MD   40 mg at 12/04/20 0806    ceftaroline fosamil (TEFLARO) 600 mg in dextrose 5 % 50 mL IVPB  600 mg Intravenous Q12H Luz Wood MD 50 mL/hr at 12/04/20 1843 600 mg at 12/04/20 1843    docusate (COLACE) 50 MG/5ML liquid 100 mg  100 mg Oral BID Selam Zambrano MD   100 mg at 12/03/20 2043    pantoprazole (PROTONIX) injection 40 mg  40 mg Intravenous BID Jewel A Petey Failing, APRN - CNP   40 mg at 12/04/20 0703    And    sodium chloride (PF) 0.9 % injection 10 mL  10 mL Intravenous Daily Jewel A Petey Failing, APRN - CNP   10 mL at 12/04/20 0808    fentaNYL 20 mcg/mL Infusion  25 mcg/hr Intravenous Continuous Roxy Pierce DO   Stopped at 12/04/20 1750    midazolam (VERSED) 1 mg/mL in D5W infusion  1 mg/hr Intravenous Continuous Iman Albarado MD   Stopped at 12/04/20 1750    LORazepam (ATIVAN) injection 0.5 mg  0.5 mg Intravenous Q8H PRN Milly Mayen MD   0.5 mg at 11/29/20 0051    latanoprost (XALATAN) 0.005 % ophthalmic solution 1 drop  1 drop Both Eyes Nightly Milly Mayen MD   1 drop at 12/03/20 2044    0.9 % sodium chloride bolus  30 mL Intravenous PRN Jackelin Hernández DO        0.9 % sodium chloride bolus  20 mL Intravenous Once Guy No, DO        0.9 % sodium chloride bolus  20 mL Intravenous Once Vilma Montes MD        sodium chloride flush 0.9 % injection 10 mL  10 mL Intravenous PRN Duane No, DO   10 mL at 11/30/20 0824    potassium chloride (KLOR-CON M) extended release tablet 40 mEq  40 mEq Oral PRN Maryagnes Loach, APRN - CNS   40 mEq at 11/22/20 1544    Or    potassium bicarb-citric acid (EFFER-K) effervescent tablet 40 mEq  40 mEq Oral PRN Maryagnes Loach, APRN - CNS   40 mEq at 11/21/20 4977    Or    potassium chloride 10 mEq/100 mL IVPB (Peripheral Line)  10 mEq Intravenous PRN Maryagnes Loach, APRN - CNS        magnesium sulfate 1 g in dextrose 5% 100 mL IVPB  1 g Intravenous PRN Maryagnes Loach, APRN - CNS        acetaminophen (TYLENOL) tablet 650 mg  650 mg Oral Q6H PRN Maryagnes Loach, APRN - CNS   650 mg at 12/04/20 2392    Or    acetaminophen (TYLENOL) suppository 650 mg  650 mg Rectal Q6H PRN Maryagnes Loach, APRN - CNS        polyethylene glycol (GLYCOLAX) packet 17 g  17 g Oral Daily PRN Maryagnes Loach, APRN - CNS   17 g at 12/01/20 0901    promethazine (PHENERGAN) tablet 12.5 mg  12.5 mg Oral Q6H PRN Maryagnes Loach, APRN - CNS        Or    ondansetron (ZOFRAN) injection 4 mg  4 mg Intravenous Q6H PRN Maryagnes Loach, APRN - CNS        nicotine (NICODERM CQ) 21 MG/24HR 1 patch  1 patch Transdermal Daily PRN Maryagnes Loach, APRN - CNS           Allergies:  Fish allergy; Hydrocodone-acetaminophen; Tizanidine; Atorvastatin; Metoclopramide; Moxifloxacin; Oxycodone; Pregabalin; Tramadol; Zolpidem; Cephalexin; Iodides; and Sulfa antibiotics    Social History:   reports that she quit smoking about 15 years ago. Her smoking use included cigarettes. She started smoking about 63 years ago. She does not have any smokeless tobacco history on file. She reports that she does not drink alcohol or use drugs.      Family History: family history includes Early Death in her mother; Heart Disease in her mother; Heart Failure in her mother; Stroke in her father. REVIEW OF SYSTEMS:    unobtainable   PHYSICAL EXAM:        BP (!) 118/59   Pulse 72   Temp 98.6 °F (37 °C) (Core)   Resp 25   Ht 5' 7\" (1.702 m)   Wt 169 lb 5 oz (76.8 kg)   SpO2 96%   BMI 26.52 kg/m²    Temp (24hrs), Av.9 °F (37.7 °C), Min:98.6 °F (37 °C), Max:100.9 °F (38.3 °C)    Examination is limited due to the current acute COVID-19 pneumonia due to limited PPE resources and to limit the transmission of the virus. DATA:      Labs:     Results for orders placed or performed during the hospital encounter of 20   Culture, Blood 1    Specimen: Blood   Result Value Ref Range    Specimen Description . BLOOD     Special Requests LT HAND 20ML     Culture NO GROWTH 6 DAYS    Culture, Blood 2    Specimen: Blood   Result Value Ref Range    Specimen Description . BLOOD     Special Requests RT HAND 2ML     Culture NO GROWTH 6 DAYS    Respiratory Panel, Molecular, with COVID-19    Specimen: Nasopharyngeal Swab   Result Value Ref Range    Specimen Description . NASOPHARYNGEAL SWAB     Adenovirus PCR Not Detected Not Detected    Coronavirus 229E PCR Not Detected Not Detected    Coronavirus HKU1 PCR Not Detected Not Detected    Coronavirus NL63 PCR Not Detected Not Detected    Coronavirus OC43 PCR Not Detected Not Detected    SARS-CoV-2, PCR DETECTED (A) Not Detected    Human Metapneumovirus PCR Not Detected Not Detected    Rhino/Enterovirus PCR Not Detected Not Detected    Influenza A by PCR Not Detected Not Detected    Influenza A H1 PCR NOT REPORTED Not Detected    Influenza A H1 (2009) PCR NOT REPORTED Not Detected    Influenza A H3 PCR NOT REPORTED Not Detected    Influenza B by PCR Not Detected Not Detected    Parainfluenza 1 PCR Not Detected Not Detected    Parainfluenza 2 PCR Not Detected Not Detected    Parainfluenza 3 PCR Not Detected Not Detected    Parainfluenza 4 PCR Not Detected Not Detected    Resp Syncytial Virus PCR Not Detected Not Detected    Bordetella Parapertussis Not Detected Not Detected    B Pertussis by PCR Not Detected Not Detected    Chlamydia pneumoniae By PCR Not Detected Not Detected    Mycoplasma pneumo by PCR Not Detected Not Detected   LEGIONELLA ANTIGEN, URINE    Specimen: Urine, clean catch   Result Value Ref Range    Legionella Pneumophilia Ag, Urine NEGATIVE    Culture, Blood 1    Specimen: Blood   Result Value Ref Range    Specimen Description . BLOOD     Special Requests R HAND 9 ML     Culture NO GROWTH 4 DAYS    Culture, Blood 1    Specimen: Blood   Result Value Ref Range    Specimen Description . BLOOD     Special Requests L HAND 11 ML     Culture NO GROWTH 4 DAYS    Culture, Respiratory    Specimen: Sputum Aspirated   Result Value Ref Range    Specimen Description . ASPIRATED SPUTUM     Special Requests NOT REPORTED     Direct Exam >25 NEUTROPHILS/LPF     Direct Exam < 10 EPITHELIAL CELLS/LPF     Direct Exam NO SIGNIFICANT PATHOGENS SEEN     Culture NORMAL RESPIRATORY MORIS MODERATE GROWTH    Culture, Respiratory    Specimen: Endotracheal   Result Value Ref Range    Specimen Description . ENDOTRACHEAL     Special Requests NOT REPORTED     Direct Exam >10, <25 WBC'S/LPF     Direct Exam < 10 EPITHELIAL CELLS/LPF     Direct Exam MANY GRAM NEGATIVE RODS (A)     Culture PENDING    CBC   Result Value Ref Range    WBC 1.3 (LL) 3.5 - 11.3 k/uL    RBC 2.53 (L) 3.95 - 5.11 m/uL    Hemoglobin 7.4 (L) 11.9 - 15.1 g/dL    Hematocrit 23.6 (L) 36.3 - 47.1 %    MCV 93.3 82.6 - 102.9 fL    MCH 29.2 25.2 - 33.5 pg    MCHC 31.4 28.4 - 34.8 g/dL    RDW 18.5 (H) 11.8 - 14.4 %    Platelets 349 298 - 759 k/uL    MPV 10.6 8.1 - 13.5 fL    NRBC Automated 0.0 0.0 per 100 WBC   Protime-INR   Result Value Ref Range    Protime 9.3 9.0 - 12.0 sec    INR 0.9    Comprehensive Metabolic Panel w/ Reflex to MG   Result Value Ref Range    Glucose 112 (H) 70 - 99 mg/dL    BUN 24 (H) 8 - 23 mg/dL    CREATININE 0.89 0.50 - 0.90 mg/dL    Bun/Cre Ratio NOT Morphology ANISOCYTOSIS PRESENT    D-Dimer, Quantitative   Result Value Ref Range    D-Dimer, Quant 2.44 mg/L FEU   Ferritin   Result Value Ref Range    Ferritin 3,024 (H) 13 - 150 ug/L   Fibrinogen   Result Value Ref Range    Fibrinogen 720 (H) 140 - 420 mg/dL   Protime-INR   Result Value Ref Range    Protime 9.5 9.0 - 12.0 sec    INR 0.9    Lactate Dehydrogenase   Result Value Ref Range     (H) 135 - 214 U/L   Hepatic Function Panel   Result Value Ref Range    Alb 2.9 (L) 3.5 - 5.2 g/dL    Alkaline Phosphatase 74 35 - 104 U/L    ALT 45 (H) 5 - 33 U/L    AST 38 (H) <32 U/L    Total Bilirubin 0.53 0.3 - 1.2 mg/dL    Bilirubin, Direct 0.22 <0.31 mg/dL    Bilirubin, Indirect 0.31 0.00 - 1.00 mg/dL    Total Protein 5.8 (L) 6.4 - 8.3 g/dL    Globulin NOT REPORTED 1.5 - 3.8 g/dL    Albumin/Globulin Ratio 1.0 1.0 - 2.5   Magnesium   Result Value Ref Range    Magnesium 2.3 1.6 - 2.6 mg/dL   Renal Function Panel   Result Value Ref Range    Glucose 109 (H) 70 - 99 mg/dL    BUN 23 8 - 23 mg/dL    CREATININE 0.81 0.50 - 0.90 mg/dL    Bun/Cre Ratio NOT REPORTED 9 - 20    Calcium 8.0 (L) 8.6 - 10.4 mg/dL    Alb 2.9 (L) 3.5 - 5.2 g/dL    Phosphorus 3.5 2.6 - 4.5 mg/dL    Sodium 133 (L) 135 - 144 mmol/L    Potassium 4.1 3.7 - 5.3 mmol/L    Chloride 100 98 - 107 mmol/L    CO2 21 20 - 31 mmol/L    Anion Gap 12 9 - 17 mmol/L    GFR Non-African American >60 >60 mL/min    GFR African American >60 >60 mL/min    GFR Comment          GFR Staging NOT REPORTED    Procalcitonin   Result Value Ref Range    Procalcitonin 1.00 (H) <0.09 ng/mL   Brain Natriuretic Peptide   Result Value Ref Range    Pro- (H) <300 pg/mL    BNP Interpretation Pro-BNP Reference Range:    COVID-19    Specimen: Nasopharyngeal Swab   Result Value Ref Range    SARS-CoV-2 Positive (A)    MYCOPLASMA PNEUMONIAE ANTIBODY, IGM   Result Value Ref Range    Mycoplasma pneumo IgM 0.17 <0.91   URINALYSIS   Result Value Ref Range    Color, UA DARK YELLOW (A) YELLOW Turbidity UA CLEAR CLEAR    Glucose, Ur NEGATIVE NEGATIVE    Bilirubin Urine NEGATIVE  Verified by ictotest. (A) NEGATIVE    Ketones, Urine SMALL (A) NEGATIVE    Specific Gravity, UA 1.023 1.005 - 1.030    Urine Hgb NEGATIVE NEGATIVE    pH, UA 5.5 5.0 - 8.0    Protein, UA 1+ (A) NEGATIVE    Urobilinogen, Urine Normal Normal    Nitrite, Urine NEGATIVE NEGATIVE    Leukocyte Esterase, Urine NEGATIVE NEGATIVE    Urinalysis Comments NOT REPORTED    OCCULT BLOOD SCREEN   Result Value Ref Range    Occult Blood, Stool #1 NEGATIVE NEGATIVE    Date, Stool #1 . FECES     Time, Stool #1 . FECES     Occult Blood, Stool #2 NOT REPORTED NEGATIVE    Date, Stool #2 NOT REPORTED     Time, Stool #2 NOT REPORTED     Occult Blood, Stool #3 NOT REPORTED NEGATIVE    Date, Stool #3 NOT REPORTED     Time, Stool #3 NOT REPORTED    Microscopic Urinalysis   Result Value Ref Range    -          WBC, UA 0 TO 2 0 - 5 /HPF    RBC, UA 0 TO 2 0 - 4 /HPF    Casts UA  0 - 8 /LPF     5 TO 10 HYALINE Reference range defined for non-centrifuged specimen. Crystals, UA NOT REPORTED None /HPF    Epithelial Cells UA 0 TO 2 0 - 5 /HPF    Renal Epithelial, UA NOT REPORTED 0 /HPF    Bacteria, UA NOT REPORTED None    Mucus, UA NOT REPORTED None    Trichomonas, UA NOT REPORTED None    Amorphous, UA NOT REPORTED None    Other Observations UA NOT REPORTED NOT REQ.     Yeast, UA NOT REPORTED None   Vitamin B12 & Folate   Result Value Ref Range    Vitamin B-12 >2000 (H) 232 - 1245 pg/mL    Folate 11.5 >4.8 ng/mL   Iron and TIBC   Result Value Ref Range    Iron 92 37 - 145 ug/dL    TIBC 136 (L) 250 - 450 ug/dL    Iron Saturation 68 (H) 20 - 55 %    UIBC 44 (L) 112 - 347 ug/dL   CBC   Result Value Ref Range    WBC 2.0 (L) 3.5 - 11.3 k/uL    RBC 2.62 (L) 3.95 - 5.11 m/uL    Hemoglobin 7.7 (L) 11.9 - 15.1 g/dL    Hematocrit 25.6 (L) 36.3 - 47.1 %    MCV 97.7 82.6 - 102.9 fL    MCH 29.4 25.2 - 33.5 pg    MCHC 30.1 28.4 - 34.8 g/dL    RDW 19.6 (H) 11.8 - 14.4 % Platelets 695 553 - 313 k/uL    MPV 11.1 8.1 - 13.5 fL    NRBC Automated 0.0 0.0 per 100 WBC   Renal Function Panel   Result Value Ref Range    Glucose 93 70 - 99 mg/dL    BUN 23 8 - 23 mg/dL    CREATININE 0.81 0.50 - 0.90 mg/dL    Bun/Cre Ratio NOT REPORTED 9 - 20    Calcium 8.4 (L) 8.6 - 10.4 mg/dL    Alb 2.8 (L) 3.5 - 5.2 g/dL    Phosphorus 3.3 2.6 - 4.5 mg/dL    Sodium 138 135 - 144 mmol/L    Potassium 3.6 (L) 3.7 - 5.3 mmol/L    Chloride 100 98 - 107 mmol/L    CO2 22 20 - 31 mmol/L    Anion Gap 16 9 - 17 mmol/L    GFR Non-African American >60 >60 mL/min    GFR African American >60 >60 mL/min    GFR Comment          GFR Staging NOT REPORTED    Magnesium   Result Value Ref Range    Magnesium 2.5 1.6 - 2.6 mg/dL   FIBRINOGEN   Result Value Ref Range    Fibrinogen 1002 (H) 140 - 420 mg/dL   C-Reactive Protein   Result Value Ref Range    .4 (H) 0.0 - 5.0 mg/L   Ferritin   Result Value Ref Range    Ferritin 3,453 (H) 13 - 150 ug/L   Lactate Dehydrogenase   Result Value Ref Range     (H) 135 - 214 U/L   Magnesium   Result Value Ref Range    Magnesium 2.4 1.6 - 2.6 mg/dL   Comprehensive Metabolic Panel   Result Value Ref Range    Glucose 94 70 - 99 mg/dL    BUN 19 8 - 23 mg/dL    CREATININE 0.53 0.50 - 0.90 mg/dL    Bun/Cre Ratio NOT REPORTED 9 - 20    Calcium 8.4 (L) 8.6 - 10.4 mg/dL    Sodium 138 135 - 144 mmol/L    Potassium 4.0 3.7 - 5.3 mmol/L    Chloride 102 98 - 107 mmol/L    CO2 21 20 - 31 mmol/L    Anion Gap 15 9 - 17 mmol/L    Alkaline Phosphatase 58 35 - 104 U/L    ALT 35 (H) 5 - 33 U/L    AST 33 (H) <32 U/L    Total Bilirubin 0.47 0.3 - 1.2 mg/dL    Total Protein 5.6 (L) 6.4 - 8.3 g/dL    Alb 2.6 (L) 3.5 - 5.2 g/dL    Albumin/Globulin Ratio 0.9 (L) 1.0 - 2.5    GFR Non-African American >60 >60 mL/min    GFR African American >60 >60 mL/min    GFR Comment          GFR Staging NOT REPORTED    CBC Auto Differential   Result Value Ref Range    WBC 3.3 (L) 3.5 - 11.3 k/uL    RBC 2.61 (L) 3.95 - 5.11 m/uL    Hemoglobin 7.8 (L) 11.9 - 15.1 g/dL    Hematocrit 23.8 (L) 36.3 - 47.1 %    MCV 91.2 82.6 - 102.9 fL    MCH 29.9 25.2 - 33.5 pg    MCHC 32.8 28.4 - 34.8 g/dL    RDW 19.2 (H) 11.8 - 14.4 %    Platelets 996 356 - 814 k/uL    MPV 11.4 8.1 - 13.5 fL    NRBC Automated 0.0 0.0 per 100 WBC    Differential Type NOT REPORTED     WBC Morphology NOT REPORTED     RBC Morphology NOT REPORTED     Platelet Estimate NOT REPORTED     Immature Granulocytes 0 0 %    Seg Neutrophils 56 36 - 66 %    Lymphocytes 36 24 - 44 %    Monocytes 8 (H) 1 - 7 %    Eosinophils % 0 (L) 1 - 4 %    Basophils 0 0 - 2 %    Absolute Immature Granulocyte 0.00 0.00 - 0.30 k/uL    Segs Absolute 1.85 1.8 - 7.7 k/uL    Absolute Lymph # 1.19 1.0 - 4.8 k/uL    Absolute Mono # 0.26 0.1 - 0.8 k/uL    Absolute Eos # 0.00 0.0 - 0.4 k/uL    Basophils Absolute 0.00 0.0 - 0.2 k/uL    Morphology ANISOCYTOSIS PRESENT    Mycoplasma pneumoniae antibody, IgM   Result Value Ref Range    Mycoplasma pneumo IgM 0.20 <0.91   CBC Auto Differential   Result Value Ref Range    WBC 9.0 3.5 - 11.3 k/uL    RBC 2.60 (L) 3.95 - 5.11 m/uL    Hemoglobin 8.0 (L) 11.9 - 15.1 g/dL    Hematocrit 26.0 (L) 36.3 - 47.1 %    .0 82.6 - 102.9 fL    MCH 30.8 25.2 - 33.5 pg    MCHC 30.8 28.4 - 34.8 g/dL    RDW 19.6 (H) 11.8 - 14.4 %    Platelets 096 304 - 107 k/uL    MPV 11.4 8.1 - 13.5 fL    NRBC Automated 0.0 0.0 per 100 WBC    Differential Type NOT REPORTED     WBC Morphology NOT REPORTED     RBC Morphology NOT REPORTED     Platelet Estimate NOT REPORTED     Immature Granulocytes 1 (H) 0 %    Seg Neutrophils 74 (H) 36 - 66 %    Lymphocytes 15 (L) 24 - 44 %    Monocytes 10 (H) 1 - 7 %    Eosinophils % 0 (L) 1 - 4 %    Basophils 0 0 - 2 %    Absolute Immature Granulocyte 0.09 0.00 - 0.30 k/uL    Segs Absolute 6.66 1.8 - 7.7 k/uL    Absolute Lymph # 1.35 1.0 - 4.8 k/uL    Absolute Mono # 0.90 (H) 0.1 - 0.8 k/uL    Absolute Eos # 0.00 0.0 - 0.4 k/uL    Basophils Absolute 0.00 0.0 - 0.2 k/uL    Morphology ANISOCYTOSIS PRESENT    FERRITIN   Result Value Ref Range    Ferritin 3,590 (H) 13 - 150 ug/L   FIBRINOGEN   Result Value Ref Range    Fibrinogen 673 (H) 140 - 420 mg/dL   CBC Auto Differential   Result Value Ref Range    WBC 12.8 (H) 3.5 - 11.3 k/uL    RBC 2.36 (L) 3.95 - 5.11 m/uL    Hemoglobin 7.3 (L) 11.9 - 15.1 g/dL    Hematocrit 23.4 (L) 36.3 - 47.1 %    MCV 99.2 82.6 - 102.9 fL    MCH 30.9 25.2 - 33.5 pg    MCHC 31.2 28.4 - 34.8 g/dL    RDW 19.0 (H) 11.8 - 14.4 %    Platelets 997 120 - 981 k/uL    MPV 11.5 8.1 - 13.5 fL    NRBC Automated 0.0 0.0 per 100 WBC    Differential Type NOT REPORTED     WBC Morphology NOT REPORTED     RBC Morphology NOT REPORTED     Platelet Estimate NOT REPORTED     Immature Granulocytes 1 (H) 0 %    Seg Neutrophils 76 (H) 36 - 66 %    Lymphocytes 16 (L) 24 - 44 %    Monocytes 7 1 - 7 %    Eosinophils % 0 (L) 1 - 4 %    Basophils 0 0 - 2 %    Absolute Immature Granulocyte 0.13 0.00 - 0.30 k/uL    Segs Absolute 9.72 (H) 1.8 - 7.7 k/uL    Absolute Lymph # 2.05 1.0 - 4.8 k/uL    Absolute Mono # 0.90 (H) 0.1 - 0.8 k/uL    Absolute Eos # 0.00 0.0 - 0.4 k/uL    Basophils Absolute 0.00 0.0 - 0.2 k/uL    Morphology ANISOCYTOSIS PRESENT     Morphology INCREASED BANDS PRESENT     Morphology TOXIC GRANULATION PRESENT    Basic Metabolic Panel w/ Reflex to MG   Result Value Ref Range    Glucose 84 70 - 99 mg/dL    BUN 22 8 - 23 mg/dL    CREATININE 0.52 0.50 - 0.90 mg/dL    Bun/Cre Ratio NOT REPORTED 9 - 20    Calcium 8.6 8.6 - 10.4 mg/dL    Sodium 143 135 - 144 mmol/L    Potassium 3.5 (L) 3.7 - 5.3 mmol/L    Chloride 106 98 - 107 mmol/L    CO2 25 20 - 31 mmol/L    Anion Gap 12 9 - 17 mmol/L    GFR Non-African American >60 >60 mL/min    GFR African American >60 >60 mL/min    GFR Comment          GFR Staging NOT REPORTED    Brain Natriuretic Peptide   Result Value Ref Range    Pro- (H) <300 pg/mL    BNP Interpretation Pro-BNP Reference Range:    FIBRINOGEN   Result Value Ref Range    Fibrinogen 506 (H) 140 - 420 mg/dL   C-REACTIVE PROTEIN   Result Value Ref Range    CRP 67.6 (H) 0.0 - 5.0 mg/L   FERRITIN   Result Value Ref Range    Ferritin 2,838 (H) 13 - 150 ug/L   D-DIMER, QUANTITATIVE   Result Value Ref Range    D-Dimer, Quant 1.93 mg/L FEU   Magnesium   Result Value Ref Range    Magnesium 2.0 1.6 - 2.6 mg/dL   CBC Auto Differential   Result Value Ref Range    WBC 10.4 3.5 - 11.3 k/uL    RBC 2.70 (L) 3.95 - 5.11 m/uL    Hemoglobin 8.1 (L) 11.9 - 15.1 g/dL    Hematocrit 27.6 (L) 36.3 - 47.1 %    .2 82.6 - 102.9 fL    MCH 30.0 25.2 - 33.5 pg    MCHC 29.3 28.4 - 34.8 g/dL    RDW 19.2 (H) 11.8 - 14.4 %    Platelets 427 230 - 861 k/uL    MPV 11.2 8.1 - 13.5 fL    NRBC Automated 0.0 0.0 per 100 WBC    Differential Type NOT REPORTED     WBC Morphology NOT REPORTED     RBC Morphology NOT REPORTED     Platelet Estimate NOT REPORTED     Immature Granulocytes 3 (H) 0 %    Seg Neutrophils 68 (H) 36 - 66 %    Lymphocytes 20 (L) 24 - 44 %    Monocytes 9 (H) 1 - 7 %    Eosinophils % 0 (L) 1 - 4 %    Basophils 0 0 - 2 %    Absolute Immature Granulocyte 0.31 (H) 0.00 - 0.30 k/uL    Segs Absolute 7.07 1.8 - 7.7 k/uL    Absolute Lymph # 2.08 1.0 - 4.8 k/uL    Absolute Mono # 0.94 (H) 0.1 - 0.8 k/uL    Absolute Eos # 0.00 0.0 - 0.4 k/uL    Basophils Absolute 0.00 0.0 - 0.2 k/uL    Morphology ANISOCYTOSIS PRESENT    POTASSIUM   Result Value Ref Range    Potassium 3.1 (L) 3.7 - 5.3 mmol/L   CBC Auto Differential   Result Value Ref Range    WBC 9.3 3.5 - 11.3 k/uL    RBC 2.54 (L) 3.95 - 5.11 m/uL    Hemoglobin 8.0 (L) 11.9 - 15.1 g/dL    Hematocrit 25.9 (L) 36.3 - 47.1 %    .0 82.6 - 102.9 fL    MCH 31.5 25.2 - 33.5 pg    MCHC 30.9 28.4 - 34.8 g/dL    RDW 19.0 (H) 11.8 - 14.4 %    Platelets 845 794 - 368 k/uL    MPV 11.4 8.1 - 13.5 fL    NRBC Automated 0.0 0.0 per 100 WBC    Differential Type NOT REPORTED     WBC Morphology NOT REPORTED     RBC Morphology NOT REPORTED     Platelet Estimate NOT REPORTED     Immature Granulocytes 5 (H) 0 %    Seg Neutrophils 69 (H) 36 - 66 %    Lymphocytes 23 (L) 24 - 44 %    Monocytes 3 1 - 7 %    Eosinophils % 0 (L) 1 - 4 %    Basophils 0 0 - 2 %    Absolute Immature Granulocyte 0.47 (H) 0.00 - 0.30 k/uL    Segs Absolute 6.41 1.8 - 7.7 k/uL    Absolute Lymph # 2.14 1.0 - 4.8 k/uL    Absolute Mono # 0.28 0.1 - 0.8 k/uL    Absolute Eos # 0.00 0.0 - 0.4 k/uL    Basophils Absolute 0.00 0.0 - 0.2 k/uL    Morphology ANISOCYTOSIS PRESENT     Morphology TOXIC GRANULATION PRESENT    Comprehensive Metabolic Panel w/ Reflex to MG   Result Value Ref Range    Glucose 84 70 - 99 mg/dL    BUN 16 8 - 23 mg/dL    CREATININE 0.64 0.50 - 0.90 mg/dL    Bun/Cre Ratio NOT REPORTED 9 - 20    Calcium 9.0 8.6 - 10.4 mg/dL    Sodium 139 135 - 144 mmol/L    Potassium 3.8 3.7 - 5.3 mmol/L    Chloride 103 98 - 107 mmol/L    CO2 24 20 - 31 mmol/L    Anion Gap 12 9 - 17 mmol/L    Alkaline Phosphatase 88 35 - 104 U/L    ALT 23 5 - 33 U/L    AST 22 <32 U/L    Total Bilirubin 0.51 0.3 - 1.2 mg/dL    Total Protein 6.3 (L) 6.4 - 8.3 g/dL    Alb 2.5 (L) 3.5 - 5.2 g/dL    Albumin/Globulin Ratio 0.7 (L) 1.0 - 2.5    GFR Non-African American >60 >60 mL/min    GFR African American >60 >60 mL/min    GFR Comment          GFR Staging NOT REPORTED    Urinalysis Reflex to Culture    Specimen: Urine, clean catch   Result Value Ref Range    Color, UA DARK YELLOW (A) YELLOW    Turbidity UA CLEAR CLEAR    Glucose, Ur NEGATIVE NEGATIVE    Bilirubin Urine NEGATIVE NEGATIVE    Ketones, Urine NEGATIVE NEGATIVE    Specific Gravity, UA 1.022 1.005 - 1.030    Urine Hgb NEGATIVE NEGATIVE    pH, UA 7.0 5.0 - 8.0    Protein, UA 1+ (A) NEGATIVE    Urobilinogen, Urine Normal Normal    Nitrite, Urine NEGATIVE NEGATIVE    Leukocyte Esterase, Urine NEGATIVE NEGATIVE    Urinalysis Comments NOT REPORTED    Microscopic Urinalysis   Result Value Ref Range    -          WBC, UA 2 TO 5 0 - 5 /HPF    RBC, UA 5 TO 10 0 - 4 /HPF Casts UA  0 - 8 /LPF     10 TO 20 HYALINE Reference range defined for non-centrifuged specimen. Crystals, UA NOT REPORTED None /HPF    Epithelial Cells UA 5 TO 10 0 - 5 /HPF    Renal Epithelial, UA NOT REPORTED 0 /HPF    Bacteria, UA NOT REPORTED None    Mucus, UA NOT REPORTED None    Trichomonas, UA NOT REPORTED None    Amorphous, UA NOT REPORTED None    Other Observations UA NOT REPORTED NOT REQ.     Yeast, UA NOT REPORTED None   CBC Auto Differential   Result Value Ref Range    WBC 6.9 3.5 - 11.3 k/uL    RBC 2.28 (L) 3.95 - 5.11 m/uL    Hemoglobin 6.8 (LL) 11.9 - 15.1 g/dL    Hematocrit 21.5 (L) 36.3 - 47.1 %    MCV 94.3 82.6 - 102.9 fL    MCH 29.8 25.2 - 33.5 pg    MCHC 31.6 28.4 - 34.8 g/dL    RDW 18.2 (H) 11.8 - 14.4 %    Platelets 926 147 - 928 k/uL    MPV 11.0 8.1 - 13.5 fL    NRBC Automated 0.0 0.0 per 100 WBC    Differential Type NOT REPORTED     WBC Morphology NOT REPORTED     RBC Morphology NOT REPORTED     Platelet Estimate NOT REPORTED     Immature Granulocytes 9 (H) 0 %    Seg Neutrophils 66 36 - 66 %    Lymphocytes 18 (L) 24 - 44 %    Monocytes 7 1 - 7 %    Eosinophils % 0 (L) 1 - 4 %    Basophils 0 0 - 2 %    Absolute Immature Granulocyte 0.62 (H) 0.00 - 0.30 k/uL    Segs Absolute 4.56 1.8 - 7.7 k/uL    Absolute Lymph # 1.24 1.0 - 4.8 k/uL    Absolute Mono # 0.48 0.1 - 0.8 k/uL    Absolute Eos # 0.00 0.0 - 0.4 k/uL    Basophils Absolute 0.00 0.0 - 0.2 k/uL    Morphology ANISOCYTOSIS PRESENT    Comprehensive Metabolic Panel w/ Reflex to MG   Result Value Ref Range    Glucose 111 (H) 70 - 99 mg/dL    BUN 24 (H) 8 - 23 mg/dL    CREATININE 0.61 0.50 - 0.90 mg/dL    Bun/Cre Ratio NOT REPORTED 9 - 20    Calcium 8.2 (L) 8.6 - 10.4 mg/dL    Sodium 143 135 - 144 mmol/L    Potassium 4.2 3.7 - 5.3 mmol/L    Chloride 105 98 - 107 mmol/L    CO2 26 20 - 31 mmol/L    Anion Gap 12 9 - 17 mmol/L    Alkaline Phosphatase 77 35 - 104 U/L    ALT 18 5 - 33 U/L    AST 18 <32 U/L    Total Bilirubin 0.41 0.3 - 1.2 mg/dL Total Protein 5.9 (L) 6.4 - 8.3 g/dL    Alb 2.2 (L) 3.5 - 5.2 g/dL    Albumin/Globulin Ratio 0.6 (L) 1.0 - 2.5    GFR Non-African American >60 >60 mL/min    GFR African American >60 >60 mL/min    GFR Comment          GFR Staging NOT REPORTED    CBC Auto Differential   Result Value Ref Range    WBC 4.2 3.5 - 11.3 k/uL    RBC 2.05 (L) 3.95 - 5.11 m/uL    Hemoglobin 6.0 (LL) 11.9 - 15.1 g/dL    Hematocrit 19.8 (L) 36.3 - 47.1 %    MCV 96.6 82.6 - 102.9 fL    MCH 29.3 25.2 - 33.5 pg    MCHC 30.3 28.4 - 34.8 g/dL    RDW 18.4 (H) 11.8 - 14.4 %    Platelets 572 732 - 116 k/uL    MPV 11.2 8.1 - 13.5 fL    NRBC Automated 0.0 0.0 per 100 WBC    Differential Type NOT REPORTED     WBC Morphology NOT REPORTED     RBC Morphology NOT REPORTED     Platelet Estimate NOT REPORTED     Immature Granulocytes 2 (H) 0 %    Seg Neutrophils 74 (H) 36 - 66 %    Lymphocytes 16 (L) 24 - 44 %    Monocytes 8 (H) 1 - 7 %    Eosinophils % 0 (L) 1 - 4 %    Basophils 0 0 - 2 %    Absolute Immature Granulocyte 0.08 0.00 - 0.30 k/uL    Segs Absolute 3.11 1.8 - 7.7 k/uL    Absolute Lymph # 0.67 (L) 1.0 - 4.8 k/uL    Absolute Mono # 0.34 0.1 - 0.8 k/uL    Absolute Eos # 0.00 0.0 - 0.4 k/uL    Basophils Absolute 0.00 0.0 - 0.2 k/uL    Morphology ANISOCYTOSIS PRESENT     Morphology TOXIC GRANULATION PRESENT    Comprehensive Metabolic Panel w/ Reflex to MG   Result Value Ref Range    Glucose 96 70 - 99 mg/dL    BUN 26 (H) 8 - 23 mg/dL    CREATININE 0.61 0.50 - 0.90 mg/dL    Bun/Cre Ratio NOT REPORTED 9 - 20    Calcium 8.3 (L) 8.6 - 10.4 mg/dL    Sodium 141 135 - 144 mmol/L    Potassium 4.4 3.7 - 5.3 mmol/L    Chloride 104 98 - 107 mmol/L    CO2 31 20 - 31 mmol/L    Anion Gap 6 (L) 9 - 17 mmol/L    Alkaline Phosphatase 62 35 - 104 U/L    ALT 18 5 - 33 U/L    AST 16 <32 U/L    Total Bilirubin 0.30 0.3 - 1.2 mg/dL    Total Protein 5.7 (L) 6.4 - 8.3 g/dL    Alb 2.2 (L) 3.5 - 5.2 g/dL    Albumin/Globulin Ratio 0.6 (L) 1.0 - 2.5    GFR Non- >60 >60 mL/min    GFR African American >60 >60 mL/min    GFR Comment          GFR Staging NOT REPORTED    FERRITIN   Result Value Ref Range    Ferritin 4,245 (H) 13 - 150 ug/L   VITAMIN D 25 HYDROXY   Result Value Ref Range    Vit D, 25-Hydroxy 47.6 30.0 - 100.0 ng/mL   OCCULT BLOOD SCREEN   Result Value Ref Range    Occult Blood, Stool #1 POSITIVE (A) NEGATIVE    Date, Stool #1 [id number]     Time, Stool #1 UNK     Occult Blood, Stool #2 NOT REPORTED NEGATIVE    Date, Stool #2 NOT REPORTED     Time, Stool #2 NOT REPORTED     Occult Blood, Stool #3 NOT REPORTED NEGATIVE    Date, Stool #3 NOT REPORTED     Time, Stool #3 NOT REPORTED    HEMOGLOBIN AND HEMATOCRIT, BLOOD   Result Value Ref Range    Hemoglobin 7.7 (L) 11.9 - 15.1 g/dL    Hematocrit 24.0 (L) 36.3 - 47.1 %   CBC Auto Differential   Result Value Ref Range    WBC 5.3 3.5 - 11.3 k/uL    RBC 2.64 (L) 3.95 - 5.11 m/uL    Hemoglobin 8.0 (L) 11.9 - 15.1 g/dL    Hematocrit 25.2 (L) 36.3 - 47.1 %    MCV 95.5 82.6 - 102.9 fL    MCH 30.3 25.2 - 33.5 pg    MCHC 31.7 28.4 - 34.8 g/dL    RDW 17.4 (H) 11.8 - 14.4 %    Platelets 752 609 - 426 k/uL    MPV 11.3 8.1 - 13.5 fL    NRBC Automated 0.0 0.0 per 100 WBC    Differential Type NOT REPORTED     WBC Morphology NOT REPORTED     RBC Morphology NOT REPORTED     Platelet Estimate NOT REPORTED     Immature Granulocytes 5 (H) 0 %    Seg Neutrophils 78 (H) 36 - 66 %    Lymphocytes 11 (L) 24 - 44 %    Monocytes 4 1 - 7 %    Eosinophils % 2 1 - 4 %    Basophils 0 0 - 2 %    Absolute Immature Granulocyte 0.27 0.00 - 0.30 k/uL    Segs Absolute 4.13 1.8 - 7.7 k/uL    Absolute Lymph # 0.58 (L) 1.0 - 4.8 k/uL    Absolute Mono # 0.21 0.1 - 0.8 k/uL    Absolute Eos # 0.11 0.0 - 0.4 k/uL    Basophils Absolute 0.00 0.0 - 0.2 k/uL    Morphology ANISOCYTOSIS PRESENT     Morphology TOXIC GRANULATION PRESENT    Comprehensive Metabolic Panel w/ Reflex to MG   Result Value Ref Range    Glucose 91 70 - 99 mg/dL    BUN 32 (H) 8 - 23 mg/dL CREATININE 0.65 0.50 - 0.90 mg/dL    Bun/Cre Ratio NOT REPORTED 9 - 20    Calcium 7.8 (L) 8.6 - 10.4 mg/dL    Sodium 143 135 - 144 mmol/L    Potassium 4.9 3.7 - 5.3 mmol/L    Chloride 106 98 - 107 mmol/L    CO2 30 20 - 31 mmol/L    Anion Gap 7 (L) 9 - 17 mmol/L    Alkaline Phosphatase 61 35 - 104 U/L    ALT 14 5 - 33 U/L    AST 13 <32 U/L    Total Bilirubin 0.29 (L) 0.3 - 1.2 mg/dL    Total Protein 6.1 (L) 6.4 - 8.3 g/dL    Alb 2.3 (L) 3.5 - 5.2 g/dL    Albumin/Globulin Ratio 0.6 (L) 1.0 - 2.5    GFR Non-African American >60 >60 mL/min    GFR African American >60 >60 mL/min    GFR Comment          GFR Staging NOT REPORTED    HEMOGLOBIN AND HEMATOCRIT, BLOOD   Result Value Ref Range    Hemoglobin 8.1 (L) 11.9 - 15.1 g/dL    Hematocrit 26.1 (L) 36.3 - 47.1 %   HEMOGLOBIN AND HEMATOCRIT, BLOOD   Result Value Ref Range    Hemoglobin 8.0 (L) 11.9 - 15.1 g/dL    Hematocrit 25.7 (L) 36.3 - 47.1 %   CBC Auto Differential   Result Value Ref Range    WBC 6.8 3.5 - 11.3 k/uL    RBC 2.72 (L) 3.95 - 5.11 m/uL    Hemoglobin 8.2 (L) 11.9 - 15.1 g/dL    Hematocrit 26.7 (L) 36.3 - 47.1 %    MCV 98.2 82.6 - 102.9 fL    MCH 30.1 25.2 - 33.5 pg    MCHC 30.7 28.4 - 34.8 g/dL    RDW 17.0 (H) 11.8 - 14.4 %    Platelets 543 416 - 798 k/uL    MPV 11.8 8.1 - 13.5 fL    NRBC Automated 0.0 0.0 per 100 WBC    Differential Type NOT REPORTED     WBC Morphology NOT REPORTED     RBC Morphology NOT REPORTED     Platelet Estimate NOT REPORTED     Immature Granulocytes 3 (H) 0 %    Seg Neutrophils 86 (H) 36 - 66 %    Lymphocytes 7 (L) 24 - 44 %    Monocytes 4 1 - 7 %    Eosinophils % 0 (L) 1 - 4 %    Basophils 0 0 - 2 %    Absolute Immature Granulocyte 0.20 0.00 - 0.30 k/uL    Segs Absolute 5.85 1.8 - 7.7 k/uL    Absolute Lymph # 0.48 (L) 1.0 - 4.8 k/uL    Absolute Mono # 0.27 0.1 - 0.8 k/uL    Absolute Eos # 0.00 0.0 - 0.4 k/uL    Basophils Absolute 0.00 0.0 - 0.2 k/uL    Morphology ANISOCYTOSIS PRESENT     Morphology TOXIC GRANULATION PRESENT HEMOGLOBIN AND HEMATOCRIT, BLOOD   Result Value Ref Range    Hemoglobin 8.0 (L) 11.9 - 15.1 g/dL    Hematocrit 26.4 (L) 36.3 - 47.1 %   CBC Auto Differential   Result Value Ref Range    WBC 8.2 3.5 - 11.3 k/uL    RBC 2.84 (L) 3.95 - 5.11 m/uL    Hemoglobin 8.4 (L) 11.9 - 15.1 g/dL    Hematocrit 27.8 (L) 36.3 - 47.1 %    MCV 97.9 82.6 - 102.9 fL    MCH 29.6 25.2 - 33.5 pg    MCHC 30.2 28.4 - 34.8 g/dL    RDW 16.6 (H) 11.8 - 14.4 %    Platelets 293 (L) 249 - 453 k/uL    MPV 12.3 8.1 - 13.5 fL    NRBC Automated 0.0 0.0 per 100 WBC    Differential Type NOT REPORTED     WBC Morphology NOT REPORTED     RBC Morphology NOT REPORTED     Platelet Estimate NOT REPORTED     Immature Granulocytes 5 (H) 0 %    Seg Neutrophils 83 (H) 36 - 65 %    Lymphocytes 9 (L) 24 - 43 %    Monocytes 2 (L) 3 - 12 %    Eosinophils % 0 (L) 1 - 4 %    Basophils 1 0 - 2 %    Absolute Immature Granulocyte 0.41 (H) 0.00 - 0.30 k/uL    Segs Absolute 6.81 1.50 - 8.10 k/uL    Absolute Lymph # 0.74 (L) 1.10 - 3.70 k/uL    Absolute Mono # 0.16 0.10 - 1.20 k/uL    Absolute Eos # 0.00 0.00 - 0.44 k/uL    Basophils Absolute 0.08 0.00 - 0.20 k/uL    Morphology ANISOCYTOSIS PRESENT     Morphology TOXIC GRANULATION PRESENT    BASIC METABOLIC PANEL   Result Value Ref Range    Glucose 121 (H) 70 - 99 mg/dL    BUN 44 (H) 8 - 23 mg/dL    CREATININE 0.66 0.50 - 0.90 mg/dL    Bun/Cre Ratio NOT REPORTED 9 - 20    Calcium 8.6 8.6 - 10.4 mg/dL    Sodium 144 135 - 144 mmol/L    Potassium 3.9 3.7 - 5.3 mmol/L    Chloride 105 98 - 107 mmol/L    CO2 33 (H) 20 - 31 mmol/L    Anion Gap 6 (L) 9 - 17 mmol/L    GFR Non-African American >60 >60 mL/min    GFR African American >60 >60 mL/min    GFR Comment          GFR Staging NOT REPORTED    HEMOGLOBIN AND HEMATOCRIT, BLOOD   Result Value Ref Range    Hemoglobin 7.8 (L) 11.9 - 15.1 g/dL    Hematocrit 25.6 (L) 36.3 - 47.1 %   CBC Auto Differential   Result Value Ref Range    WBC 8.0 3.5 - 11.3 k/uL    RBC 2.52 (L) 3.95 - 5.11 m/uL    Hemoglobin 7.3 (L) 11.9 - 15.1 g/dL    Hematocrit 25.2 (L) 36.3 - 47.1 %    .0 82.6 - 102.9 fL    MCH 29.0 25.2 - 33.5 pg    MCHC 29.0 28.4 - 34.8 g/dL    RDW 16.6 (H) 11.8 - 14.4 %    Platelets 79 (L) 793 - 453 k/uL    MPV 12.4 8.1 - 13.5 fL    NRBC Automated 0.0 0.0 per 100 WBC    Differential Type NOT REPORTED     WBC Morphology NOT REPORTED     RBC Morphology ANISOCYTOSIS PRESENT     Platelet Estimate NOT REPORTED     Seg Neutrophils 85 (H) 36 - 65 %    Lymphocytes 9 (L) 24 - 43 %    Monocytes 2 (L) 3 - 12 %    Eosinophils % 0 (L) 1 - 4 %    Basophils 0 0 - 2 %    Immature Granulocytes 3 (H) 0 %    Segs Absolute 6.80 1.50 - 8.10 k/uL    Absolute Lymph # 0.71 (L) 1.10 - 3.70 k/uL    Absolute Mono # 0.19 0.10 - 1.20 k/uL    Absolute Eos # <0.03 0.00 - 0.44 k/uL    Basophils Absolute <0.03 0.00 - 0.20 k/uL    Absolute Immature Granulocyte 0.27 0.00 - 0.30 k/uL   HEMOGLOBIN AND HEMATOCRIT, BLOOD   Result Value Ref Range    Hemoglobin 7.3 (L) 11.9 - 15.1 g/dL    Hematocrit 23.9 (L) 36.3 - 47.1 %   HEMOGLOBIN AND HEMATOCRIT, BLOOD   Result Value Ref Range    Hemoglobin 7.8 (L) 11.9 - 15.1 g/dL    Hematocrit 25.6 (L) 36.3 - 47.1 %   CBC Auto Differential   Result Value Ref Range    WBC 7.1 3.5 - 11.3 k/uL    RBC 2.42 (L) 3.95 - 5.11 m/uL    Hemoglobin 7.2 (L) 11.9 - 15.1 g/dL    Hematocrit 24.2 (L) 36.3 - 47.1 %    .0 82.6 - 102.9 fL    MCH 29.8 25.2 - 33.5 pg    MCHC 29.8 28.4 - 34.8 g/dL    RDW 16.7 (H) 11.8 - 14.4 %    Platelets 75 (L) 866 - 453 k/uL    MPV 12.9 8.1 - 13.5 fL    NRBC Automated 0.4 (H) 0.0 per 100 WBC    Differential Type NOT REPORTED     WBC Morphology NOT REPORTED     RBC Morphology ANISOCYTOSIS PRESENT     Platelet Estimate NOT REPORTED     Seg Neutrophils 81 (H) 36 - 65 %    Lymphocytes 13 (L) 24 - 43 %    Monocytes 3 3 - 12 %    Eosinophils % 0 (L) 1 - 4 %    Basophils 0 0 - 2 %    Immature Granulocytes 2 (H) 0 %    Segs Absolute 5.70 1.50 - 8.10 k/uL    Absolute Lymph # 0.94 (L) 1.10 - 3.70 k/uL    Absolute Mono # 0.24 0.10 - 1.20 k/uL    Absolute Eos # <0.03 0.00 - 0.44 k/uL    Basophils Absolute <0.03 0.00 - 0.20 k/uL    Absolute Immature Granulocyte 0.17 0.00 - 0.30 k/uL   HEMOGLOBIN AND HEMATOCRIT, BLOOD   Result Value Ref Range    Hemoglobin 6.8 (LL) 11.9 - 15.1 g/dL    Hematocrit 22.9 (L) 36.3 - 47.1 %   HEMOGLOBIN AND HEMATOCRIT, BLOOD   Result Value Ref Range    Hemoglobin 9.0 (L) 11.9 - 15.1 g/dL    Hematocrit 29.3 (L) 36.3 - 47.1 %   BASIC METABOLIC PANEL   Result Value Ref Range    Glucose 139 (H) 70 - 99 mg/dL    BUN 38 (H) 8 - 23 mg/dL    CREATININE 0.57 0.50 - 0.90 mg/dL    Bun/Cre Ratio NOT REPORTED 9 - 20    Calcium 8.6 8.6 - 10.4 mg/dL    Sodium 145 (H) 135 - 144 mmol/L    Potassium 4.1 3.7 - 5.3 mmol/L    Chloride 108 (H) 98 - 107 mmol/L    CO2 31 20 - 31 mmol/L    Anion Gap 6 (L) 9 - 17 mmol/L    GFR Non-African American >60 >60 mL/min    GFR African American >60 >60 mL/min    GFR Comment          GFR Staging NOT REPORTED    CBC Auto Differential   Result Value Ref Range    WBC 4.1 3.5 - 11.3 k/uL    RBC 2.53 (L) 3.95 - 5.11 m/uL    Hemoglobin 7.5 (L) 11.9 - 15.1 g/dL    Hematocrit 25.3 (L) 36.3 - 47.1 %    .0 82.6 - 102.9 fL    MCH 29.6 25.2 - 33.5 pg    MCHC 29.6 28.4 - 34.8 g/dL    RDW 17.5 (H) 11.8 - 14.4 %    Platelets 81 (L) 290 - 453 k/uL    MPV 12.8 8.1 - 13.5 fL    NRBC Automated 1.2 (H) 0.0 per 100 WBC    Differential Type NOT REPORTED     WBC Morphology NOT REPORTED     RBC Morphology ANISOCYTOSIS PRESENT     Platelet Estimate NOT REPORTED     Seg Neutrophils 73 (H) 36 - 65 %    Lymphocytes 19 (L) 24 - 43 %    Monocytes 6 3 - 12 %    Eosinophils % 0 (L) 1 - 4 %    Basophils 0 0 - 2 %    Immature Granulocytes 3 (H) 0 %    Segs Absolute 2.94 1.50 - 8.10 k/uL    Absolute Lymph # 0.76 (L) 1.10 - 3.70 k/uL    Absolute Mono # 0.23 0.10 - 1.20 k/uL    Absolute Eos # <0.03 0.00 - 0.44 k/uL    Basophils Absolute <0.03 0.00 - 0.20 k/uL    Absolute Immature Granulocyte 0.11 0.00 - 0.30 k/uL   Basic Metabolic Panel w/ Reflex to MG   Result Value Ref Range    Glucose 107 (H) 70 - 99 mg/dL    BUN 34 (H) 8 - 23 mg/dL    CREATININE 0.51 0.50 - 0.90 mg/dL    Bun/Cre Ratio NOT REPORTED 9 - 20    Calcium 8.0 (L) 8.6 - 10.4 mg/dL    Sodium 146 (H) 135 - 144 mmol/L    Potassium 4.1 3.7 - 5.3 mmol/L    Chloride 111 (H) 98 - 107 mmol/L    CO2 30 20 - 31 mmol/L    Anion Gap 5 (L) 9 - 17 mmol/L    GFR Non-African American >60 >60 mL/min    GFR African American >60 >60 mL/min    GFR Comment          GFR Staging NOT REPORTED    HEMOGLOBIN AND HEMATOCRIT, BLOOD   Result Value Ref Range    Hemoglobin 8.0 (L) 11.9 - 15.1 g/dL    Hematocrit 25.9 (L) 36.3 - 47.1 %   Cortisol Total   Result Value Ref Range    Cortisol 12.2 2.7 - 18.4 ug/dL    Cortisol Collection Info NOT REPORTED    HEMOGLOBIN AND HEMATOCRIT, BLOOD   Result Value Ref Range    Hemoglobin 7.7 (L) 11.9 - 15.1 g/dL    Hematocrit 25.4 (L) 36.3 - 47.1 %   CBC Auto Differential   Result Value Ref Range    WBC 3.0 (L) 3.5 - 11.3 k/uL    RBC 2.45 (L) 3.95 - 5.11 m/uL    Hemoglobin 7.2 (L) 11.9 - 15.1 g/dL    Hematocrit 23.8 (L) 36.3 - 47.1 %    MCV 97.1 82.6 - 102.9 fL    MCH 29.4 25.2 - 33.5 pg    MCHC 30.3 28.4 - 34.8 g/dL    RDW 16.8 (H) 11.8 - 14.4 %    Platelets 90 (L) 937 - 453 k/uL    MPV 13.0 8.1 - 13.5 fL    NRBC Automated 0.7 (H) 0.0 per 100 WBC    Differential Type NOT REPORTED     WBC Morphology NOT REPORTED     RBC Morphology ANISOCYTOSIS PRESENT     Platelet Estimate NOT REPORTED     Seg Neutrophils 66 (H) 36 - 65 %    Lymphocytes 24 24 - 43 %    Monocytes 6 3 - 12 %    Eosinophils % 0 (L) 1 - 4 %    Basophils 0 0 - 2 %    Immature Granulocytes 4 (H) 0 %    Segs Absolute 1.99 1.50 - 8.10 k/uL    Absolute Lymph # 0.71 (L) 1.10 - 3.70 k/uL    Absolute Mono # 0.19 0.10 - 1.20 k/uL    Absolute Eos # <0.03 0.00 - 0.44 k/uL    Basophils Absolute <0.03 0.00 - 0.20 k/uL    Absolute Immature Granulocyte 0.11 0.00 - 0.30 k/uL   Basic Metabolic Panel w/ Reflex to MG   Result Value Ref Range    Glucose 108 (H) 70 - 99 mg/dL    BUN 32 (H) 8 - 23 mg/dL    CREATININE 0.48 (L) 0.50 - 0.90 mg/dL    Bun/Cre Ratio NOT REPORTED 9 - 20    Calcium 8.1 (L) 8.6 - 10.4 mg/dL    Sodium 144 135 - 144 mmol/L    Potassium 3.9 3.7 - 5.3 mmol/L    Chloride 107 98 - 107 mmol/L    CO2 30 20 - 31 mmol/L    Anion Gap 7 (L) 9 - 17 mmol/L    GFR Non-African American >60 >60 mL/min    GFR African American >60 >60 mL/min    GFR Comment          GFR Staging NOT REPORTED    HEMOGLOBIN AND HEMATOCRIT, BLOOD   Result Value Ref Range    Hemoglobin 8.0 (L) 11.9 - 15.1 g/dL    Hematocrit 26.0 (L) 36.3 - 47.1 %   HEMOGLOBIN AND HEMATOCRIT, BLOOD   Result Value Ref Range    Hemoglobin 7.4 (L) 11.9 - 15.1 g/dL    Hematocrit 24.1 (L) 36.3 - 47.1 %   CBC Auto Differential   Result Value Ref Range    WBC 3.5 3.5 - 11.3 k/uL    RBC 2.41 (L) 3.95 - 5.11 m/uL    Hemoglobin 7.3 (L) 11.9 - 15.1 g/dL    Hematocrit 23.8 (L) 36.3 - 47.1 %    MCV 98.8 82.6 - 102.9 fL    MCH 30.3 25.2 - 33.5 pg    MCHC 30.7 28.4 - 34.8 g/dL    RDW 16.2 (H) 11.8 - 14.4 %    Platelets See Reflexed IPF Result 138 - 453 k/uL    MPV NOT REPORTED 8.1 - 13.5 fL    NRBC Automated 0.6 (H) 0.0 per 100 WBC    Differential Type NOT REPORTED     WBC Morphology NOT REPORTED     RBC Morphology ANISOCYTOSIS PRESENT     Platelet Estimate NOT REPORTED     Seg Neutrophils 60 36 - 65 %    Lymphocytes 30 24 - 43 %    Monocytes 7 3 - 12 %    Eosinophils % 0 (L) 1 - 4 %    Basophils 0 0 - 2 %    Immature Granulocytes 3 (H) 0 %    Segs Absolute 2.10 1.50 - 8.10 k/uL    Absolute Lymph # 1.04 (L) 1.10 - 3.70 k/uL    Absolute Mono # 0.23 0.10 - 1.20 k/uL    Absolute Eos # <0.03 0.00 - 0.44 k/uL    Basophils Absolute <0.03 0.00 - 0.20 k/uL    Absolute Immature Granulocyte 0.12 0.00 - 0.30 k/uL   Basic Metabolic Panel w/ Reflex to MG   Result Value Ref Range    Glucose 133 (H) 70 - 99 mg/dL    BUN 28 (H) 8 - 23 mg/dL CREATININE 0.56 0.50 - 0.90 mg/dL    Bun/Cre Ratio NOT REPORTED 9 - 20    Calcium 8.3 (L) 8.6 - 10.4 mg/dL    Sodium 139 135 - 144 mmol/L    Potassium 3.9 3.7 - 5.3 mmol/L    Chloride 102 98 - 107 mmol/L    CO2 28 20 - 31 mmol/L    Anion Gap 9 9 - 17 mmol/L    GFR Non-African American >60 >60 mL/min    GFR African American >60 >60 mL/min    GFR Comment          GFR Staging NOT REPORTED    HEMOGLOBIN AND HEMATOCRIT, BLOOD   Result Value Ref Range    Hemoglobin 7.2 (L) 11.9 - 15.1 g/dL    Hematocrit 23.8 (L) 36.3 - 47.1 %   Immature Platelet Fraction   Result Value Ref Range    Platelet, Immature Fraction 9.7 1.1 - 10.3 %    Platelet, Fluorescence 134 (L) 138 - 453 k/uL   HEMOGLOBIN AND HEMATOCRIT, BLOOD   Result Value Ref Range    Hemoglobin 7.1 (L) 11.9 - 15.1 g/dL    Hematocrit 23.2 (L) 36.3 - 47.1 %   D-Dimer Test   Result Value Ref Range    D-Dimer, Quant 24.53 mg/L FEU   CBC Auto Differential   Result Value Ref Range    WBC 2.9 (L) 3.5 - 11.3 k/uL    RBC 2.24 (L) 3.95 - 5.11 m/uL    Hemoglobin 6.7 (LL) 11.9 - 15.1 g/dL    Hematocrit 21.8 (L) 36.3 - 47.1 %    MCV 97.3 82.6 - 102.9 fL    MCH 29.9 25.2 - 33.5 pg    MCHC 30.7 28.4 - 34.8 g/dL    RDW 16.1 (H) 11.8 - 14.4 %    Platelets 708 (L) 708 - 453 k/uL    MPV 12.8 8.1 - 13.5 fL    NRBC Automated 0.7 (H) 0.0 per 100 WBC    Differential Type NOT REPORTED     WBC Morphology NOT REPORTED     RBC Morphology ANISOCYTOSIS PRESENT     Platelet Estimate NOT REPORTED     Seg Neutrophils 60 36 - 65 %    Lymphocytes 27 24 - 43 %    Monocytes 9 3 - 12 %    Eosinophils % 0 (L) 1 - 4 %    Basophils 0 0 - 2 %    Immature Granulocytes 4 (H) 0 %    Segs Absolute 1.74 1.50 - 8.10 k/uL    Absolute Lymph # 0.79 (L) 1.10 - 3.70 k/uL    Absolute Mono # 0.26 0.10 - 1.20 k/uL    Absolute Eos # <0.03 0.00 - 0.44 k/uL    Basophils Absolute <0.03 0.00 - 0.20 k/uL    Absolute Immature Granulocyte 0.11 0.00 - 0.30 k/uL   Basic Metabolic Panel w/ Reflex to MG   Result Value Ref Range Ventilator     Thomas Test NOT APPLICABLE     Sample Site Arterial Line     Mode PRVC     FIO2 100.0     Pt Temp NOT REPORTED     POC pH Temp NOT REPORTED     POC pCO2 Temp NOT REPORTED mm Hg    POC pO2 Temp NOT REPORTED mm Hg     *Note: Due to a large number of results and/or encounters for the requested time period, some results have not been displayed. A complete set of results can be found in Results Review. IMAGING DATA:    Xr Chest (single View Frontal)    Result Date: 11/17/2020  EXAMINATION: NUCLEAR MEDICINE PERFUSION SCAN. PORTABLE CHEST RADIOGRAPH 11/17/2020 TECHNIQUE: 5 millicuries of Tc 44X MAA was administered intravenously prior to planar imaging of the lungs in multiple projections. HISTORY: ORDERING SYSTEM PROVIDED HISTORY: elevated D-Dimer outside hospital Reason for Exam: Covid-19 positive pt. and elevated D-Dimer 2.44 FINDINGS: PERFUSION: Distribution of radiotracer is slightly heterogeneous. No segmental defects identified. CHEST RADIOGRAPH:  Bilateral mid to lower lung field left greater than right opacities with peripheral involvement most pronounced at the bases. No effusion or pneumothorax. Normal heart size. Postsurgical changes of median sternotomy. Right-sided Port-A-Cath is in place     *Low Probability for Pulmonary Embolus. *Bilateral mid to lower lung field left greater than right opacities with peripheral involvement most pronounced at the bases compatible with  COVID-19 pneumonia. The findings were sent to the Radiology Results [address] at 2:46 pm on 11/17/2020to be communicated to a licensed caregiver. [facility] Lung Scan Perfusion Only    Result Date: 11/17/2020  EXAMINATION: NUCLEAR MEDICINE PERFUSION SCAN. PORTABLE CHEST RADIOGRAPH 11/17/2020 TECHNIQUE: 5 millicuries of Tc 07J MAA was administered intravenously prior to planar imaging of the lungs in multiple projections.  HISTORY: ORDERING SYSTEM PROVIDED HISTORY: elevated D-Dimer outside hospital Reason for Exam: Covid-19 positive pt. and elevated D-Dimer 2.44 FINDINGS: PERFUSION: Distribution of radiotracer is slightly heterogeneous. No segmental defects identified. CHEST RADIOGRAPH:  Bilateral mid to lower lung field left greater than right opacities with peripheral involvement most pronounced at the bases. No effusion or pneumothorax. Normal heart size. Postsurgical changes of median sternotomy. Right-sided Port-A-Cath is in place     *Low Probability for Pulmonary Embolus. *Bilateral mid to lower lung field left greater than right opacities with peripheral involvement most pronounced at the bases compatible with  COVID-19 pneumonia. The findings were sent to the Radiology Results [address] at 2:46 pm on 11/17/2020to be communicated to a licensed caregiver. IMPRESSION:   Primary Problem  Pneumonia due to COVID-19 virus    Active Hospital Problems    Diagnosis Date Noted    Other pancytopenia ([address] [D61.818] 11/24/2020    Hypokalemia [E87.6] 11/22/2020    MDS (myelodysplastic syndrome) ([address] [D46.9] 11/17/2020    Pneumonia due to COVID-19 virus [U07.1, J12.89] 11/17/2020    Neutropenic sepsis ([address] [A41.9, D70.9] 11/17/2020    Bicytopenia [D75.89] 11/17/2020    ROSALINO (acute kidney injury) ([address] [N17.9] 11/17/2020    Acute respiratory failure with hypoxia ([address] [J96.01] 11/17/2020    Essential hypertension [I10] 11/17/2020    Coronary artery disease involving coronary bypass graft of native heart without angina pectoris [I25.810] 11/17/2020    History of Raynaud's syndrome [Z86.79] 11/17/2020    Transaminitis [R74.01] 11/17/2020    Severe sepsis ([address] [A41.9, R65.20] 11/17/2020    Immunosuppressed status ([address] [D84.9]     Acute respiratory failure due to COVID-19 ([address] [U07.1, J96.00] 11/16/2020       MDS  Anemia, transfusion dependent  COVID-19 infection    RECOMMENDATIONS:  Condition remains poor but stable  Counts remain stable. No transfusion needed.   Continue to follow [address] [place] [address] [facility]                              This note is created with the assistance of a speech recognition program.  While intending to generate a document that actually reflects the content of the visit, the document can still have some errors including those of syntax and sound a like substitutions which may escape proof reading. It such instances, actual meaning can be extrapolated by contextual diversion.

## 2020-12-04 NOTE — PROGRESS NOTES
Progress Note    Patient - Haris Silver  Date of Admission -  2020 10:51 PM  Date of Evaluation -  2020  Room and Bed Number -  3023/3023-01   Hospital Day - 18    CHIEF COMPLAINT : ACUTE HYPOXIC RESPIRATORY FAILURE DUE TO COVID -19 PNEUMONIA   SUBJECTIVE:     OVERNIGHT EVENTS:         No acute events    SECRETIONS  -Amount:  [] Small [x] Moderate  [] Large    [] None  Color:     [x] White [] Colored  [] Bloody    SEDATION:    [] Propofol gtt  [x] Versed gtt  [x] FENTANYL  gtt  gtt   [] No Sedation    PARALYZED:  [x] No    [] Yes    VASOPRESSORS:  [] No    [x] Yes  [x] Levophed [] Dopamine [] Vasopressin  [] Dobutamine [] Phenylephrine [] Epinephrine    INHALED NITRIC OXIDE : [x] No    [] Yes    PRONE :       [x] No    [] Yes    REMDESIVIR:             [] No    [x] Yes  Completed     DEXAMETHASONE : [] No    [x] Yes completed     CONVALESCENT PLASMA : [] No    [x] Yes    Ros unable to perform due to intubation and sedation    OBJECTIVE:     VITAL SIGNS:  BP (!) 105/45   Pulse 77   Temp 99.3 °F (37.4 °C) (Oral)   Resp 27   Ht 5' 7\" (1.702 m)   Wt 169 lb 5 oz (76.8 kg)   SpO2 98%   BMI 26.52 kg/m²   Tmax over 24 hours:  Temp (24hrs), Av.4 °F (38 °C), Min:99.3 °F (37.4 °C), Max:101.5 °F (38.6 °C)      Patient Vitals for the past 8 hrs:   BP Temp Temp src Pulse Resp SpO2 Height   20 1215 -- -- -- 77 27 98 % --   20 1200 (!) 105/45 -- -- 78 30 98 % --   20 1100 103/69 99.3 °F (37.4 °C) Oral 85 24 99 % --   20 1030 (!) 104/54 -- -- 87 27 99 % --   20 1020 -- -- -- -- -- -- 5' 7\" (1.702 m)   20 1000 (!) 99/57 -- -- 91 24 99 % --   20 0900 (!) 116/53 -- -- 102 28 97 % --   20 0841 -- -- -- -- 25 97 % --   20 0834 -- -- -- 96 22 98 % --   20 08 (!) 125/54 100.9 °F (38.3 °C) CORE 97 25 95 % --   20 0700 124/60 -- -- 96 25 94 % --   20 0600 116/61 -- -- 97 25 96 % --   20 0500 92/73 -- -- 93 28 95 % --         Intake/Output PRN  acetaminophen, 650 mg, Q6H PRN    Or  acetaminophen, 650 mg, Q6H PRN  polyethylene glycol, 17 g, Daily PRN  promethazine, 12.5 mg, Q6H PRN    Or  ondansetron, 4 mg, Q6H PRN  nicotine, 1 patch, Daily PRN        SUPPORT DEVICES: [x] Ventilator [] BIPAP  [] Nasal Cannula [] Room Air      ABGs:     Lab Results   Component Value Date    QBB7BCY 31 12/04/2020    FIO2 45.0 12/04/2020       DATA:  Complete Blood Count:   Recent Labs     12/02/20 0430 12/03/20 0422 12/03/20  1727 12/04/20 0447   WBC 3.0*  --  3.5  --  2.9*   RBC 2.45*  --  2.41*  --  2.24*   HGB 7.2*   < > 7.3*  7.2* 7.1* 6.7*   HCT 23.8*   < > 23.8*  23.8* 23.2* 21.8*   MCV 97.1  --  98.8  --  97.3   MCH 29.4  --  30.3  --  29.9   MCHC 30.3  --  30.7  --  30.7   RDW 16.8*  --  16.2*  --  16.1*   PLT 90*  --  See Reflexed IPF Result  --  129*   MPV 13.0  --  NOT REPORTED  --  12.8    < > = values in this interval not displayed.         Last 3 Blood Glucose:   Recent Labs     12/02/20 0430 12/03/20 0422 12/04/20 0447   GLUCOSE 108* 133* 93        PT/INR:    Lab Results   Component Value Date    PROTIME 9.5 11/17/2020    INR 0.9 11/17/2020     PTT:  No results found for: APTT, PTT    Comprehensive Metabolic Profile:   Recent Labs     12/02/20 0430 12/03/20 0422 12/04/20 0447    139 136   K 3.9 3.9 4.1    102 101   CO2 30 28 27   BUN 32* 28* 22   CREATININE 0.48* 0.56 0.51   GLUCOSE 108* 133* 93   CALCIUM 8.1* 8.3* 8.3*      Magnesium:   Lab Results   Component Value Date    MG 2.0 11/21/2020    MG 2.4 11/19/2020    MG 2.5 11/18/2020     Phosphorus:   Lab Results   Component Value Date    PHOS 3.3 11/18/2020    PHOS 3.5 11/17/2020     Ionized Calcium: No results found for: CAION     Urinalysis:   Lab Results   Component Value Date    NITRU NEGATIVE 11/23/2020    COLORU DARK YELLOW 11/23/2020    PHUR 7.0 11/23/2020    WBCUA 2 TO 5 11/23/2020    RBCUA 5 TO 10 11/23/2020    MUCUS NOT REPORTED 11/23/2020    TRICHOMONAS NOT REPORTED 11/23/2020    YEAST NOT REPORTED 11/23/2020    BACTERIA NOT REPORTED 11/23/2020    SPECGRAV 1.022 11/23/2020    LEUKOCYTESUR NEGATIVE 11/23/2020    UROBILINOGEN Normal 11/23/2020    BILIRUBINUR NEGATIVE 11/23/2020    GLUCOSEU NEGATIVE 11/23/2020    KETUA NEGATIVE 11/23/2020    AMORPHOUS NOT REPORTED 11/23/2020             Xr Chest (single View Frontal)    Result Date: 11/29/2020  Stable interstitial infiltrates. ET tube terminates 35 mm above the nghia.      Xr Chest Portable    Result Date: 12/1/2020  No significant change in appearance of pneumonia          VENTILATOR SETTINGS:  Vent Information  $Ventilation: $Subsequent Day  Skin Assessment: Clean, dry, & intact  Suction Catheter Diameter: 14  Equipment ID: [id number]  Equipment Changed: HME  Vent Type: Servo i  Vent Mode: (S) PRVC  Vt Ordered: 40 mL  Rate Set: 30 bmp  Pressure Support: 8 cmH20  FiO2 : (S) 50 %  SpO2: 98 %  SpO2/FiO2 ratio: 194  Sensitivity: 3  PEEP/CPAP: 8  I Time/ I Time %: 0.7 s  Humidification Source: HME  Nitric Oxide/Epoprostenol In Use?: No  Mask Type: Full face mask  Mask Size: Small     PaO2/FiO2 RATIO:  Recent Labs     12/04/20  0245   POCPO2 51.5*      FiO2 : (S) 50 %       LABS:  ABGs:   Recent Labs     12/02/20  0420 12/03/20  0410 12/04/20  0245   POCPH 7.410 7.414 7.441   POCPCO2 49.7* 47.8 43.7   POCPO2 54.1* 61.1* 51.5*   POCHCO3 31.5* 30.5* 29.8*   KBEE4SMN 87* 91* 87*            ASSESSMENT:     Principal Problem:    Pneumonia due to COVID-19 virus  Active Problems:    Acute respiratory failure due to COVID-19 (HCC)    MDS (myelodysplastic syndrome) ([facility])    Neutropenic sepsis (HCC)    Bicytopenia    ROSALINO (acute kidney injury) ([facility])    Acute respiratory failure with hypoxia (HCC)    Essential hypertension    Coronary artery disease involving coronary bypass graft of native heart without angina pectoris    History of Raynaud's syndrome    Transaminitis    Immunosuppressed status ([facility])    Severe sepsis (HCC)    Hypokalemia Other pancytopenia ([facility]  Resolved Problems:    * No resolved hospital problems. *              Acute hypoxic respiratory failure secondary to COVID 19   Acute respiratory distress syndrome   Bilateral multifocal pneumonia due to COVID 19 infection   Covid -19 pandemic emergency    Mild hypernatremia better       LOS: 18  PLAN:    D/w RT  D/w RN   Vent setting reviewed   Sedation reviewed -   Change to precedex and prn fentanyl  Airborne isolation and droplet precautions to be continued  Continue supportive care    dc free water  100 ml q 6 hrs - continue monitor sodium   Cont tube feeding  Cont vent support  - did cpap today but sats 91 % and rr in 30s   Will cont cpap trial bid   Monitor endotracheal secretions   Antibiotics per id     WEAN PER PROTOCOL:  [x] No   [] Yes  [] N/A      ICU PROPHYLAXIS:  Stress ulcer:  [x] PPI Agent  [] [id number] [] Sucralfate  [] Other:  VTE:   [x] Enoxaparin  [] Unfract. Heparin Subcut  [] EPC Cuffs    NUTRITION:  [] NPO [x] Tube Feeding  [] TPN  [] PO    INSULIN DRIP:   [x] No   [] Yes        TRANSFER OUT OF ICU:   [x] No   [] Yes    Treatment plan Discussed with nursing staff in detail , all questions answered . Total critical care time caring for this patient with life threatening, unstable organ failure, including direct patient contact, management of life support systems, review of data including imaging and labs, discussions with other team members and physicians at least 27  Min so far today, excluding procedures. Electronically signed by Magalys Olea MD on 12/4/2020 at 12:37 PM       This patient was evaluated in the context of the global SARS-CoV-2 (COVID-19) pandemic, which necessitated considerations that the patient either has COVID-19 infection or is at risk of infection with COVID-19.  Institutional protocols and algorithms that pertain to the evaluation & management of patients with COVID-19 or those at risk for COVID-19 are in a state of rapid changes based on information released by regulatory bodies including the CDC and federal and state organizations. These policies and algorithms were followed during the patient's care. Please note that this chart was generated using voice recognition Dragon dictation software. Although every effort was made to ensure the accuracy of this automated transcription, some errors in transcription may have occurred.

## 2020-12-04 NOTE — PROGRESS NOTES
Comprehensive Nutrition Assessment    Type and Reason for Visit:  Reassess    Nutrition Recommendations/Plan:   -Continue NPO status   -Continue tube feeding of  Vital AF 1.2 (Semi-elemental and carb-controlled) @ 55 mL/hr x 24 hrs ~> 1584 kcals, 99 gms protein (w/out propofol running)  -If pt starts pronating/supine, recommend               -Prone @ 20 mL/hr               -Supine @ 125 mL/hr   -Hold tube feeding and contact provider if residuals are >250 mLs   -Will conitnue to monitor EN, labs, weights and wound healing      Nutrition Assessment:  Pt stable from a nutritional standpoint aeb tube feeding running @ goal rate of 55 mL/hr and tolerating. Pt noted to have residuals of 250 mLs but are now improved. Pt remains intubated w/ propofol off. Last BM noted on 12/4. FMS in place. Pt w/ fluctuating weights since admission - SOCRATES actual wt loss at this time. Will continue to monitor. Malnutrition Assessment:  Malnutrition Status:  Insufficient data    Context:  Acute Illness     Findings of the 6 clinical characteristics of malnutrition:  Energy Intake:  No significant decrease in energy intake  Weight Loss:  Unable to assess(Weight fluctuations since admission noted - ? accuracy)     Body Fat Loss:  Unable to assess Orbital   Muscle Mass Loss:  Unable to assess    Fluid Accumulation:  1 - Mild Extremities, Generalized   Strength:  Not Performed    Estimated Daily Nutrient Needs:  Energy (kcal):  25-30 kcal/kg = 6645-9555 kcals/day; Weight Used for Energy Requirements:  Ideal     Protein (g):  1.2-2.0 gm/kg ~>  gms/d; Weight Used for Protein Requirements:  Ideal          Nutrition Related Findings:  BM 12/4; labs/meds reviewed      Wounds:  Pressure Injury, Stage II       Current Nutrition Therapies:    DIET TUBE FEED CONTINUOUS/CYCLIC NPO; Semi-elemental (carb-controlled);  Orogastric; Continuous; 25; 55; 24  Current Tube Feeding (TF) Orders:  · Feeding Route: Orogastric  · Formula: Semi-Elemental  · Schedule: Continuous  · Additives/Modulars:    · Water Flushes:    · Current TF & Flush Orders Provides: 1584 kcals, 99 gm pro/day  · Goal TF & Flush Orders Provides: At 55 mL/hr = 1584 kcals, 99 gm pro/day    Additional Calorie Sources:   none    Anthropometric Measures:  · Height: 5' 7\" (170.2 cm)  · Current Body Weight: 169 lb (76.7 kg)   · Admission Body Weight: 182 lb (82.6 kg)    · Ideal Body Weight: 135 lbs; % Ideal Body Weight 125.2 %   · BMI: 26.5  · BMI Categories: Overweight (BMI 25.0-29.9)(using admit wt)       Nutrition Diagnosis:   · Inadequate oral intake related to impaired respiratory function as evidenced by NPO or clear liquid status due to medical condition, intubation, nutrition support - enteral nutrition      Nutrition Interventions:   Food and/or Nutrient Delivery:  Continue NPO, Continue Current Tube Feeding  Nutrition Education/Counseling:  Education not indicated   Coordination of Nutrition Care:  Continue to monitor while inpatient    Goals: Achieved   meet % of estimated nutrient needs       Nutrition Monitoring and Evaluation:   Food/Nutrient Intake Outcomes:  Enteral Nutrition Intake/Tolerance  Physical Signs/Symptoms Outcomes:  Biochemical Data, GI Status, Fluid Status or Edema, Skin, Weight, Nutrition Focused Physical Findings     Discharge Planning:     Too soon to determine     Electronically signed by Dilia Dang RD, LD on 12/4/20 at 10:22 AM EST    Contact: [phone]

## 2020-12-04 NOTE — PROGRESS NOTES
[facility]  Office: [address], DO, Radha Elissa, DO, Belledarryl Lazcano, DO, Makeda Martinez Blood, DO, Filippo Medley MD, Robert Jeong MD, Darryle So, MD, Janell Perdomo MD, Mary Kate Rangel MD, Matthew Lujan MD, Josemanuel Sam MD, Hannah Ivan MD, Ren Orr MD, Smith Edwards, DO, Rupesh Bennett MD, Jayshree Samayoa MD, Juan Franks DO, Sergio Amaral MD,  Paty Andrea DO, Nata Steel MD, Farideh Ruvalcaba MD, Kathi Hollingsworth, [facility], [facility], CNP, Leonard Agudelo, CNP, Lincoln Hooks, CNS, Laci Marion, CNP, Db Brock, CNP, Zachary Ruby, CNP, Horace Ceja, CNP, Clifton Knight, CNP, BAYLEE Marx-C, Mazin Solis, LUIS, Andi Hollins, CNP, Joy Neville, CNP, Oswald Mallory, CNP, Tawana Rubinstein, CNP, Tanna Bhardwaj, CNP         [facility]   [address] [facility]    Progress Note    12/4/2020    11:45 AM    Name:   Casey Gazra  MRN:     [id number]     [place]:      [de-identified]   Room:   3023/3023-01   Day:  18  Admit Date:  11/16/2020 10:51 PM    PCP:   Kelsey Lucia MD  Code Status:  DNR-CCA    Subjective:     C/C: No chief complaint on file. Interval History Status: not changed. Patient seen and examined at bedside. Nhad fever overnight, requiring 1 blood transfusion. Brief History:        Per EMR:  Cuco Bhatti a 70 y. o. female with a past medical history for MDS, essential hypertension, melanoma, mitral valve prolapse, MRSA, Raynaud's disease presents emergency department for shortness of breath and dyspnea upon exertion for 3 days.  Patient was initially seen at [facility] diagnosed with Covid pneumonia.  Patient requiring 50% FiO2 on BiPAP, patient also had an elevated D-dimer but CTA was not possible secondary to contrast allergy.  [facility] unable to accommodate due to lack of Covid beds, patient was transferred for further care.  Spoke with pulmonary medicine at [facility] and was accepted for ICU transfer. Christi Goltz was given [facility] of [facility]BCs [facility] for a hemoglobin less than 7, patient was also placed on BiPAP for transfer.  Patient arrived, no complaints except for some shortness of breath. Needing high flow oxygen, still critical     Intubated and sedated, endotracheal intubation done on November 23, 2020 due to acute respiratory failure not holding oxygenation even on FiO2 100% BiPAP    Review of Systems:        Unable to obtain due to intubation, sedation    Medications: Allergies: Allergies   Allergen Reactions    Fish Allergy Anaphylaxis, Hives and Swelling    Hydrocodone-Acetaminophen Anaphylaxis    Tizanidine Anaphylaxis and Hives    Atorvastatin Hives, Other (See Comments) and Swelling    Metoclopramide Hives     Other reaction(s): Unknown, Unknown    Moxifloxacin Hives, Other (See Comments) and Swelling     Other reaction(s): Other (See Comments), Unknown    Oxycodone Other (See Comments)     Other reaction(s): Hallucinations, Mental Status Change    Pregabalin Other (See Comments)     Other reaction(s): Hallucinations, Other (See Comments)  Causes sores in the mouth  Causes sores in the mouth      Tramadol      Other reaction(s): Edema, Other (See Comments)    Zolpidem Other (See Comments)     Other reaction(s): Other (See Comments)  causes cramps in hands and legs  causes cramps in hands and legs  Other reaction(s):  Other (See Comments)  causes cramps in hands and legs  causes cramps in hands and legs      Cephalexin Rash     Other reaction(s): Unknown, Unknown    Iodides Rash and Swelling     ivp and heart cath dye    Sulfa Antibiotics Rash       Current Meds:   Scheduled Meds:    midodrine  10 mg Oral TID WC    QUEtiapine  50 mg Oral Nightly    enoxaparin  40 mg Subcutaneous Daily    ceftaroline fosamil (TEFLARO) IVPB  600 mg Intravenous Q12H    docusate  100 mg Oral BID    pantoprazole  40 mg Intravenous BID    And    sodium chloride (PF) 10 mL Intravenous Daily    latanoprost  1 drop Both Eyes Nightly    sodium chloride  20 mL Intravenous Once    sodium chloride  20 mL Intravenous Once     Continuous Infusions:    dexmedetomidine 0.3 mcg/kg/hr (20 1132)    norepinephrine Stopped (20 1700)    fentaNYL 100 mcg/hr (20 7636)    midazolam 4 mg/hr (20 1110)     PRN Meds: artificial tears, LORazepam, sodium chloride, sodium chloride flush, potassium chloride **OR** potassium alternative oral replacement **OR** potassium chloride, magnesium sulfate, acetaminophen **OR** acetaminophen, polyethylene glycol, promethazine **OR** ondansetron, nicotine    Data:     Past Medical History:   has a past medical history of Cancer ([facility] [address] Deaf, left, Essential hypertension, GERD (gastroesophageal reflux disease), Glaucoma, Hyperlipidemia, Melanoma ([facility] [address] MRSA (methicillin resistant staph aureus) culture positive, MVP (mitral valve prolapse), Pharyngoesophageal dysphagia, Raynaud disease, and Status post four vessel coronary artery bypass. Social History:   reports that she quit smoking about 15 years ago. Her smoking use included cigarettes. She started smoking about 63 years ago. She does not have any smokeless tobacco history on file. She reports that she does not drink alcohol or use drugs. Family History:   Family History   Problem Relation Age of Onset    Heart Failure Mother     Early Death Mother     Heart Disease Mother     Stroke Father        Vitals:  /69   Pulse 85   Temp 99.3 °F (37.4 °C) (Oral)   Resp 24   Ht 5' 7\" (1.702 m)   Wt 169 lb 5 oz (76.8 kg)   SpO2 99%   BMI 26.52 kg/m²   Temp (24hrs), Av.4 °F (38 °C), Min:99.3 °F (37.4 °C), Max:101.5 °F (38.6 °C)    Recent Labs     20  0410 20  0245   POCGLU 142* 103*       I/O (24Hr):     Intake/Output Summary (Last 24 hours) at 2020 1145  Last data filed at 2020 1130  Gross per 24 hour   Intake 2541 ml   Output 1110 ml   Net 1431 ml       Labs:  Hematology:  Recent Labs     12/02/20  0430  12/03/20  0422 12/03/20  1727 12/03/20  2108 12/04/20  0447   WBC 3.0*  --  3.5  --   --  2.9*   RBC 2.45*  --  2.41*  --   --  2.24*   HGB 7.2*   < > 7.3*  7.2* 7.1*  --  6.7*   HCT 23.8*   < > 23.8*  23.8* 23.2*  --  21.8*   MCV 97.1  --  98.8  --   --  97.3   MCH 29.4  --  30.3  --   --  29.9   MCHC 30.3  --  30.7  --   --  30.7   RDW 16.8*  --  16.2*  --   --  16.1*   PLT 90*  --  See Reflexed IPF Result  --   --  129*   MPV 13.0  --  NOT REPORTED  --   --  12.8   CRP  --   --  164.0*  --   --   --    DDIMER  --   --   --   --  24.53  --     < > = values in this interval not displayed. Chemistry:  Recent Labs     12/02/20  0430 12/03/20 0422 12/04/20  0447    139 136   K 3.9 3.9 4.1    102 101   CO2 30 28 27   GLUCOSE 108* 133* 93   BUN 32* 28* 22   CREATININE 0.48* 0.56 0.51   ANIONGAP 7* 9 8*   LABGLOM >60 >60 >60   GFRAA >60 >60 >60   CALCIUM 8.1* 8.3* 8.3*     Recent Labs     12/03/20  0410 12/03/20  0422 12/04/20  0245   LDH  --  224*  --    POCGLU 142*  --  103*     ABG:  Lab Results   Component Value Date    POCPH 7.441 12/04/2020    POCPCO2 43.7 12/04/2020    POCPO2 51.5 12/04/2020    POCHCO3 29.8 12/04/2020    NBEA NOT REPORTED 12/04/2020    PBEA 5 12/04/2020    HPQ8WQQ 31 12/04/2020    JUDT2CEY 87 12/04/2020    FIO2 45.0 12/04/2020     Lab Results   Component Value Date/Time    SPECIAL NOT REPORTED 11/30/2020 10:17 PM     Lab Results   Component Value Date/Time    CULTURE NORMAL RESPIRATORY MORIS MODERATE GROWTH 11/30/2020 10:17 PM       Radiology:  Xr Chest (single View Frontal)    Result Date: 11/29/2020  Stable interstitial infiltrates. ET tube terminates 35 mm above the nghia.      Xr Chest Portable    Result Date: 12/1/2020  No significant change in appearance of pneumonia       Physical Examination:        General appearance: Intubated, sedated, chronically ill-appearing  Mental Status: Intubated, sedated, does open eyes to verbal stimulus  Lungs: Ventilated breath sounds  Heart:  regular rate and rhythm, no murmur  Abdomen:  soft, nontender, nondistended, normal bowel sounds  Extremities:  no edema, redness, tenderness in the calves  Skin:  no gross lesions, rashes, induration    Assessment:        Hospital Problems           Last Modified POA    * (Principal) Pneumonia due to COVID-19 virus 11/17/2020 Yes    Acute respiratory failure due to COVID-19 ([address] 11/16/2020 Yes    MDS (myelodysplastic syndrome) ([address] 11/17/2020 Yes    Neutropenic sepsis ([address] 11/17/2020 Yes    Bicytopenia 11/17/2020 Yes    ROSALINO (acute kidney injury) ([address] 11/17/2020 Yes    Acute respiratory failure with hypoxia ([address] 11/17/2020 Yes    Essential hypertension 11/17/2020 Yes    Coronary artery disease involving coronary bypass graft of native heart without angina pectoris 11/17/2020 Yes    History of Raynaud's syndrome 11/17/2020 Yes    Transaminitis 11/17/2020 Yes    Immunosuppressed status ([address] 11/17/2020 Yes    Severe sepsis ([address] 11/17/2020 Yes    Hypokalemia 11/22/2020 Yes    Other pancytopenia ([address] 11/24/2020 Yes          Plan:        1. COVID-19 with acute respiratory failure. Appreciate critical care recommendations. agree with critical care, try to switch to precedex to see if patient will tolerate SBT, continue to try spontaneous breathing trials  2. Try to wean off limit Levophed, continue midodrine  3. Monitor sodium, continue free water flushes and tube feeds  4. Patient currently on Teflaro, no growth 4 days on blood cultures, normal respiratory growth seen on respiratory culture. repeat blood, respiratory cultures  5. Continue to monitor hemoglobin. Transfuse 1 unit RBC Will transfuse if less than 7. Has a history of myelodysplastic syndrome. Appreciate hematology oncology recommendations.     Aron Ewing MD  12/4/2020  11:45 AM

## 2020-12-05 NOTE — PROGRESS NOTES
[facility] -   Infectious diseases evaluation  admission date 11/16/2020    reason for consultation:   covid    Impression :   Current:  · covid pneumonia - severe w resp distress  · MDS  · neutropenia  ·   · Allergy to Keflex-rash  Discussion / summary of stay / plan of care   ·   Recommendations     consented Immune plasma - ordered 11/17,  1 U - tolerated 11/17  Post Meropenem and vanco - stopped 11/18 due to cx neg  remdesevir till 11/20/20, completed  Did not fit criteria for research medicine due to her MDS  Steroids course over  Inflammatory markers improved    · Fever 38 on 11/30 - cx neg  · ceftaroline 11/30/20 - 12/4 - stop  · Fever 12/4 - sp cx GNR  · Cefepime 12/4    Infection Control Recommendations   · Norman Precautions  · Contact Isolation   · Airborne isolation  · Droplet Isolation      Antimicrobial Stewardship Recommendations   · Simplification of therapy  · Targeted therapy  · Per Kg dosing    Coordination ofOutpatient Care:   · Estimated Length of IV antimicrobials:  · Patient will need Midline / picc Catheter Insertion:   · Patient will need SNF:  · Patient will need outpatient wound care:     History of Present Illness:   Initial history:  Primo Hinds is a 70y.o.-year-old female w resp distress transferred from [address] pneumonia and underlying MDS.   Neutropenia  On bipap and 50% FIO2, elevated D-Dimers and went for a VQ scan  Past cig smoking - HTN - Raynaud-    Started on decadron remdesevir  started on meropenem and vanco pend BC due to concern for ongoing sepsis      Interval changes     Patient Vitals for the past 8 hrs:   BP Temp Temp src Pulse Resp SpO2   12/04/20 1800 (!) 118/59 -- -- 72 25 96 %   12/04/20 1700 131/66 -- -- 69 23 96 %   12/04/20 1600 (!) 111/55 98.6 °F (37 °C) CORE 69 24 95 %   12/04/20 1530 (!) 117/49 -- -- 68 23 95 %   12/04/20 1500 (!) 121/50 -- -- 66 26 94 %   12/04/20 1215 -- -- -- 77 27 98 %   12/04/20 1200 (!) 105/45 -- -- 78 30 98 %       On the vent- 40% and 8 peep  Tachycardic  Diarrhea FMS  Marie    Fever last 2 days -  Sp cx 12/3 GNR+++  BC 12/4  Ferritin better  No new CXR    Likely new resp infection  Still on ceftaroline - switch to cefepime 12/4      The patient has a right chest port that is been used. Has had at admission Neupogen to improve her white count  tolerated 1 unit of plasma well 11/17      Summary of relevant labs:  Labs:  WBC  1.2-12.8 - 9.3-6.9-4.1 -2.9  Creat 0.81  procalcitonin 1   - 227-67-67.6 -164  Ferritin 3046 - 3024 -[id number] -2970  DDimer 2.44  Fibrinogen 720 - 1002  proBNP 729   - 291 - 407      Micro:    BC 12/4 x 2  BC neg x 2 11/30  BC 11/17/20 x 2    Sp cx GNR ++ 12/4    resp PCR neg but for COVID+ 11/17  Legionella AG neg  Mycoplasma IgM negative  Imaging:  VQ scan low probability    CT chest 11/16 [facility] periph ground glass bilat, mediastinal large LN  CT brain 11/216 neg [facility]      I have personally reviewed the past medical history, past surgical history, medications, social history, and family history, and I haveupdated the database accordingly. Allergies:   Fish allergy; Hydrocodone-acetaminophen; Tizanidine; Atorvastatin; Metoclopramide; Moxifloxacin; Oxycodone; Pregabalin; Tramadol; Zolpidem; Cephalexin; Iodides; and Sulfa antibiotics     Review of Systems:     Review of Systems   Unable to perform ROS: Intubated       Physical Examination :       Physical Exam  Constitutional:       Appearance: Normal appearance. She is ill-appearing. HENT:      Head: Normocephalic and atraumatic. Nose: Nose normal. No congestion or rhinorrhea. Eyes:      General: No scleral icterus. Conjunctiva/sclera: Conjunctivae normal.      Pupils: Pupils are equal, round, and reactive to light. Neck:      Musculoskeletal: Neck supple. No neck rigidity or muscular tenderness.    Cardiovascular:      Rate and Rhythm: Normal rate and regular rhythm. Heart sounds: Normal heart sounds. No murmur. No friction rub. Pulmonary:      Effort: No respiratory distress. Breath sounds: No stridor. No wheezing, rhonchi or rales. Chest:      Chest wall: No tenderness. Abdominal:      General: There is no distension. Palpations: Abdomen is soft. There is no mass. Tenderness: There is no abdominal tenderness. Genitourinary:     Comments: Urine bhavin  Musculoskeletal:         General: No swelling or deformity. Skin:     General: Skin is dry. Coloration: Skin is not jaundiced or pale. Findings: Bruising present. No erythema, lesion or rash. Neurological:      Mental Status: She is alert.       Comments: Intubated   Psychiatric:      Comments: Intubated         Past Medical History:     Past Medical History:   Diagnosis Date    Cancer ([facility] [address]     myelodysplastic syndrome    Deaf, left     Essential hypertension     GERD (gastroesophageal reflux disease)     Glaucoma     Hyperlipidemia     Melanoma ([facility] [address]     MRSA (methicillin resistant staph aureus) culture positive     MVP (mitral valve prolapse)     Pharyngoesophageal dysphagia     Raynaud disease     Status post four vessel coronary artery bypass        Past Surgical  History:     Past Surgical History:   Procedure Laterality Date    APPENDECTOMY      CARDIAC SURGERY      CABG x3    CHOLECYSTECTOMY      HYSTERECTOMY, TOTAL ABDOMINAL      IR PORT PLACEMENT < 5 YEARS      SMALL INTESTINE SURGERY      TUBAL LIGATION         Medications:      midodrine  10 mg Oral TID WC    QUEtiapine  50 mg Oral Nightly    enoxaparin  40 mg Subcutaneous Daily    ceftaroline fosamil (TEFLARO) IVPB  600 mg Intravenous Q12H    docusate  100 mg Oral BID    pantoprazole  40 mg Intravenous BID    And    sodium chloride (PF)  10 mL Intravenous Daily    latanoprost  1 drop Both Eyes Nightly    sodium chloride  20 mL Intravenous Once    sodium chloride  20 mL Intravenous --  129*  --    LYMPHOPCT 30  --  27  --    MONOPCT 7  --  9  --     < > = values in this interval not displayed. BMP:  Recent Labs     12/03/20  0422 12/04/20  0447    136   K 3.9 4.1    101   CO2 28 27   BUN 28* 22   CREATININE 0.56 0.51     Hepatic Function Panel:   No results for input(s): PROT, LABALBU, BILIDIR, IBILI, BILITOT, ALKPHOS, ALT, AST in the last 72 hours. No results for input(s): RPR in the last 72 hours. No results for input(s): HIV in the last 72 hours. No results for input(s): BC in the last 72 hours. Lab Results   Component Value Date    CREATININE 0.51 12/04/2020    GLUCOSE 93 12/04/2020       Detailed results: Thank you for allowing us to participate in the care of this patient. Please call with questions. This note is created with the assistance of a speech recognition program.  While intending to generate adocument that actually reflects the content of the visit, the document can still have some errors including those of syntax and sound a like substitutions which may escape proof reading. It such instances, actual meaningcan be extrapolated by contextual diversion.     Fernando Mcrae MD  Office: [phone]  Perfect serve / office [phone]

## 2020-12-05 NOTE — PLAN OF CARE
Problem: Respiratory:  Goal: Ability to maintain a clear airway will improve  Description: Ability to maintain a clear airway will improve  Outcome: Ongoing  Goal: Ability to maintain adequate ventilation will improve  Description: Ability to maintain adequate ventilation will improve  Outcome: Ongoing  Goal: Complications related to the disease process, condition or treatment will be avoided or minimized  Description: Complications related to the disease process, condition or treatment will be avoided or minimized  Outcome: Ongoing     Problem: Gas Exchange - Impaired  Goal: Absence of hypoxia  Outcome: Ongoing  Goal: Promote optimal lung function  Outcome: Ongoing     Problem: Breathing Pattern - Ineffective  Goal: Ability to achieve and maintain a regular respiratory rate  Outcome: Ongoing     Problem: OXYGENATION/RESPIRATORY FUNCTION  Goal: Patient will maintain patent airway  Outcome: Ongoing  Goal: Patient will achieve/maintain normal respiratory rate/effort  Description: Respiratory rate and effort will be within normal limits for the patient  Outcome: Ongoing     Problem: MECHANICAL VENTILATION  Goal: Patient will maintain patent airway  Outcome: Ongoing  Goal: Oral health is maintained or improved  Outcome: Ongoing  Goal: ET tube will be managed safely  Outcome: Ongoing  Goal: Ability to express needs and understand communication  Outcome: Ongoing  Goal: Mobility/activity is maintained at optimum level for patient  Outcome: Ongoing

## 2020-12-05 NOTE — PROGRESS NOTES
[facility]  Office: [address], DO, Casie Kasey, DO, Ayocece Moscoso, DO, Cisco Elaine, DO, Omaira Vaz MD, Lino Samuel MD, Andrea England MD, Kevon Andrews MD, Branden Kang MD, Alicia Ho MD, Flaco Ramos MD, Carie Patterson MD, Ren Taylor MD, Luba Trent DO, Carla Crawford MD, Fernando Rose MD, Carol Laguerre DO, Deloris Dozier MD,  Esdras Trejo DO, Arnulfo Alejandro MD, Gume Bryson MD, Shmuel Short, CNP, [facility], CNP, Faizan Zepeda, CNP, Marin Blackwood, CNS, Bisi Dsouza, CNP, Natalia Márquez, CNP, Magnolia Olivas, CNP, Regis Larsen, CNP, Roc Moraes, CNP, MARCIE TalleyC, Sandie Allison, [facility], Haleigh Avery, CNP, Tong Basurto, CNP, Melissa Bean, CNP, Talai Markham, CNP, Geo Hunt, CNP         [facility]   [address] [facility]    Progress Note    12/5/2020    5:58 PM    Name:   Khadra Amor  MRN:     [id number]     Acct:      [de-identified]   Room:   [facility] Day:  23  Admit Date:  11/16/2020 10:51 PM    PCP:   Inez Kaminski MD  Code Status:  DNR-CCA    Subjective:     C/C: No chief complaint on file. Interval History Status: not changed. Patient seen and examined at bedside. Still having fevers, hb overall stable. Last transfused 12/4/2020. Brief History:        Per EMR:  Meena Coy a 70 y. o. female with a past medical history for MDS, essential hypertension, melanoma, mitral valve prolapse, MRSA, Raynaud's disease presents emergency department for shortness of breath and dyspnea upon exertion for 3 days.  Patient was initially seen at [facility] diagnosed with Covid pneumonia.  Patient requiring 50% FiO2 on BiPAP, patient also had an elevated D-dimer but CTA was not possible secondary to contrast allergy.  [facility] unable to accommodate due to lack of Covid beds, patient was transferred for further care.  Spoke with pulmonary medicine at [facility]. Intravenous Daily    latanoprost  1 drop Both Eyes Nightly    sodium chloride  20 mL Intravenous Once    sodium chloride  20 mL Intravenous Once     Continuous Infusions:    dexmedetomidine 1.1 mcg/kg/hr (20 1640)    norepinephrine 5 mcg/min (20 1052)    fentaNYL Stopped (20 1750)    midazolam 1 mg/hr (20 1553)     PRN Meds: fentanNYL **OR** fentanNYL, artificial tears, LORazepam, sodium chloride, sodium chloride flush, potassium chloride **OR** potassium alternative oral replacement **OR** potassium chloride, magnesium sulfate, acetaminophen **OR** acetaminophen, polyethylene glycol, promethazine **OR** ondansetron, nicotine    Data:     Past Medical History:   has a past medical history of Cancer ([facility] Deaf, left, Essential hypertension, GERD (gastroesophageal reflux disease), Glaucoma, Hyperlipidemia, Melanoma ([facility] MRSA (methicillin resistant staph aureus) culture positive, MVP (mitral valve prolapse), Pharyngoesophageal dysphagia, Raynaud disease, and Status post four vessel coronary artery bypass. Social History:   reports that she quit smoking about 15 years ago. Her smoking use included cigarettes. She started smoking about 63 years ago. She does not have any smokeless tobacco history on file. She reports that she does not drink alcohol or use drugs. Family History:   Family History   Problem Relation Age of Onset    Heart Failure Mother     Early Death Mother     Heart Disease Mother     Stroke Father        Vitals:  BP (!) 124/43   Pulse 67   Temp 100.4 °F (38 °C) (Core)   Resp (!) 32   Ht 5' 7\" (1.702 m)   Wt 169 lb 5 oz (76.8 kg)   SpO2 98%   BMI 26.52 kg/m²   Temp (24hrs), Av.2 °F (38.4 °C), Min:100.4 °F (38 °C), Max:102.4 °F (39.1 °C)    Recent Labs     20  0410 20  0245   POCGLU 142* 103*       I/O (24Hr):     Intake/Output Summary (Last 24 hours) at 2020 1756  Last data filed at 2020 1553  Gross per 24 hour   Intake 1817 ml Output 1910 ml   Net -93 ml       Labs:  Hematology:  Recent Labs     12/03/20  0422  12/03/20  2108 12/04/20 0447 12/04/20  1644 12/05/20  0541   WBC 3.5  --   --  2.9*  --  3.4*   RBC 2.41*  --   --  2.24*  --  2.77*   HGB 7.3*  7.2*   < >  --  6.7* 8.2* 8.2*   HCT 23.8*  23.8*   < >  --  21.8* 25.7* 26.4*   MCV 98.8  --   --  97.3  --  95.3   MCH 30.3  --   --  29.9  --  29.6   MCHC 30.7  --   --  30.7  --  31.1   RDW 16.2*  --   --  16.1*  --  17.1*   PLT See Reflexed IPF Result  --   --  129*  --  170   MPV NOT REPORTED  --   --  12.8  --  12.2   .0*  --   --   --   --   --    DDIMER  --   --  24.53  --   --   --     < > = values in this interval not displayed. Chemistry:  Recent Labs     12/03/20 0422 12/04/20 0447 12/05/20  0541    136 141   K 3.9 4.1 3.9    101 103   CO2 28 27 27   GLUCOSE 133* 93 116*   BUN 28* 22 23   CREATININE 0.56 0.51 0.58   ANIONGAP 9 8* 11   LABGLOM >60 >60 >60   GFRAA >60 >60 >60   CALCIUM 8.3* 8.3* 8.3*     Recent Labs     12/03/20  0410 12/03/20  0422 12/04/20  0245   LDH  --  224*  --    POCGLU 142*  --  103*     ABG:  Lab Results   Component Value Date    POCPH 7.531 12/05/2020    POCPCO2 36.7 12/05/2020    POCPO2 46.7 12/05/2020    POCHCO3 30.8 12/05/2020    NBEA NOT REPORTED 12/05/2020    PBEA 8 12/05/2020    FMG6YPC 32 12/05/2020    GTHS0TMB 87 12/05/2020    FIO2 40.0 12/05/2020     Lab Results   Component Value Date/Time    SPECIAL L HEEL 6ML 12/04/2020 07:05 PM     Lab Results   Component Value Date/Time    CULTURE NO GROWTH 17 HOURS 12/04/2020 07:05 PM       Radiology:  Xr Chest (single View Frontal)    Result Date: 11/29/2020  Stable interstitial infiltrates. ET tube terminates 35 mm above the nghia.      Xr Chest Portable    Result Date: 12/1/2020  No significant change in appearance of pneumonia       Physical Examination:        General appearance: Intubated, sedated, chronically ill-appearing  Mental Status: Intubated, sedated, not opening eyes to verbal stimulis, does withdraw to pain  Lungs: Ventilated breath sounds  Heart:  regular rate and rhythm, no murmur  Abdomen:  soft, nontender, nondistended, normal bowel sounds  Extremities:  no edema, redness, tenderness in the calves  Skin:  no gross lesions, rashes, induration    Assessment:        Hospital Problems           Last Modified POA    * (Principal) Pneumonia due to COVID-19 virus 11/17/2020 Yes    Acute respiratory failure due to COVID-19 ([address] 11/16/2020 Yes    MDS (myelodysplastic syndrome) ([address] 11/17/2020 Yes    Neutropenic sepsis ([address] 11/17/2020 Yes    Bicytopenia 11/17/2020 Yes    ROSALINO (acute kidney injury) ([address] 11/17/2020 Yes    Acute respiratory failure with hypoxia ([address] 11/17/2020 Yes    Essential hypertension 11/17/2020 Yes    Coronary artery disease involving coronary bypass graft of native heart without angina pectoris 11/17/2020 Yes    History of Raynaud's syndrome 11/17/2020 Yes    Transaminitis 11/17/2020 Yes    Immunosuppressed status ([address] 11/17/2020 Yes    Severe sepsis ([address] 11/17/2020 Yes    Hypokalemia 11/22/2020 Yes    Other pancytopenia ([address] 11/24/2020 Yes          Plan:        1. COVID-19 with acute respiratory failure. Appreciate critical care recommendations. agree with critical care, try to switch to precedex to see if patient will tolerate SBT, continue to try spontaneous breathing trials  2. Try to wean off limit Levophed, continue midodrine  3. Will give 1 dose IV lasix  4. Try to see if Fentanyl IV pushes will help with ventilator synchrony, patient has been weaned off fentanyl drip  5. Patient currently on Teflaro,respiratory cultures showing gram negative rods, final cultures pending  6. Continue to monitor hemoglobin. Will transfuse if less than 7. Has a history of myelodysplastic syndrome. Appreciate hematology oncology recommendations.   7. 'Consult to palliative care    Deloris Dozier MD  12/5/2020  5:58 PM

## 2020-12-05 NOTE — FLOWSHEET NOTE
RN attempted to call pt daughter Sandra Powell for morning update, no answer - voicemail left. Spiritual care offered zoom meeting @ 2030. Will notify spiritual care when family calls back.    Electronically signed by Mague Ellis RN on 12/5/2020 at 6:24 AM

## 2020-12-05 NOTE — PROGRESS NOTES
Progress Note    Patient - Casey April  Date of Admission -  2020 10:51 PM  Date of Evaluation -  2020  Room and Bed Number -  3023/3023-01   Hospital Day -     CHIEF COMPLAINT : ACUTE HYPOXIC RESPIRATORY FAILURE DUE TO COVID -19 PNEUMONIA   SUBJECTIVE:     OVERNIGHT EVENTS:         Continues to require Levophed. Increase to 5 mcg. Sedation adjusted  Continues to be vent dependent.   SECRETIONS  -Amount:  [] Small [x] Moderate  [] Large    [] None  Color:     [x] White [] Colored  [] Bloody    SEDATION:    [] Propofol gtt  [] Versed gtt  [] FENTANYL  gtt  gtt   [] No Sedation    PARALYZED:  [x] No    [] Yes    VASOPRESSORS:  [] No    [x] Yes  [x] Levophed [] Dopamine [] Vasopressin  [] Dobutamine [] Phenylephrine [] Epinephrine    INHALED NITRIC OXIDE : [x] No    [] Yes    PRONE :       [x] No    [] Yes    REMDESIVIR:             [] No    [x] Yes  Completed     DEXAMETHASONE : [] No    [x] Yes completed     CONVALESCENT PLASMA : [] No    [x] Yes    Ros unable to perform due to intubation and sedation    OBJECTIVE:     VITAL SIGNS:  BP (!) 115/51   Pulse 67   Temp 100.8 °F (38.2 °C)   Resp (!) 33   Ht 5' 7\" (1.702 m)   Wt 169 lb 5 oz (76.8 kg)   SpO2 98%   BMI 26.52 kg/m²   Tmax over 24 hours:  Temp (24hrs), Av.5 °F (38.6 °C), Min:100.8 °F (38.2 °C), Max:102.4 °F (39.1 °C)      Patient Vitals for the past 8 hrs:   BP Pulse Resp SpO2   20 1545 (!) 115/51 67 (!) 33 --   20 1530 (!) 120/55 68 (!) 32 --   20 1515 (!) 119/55 66 (!) 33 --   20 1500 -- 68 (!) 31 --   20 1445 (!) 110/50 62 29 --   20 1430 (!) 109/50 63 30 --   20 1415 (!) 111/45 64 29 --   20 1400 (!) 114/49 63 27 --   20 1345 (!) 127/53 64 (!) 31 --   20 1330 (!) 115/52 63 27 --   20 1315 (!) 116/52 63 27 --   20 1300 (!) 120/48 63 28 --   20 1245 (!) 127/50 62 28 --   20 1230 (!) 119/49 63 27 --   20 1215 (!) 124/48 63 28 --   20 1200 (!) 104/48 71 30 --   12/05/20 1149 (!) 109/56 69 28 98 %   12/05/20 1145 (!) 109/56 70 27 --   12/05/20 1130 (!) 98/42 62 27 --   12/05/20 1115 (!) 101/47 62 29 --   12/05/20 1100 (!) 107/47 63 29 --   12/05/20 1045 (!) 109/49 65 (!) 31 --   12/05/20 1030 (!) 109/49 66 30 --   12/05/20 1015 (!) 87/40 65 30 --   12/05/20 1000 (!) 86/41 68 28 99 %   12/05/20 0900 (!) 104/50 74 (!) 34 98 %         Intake/Output Summary (Last 24 hours) at 12/5/2020 1609  Last data filed at 12/5/2020 1553  Gross per 24 hour   Intake 2134 ml   Output 1910 ml   Net 224 ml     Date 12/05/20 0000 - 12/05/20 2359   Shift 2442-8123 8629-1036 1525-3694 24 Hour Total   INTAKE   I.V.(mL/kg) 369(4.8) 425(5.5)  794(10.3)   Other(mL/kg) 57(0.7)   57(0.7)   NG/GT(mL/kg) 486(6.3) 361(4.7)  847(11)   Shift Total(mL/kg) 912(11.9) 407(19.6)  4623(78.9)   OUTPUT   Urine(mL/kg/hr) 700(1.1) 585(1)  1285   Shift Total(mL/kg) 700(9.1) 585(7.6)  1285(16.7)   Weight (kg) 76.8 76.8 76.8 76.8     Wt Readings from Last 3 Encounters:   12/04/20 169 lb 5 oz (76.8 kg)     Body mass index is 26.52 kg/m². PHYSICAL EXAM:  I have discussed the care of Marguerite Angel  including pertinent history and exam findings,  with the nursing staff I have seen  the patient and the key elements of all parts of the encounter have been performed by me. For careful stewardship of limited PPE during COVID-19 pandemic my physical exam was deferred. [facility]    MEDICATIONS:  Scheduled Meds:   cefepime  2 g Intravenous Q12H    midodrine  10 mg Oral TID WC    QUEtiapine  50 mg Oral Nightly    enoxaparin  40 mg Subcutaneous Daily    docusate  100 mg Oral BID    pantoprazole  40 mg Intravenous BID    And    sodium chloride (PF)  10 mL Intravenous Daily    latanoprost  1 drop Both Eyes Nightly    sodium chloride  20 mL Intravenous Once    sodium chloride  20 mL Intravenous Once     Continuous Infusions:   dexmedetomidine 0.9 mcg/kg/hr (12/05/20 9182)    norepinephrine 5 mcg/min (12/05/20 1052)    fentaNYL Stopped (12/04/20 1750)    midazolam 1 mg/hr (12/05/20 1553)     PRN Meds:   fentanNYL, 50 mcg, Q2H PRN    Or  fentanNYL, 100 mcg, Q2H PRN  artificial tears, , PRN  LORazepam, 0.5 mg, Q8H PRN  sodium chloride, 30 mL, PRN  sodium chloride flush, 10 mL, PRN  potassium chloride, 40 mEq, PRN    Or  potassium alternative oral replacement, 40 mEq, PRN    Or  potassium chloride, 10 mEq, PRN  magnesium sulfate, 1 g, PRN  acetaminophen, 650 mg, Q6H PRN    Or  acetaminophen, 650 mg, Q6H PRN  polyethylene glycol, 17 g, Daily PRN  promethazine, 12.5 mg, Q6H PRN    Or  ondansetron, 4 mg, Q6H PRN  nicotine, 1 patch, Daily PRN        SUPPORT DEVICES: [x] Ventilator [] BIPAP  [] Nasal Cannula [] Room Air      ABGs:     Lab Results   Component Value Date    QNT3AHE 32 12/05/2020    FIO2 40.0 12/05/2020       DATA:  Complete Blood Count:   Recent Labs     12/03/20 0422 12/04/20 0447 12/04/20  1644 12/05/20  0541   WBC 3.5  --  2.9*  --  3.4*   RBC 2.41*  --  2.24*  --  2.77*   HGB 7.3*  7.2*   < > 6.7* 8.2* 8.2*   HCT 23.8*  23.8*   < > 21.8* 25.7* 26.4*   MCV 98.8  --  97.3  --  95.3   MCH 30.3  --  29.9  --  29.6   MCHC 30.7  --  30.7  --  31.1   RDW 16.2*  --  16.1*  --  17.1*   PLT See Reflexed IPF Result  --  129*  --  170   MPV NOT REPORTED  --  12.8  --  12.2    < > = values in this interval not displayed.         Last 3 Blood Glucose:   Recent Labs     12/03/20 0422 12/04/20 0447 12/05/20  0541   GLUCOSE 133* 93 116*        PT/INR:    Lab Results   Component Value Date    PROTIME 9.5 11/17/2020    INR 0.9 11/17/2020     PTT:  No results found for: APTT, PTT    Comprehensive Metabolic Profile:   Recent Labs     12/03/20  0422 12/04/20  0447 12/05/20  0541    136 141   K 3.9 4.1 3.9    101 103   CO2 28 27 27   BUN 28* 22 23   CREATININE 0.56 0.51 0.58   GLUCOSE 133* 93 116*   CALCIUM 8.3* 8.3* 8.3*      Magnesium:   Lab Results   Component Value Date    MG 2.0 11/21/2020    MG 2.4 11/19/2020    MG 2.5 11/18/2020     Phosphorus:   Lab Results   Component Value Date    PHOS 3.3 11/18/2020    PHOS 3.5 11/17/2020     Ionized Calcium: No results found for: CAION     Urinalysis:   Lab Results   Component Value Date    NITRU NEGATIVE 11/23/2020    COLORU DARK YELLOW 11/23/2020    PHUR 7.0 11/23/2020    WBCUA 2 TO 5 11/23/2020    RBCUA 5 TO 10 11/23/2020    MUCUS NOT REPORTED 11/23/2020    TRICHOMONAS NOT REPORTED 11/23/2020    YEAST NOT REPORTED 11/23/2020    BACTERIA NOT REPORTED 11/23/2020    SPECGRAV 1.022 11/23/2020    LEUKOCYTESUR NEGATIVE 11/23/2020    UROBILINOGEN Normal 11/23/2020    BILIRUBINUR NEGATIVE 11/23/2020    GLUCOSEU NEGATIVE 11/23/2020    KETUA NEGATIVE 11/23/2020    AMORPHOUS NOT REPORTED 11/23/2020             Xr Chest (single View Frontal)    Result Date: 11/29/2020  Stable interstitial infiltrates. ET tube terminates 35 mm above the nghia.      Xr Chest Portable    Result Date: 12/1/2020  No significant change in appearance of pneumonia          VENTILATOR SETTINGS:  Vent Information  $Ventilation: $Subsequent Day  Skin Assessment: Clean, dry, & intact  Suction Catheter Diameter: 14  Equipment ID: [id number]  Equipment Changed: HME  Vent Type: Servo i  Vent Mode: PRVC  Vt Ordered: 400 mL  Rate Set: 30 bmp  Pressure Support: 8 cmH20  FiO2 : (S) 40 %  SpO2: 98 %  SpO2/FiO2 ratio: 198  Sensitivity: 3  PEEP/CPAP: 8  I Time/ I Time %: 0.7 s  Humidification Source: HME  Nitric Oxide/Epoprostenol In Use?: No  Mask Type: Full face mask  Mask Size: Small     PaO2/FiO2 RATIO:  Recent Labs     12/05/20 0228   POCPO2 46.7*      FiO2 : (S) 40 %       LABS:  ABGs:   Recent Labs     12/03/20  0410 12/04/20  0245 12/05/20 0228   POCPH 7.414 7.441 7.531*   POCPCO2 47.8 43.7 36.7   POCPO2 61.1* 51.5* 46.7*   POCHCO3 30.5* 29.8* 30.8*   GAYY4YQJ 91* 87* 87*            ASSESSMENT:     Principal Problem:    Pneumonia due to COVID-19 virus  Active Problems:    Acute respiratory failure due to COVID-19 ([facility] [address]    MDS (myelodysplastic syndrome) ([facility] [address]    Neutropenic sepsis ([facility] [address]    Bicytopenia    ROSALINO (acute kidney injury) ([facility] [address]    Acute respiratory failure with hypoxia ([facility] [address]    Essential hypertension    Coronary artery disease involving coronary bypass graft of native heart without angina pectoris    History of Raynaud's syndrome    Transaminitis    Immunosuppressed status (HCC)    Severe sepsis (HCC)    Hypokalemia    Other pancytopenia ([facility] [address]  Resolved Problems:    * No resolved hospital problems. *              Acute hypoxic respiratory failure secondary to COVID 19   Acute respiratory distress syndrome   Bilateral multifocal pneumonia due to COVID 19 infection   Covid -19 pandemic emergency    Mild hypernatremia better       LOS: 19  PLAN:    D/w RT  D/w RN   Vent setting reviewed   Continues to require vasopressors, vent dependent. Becomes tachypneic when spontaneous breathing trials are attempted. Febrile. Multiple comorbidities. Updated patient's daughter Pradip Forge  Consider palliative care. Sedation reviewed -   Change to precedex   Airborne isolation and droplet precautions to be continued  Continue supportive care   Cont tube feeding  Cont vent support  -   Monitor endotracheal secretions   Antibiotics per id     WEAN PER PROTOCOL:  [x] No   [] Yes  [] N/A      ICU PROPHYLAXIS:  Stress ulcer:  [x] PPI Agent  [] Q4Ywhbr [] Sucralfate  [] Other:  VTE:   [x] Enoxaparin  [] Unfract. Heparin Subcut  [] EPC Cuffs    NUTRITION:  [] NPO [x] Tube Feeding  [] TPN  [] PO    INSULIN DRIP:   [x] No   [] Yes        TRANSFER OUT OF ICU:   [x] No   [] Yes    Treatment plan Discussed with nursing staff in detail , all questions answered .      Total critical care time caring for this patient with life threatening, unstable organ failure, including direct patient contact, management of life support systems, review of data including imaging and labs, discussions with other team members and physicians at least 27

## 2020-12-06 NOTE — PROGRESS NOTES
Progress Note    Patient - Marguerite Angel  Date of Admission -  2020 10:51 PM  Date of Evaluation -  2020  Room and Bed Number -  -   Hospital Day -     CHIEF COMPLAINT : ACUTE HYPOXIC RESPIRATORY FAILURE DUE TO COVID -19 PNEUMONIA   SUBJECTIVE:     OVERNIGHT EVENTS:         dnrcc   OBJECTIVE:     VITAL SIGNS:  BP (!) 100/47   Pulse 118   Temp 100.6 °F (38.1 °C) (Core)   Resp 21   Ht 5' 7\" (1.702 m)   Wt 169 lb 5 oz (76.8 kg)   SpO2 (!) 75%   BMI 26.52 kg/m²   Tmax over 24 hours:  Temp (24hrs), Av.5 °F (38.1 °C), Min:100.4 °F (38 °C), Max:100.6 °F (38.1 °C)      Patient Vitals for the past 8 hrs:   BP Pulse Resp SpO2   20 1108 (!) 100/47 118 21 (!) 75 %         Intake/Output Summary (Last 24 hours) at 2020 1552  Last data filed at 2020 0600  Gross per 24 hour   Intake 529 ml   Output 1285 ml   Net -756 ml     Date 20 0000 - 20 2359   Shift 1575-4816 5330-9691 5776-9559 24 Hour Total   INTAKE   Shift Total(mL/kg)       OUTPUT   Urine(mL/kg/hr) 305(0.5)   305   Shift Total(mL/kg) 305(4)   305(4)   Weight (kg) 76.8 76.8 76.8 76.8     Wt Readings from Last 3 Encounters:   20 169 lb 5 oz (76.8 kg)     Body mass index is 26.52 kg/m². PHYSICAL EXAM:  I have discussed the care of Marguerite Angel  including pertinent history and exam findings,  with the nursing staff I have seen  the patient and the key elements of all parts of the encounter have been performed by me. For careful stewardship of limited PPE during COVID-19 pandemic my physical exam was deferred. Mario Cifuentes    MEDICATIONS:  Scheduled Meds:   latanoprost  1 drop Both Eyes Nightly     Continuous Infusions:    PRN Meds:   morphine, 2 mg, Q1H PRN    Or  morphine, 4 mg, Q1H PRN  glycopyrrolate, 0.2 mg, Q4H PRN  ondansetron, 4 mg, Q6H PRN  LORazepam, 1 mg, Q30 Min PRN  artificial tears, , PRN  acetaminophen, 650 mg, Q6H PRN    Or  acetaminophen, 650 mg, Q6H PRN  promethazine, 12.5 mg, Q6H PRN        SUPPORT DEVICES: [] Ventilator [] BIPAP  [x] Nasal Cannula [] Room Air      ABGs:     Lab Results   Component Value Date    FID6DOT 32 12/05/2020    FIO2 40.0 12/05/2020       DATA:  Complete Blood Count:   Recent Labs     12/04/20  0447 12/04/20  1644 12/05/20  0541 12/05/20  1634   WBC 2.9*  --  3.4*  --    RBC 2.24*  --  2.77*  --    HGB 6.7* 8.2* 8.2* 9.1*   HCT 21.8* 25.7* 26.4* 28.8*   MCV 97.3  --  95.3  --    MCH 29.9  --  29.6  --    MCHC 30.7  --  31.1  --    RDW 16.1*  --  17.1*  --    *  --  170  --    MPV 12.8  --  12.2  --         Last 3 Blood Glucose:   Recent Labs     12/04/20 0447 12/05/20  0541   GLUCOSE 93 116*        PT/INR:    Lab Results   Component Value Date    PROTIME 9.5 11/17/2020    INR 0.9 11/17/2020     PTT:  No results found for: APTT, PTT    Comprehensive Metabolic Profile:   Recent Labs     12/04/20 0447 12/05/20  0541    141   K 4.1 3.9    103   CO2 27 27   BUN 22 23   CREATININE 0.51 0.58   GLUCOSE 93 116*   CALCIUM 8.3* 8.3*      Magnesium:   Lab Results   Component Value Date    MG 2.0 11/21/2020    MG 2.4 11/19/2020    MG 2.5 11/18/2020     Phosphorus:   Lab Results   Component Value Date    PHOS 3.3 11/18/2020    PHOS 3.5 11/17/2020     Ionized Calcium: No results found for: CAION     Urinalysis:   Lab Results   Component Value Date    NITRU NEGATIVE 11/23/2020    COLORU DARK YELLOW 11/23/2020    PHUR 7.0 11/23/2020    WBCUA 2 TO 5 11/23/2020    RBCUA 5 TO 10 11/23/2020    MUCUS NOT REPORTED 11/23/2020    TRICHOMONAS NOT REPORTED 11/23/2020    YEAST NOT REPORTED 11/23/2020    BACTERIA NOT REPORTED 11/23/2020    SPECGRAV 1.022 11/23/2020    LEUKOCYTESUR NEGATIVE 11/23/2020    UROBILINOGEN Normal 11/23/2020    BILIRUBINUR NEGATIVE 11/23/2020    GLUCOSEU NEGATIVE 11/23/2020    KETUA NEGATIVE 11/23/2020    AMORPHOUS NOT REPORTED 11/23/2020             Xr Chest (single View Frontal)    Result Date: 11/29/2020  Stable interstitial infiltrates.   ET tube terminates 35 mm above the nghia. Xr Chest Portable    Result Date: 12/1/2020  No significant change in appearance of pneumonia          VENTILATOR SETTINGS:  Vent Information  $Ventilation: $Subsequent Day  Skin Assessment: Clean, dry, & intact  Suction Catheter Diameter: 14  Equipment ID: [id number]  Equipment Changed: HME  Vent Type: Servo i  Vent Mode: PRVC  Vt Ordered: 400 mL  Rate Set: 30 bmp  Pressure Support: 8 cmH20  FiO2 : 40 %  SpO2: (!) 75 %  SpO2/FiO2 ratio: 245  Sensitivity: 3  PEEP/CPAP: 8  I Time/ I Time %: 0.7 s  Humidification Source: HME  Nitric Oxide/Epoprostenol In Use?: No  Mask Type: Full face mask  Mask Size: Small     PaO2/FiO2 RATIO:  Recent Labs     12/05/20 0228   POCPO2 46.7*      FiO2 : 40 %       LABS:  ABGs:   Recent Labs     12/04/20 0245 12/05/20 0228   POCPH 7.441 7.531*   POCPCO2 43.7 36.7   POCPO2 51.5* 46.7*   POCHCO3 29.8* 30.8*   GJXO5VSN 87* 87*            ASSESSMENT:     Principal Problem:    Pneumonia due to COVID-19 virus  Active Problems:    Acute respiratory failure due to COVID-19 (HCC)    MDS (myelodysplastic syndrome) (HCC)    Neutropenic sepsis (HCC)    Bicytopenia    ROSALINO (acute kidney injury) (HCC)    Acute respiratory failure with hypoxia (HCC)    Essential hypertension    Coronary artery disease involving coronary bypass graft of native heart without angina pectoris    History of Raynaud's syndrome    Transaminitis    Immunosuppressed status (HCC)    Severe sepsis (HCC)    Hypokalemia    Other pancytopenia (HCC)  Resolved Problems:    * No resolved hospital problems.  *              Acute hypoxic respiratory failure secondary to COVID 19   Acute respiratory distress syndrome   Bilateral multifocal pneumonia due to COVID 19 infection   Covid -19 pandemic emergency    Mild hypernatremia better       LOS: 20  PLAN:  dnrcc   Will sign off   Electronically signed by Dread Dan MD on 12/6/2020 at 3:52 PM       This patient was evaluated in the context of the global SARS-CoV-2 (COVID-19) pandemic, which necessitated considerations that the patient either has COVID-19 infection or is at risk of infection with COVID-19. Institutional protocols and algorithms that pertain to the evaluation & management of patients with COVID-19 or those at risk for COVID-19 are in a state of rapid changes based on information released by regulatory bodies including the CDC and federal and state organizations. These policies and algorithms were followed during the patient's care. Please note that this chart was generated using voice recognition Dragon dictation software. Although every effort was made to ensure the accuracy of this automated transcription, some errors in transcription may have occurred.      +

## 2020-12-06 NOTE — FLOWSHEET NOTE
SPIRITUAL CARE DEPARTMENT - [address]  PROGRESS NOTE    Shift date: 12/05/20  Shift day: Saturday   Shift # 3    Room # 3023/3023-01   Name: Esau Robertson            Age: 70 y.o. Gender: female              Referral: Zoom call END OF LIFE    Admit Date & Time: 11/16/2020 10:51 PM    PATIENT/EVENT DESCRIPTION:  Esau Robertson is a 70 y.o. female. Her code status was changed to [facility]. The patient was terminally extubated. SPIRITUAL ASSESSMENT/INTERVENTION:  The patient's family was able to all be on Zoom to be able to say goodbye to their loved one. Initially just the patient's children Víctor Sanders were on the call. After the extubation the whole family was able to join the call. The link was e-mailed to the patient's four children and they were able to forward it on.     E-mail's used:   [email or web address]  [email or web address]  [email or web address]  [email or web address]  [email or web address]     Please note: Jessica Amaya [phone] called the hosptail on behalf of Tomas Curtis and José Miguel Palomo. ( She said they are the patient's siblings) She wanted to help them join the zoom call and provided her e-mail [email or web address]     A Serena Burris [phone] who identified herself as a granddaughter also called the hospital.     After the Zoom call the  was able to talk to the family and provide prayer and read scripture. SPIRITUAL CARE FOLLOW-UP PLAN:  Chaplains will remain available to offer spiritual and emotional support as needed.       Electronically signed by Chalino Gonzalez Resident, on 12/5/2020 at 9:16 PM.  Tor Ibrahim  [phone]       12/05/20 2030   Encounter Summary   Services provided to: Family   Referral/Consult From: Nurse   Support System Family members   Continue Visiting   (12/05/20)   Complexity of Encounter High   Length of Encounter 1 hour;45 minutes   Spiritual Assessment Completed Yes Grief and Life Adjustment   Type End of life;Palliative care   Assessment Tearful;Grieving; Anxious; Angry   Intervention Active listening;Prayer;Sustaining presence/ Ministry of presence;Ritual   Outcome Grieving

## 2020-12-06 NOTE — FLOWSHEET NOTE
[facility] [address]   Patient Death Note  DEATH   Shift date: 20    Shift day:   Shift #: 2                 Room #    Name: Madelin Cleaning            Age: 70 y.o. Gender: female          Spiritism: No Hindu on file      Place of Pent[facility]: [address] [place] [address] Date & Time: 2020 10:51 PM     Referral: Nurse    Actual date of death: 20   TOD: 17:01       SITUATION AT DEATH:  The patient was in Covid isolation when she was terminally extubated. IS THIS A 'S CASE? No    SPIRITUAL/EMOTIONAL INTERVENTION:  The  was able to call and provide support for the patient's . The  sustained ministerial presence with the medical staff.  called  home. Family Received Grief Packet? No       NAME AND PHONE NUMBER OF DOCTOR SIGNING DEATH CERTIFICATE:  (full name) Jo-Ann Navarrete MD     (phone)  [phone]       spoke with [phone] [facility] Nurse   , who will contact the  home   [address] [place] [address] AT 17:50  (Please note which nurse you spoke with to confirm the  home will be contacted. Family should not be listed)    Copy of COMPLETED Release of Body Form Received? Yes     HOME:  Name: [facility]: [facility]  Phone Number: [phone]    NEXT OF KIN:  Name: Margaret Jaimes  Relationship: Spouse   Street Address: Jessie Mendoza city: [facility] HOS[facility]: [place]  Zip code: [address]   Phone Number: [phone]    [facility]? No    IF SO, WHAT?   None     Electronically signed by Natalie Edwards Resident, on 2020 at 6:06 PM.  [facility]  [phone]     20 1805   Encounter Summary   Services provided to: Family   Referral/Consult From: Nurse   Support System Family members   Place [facility])    Continue Visiting   (20)   Complexity of Encounter High   Length of Encounter 1 hour;3 hours   Spiritual Assessment Completed Yes   Grief and Life Adjustment   Type Death   Assessment Grieving   Intervention Active listening   Outcome Grieving

## 2020-12-06 NOTE — PROGRESS NOTES
Report given to [facility] on Car 2. Pt. Taken down with all belongings to room 2026.

## 2020-12-06 NOTE — PLAN OF CARE
I talked to patient's family including daughter Glenn Hernández and spouse Michael Jaimes regarding plan of care. Family wants to change CODE STATUS to comfort care . Patient's spouse and daughter says that they do not want the patient to suffer anymore and that is what patient would have chosen for her. Family is planning terminal weaning after virtual video visit later tonight.

## 2020-12-06 NOTE — PROGRESS NOTES
[facility]  Office: [address], DO, Raghusuzannerylan Lott, DO, Per Arvizu, DO, Ismael Elaine, DO, Umair Fonseca MD, Jose White MD, Scooter Holguin MD, Atif Browning MD, Janie Duggan MD, Sobeida Kendall MD, Silvana Vega MD, Danielle Boyle MD, Ren Delgado MD, Evan Jimenez, DO, Nimesh Tavarez MD, Lindsay Morales MD, Blake White, DO, Parminder Bae MD,  Juliet De La Torre DO, Francy Davenport MD, Víctor Hall MD, Yoan Reyes, [facility], [facility] HospitalCLARISA Solo, CNP, Nell Wheeler, CNP, Elliot Zhao, CNS, Gabbie Turner, CNP, Ama Gomez, CNP, Jolie Sandra, CNP, Faisal Castillo, CNP, Gisell Lance, CNP, MARCIE MurphyC, Julio Rolon, [facility], Paolo Gorman, CNP, Kenya Troncoso, CNP, Ifeoma Landin, CNP, Ayala Sheppard, CNP, Raffaele Ariza, CNP         [facility]   [address] [facility]    Progress Note    12/6/2020    8:32 AM    Name:   Andra Azevedo  MRN:     [id number]     [place]:      [de-identified]   Room:   [facility]01   Day:  20  Admit Date:  11/16/2020 10:51 PM    PCP:   Jaylene Herrera MD  Code Status:  DNR-CC    Subjective:     C/C: No chief complaint on file. Interval History Status: not changed. Patient seen and examined. CUrrently extubated, appears agonal breathing, tachycardic, not responding to commands, not opening eyes to verbal or painful stimulus    Brief History:       Severa Living a 70 y. o. female with a past medical history for MDS, essential hypertension, melanoma, mitral valve prolapse, MRSA, Raynaud's disease presents emergency department for shortness of breath and dyspnea upon exertion for 3 days.  Patient was initially seen at [facility] diagnosed with Covid pneumonia.  Patient requiring 50% FiO2 on BiPAP, patient also had an elevated D-dimer but CTA was not possible secondary to contrast allergy.  [facility] unable to accommodate due to lack of Covid beds, patient was transferred for further care.  Spoke with pulmonary medicine at [facility] and was accepted for ICU transfer. Jordan Martinez was given 1 unit of PRBCs at [address] [place] [address] for a hemoglobin less than 7, patient was also placed on BiPAP for transfer. Jordan Martinez arrived, no complaints except for some shortness of breath. Needing high flow oxygen, still critical     Intubated and sedated, endotracheal intubation done on November 23, 2020 due to acute respiratory failure not holding oxygenation even on FiO2 100% BiPAP. Patient was intubated, requring multiple transfusions, family decided to make patient DNR-CC and to palliate. Patient was extubated 12/5/2020. Review of Systems:     Unable to obtain due to lack of cooperation    Medications: Allergies: Allergies   Allergen Reactions    Fish Allergy Anaphylaxis, Hives and Swelling    Hydrocodone-Acetaminophen Anaphylaxis    Tizanidine Anaphylaxis and Hives    Atorvastatin Hives, Other (See Comments) and Swelling    Metoclopramide Hives     Other reaction(s): Unknown, Unknown    Moxifloxacin Hives, Other (See Comments) and Swelling     Other reaction(s): Other (See Comments), Unknown    Oxycodone Other (See Comments)     Other reaction(s): Hallucinations, Mental Status Change    Pregabalin Other (See Comments)     Other reaction(s): Hallucinations, Other (See Comments)  Causes sores in the mouth  Causes sores in the mouth      Tramadol      Other reaction(s): Edema, Other (See Comments)    Zolpidem Other (See Comments)     Other reaction(s): Other (See Comments)  causes cramps in hands and legs  causes cramps in hands and legs  Other reaction(s):  Other (See Comments)  causes cramps in hands and legs  causes cramps in hands and legs      Cephalexin Rash     Other reaction(s): Unknown, Unknown    Iodides Rash and Swelling     ivp and heart cath dye    Sulfa Antibiotics Rash       Current Meds:   Scheduled Meds:    latanoprost  1 drop Both Eyes Nightly     Continuous Infusions:   PRN Meds: morphine **OR** morphine, glycopyrrolate, ondansetron, LORazepam, artificial tears, acetaminophen **OR** acetaminophen, promethazine **OR** [DISCONTINUED] ondansetron    Data:     Past Medical History:   has a [facility] of [facility] Deaf, left, Essential hypertension, GERD (gastroesophageal reflux disease), Glaucoma, Hyperlipidemia, [facility]an[facility] MRSA (methicillin resistant staph aureus) culture positive, MVP (mitral valve prolapse), Pharyngoesophageal dysphagia, Raynaud disease, and Status post four vessel coronary artery bypass. Social History:   reports that she quit smoking about 15 years ago. Her smoking use included cigarettes. She started smoking about 63 years ago. She does not have any smokeless tobacco history on file. She reports that she does not drink alcohol or use drugs. Family History:   Family History   Problem Relation Age of Onset    Heart Failure Mother     Early Death Mother     Heart Disease Mother     Stroke Father        Vitals:  BP (!) 118/44   Pulse 66   Temp 100.6 °F (38.1 °C) (Core)   Resp (!) 32   Ht 5' 7\" (1.702 m)   Wt 169 lb 5 oz (76.8 kg)   SpO2 96%   BMI 26.52 kg/m²   Temp (24hrs), Av.5 °F (38.1 °C), Min:100.4 °F (38 °C), Max:100.6 °F (38.1 °C)    Recent Labs     20  0245   POCGLU 103*       I/O (24Hr):     Intake/Output Summary (Last 24 hours) at 2020 0832  Last data filed at 2020 0600  Gross per 24 hour   Intake 1069 ml   Output 1690 ml   Net -621 ml       Labs:  Hematology:  Recent Labs     20  2108 20  0447 20  1644 20  0541 20  1634   WBC  --  2.9*  --  3.4*  --    RBC  --  2.24*  --  2.77*  --    HGB  --  6.7* 8.2* 8.2* 9.1*   HCT  --  21.8* 25.7* 26.4* 28.8*   MCV  --  97.3  --  95.3  --    MCH  --  29.9  --  29.6  --    MCHC  --  30.7  --  31.1  --    RDW  --  16.1*  --  17.1*  --    PLT  --  129*  --  170  --    MPV  --  12.8  --  12.2  --    DDIMER 24.53  --   --   --   -- Chemistry:  Recent Labs     12/04/20  0447 12/05/20  0541    141   K 4.1 3.9    103   CO2 27 27   GLUCOSE 93 116*   BUN 22 23   CREATININE 0.51 0.58   ANIONGAP 8* 11   LABGLOM >60 >60   GFRAA >60 >60   CALCIUM 8.3* 8.3*     Recent Labs     12/04/20  0245   POCGLU 103*     ABG:  Lab Results   Component Value Date    POCPH 7.531 12/05/2020    POCPCO2 36.7 12/05/2020    POCPO2 46.7 12/05/2020    POCHCO3 30.8 12/05/2020    NBEA NOT REPORTED 12/05/2020    PBEA 8 12/05/2020    CMP1HZM 32 12/05/2020    BSOT7XNC 87 12/05/2020    FIO2 40.0 12/05/2020     Lab Results   Component Value Date/Time    SPECIAL L HEEL 6ML 12/04/2020 07:05 PM     Lab Results   Component Value Date/Time    CULTURE NO GROWTH 2 DAYS 12/04/2020 07:05 PM       Radiology:  Xr Chest (single View Frontal)    Result Date: 11/29/2020  Stable interstitial infiltrates. ET tube terminates 35 mm above the nghia. Xr Chest Portable    Result Date: 12/4/2020  Patchy airspace opacities bilaterally, may be related to pulmonary edema versus pneumonia, mildly increased. Mild right pleural effusion.      Xr Chest Portable    Result Date: 12/1/2020  No significant change in appearance of pneumonia       Physical Examination:        General appearance:  Chronically ill appearing,  Mental Status:  Not opening eyes to command, or stimulus  Lungs: poor breath sounds bilaterally, agonal breathing  Heart:  Tachycardic, systolic murmur  Abdomen:  soft, nontender, nondistended, normal bowel sounds  Extremities:  no edema, redness, tenderness in the calves  Skin:  no gross lesions, rashes, induration    Assessment:        Hospital Problems           Last Modified POA    * (Principal) Pneumonia due to COVID-19 virus 11/17/2020 Yes    Acute respiratory failure due to COVID-19 ([facility] 11/16/2020 Yes    MDS (myelodysplastic syndrome) ([facility] 11/17/2020 Yes    Neutropenic sepsis ([facility] 11/17/2020 Yes    Bicytopenia 11/17/2020 Yes    ROSALINO (acute kidney injury) ([facility] 11/17/2020 Yes    Acute respiratory failure with hypoxia ([facility] 11/17/2020 Yes    Essential hypertension 11/17/2020 Yes    Coronary artery disease involving coronary bypass graft of native heart without angina pectoris 11/17/2020 Yes    History of Raynaud's syndrome 11/17/2020 Yes    Transaminitis 11/17/2020 Yes    Immunosuppressed status ([address] 11/17/2020 Yes    Severe sepsis ([facility] 11/17/2020 Yes    Hypokalemia 11/22/2020 Yes    Other pancytopenia ([address] 11/24/2020 Yes          Plan:        1. DC oxygen,  2. Comfort care measures, will treat pain and any anxiety patient may have  3. DNR-CC  4.  Consult to power Moffett MD  12/6/2020  8:32 AM

## 2020-12-07 NOTE — DISCHARGE SUMMARY
[facility]  Office: [address], DO, Osmany Mahmood, DO, Johan Viera, DO, [facility], DO, Herbert Dempsey MD, Emile Gibbs MD, Brigette Mendez MD, Rena Rader MD, Kwan Das MD, Doris Vega MD, Arianna Mace MD, Morgan Stephen MD, Mbonu Fleet Cowden, MD, Elizabeth Palmer DO, Brandy Philippe MD, Mackenzie Harris MD, Kesha Lara, DO, Cortez Belle MD,  Abe Oviedo DO, Rosibel Isabel MD, Sangeeta De La Cruz MD, Karol Jolly, [facility], [facility], CNP, Juan David Coy, CNP, Eva Kessler, [facility], Abilio Lauren, CNP, Lorie Laughlin, CNP, Bill Rojo, CNP, Jason Greco, CNP, Chris Solares, CNP, Michelle Sandoval PA-C, Erica Barnes, [facility], Natalie Robles, CNP, Anita Franco, CNP, Orestes Mendenhall, CNP, Radha Dang, CNP, Thierno  [facility]    Discharge Summary     Patient ID: Nadine Dalton  :  1945   MRN: [id number]     ACCOUNT:  [de-identified]   Patient's PCP: Jordy Welch MD  Admit Date: 2020   Discharge Date: 2020     Length of Stay: 20  Code Status:  DNR-CC  Admitting Physician: Cortez Belle MD  Discharge Physician: Cortez Belle MD     Active Discharge Diagnoses:     Hospital Problem Lists:  Principal Problem:    Pneumonia due to COVID-19 virus  Active Problems:    Acute respiratory failure due to COVID-19 [facility])    MDS (myelodysplastic syndrome) ([facility] [address]    Neutropenic sepsis ([facility] [address]    Bicytopenia    ROSALINO (acute kidney injury) ([facility] [address]    Acute respiratory failure with hypoxia ([facility] [address]    Essential hypertension    Coronary artery disease involving coronary bypass graft of native heart without angina pectoris    History of Raynaud's syndrome    Transaminitis    Immunosuppressed status ([facility] [address]    Severe sepsis ([facility] [address]    Hypokalemia    Other pancytopenia ([facility] [address]  Resolved Problems:    * No resolved hospital problems.  *      Admission Condition:  serious     Discharged Condition: terminal    Hospital Stay:     Hospital Course:  Paul Shay is a 70 y.o. female who was admitted for the management of   Pneumonia due to COVID-19 virus , presented to ER with   Clement Fink a 70 y. o. female with a past medical history for MDS, essential hypertension, melanoma, mitral valve prolapse, MRSA, Raynaud's disease presents emergency department for shortness of breath and dyspnea upon exertion for 3 days.  Patient was initially seen at [facility] diagnosed with Covid pneumonia.  Patient requiring 50% FiO2 on BiPAP, patient also had an elevated D-dimer but CTA was not possible secondary to contrast allergy.  [facility] unable to accommodate due to lack of [facility] beds, patient was transferred for further care.  Spoke with pulmonary medicine at [facility] and was accepted for ICU transfer. Elian Line was given 1 unit of PRBCs at [address] [place] [address] for a hemoglobin less than 7, patient was also placed on BiPAP for transfer.  Patient arrived, no complaints except for some shortness of breath. Needing high flow oxygen, still critical     Intubated and sedated, endotracheal intubation done on November 23, 2020 due to acute respiratory failure not holding oxygenation even on FiO2 100% BiPAP. Patient was intubated, requring multiple transfusions, family decided to make patient DNR-CC and to palliate. Patient was extubated 12/5/2020.  Patient passed away at 17:01      Significant therapeutic interventions: see above    Significant Diagnostic Studies:   Labs / Micro:  CBC:   Lab Results   Component Value Date    WBC 3.4 12/05/2020    RBC 2.77 12/05/2020    HGB 9.1 12/05/2020    HCT 28.8 12/05/2020    MCV 95.3 12/05/2020    MCH 29.6 12/05/2020    MCHC 31.1 12/05/2020    RDW 17.1 12/05/2020     12/05/2020     BMP:    Lab Results   Component Value Date    GLUCOSE 116 12/05/2020     12/05/2020    K 3.9 12/05/2020     12/05/2020    CO2 27 12/05/2020    ANIONGAP 11 12/05/2020 BUN 23 2020    CREATININE 0.58 2020    BUNCRER NOT REPORTED 2020    CALCIUM 8.3 2020    LABGLOM >60 2020    GFRAA >60 2020    GFR      2020    GFR NOT REPORTED 2020        Radiology:  Xr Chest (single View Frontal)    Result Date: 2020  Stable interstitial infiltrates. ET tube terminates 35 mm above the nghia. Xr Chest Portable    Result Date: 2020  Patchy airspace opacities bilaterally, may be related to pulmonary edema versus pneumonia, mildly increased. Mild right pleural effusion. Xr Chest Portable    Result Date: 2020  No significant change in appearance of pneumonia       Consultations:    Consults:     Final Specialist Recommendations/Findings:   IP CONSULT TO PULMONOLOGY  IP CONSULT TO INFECTIOUS DISEASES  IP CONSULT TO ONCOLOGY  IP CONSULT TO PHARMACY  IP CONSULT TO PULMONOLOGY  IP CONSULT TO SPIRITUAL SERVICES  IP CONSULT TO GI  IP CONSULT TO DIETITIAN  IP CONSULT TO PALLIATIVE CARE  IP CONSULT TO HOSPICE      The patient was seen and examined on day of discharge and this discharge summary is in conjunction with any daily progress note from day of discharge. Discharge plan:     Disposition:   Electronically signed by   Irina Rubi MD  2020  7:09 PM      Thank you Dr. Lise Coronado MD for the opportunity to be involved in this patient's care.

## 2020-12-10 LAB
CULTURE: NORMAL
CULTURE: NORMAL
Lab: NORMAL
Lab: NORMAL
SPECIMEN DESCRIPTION: NORMAL
SPECIMEN DESCRIPTION: NORMAL

## 2023-02-03 NOTE — PROGRESS NOTES
2-3-23  Pt called back . Retiring end of March and would like to schedule for April.  Same insurance    emq 4-4-23  No covid  gatorade prep -  Ins confirmed 2023  My A  Letters  Contact cell       Case ID: [id number] Patient name: David Glez [[id number]]   Date: 4/4/2023 Status: Scheduled     2-6 sent my A    [facility] -   Infectious diseases evaluation  admission date 11/16/2020    reason for consultation:   covid    Impression :   Current:  · covid pneumonia - severe w resp distress  · MDS  · neutropenia  ·   · Allergy to Keflex-rash  Discussion / summary of stay / plan of care   ·   Recommendations     consented Immune plasma - ordered 11/17,  1 U - tolerated 11/17  Post Meropenem and vanco - stopped 11/18 due to cx neg  remdesevir till 11/20/20, completed  Did not fit criteria for research medicine due to her MDS  Steroids course over  Inflammatory markers improved    · Fever 38 on 11/30  · Get 2 blood cultures, and sputum culture  · Start the patient on ceftaroline 11/30 , watch for rash    Infection Control Recommendations   · [place] Precautions  · Contact Isolation   · Airborne isolation  · Droplet Isolation      Antimicrobial Stewardship Recommendations   · Simplification of therapy  · Targeted therapy  · Per Kg dosing    Coordination ofOutpatient Care:   · Estimated Length of IV antimicrobials:  · Patient will need Midline / picc Catheter Insertion:   · Patient will need SNF:  · Patient will need outpatient wound care:     History of Present Illness:   Initial history:  Haris Silver is a 70y.o.-year-old female w resp distress transferred from [facility] pneumonia and underlying MDS.   Neutropenia  On bipap and 50% FIO2, elevated D-Dimers and went for a VQ scan  Past cig smoking - HTN - Raynaud-    Started on decadron remdesevir  started on meropenem and vanco pend BC due to concern for ongoing sepsis      Interval changes     Patient Vitals for the past 8 hrs:   BP Temp Temp src Pulse Resp SpO2   11/30/20 1800 -- -- -- 102 17 97 %   11/30/20 1700 -- -- -- 101 14 98 %   11/30/20 1651 (!) 98/44 100.4 °F (38 °C) CORE 102 15 98 %   11/30/20 1600 (!) 103/49 100.4 °F (38 °C) CORE 100 26 98 %   11/30/20 1557 (!) 103/49 -- -- 102 27 99 % personally reviewed the past medical history, past surgical history, medications, social history, and family history, and I haveupdated the database accordingly. Allergies:   Fish allergy; Hydrocodone-acetaminophen; Tizanidine; Atorvastatin; Metoclopramide; Moxifloxacin; Oxycodone; Pregabalin; Tramadol; Zolpidem; Cephalexin; Iodides; and Sulfa antibiotics     Review of Systems:     Review of Systems   Unable to perform ROS: Intubated       Physical Examination :       Physical Exam  Constitutional:       General: She is not in acute distress. Appearance: Normal appearance. She is ill-appearing. She is not diaphoretic. HENT:      Head: Normocephalic and atraumatic. Nose: Nose normal. No congestion. Eyes:      General: No scleral icterus. Conjunctiva/sclera: Conjunctivae normal.      Pupils: Pupils are equal, round, and reactive to light. Neck:      Musculoskeletal: Neck supple. No neck rigidity or muscular tenderness. Cardiovascular:      Rate and Rhythm: Normal rate and regular rhythm. Heart sounds: Normal heart sounds. No murmur. No friction rub. Pulmonary:      Effort: No respiratory distress. Breath sounds: No stridor. No wheezing, rhonchi or rales. Chest:      Chest wall: No tenderness. Abdominal:      General: There is no distension. Palpations: Abdomen is soft. There is no mass. Tenderness: There is no abdominal tenderness. Genitourinary:     Comments: Urine bhavin  Musculoskeletal:         General: No swelling or deformity. Skin:     General: Skin is dry. Coloration: Skin is not jaundiced or pale. Findings: Bruising present. No erythema, lesion or rash. Neurological:      Mental Status: She is alert. Mental status is at baseline.       Comments: Intubated   Psychiatric:      Comments: Intubated         Past Medical History:     Past Medical History:   Diagnosis Date    Cancer ([facility] [address]     myelodysplastic syndrome    Deaf, left     Essential hypertension     GERD (gastroesophageal reflux disease)     Glaucoma     Hyperlipidemia     Melanoma (HCC)     MRSA (methicillin resistant staph aureus) culture positive     MVP (mitral valve prolapse)     Pharyngoesophageal dysphagia     Raynaud disease     Status post four vessel coronary artery bypass        Past Surgical  History:     Past Surgical History:   Procedure Laterality Date    APPENDECTOMY      CARDIAC SURGERY      CABG x3    CHOLECYSTECTOMY      HYSTERECTOMY, TOTAL ABDOMINAL      IR PORT PLACEMENT < 5 YEARS      SMALL INTESTINE SURGERY      TUBAL LIGATION         Medications:      QUEtiapine  50 mg Oral Nightly    enoxaparin  40 mg Subcutaneous Daily    docusate  100 mg Oral BID    pantoprazole  40 mg Intravenous BID    And    sodium chloride (PF)  10 mL Intravenous Daily    potassium chloride  10 mEq Intravenous Daily    LORazepam  0.25 mg Intravenous Once    latanoprost  1 drop Both Eyes Nightly    sodium chloride  20 mL Intravenous Once    sodium chloride  20 mL Intravenous Once       Social History:     Social History     Socioeconomic History    Marital status:      Spouse name: Not on file    Number of children: Not on file    Years of education: Not on file    Highest education level: Not on file   Occupational History    Not on file   Social Needs    Financial resource strain: Not on file    Food insecurity     Worry: Not on file     Inability: Not on file    Transportation needs     Medical: Not on file     Non-medical: Not on file   Tobacco Use    Smoking status: Former Smoker     Types: Cigarettes     Start date: 1/1/1957     Last attempt to quit: 8/22/2005     Years since quitting: 15.2   Substance and Sexual Activity    Alcohol use: Never     Frequency: Never    Drug use: Never    Sexual activity: Not on file   Lifestyle    Physical activity     Days per week: Not on file     Minutes per session: Not on file    Stress: Not on file Relationships    Social connections     Talks on phone: Not on file     Gets together: Not on file     Attends Adventism service: Not on file     Active member of club or organization: Not on file     Attends meetings of clubs or organizations: Not on file     Relationship status: Not on file    Intimate partner violence     Fear of current or ex partner: Not on file     Emotionally abused: Not on file     Physically abused: Not on file     Forced sexual activity: Not on file   Other Topics Concern    Not on file   Social History Narrative    Not on file       Family History:     Family History   Problem Relation Age of Onset    Heart Failure Mother     Early Death Mother     Heart Disease Mother     Stroke Father       Medical Decision Making:   I have independently reviewed/ordered the following labs:    CBC with Differential:   Recent Labs     11/29/20  0318  11/30/20  0350 11/30/20  1227   WBC 8.0  --  7.1  --    HGB 7.3*   < > 7.2* 6.8*   HCT 25.2*   < > 24.2* 22.9*   PLT 79*  --  75*  --    LYMPHOPCT 9*  --  13*  --    MONOPCT 2*  --  3  --     < > = values in this interval not displayed. BMP:  Recent Labs     11/28/20  0417 11/30/20  0818    145*   K 3.9 4.1    108*   CO2 33* 31   BUN 44* 38*   CREATININE 0.66 0.57     Hepatic Function Panel:   No results for input(s): PROT, LABALBU, BILIDIR, IBILI, BILITOT, ALKPHOS, ALT, AST in the last 72 hours. No results for input(s): RPR in the last 72 hours. No results for input(s): HIV in the last 72 hours. No results for input(s): BC in the last 72 hours. Lab Results   Component Value Date    CREATININE 0.57 11/30/2020    GLUCOSE 139 11/30/2020       Detailed results: Thank you for allowing us to participate in the care of this patient. Please call with questions.     This note is created with the assistance of a speech recognition program.  While intending to generate adocument that actually reflects the content of the visit, the

## 2024-05-15 NOTE — PROGRESS NOTES
[facility] -   Infectious diseases evaluation  admission date 11/16/2020    reason for consultation:   covid    Impression :   Current:  · covid pneumonia - severe w resp distress  · MDS  · neutropenia  ·   Discussion / summary of stay / plan of care   ·   Recommendations     consented Immune plasma - ordered 11/17,  1 U - tolerated 11/17  Post Meropenem and vanco - stopped 11/18 due to cx neg  remdesevir till 11/20/20, completed  · Follow ferritin and infl markers today   · neupogen for neutropenia  · research medicine to help out w her covid = did not fit criteria  · Steroids for Covid lung injury    Infection Control Recommendations   · [place] Precautions  · Contact Isolation   · Airborne isolation  · Droplet Isolation      Antimicrobial Stewardship Recommendations   · Simplification of therapy  · Targeted therapy  · Per Kg dosing    Coordination ofOutpatient Care:   · Estimated Length of IV antimicrobials:  · Patient will need Midline / picc Catheter Insertion:   · Patient will need SNF:  · Patient will need outpatient wound care:     History of Present Illness:   Initial history:  Lily Dias is a 70y.o.-year-old female w resp distress transferred from [id number] pneumonia and underlying MDS.   Neutropenia  On bipap and 50% FIO2, elevated D-Dimers and went for a VQ scan  Past cig smoking - HTN - Raynaud-    Started on decadron remdesevir  started on meropenem and vanco pend BC due to concern for ongoing sepsis      Interval changes  11/21/2020   Patient Vitals for the past 8 hrs:   BP Temp Temp src Pulse Resp SpO2   11/21/20 0851 (!) 114/48 97.6 °F (36.4 °C) Oral 72 22 95 %   11/21/20 0840 -- -- -- -- 22 99 %         She is rather stable continues to be on nonrebreather, FiO2 of 100%, oriented x3, would like to go home,  Encourage because her ferritin has been improving  Abdomen soft nontender    White count is up to 12, results of Neupogen  tolerated the plasma well 11/17    No positive cultures-  Mycoplasma IGM neg      Summary of relevant labs:  Labs:  WBC  1.2-12.8  Creat 0.81  procalcitonin 1   - 227-67-67.6  Ferritin 3656 - 9954 -2067-2157  DDimer 2.44  Fibrinogen 720 - 1002  proBNP 729   - 291 - 407      Micro:  BC 11/17/20 x 2  resp PCR neg but for COVID+ 11/17  Legionella AG neg  Imaging:  VQ scan low probability    CT chest 11/16 [facility] periph ground glass bilat, mediastinal large LN  CT brain 11/216 neg [facility]      I have personally reviewed the past medical history, past surgical history, medications, social history, and family history, and I haveupdated the database accordingly. Allergies:   Fish allergy; Hydrocodone-acetaminophen; Tizanidine; Atorvastatin; Metoclopramide; Moxifloxacin; Oxycodone; Pregabalin; Tramadol; Zolpidem; Cephalexin; Iodides; and Sulfa antibiotics     Review of Systems:     Review of Systems   Constitutional: Positive for activity change. Negative for chills and fatigue. Eyes: Negative for visual disturbance. Respiratory: Positive for cough. Negative for choking, shortness of breath and wheezing. Cardiovascular: Negative for chest pain and leg swelling. Gastrointestinal: Negative for anal bleeding. Endocrine: Negative for polydipsia and polyphagia. Genitourinary: Negative for dysuria and frequency. Musculoskeletal: Negative for back pain. Skin: Negative for color change. Allergic/Immunologic: Positive for immunocompromised state. Neurological: Negative for numbness and headaches. HANDS SHAKE   Hematological: Negative for adenopathy. Psychiatric/Behavioral: Negative for agitation, behavioral problems and confusion. Physical Examination :       Physical Exam  Constitutional:       General: She is not in acute distress. Appearance: Normal appearance. She is ill-appearing. She is not diaphoretic. HENT:      Head: Normocephalic and atraumatic. Nose: Nose normal. No congestion. Eyes:      General: No scleral icterus. Conjunctiva/sclera: Conjunctivae normal.      Pupils: Pupils are equal, round, and reactive to light. Neck:      Musculoskeletal: Neck supple. No neck rigidity. Cardiovascular:      Rate and Rhythm: Normal rate and regular rhythm. Heart sounds: Normal heart sounds. No murmur. No friction rub. Pulmonary:      Effort: No respiratory distress. Breath sounds: No stridor. No wheezing, rhonchi or rales. Chest:      Chest wall: No tenderness. Abdominal:      General: There is no distension. Palpations: Abdomen is soft. Tenderness: There is no abdominal tenderness. Genitourinary:     Comments: Urine bhavin  Musculoskeletal:         General: No swelling or deformity. Skin:     General: Skin is dry. Coloration: Skin is not jaundiced. Findings: Bruising present. No erythema. Neurological:      General: No focal deficit present. Mental Status: She is alert and oriented to person, place, and time. Mental status is at baseline. Cranial Nerves: No cranial nerve deficit. Psychiatric:         Mood and Affect: Mood normal.         Thought Content:  Thought content normal.         Past Medical History:     Past Medical History:   Diagnosis Date    Cancer ([facility] [address]     myelodysplastic syndrome    Deaf, left     Essential hypertension     GERD (gastroesophageal reflux disease)     Glaucoma     Hyperlipidemia     Melanoma ([facility] [address]     MRSA (methicillin resistant staph aureus) culture positive     MVP (mitral valve prolapse)     Pharyngoesophageal dysphagia     Raynaud disease     Status post four vessel coronary artery bypass        Past Surgical  History:     Past Surgical History:   Procedure Laterality Date    APPENDECTOMY      CARDIAC SURGERY      CABG x3    CHOLECYSTECTOMY      HYSTERECTOMY, TOTAL ABDOMINAL      IR PORT PLACEMENT < 5 YEARS      SMALL INTESTINE SURGERY      TUBAL LIGATION         Medications:      furosemide  20 mg Intravenous Daily    LORazepam  0.25 mg Intravenous Once    enoxaparin  40 mg Subcutaneous BID    dexamethasone  6 mg Intravenous Daily    latanoprost  1 drop Both Eyes Nightly    sodium chloride  20 mL Intravenous Once    sodium chloride  20 mL Intravenous Once       Social History:     Social History     Socioeconomic History    Marital status:      Spouse name: Not on file    Number of children: Not on file    Years of education: Not on file    Highest education level: Not on file   Occupational History    Not on file   Social Needs    Financial resource strain: Not on file    Food insecurity     Worry: Not on file     Inability: Not on file    Transportation needs     Medical: Not on file     Non-medical: Not on file   Tobacco Use    Smoking status: Former Smoker     Types: Cigarettes     Start date: 1/1/1957     Last attempt to quit: 8/22/2005     Years since quitting: 15.2   Substance and Sexual Activity    Alcohol use: Never     Frequency: Never    Drug use: Never    Sexual activity: Not on file   Lifestyle    Physical activity     Days per week: Not on file     Minutes per session: Not on file    Stress: Not on file   Relationships    Social connections     Talks on phone: Not on file     Gets together: Not on file     Attends Caodaism service: Not on file     Active member of club or organization: Not on file     Attends meetings of clubs or organizations: Not on file     Relationship status: Not on file    Intimate partner violence     Fear of current or ex partner: Not on file     Emotionally abused: Not on file     Physically abused: Not on file     Forced sexual activity: Not on file   Other Topics Concern    Not on file   Social History Narrative    Not on file       Family History:     Family History   Problem Relation Age of Onset    Heart Failure Mother     Early Death Mother     Heart Disease Mother     Stroke Father       Medical Decision Making:   I have independently reviewed/ordered the following labs:    CBC with Differential:   Recent Labs     11/20/20  0537 11/21/20  0540   WBC 9.0 12.8*   HGB 8.0* 7.3*   HCT 26.0* 23.4*    423   LYMPHOPCT 15* 16*   MONOPCT 10* 7     BMP:  Recent Labs     11/19/20  0544 11/21/20  0540    143   K 4.0 3.5*    106   CO2 21 25   BUN 19 22   CREATININE 0.53 0.52   MG 2.4 2.0     Hepatic Function Panel:   Recent Labs     11/19/20  0544   PROT 5.6*   LABALBU 2.6*   BILITOT 0.47   ALKPHOS 58   ALT 35*   AST 33*     No results for input(s): RPR in the last 72 hours. No results for input(s): HIV in the last 72 hours. No results for input(s): BC in the last 72 hours. Lab Results   Component Value Date    CREATININE 0.52 11/21/2020    GLUCOSE 84 11/21/2020       Detailed results: Thank you for allowing us to participate in the care of this patient. Please call with questions. This note is created with the assistance of a speech recognition program.  While intending to generate adocument that actually reflects the content of the visit, the document can still have some errors including those of syntax and sound a like substitutions which may escape proof reading. It such instances, actual meaningcan be extrapolated by contextual diversion.     Meryl Taylor MD  Office: [phone]  Perfect serve / office [phone] show

## 2025-05-27 NOTE — PLAN OF CARE
Problem:  Activity:  Goal: Fatigue will decrease  Outcome: Ongoing     Problem: Cardiac:  Goal: Hemodynamic stability will improve  Outcome: Ongoing     Problem: Coping:  Goal: Level of anxiety will decrease  Outcome: Ongoing  Goal: Ability to cope will improve  Outcome: Ongoing  Goal: Ability to establish a method of communication will improve  Outcome: Ongoing     Problem: Nutritional:  Goal: Consumption of the prescribed amount of daily calories will improve  Outcome: Ongoing 48